# Patient Record
Sex: MALE | Race: WHITE | NOT HISPANIC OR LATINO | URBAN - METROPOLITAN AREA
[De-identification: names, ages, dates, MRNs, and addresses within clinical notes are randomized per-mention and may not be internally consistent; named-entity substitution may affect disease eponyms.]

---

## 2021-04-16 ENCOUNTER — INPATIENT (INPATIENT)
Facility: HOSPITAL | Age: 86
LOS: 6 days | Discharge: HOME | DRG: 312 | End: 2021-04-23
Attending: HOSPITALIST | Admitting: HOSPITALIST
Payer: MEDICARE

## 2021-04-16 VITALS
WEIGHT: 151.9 LBS | DIASTOLIC BLOOD PRESSURE: 80 MMHG | HEART RATE: 75 BPM | HEIGHT: 71 IN | SYSTOLIC BLOOD PRESSURE: 137 MMHG | TEMPERATURE: 97 F | RESPIRATION RATE: 19 BRPM | OXYGEN SATURATION: 99 %

## 2021-04-16 RX ORDER — SODIUM CHLORIDE 9 MG/ML
500 INJECTION INTRAMUSCULAR; INTRAVENOUS; SUBCUTANEOUS ONCE
Refills: 0 | Status: COMPLETED | OUTPATIENT
Start: 2021-04-16 | End: 2021-04-16

## 2021-04-16 RX ADMIN — SODIUM CHLORIDE 500 MILLILITER(S): 9 INJECTION INTRAMUSCULAR; INTRAVENOUS; SUBCUTANEOUS at 23:35

## 2021-04-16 NOTE — ED PROVIDER NOTE - PHYSICAL EXAMINATION
PHYSICAL EXAM:   General: well-appearing, appears stated age, not in extremis   HEENT: NC/AT, PERRLA, EOMI without nystagmus, conjunctiva pink, airway patent  Cardiovascular: regular rate and rhythm, + S1/S2, no murmurs, rubs, gallops appreciated  Respiratory: clear to auscultation bilaterally, good aeration bilaterally, nonlabored respirations  Abdominal: soft, nontender, nondistended, no rebound, guarding or rigidity  Back: no rashes noted  Neuro: Alert and oriented x3. Moving all extremities. No focal sensory deficits. FTN WNL. No facial droop  Psychiatric: appropriate mood and affect.   Skin: pale appearing  -Hope Ng PGY-3 PHYSICAL EXAM:   General: well-appearing, appears stated age, not in extremis   HEENT: NC/AT, PERRLA, EOMI without nystagmus, conjunctiva pink, airway patent  Cardiovascular: regular rate and rhythm, + S1/S2, no murmurs, rubs, gallops appreciated  Respiratory: clear to auscultation bilaterally, good aeration bilaterally, nonlabored respirations  Abdominal: soft, nontender, nondistended, no rebound, guarding or rigidity  Back: no rashes noted  Neuro: Alert and oriented x3. Moving all extremities. No focal sensory deficits. FTN WNL. No facial droop  Psychiatric: appropriate mood and affect.   Skin: pale appearing, L groin (site of angioplasty) soft, nontender, minimal erythema  -Hope Ng PGY-3

## 2021-04-16 NOTE — ED PROVIDER NOTE - ATTENDING CONTRIBUTION TO CARE
attending Grace: 89yM reports no significant PMH, s/p ?angioplasty yesterday p/w lightheadedness today. Reports dizziness upon standing, denies falls. Nonfocal neuro exam, pale skin. Will obtain FSG, ekg, place on tele, labs, cxr, attempt to obtain collateral information from family/EMR, reassess

## 2021-04-16 NOTE — ED PROVIDER NOTE - CLINICAL SUMMARY MEDICAL DECISION MAKING FREE TEXT BOX
89 yo M with no significant PMH presents with dizziness s/p angioplasty yesterday. Unclear what type of angioplasty. Neuro nonfocal. Low suspicion for intracranial pathology given nonfocal neuro exam, EKG sinus with 1st degree AV block but will maintain on tele/get trop to eval for arrythmia vs NSTEMI. Will get screening UA to eval for pyelo/UTI given possible kidney stent. Will get labs, orthostatics, try to obtain collateral. dispo pending

## 2021-04-16 NOTE — ED PROVIDER NOTE - CARE PLAN
Principal Discharge DX:	Orthostatic hypotension  Secondary Diagnosis:	Dizziness  Secondary Diagnosis:	Fall

## 2021-04-16 NOTE — ED PROVIDER NOTE - OBJECTIVE STATEMENT
89 yo M with no significant PMH presents with dizziness s/p angioplasty yesterday. Per EMS, patient had cardiac angioplasty yesterday. However, patient states he DOES NOT have any cardiac stents, and the procedure yesterday was for the "kidney". He endorses "kidney problems" and states that he was in a "vascular lab" yesterday. Details of the procedure unclear, no past history in the chart. Both numbers in the patients chart do not work. Denies chest pain, sob, fever, hematuria, dysuria, diarrhea, cough. States dizziness is worse with standing, feels like he will fall over. No actual falls 89 yo M with no significant PMH presents with dizziness s/p angioplasty yesterday. Per EMS, patient had cardiac angioplasty yesterday. However, patient states he DOES NOT have any cardiac stents, and the procedure yesterday was for the "kidney". He endorses "kidney problems" and states that he was in a "vascular lab" yesterday. Details of the procedure unclear, no past history in the chart. Both numbers in the patients chart do not work. Denies chest pain, sob, fever, hematuria, dysuria, diarrhea, cough. States dizziness is worse with standing, feels like he will fall over. No actual falls. Patient unable to provide information for wife. Unable to provide information for physician who performed the procedure 89 yo M with no significant PMH presents with dizziness s/p angioplasty yesterday. Per EMS, patient had cardiac angioplasty yesterday. However, patient states he DOES NOT have any cardiac stents, and the procedure yesterday was for the "kidney". He endorses "kidney problems" and states that he was in a "vascular lab" yesterday. Details of the procedure unclear, no past history in the chart. Both numbers in the patients chart do not work. Denies chest pain, sob, fever, hematuria, dysuria, diarrhea, cough. States dizziness is worse with standing, feels like he will fall over. No actual falls. Patient unable to provide wife's contact information. Unable to provide information for physician who performed the procedure

## 2021-04-16 NOTE — ED PROVIDER NOTE - PROGRESS NOTE DETAILS
Hope Ng MD PGY-3 spoke with wife Shanelle Callahan [phone], states that yesterday patient had LE angiogram (Dr. kilgore and Dr. Rommel Arceo) [phone] [address] [facility] [address]. Started on Plavix yesterday    This AM, patient was getting out of bed, couldn't reach banister, found on floor. Wife could not lift him. Called EMS.    Patient also has history of kidney disease (follows with Dr. sutherland). Walks with walker/cane at baseline. Has been ambulatory since fall.     patient has hx only of scoliosis and prostate CA, not currently on chemo or radiation. States that he has had increased weakness over the last few months, seen by PCP Hope Ng MD PGY-3 patient re-examined. endorses chronic c-spine arthritic pain but no new c-spine tenderness, no midline T/L/S spine tenderness. No chest wall tenderness. Full ROM of upper and lower extremities. Endorses R shoulder pain but with full ROM, no gross deformities. Xray shoulder added Hope Ng MD PGY-3  Patient refusing CT head. States "I did not hit my head", states he cannot lay flat 2/2 arthritis, despite tylenol. states he normally does not have to take anything for his arthritis because he does not put himself in positions that hurt. Patient is A&O x 3, no history of cognitive impairment, has capacity. Refusing CT and verbalizing risk of potential deterioration/death given concern for potential brain bleed on CT in setting of recently starting on plavix, having unwitnessed fall and dizziness. Sign out follow-up: +Orthostatic VS. Likely source of patient's feeling unbalanced with standing. Will give more hydration. Hgb 9.9, no comparison in system. Left groin non-tender, palpable femoral and pedal pulses. Pt reports angioplasty was done because he had a "cold blue left foot". Pt was resistant to get CT Head previously as he felt pain in his low back (arthritis and kyphosis) when technicians laid him on table. Pt A&Ox3 and adamant against CT because of pain when laying flat. When examiner inquired about advanced directives, pt answered he would like to be Full Code but when asked if patient would except brain surgery if he had ICH, he stated he would not. Made compromise with patient to try pain medication and trial of laying flat in stretcher, if successful then CT. CTH as fall on Plavix, however, given hypotension s/p left fem angioplasty will need CTA r/o bleeding. FABIO. Hope Ng MD PGY-3 spoke with pcp martin. states that if patient needs admission, he should be admitted to [facility] Sign out follow-up: +Orthostatic VS. Likely source of patient's feeling unbalanced with standing. Will give more hydration. Hgb 9.9, no comparison in system. Left groin non-tender, palpable femoral and pedal pulses. Pt reports angioplasty was done because he had a "cold blue left foot". Pt was resistant to get CT Head previously as he felt pain in his low back (arthritis and kyphosis) when technicians laid him on table. Pt A&Ox3 and adamant against CT because of pain when laying flat. When examiner inquired about advanced directives, pt answered he would like to be Full Code but when asked if patient would except brain surgery if he had ICH, he stated he would not. Made compromise with patient to try pain medication and trial of laying flat in stretcher, if successful then CT. CTH as fall on Plavix, however, given hypotension s/p left fem angioplasty will need CTA r/o bleeding. Pt asymptomatic when in bed but when stood up at bedside immediately dizzy and was not able to hold position. FABIO. Hope Ng MD PGY-3 CTA and CT head nonactionable. [facility] trop stable, no arrythmia, no tele monitoring indicated at this point

## 2021-04-17 DIAGNOSIS — I95.1 ORTHOSTATIC HYPOTENSION: ICD-10-CM

## 2021-04-17 DIAGNOSIS — R62.7 ADULT FAILURE TO THRIVE: ICD-10-CM

## 2021-04-17 DIAGNOSIS — I73.9 PERIPHERAL VASCULAR DISEASE, UNSPECIFIED: ICD-10-CM

## 2021-04-17 DIAGNOSIS — N18.30 CHRONIC KIDNEY DISEASE, STAGE 3 UNSPECIFIED: ICD-10-CM

## 2021-04-17 LAB
ALBUMIN SERPL ELPH-MCNC: 3.9 G/DL — SIGNIFICANT CHANGE UP (ref 3.3–5)
ALP SERPL-CCNC: 70 U/L — SIGNIFICANT CHANGE UP (ref 40–120)
ALT FLD-CCNC: 6 U/L — LOW (ref 10–45)
ANION GAP SERPL CALC-SCNC: 13 MMOL/L — SIGNIFICANT CHANGE UP (ref 5–17)
APPEARANCE UR: CLEAR — SIGNIFICANT CHANGE UP
APTT BLD: 24.9 SEC — LOW (ref 27.5–35.5)
AST SERPL-CCNC: 26 U/L — SIGNIFICANT CHANGE UP (ref 10–40)
BACTERIA # UR AUTO: NEGATIVE — SIGNIFICANT CHANGE UP
BASOPHILS # BLD AUTO: 0.05 K/UL — SIGNIFICANT CHANGE UP (ref 0–0.2)
BASOPHILS NFR BLD AUTO: 0.8 % — SIGNIFICANT CHANGE UP (ref 0–2)
BILIRUB SERPL-MCNC: 0.4 MG/DL — SIGNIFICANT CHANGE UP (ref 0.2–1.2)
BILIRUB UR-MCNC: NEGATIVE — SIGNIFICANT CHANGE UP
BUN SERPL-MCNC: 32 MG/DL — HIGH (ref 7–23)
CALCIUM SERPL-MCNC: 9.5 MG/DL — SIGNIFICANT CHANGE UP (ref 8.4–10.5)
CHLORIDE SERPL-SCNC: 103 MMOL/L — SIGNIFICANT CHANGE UP (ref 96–108)
CO2 SERPL-SCNC: 23 MMOL/L — SIGNIFICANT CHANGE UP (ref 22–31)
COLOR SPEC: YELLOW — SIGNIFICANT CHANGE UP
CREAT SERPL-MCNC: 1.83 MG/DL — HIGH (ref 0.5–1.3)
CULTURE RESULTS: NO GROWTH — SIGNIFICANT CHANGE UP
DIFF PNL FLD: ABNORMAL
EOSINOPHIL # BLD AUTO: 0.22 K/UL — SIGNIFICANT CHANGE UP (ref 0–0.5)
EOSINOPHIL NFR BLD AUTO: 3.5 % — SIGNIFICANT CHANGE UP (ref 0–6)
EPI CELLS # UR: 1 /HPF — SIGNIFICANT CHANGE UP
GLUCOSE SERPL-MCNC: 101 MG/DL — HIGH (ref 70–99)
GLUCOSE UR QL: ABNORMAL
HCT VFR BLD CALC: 29.9 % — LOW (ref 39–50)
HCT VFR BLD CALC: 31 % — LOW (ref 39–50)
HGB BLD-MCNC: 9.7 G/DL — LOW (ref 13–17)
HGB BLD-MCNC: 9.9 G/DL — LOW (ref 13–17)
HYALINE CASTS # UR AUTO: 1 /LPF — SIGNIFICANT CHANGE UP (ref 0–2)
INR BLD: 0.96 RATIO — SIGNIFICANT CHANGE UP (ref 0.88–1.16)
KETONES UR-MCNC: NEGATIVE — SIGNIFICANT CHANGE UP
LEUKOCYTE ESTERASE UR-ACNC: NEGATIVE — SIGNIFICANT CHANGE UP
LYMPHOCYTES # BLD AUTO: 0.39 K/UL — LOW (ref 1–3.3)
LYMPHOCYTES # BLD AUTO: 6.1 % — LOW (ref 13–44)
MCHC RBC-ENTMCNC: 30.7 PG — SIGNIFICANT CHANGE UP (ref 27–34)
MCHC RBC-ENTMCNC: 31.9 GM/DL — LOW (ref 32–36)
MCV RBC AUTO: 96.3 FL — SIGNIFICANT CHANGE UP (ref 80–100)
MONOCYTES # BLD AUTO: 0.89 K/UL — SIGNIFICANT CHANGE UP (ref 0–0.9)
MONOCYTES NFR BLD AUTO: 13.9 % — SIGNIFICANT CHANGE UP (ref 2–14)
NEUTROPHILS # BLD AUTO: 4.61 K/UL — SIGNIFICANT CHANGE UP (ref 1.8–7.4)
NEUTROPHILS NFR BLD AUTO: 72.2 % — SIGNIFICANT CHANGE UP (ref 43–77)
NITRITE UR-MCNC: NEGATIVE — SIGNIFICANT CHANGE UP
PH UR: 6.5 — SIGNIFICANT CHANGE UP (ref 5–8)
PLATELET # BLD AUTO: 170 K/UL — SIGNIFICANT CHANGE UP (ref 150–400)
POTASSIUM SERPL-MCNC: 4.2 MMOL/L — SIGNIFICANT CHANGE UP (ref 3.5–5.3)
POTASSIUM SERPL-SCNC: 4.2 MMOL/L — SIGNIFICANT CHANGE UP (ref 3.5–5.3)
PROT SERPL-MCNC: 6.7 G/DL — SIGNIFICANT CHANGE UP (ref 6–8.3)
PROT UR-MCNC: ABNORMAL
PROTHROM AB SERPL-ACNC: 11.5 SEC — SIGNIFICANT CHANGE UP (ref 10.6–13.6)
RBC # BLD: 3.22 M/UL — LOW (ref 4.2–5.8)
RBC # FLD: 12.3 % — SIGNIFICANT CHANGE UP (ref 10.3–14.5)
RBC CASTS # UR COMP ASSIST: 1 /HPF — SIGNIFICANT CHANGE UP (ref 0–4)
SARS-COV-2 RNA SPEC QL NAA+PROBE: SIGNIFICANT CHANGE UP
SODIUM SERPL-SCNC: 139 MMOL/L — SIGNIFICANT CHANGE UP (ref 135–145)
SP GR SPEC: 1.03 — HIGH (ref 1.01–1.02)
SPECIMEN SOURCE: SIGNIFICANT CHANGE UP
TROPONIN T, HIGH SENSITIVITY RESULT: 49 NG/L — SIGNIFICANT CHANGE UP (ref 0–51)
TROPONIN T, HIGH SENSITIVITY RESULT: 49 NG/L — SIGNIFICANT CHANGE UP (ref 0–51)
UROBILINOGEN FLD QL: NEGATIVE — SIGNIFICANT CHANGE UP
WBC # BLD: 6.39 K/UL — SIGNIFICANT CHANGE UP (ref 3.8–10.5)
WBC # FLD AUTO: 6.39 K/UL — SIGNIFICANT CHANGE UP (ref 3.8–10.5)
WBC UR QL: 1 /HPF — SIGNIFICANT CHANGE UP (ref 0–5)

## 2021-04-17 PROCEDURE — 70450 CT HEAD/BRAIN W/O DYE: CPT | Mod: 26,MA

## 2021-04-17 PROCEDURE — 73030 X-RAY EXAM OF SHOULDER: CPT | Mod: 26,RT

## 2021-04-17 PROCEDURE — 74174 CTA ABD&PLVS W/CONTRAST: CPT | Mod: 26,MA

## 2021-04-17 PROCEDURE — 71045 X-RAY EXAM CHEST 1 VIEW: CPT | Mod: 26

## 2021-04-17 PROCEDURE — 72170 X-RAY EXAM OF PELVIS: CPT | Mod: 26

## 2021-04-17 RX ORDER — CLOPIDOGREL BISULFATE 75 MG/1
75 TABLET, FILM COATED ORAL DAILY
Refills: 0 | Status: DISCONTINUED | OUTPATIENT
Start: 2021-04-17 | End: 2021-04-23

## 2021-04-17 RX ORDER — FENTANYL CITRATE 50 UG/ML
25 INJECTION INTRAVENOUS ONCE
Refills: 0 | Status: DISCONTINUED | OUTPATIENT
Start: 2021-04-17 | End: 2021-04-17

## 2021-04-17 RX ORDER — POLYETHYLENE GLYCOL 3350 17 G/17G
17 POWDER, FOR SOLUTION ORAL DAILY
Refills: 0 | Status: DISCONTINUED | OUTPATIENT
Start: 2021-04-17 | End: 2021-04-23

## 2021-04-17 RX ORDER — ACETAMINOPHEN 500 MG
650 TABLET ORAL EVERY 6 HOURS
Refills: 0 | Status: DISCONTINUED | OUTPATIENT
Start: 2021-04-17 | End: 2021-04-23

## 2021-04-17 RX ORDER — SENNA PLUS 8.6 MG/1
2 TABLET ORAL AT BEDTIME
Refills: 0 | Status: DISCONTINUED | OUTPATIENT
Start: 2021-04-17 | End: 2021-04-23

## 2021-04-17 RX ORDER — HEPARIN SODIUM 5000 [USP'U]/ML
5000 INJECTION INTRAVENOUS; SUBCUTANEOUS EVERY 8 HOURS
Refills: 0 | Status: DISCONTINUED | OUTPATIENT
Start: 2021-04-17 | End: 2021-04-23

## 2021-04-17 RX ORDER — SODIUM CHLORIDE 9 MG/ML
1000 INJECTION INTRAMUSCULAR; INTRAVENOUS; SUBCUTANEOUS
Refills: 0 | Status: DISCONTINUED | OUTPATIENT
Start: 2021-04-17 | End: 2021-04-19

## 2021-04-17 RX ORDER — ATORVASTATIN CALCIUM 80 MG/1
10 TABLET, FILM COATED ORAL AT BEDTIME
Refills: 0 | Status: DISCONTINUED | OUTPATIENT
Start: 2021-04-17 | End: 2021-04-23

## 2021-04-17 RX ORDER — SERTRALINE 25 MG/1
50 TABLET, FILM COATED ORAL DAILY
Refills: 0 | Status: DISCONTINUED | OUTPATIENT
Start: 2021-04-17 | End: 2021-04-23

## 2021-04-17 RX ORDER — ASPIRIN/CALCIUM CARB/MAGNESIUM 324 MG
81 TABLET ORAL DAILY
Refills: 0 | Status: DISCONTINUED | OUTPATIENT
Start: 2021-04-17 | End: 2021-04-23

## 2021-04-17 RX ORDER — ACETAMINOPHEN 500 MG
650 TABLET ORAL ONCE
Refills: 0 | Status: COMPLETED | OUTPATIENT
Start: 2021-04-17 | End: 2021-04-17

## 2021-04-17 RX ORDER — SODIUM CHLORIDE 9 MG/ML
1000 INJECTION INTRAMUSCULAR; INTRAVENOUS; SUBCUTANEOUS ONCE
Refills: 0 | Status: COMPLETED | OUTPATIENT
Start: 2021-04-17 | End: 2021-04-17

## 2021-04-17 RX ADMIN — ATORVASTATIN CALCIUM 10 MILLIGRAM(S): 80 TABLET, FILM COATED ORAL at 21:46

## 2021-04-17 RX ADMIN — SODIUM CHLORIDE 75 MILLILITER(S): 9 INJECTION INTRAMUSCULAR; INTRAVENOUS; SUBCUTANEOUS at 18:00

## 2021-04-17 RX ADMIN — Medication 81 MILLIGRAM(S): at 13:30

## 2021-04-17 RX ADMIN — CLOPIDOGREL BISULFATE 75 MILLIGRAM(S): 75 TABLET, FILM COATED ORAL at 13:27

## 2021-04-17 RX ADMIN — FENTANYL CITRATE 25 MICROGRAM(S): 50 INJECTION INTRAVENOUS at 04:55

## 2021-04-17 RX ADMIN — SERTRALINE 50 MILLIGRAM(S): 25 TABLET, FILM COATED ORAL at 18:00

## 2021-04-17 RX ADMIN — Medication 650 MILLIGRAM(S): at 02:03

## 2021-04-17 RX ADMIN — HEPARIN SODIUM 5000 UNIT(S): 5000 INJECTION INTRAVENOUS; SUBCUTANEOUS at 13:27

## 2021-04-17 RX ADMIN — HEPARIN SODIUM 5000 UNIT(S): 5000 INJECTION INTRAVENOUS; SUBCUTANEOUS at 21:47

## 2021-04-17 RX ADMIN — SENNA PLUS 2 TABLET(S): 8.6 TABLET ORAL at 21:46

## 2021-04-17 RX ADMIN — FENTANYL CITRATE 25 MICROGRAM(S): 50 INJECTION INTRAVENOUS at 03:38

## 2021-04-17 RX ADMIN — SODIUM CHLORIDE 1000 MILLILITER(S): 9 INJECTION INTRAMUSCULAR; INTRAVENOUS; SUBCUTANEOUS at 03:21

## 2021-04-17 RX ADMIN — SODIUM CHLORIDE 75 MILLILITER(S): 9 INJECTION INTRAMUSCULAR; INTRAVENOUS; SUBCUTANEOUS at 09:26

## 2021-04-17 RX ADMIN — SODIUM CHLORIDE 500 MILLILITER(S): 9 INJECTION INTRAMUSCULAR; INTRAVENOUS; SUBCUTANEOUS at 00:29

## 2021-04-17 RX ADMIN — Medication 650 MILLIGRAM(S): at 03:04

## 2021-04-17 RX ADMIN — SODIUM CHLORIDE 1000 MILLILITER(S): 9 INJECTION INTRAMUSCULAR; INTRAVENOUS; SUBCUTANEOUS at 04:55

## 2021-04-17 RX ADMIN — POLYETHYLENE GLYCOL 3350 17 GRAM(S): 17 POWDER, FOR SOLUTION ORAL at 13:27

## 2021-04-17 NOTE — H&P ADULT - NSHPPHYSICALEXAM_GEN_ALL_CORE
Vital Signs Last 24 Hrs  T(C): 36.7 (17 Apr 2021 07:39), Max: 37 (16 Apr 2021 23:20)  T(F): 98 (17 Apr 2021 07:39), Max: 98.6 (16 Apr 2021 23:20)  HR: 68 (17 Apr 2021 07:39) (68 - 80)  BP: 132/64 (17 Apr 2021 07:39) (126/69 - 146/76)  BP(mean): 84 (17 Apr 2021 05:25) (84 - 91)  RR: 17 (17 Apr 2021 07:39) (15 - 20)  SpO2: 97% (17 Apr 2021 07:39) (97% - 100%)    PHYSICAL EXAM:  GENERAL: NAD, well-developed, comfortable  HEAD:  Atraumatic, Normocephalic  EYES: EOMI, PERRLA, conjunctiva and sclera clear  NECK: Supple, No JVD  CHEST/LUNG: Clear to auscultation bilaterally; No wheeze  HEART: Regular rate and rhythm; No murmurs, rubs, or gallops  ABDOMEN: Soft, Nontender, Nondistended; Bowel sounds present  Neuro: AAOx3, no focal weakness, 5/5 b/l extremity strength  EXTREMITIES:  2+ Peripheral Pulses, No clubbing, cyanosis, or edema  SKIN: No rashes or lesions

## 2021-04-17 NOTE — H&P ADULT - NSHPREVIEWOFSYSTEMS_GEN_ALL_CORE
General: no weakness, no fever/chills, no weight loss/gain  Skin/Breast: no rash, no jaundice  Ophthalmologic: no vision changes, no dry eyes   Respiratory and Thorax: no cough, no wheezing, no hemoptysis, no dyspnea  Cardiovascular: no chest pain, no shortness of breath, no orthopnea  Gastrointestinal: no n/v/d, no abdominal pain, no dysphagia   Genitourinary: no dysuria, no frequency, no nocturia, no hematuria  Musculoskeletal: no trauma, no sprain/strain, no myalgias, no arthralgias, no fracture, +chronic left hip pain  Neurological: no HA, +dizziness, +orthostasis, no weakness, no numbness  Psychiatric: no depression, no SI/HI  Hematology/Lymphatics: no easy bruising  Endocrine: no heat or cold intolerance. no weight gain or loss  Allergic/Immunologic: no allergy or recent reaction

## 2021-04-17 NOTE — H&P ADULT - NSHPLABSRESULTS_GEN_ALL_CORE
LABS:                        9.7    x     )-----------( x        ( 2021 03:43 )             29.9     04-16    139  |  103  |  32<H>  ----------------------------<  101<H>  4.2   |  23  |  1.83<H>    Ca    9.5      2021 23:47    TPro  6.7  /  Alb  3.9  /  TBili  0.4  /  DBili  x   /  AST  26  /  ALT  6<L>  /  AlkPhos  70  04-16    PT/INR - ( 2021 23:47 )   PT: 11.5 sec;   INR: 0.96 ratio         PTT - ( 2021 23:47 )  PTT:24.9 sec  CAPILLARY BLOOD GLUCOSE      POCT Blood Glucose.: 92 mg/dL (2021 22:59)        Urinalysis Basic - ( 2021 00:24 )    Color: Yellow / Appearance: Clear / S.029 / pH: x  Gluc: x / Ketone: Negative  / Bili: Negative / Urobili: Negative   Blood: x / Protein: 30 mg/dL / Nitrite: Negative   Leuk Esterase: Negative / RBC: 1 /hpf / WBC 1 /HPF   Sq Epi: x / Non Sq Epi: 1 /hpf / Bacteria: Negative        RADIOLOGY & ADDITIONAL TESTS:    Imaging Personally Reviewed:  [x] YES  [ ] NO    Consultant(s) Notes Reviewed:  [x] YES  [ ] NO    Care Discussed with Consultants/Other Providers [x] YES  [ ] NO

## 2021-04-17 NOTE — H&P ADULT - ASSESSMENT
89 yo M with PMHx of Prostate Ca, hx of CKD stage 3 (Cr ~ 1.8-1.9, sees Dr. Chan renal), chronic left hip pain presents with dizziness s/p angioplasty yesterday with Vascular Dr. Wade Urias at [facility] for cold left foot/claudication, found to have stenosis in left femoral artery, popliteal and tibial artery, s/p stent placed. States dizziness is worse with standing, feels like he will fall over. Per the wife patient was getting out of bed, couldn't reach banister, found on floor. Wife could not lift him. Called EMS. In ED noted to be with orthostatic hypotension. IVF given. Admitted to medicine. Baseline hgb 10-11.      ********************* Incomplete note *********************  89 yo M with PMHx of Prostate Ca, hx of CKD stage 3 (Cr ~ 1.8-1.9, sees Dr. Chan renal), chronic left hip pain presents with dizziness s/p angioplasty yesterday with Vascular Dr. Wade Urias at [facility] for cold left foot/claudication, found to have stenosis in left femoral artery, popliteal and tibial artery, s/p stent placed. States dizziness is worse with standing, feels like he will fall over. Per the wife patient was getting out of bed, couldn't reach banister, found on floor. Wife could not lift him. Called EMS. In ED noted to be with orthostatic hypotension. IVF given. Admitted to medicine. Baseline hgb 10-11.      Orthostatic hypotension  CKD stage 3  CKD induced anemia  Prostate Ca  chronic left hip pain  peripheral artery disease s/p stent    Plan:  Admitted for dizziness, s/p recent stent placement by vascular for PAD  UA neg, no signs of infection. Cardiac enzymes stable x 2. EKG unimpressive.  c/w gentle IVF, repeat orthostatics daily  Physical therapy. Out of bed to chair with assistance.   Anemia at baseline, likely CKD induced. check iron studies, b12, folic  CKD stage 3, stable Cr at baseline 1.8-1.9. Can follow up with his renal Dr. Chan  Chronic left hip pain, arthritis. hx of lfet hip replacement, pain control  DVT ppx    Pt of [facility] PCP Dr. Castillo.     - Dr. ZEV Willet ([facility])  - [phone]   ********************* Incomplete note *********************  89 yo M with PMHx of Prostate Ca, hx of CKD stage 3 (Cr ~ 1.8-1.9, sees Dr. Chan renal), chronic left hip pain presents with dizziness s/p angioplasty yesterday with Vascular Dr. Wade Urias at [facility] for cold left foot/claudication, found to have stenosis in left femoral artery, popliteal and tibial artery, s/p stent placed. States dizziness is worse with standing, feels like he will fall over. Per the wife patient was getting out of bed, couldn't reach banister, found on floor. Wife could not lift him. Called EMS. In ED noted to be with orthostatic hypotension. IVF given. Admitted to medicine. Baseline hgb 10-11.      Orthostatic hypotension  CKD stage 3  CKD induced anemia  Prostate Ca  chronic left hip pain  peripheral artery disease s/p stent    Plan:  Admitted for dizziness, s/p recent stent placement by vascular for PAD  UA neg, no signs of infection. Cardiac enzymes stable x 2. EKG unimpressive.  CT abd: No gross left groin or active arterial bleeding, CT head also negative.  c/w gentle IVF, repeat orthostatics daily  Physical therapy. Out of bed to chair with assistance.   check TTE. vit b12, vit D, TSH  Anemia at baseline, likely CKD induced. check iron studies, b12, folic  CKD stage 3, stable Cr at baseline 1.8-1.9. Can follow up with his renal Dr. Chan  Chronic left hip pain, arthritis. hx of lfet hip replacement, pain control  DVT ppx    Pt of [facility] PCP Dr. Castillo.     - Dr. ZEV Heredia ([facility])  - [phone]   ********************* Incomplete note *********************  89 yo M with PMHx of Prostate Ca, hx of CKD stage 3 (Cr ~ 1.8-1.9, sees Dr. Chan renal), chronic left hip pain presents with dizziness s/p angioplasty yesterday with Vascular Dr. Wade Urias at [facility] for cold left foot/claudication, found to have stenosis in left femoral artery, popliteal and tibial artery, s/p stent placed. Plavix started. States dizziness is worse with standing, feels like he will fall over. Per the wife patient was getting out of bed, couldn't reach banister, found on floor. Wife could not lift him. Called EMS. In ED noted to be with orthostatic hypotension. IVF given. Admitted to medicine. Baseline hgb 10-11.      Orthostatic hypotension  CKD stage 3  CKD induced anemia  Prostate Ca  chronic left hip pain  peripheral artery disease s/p stent  depression on Sertraline   Constipation on senna and Miralax     Plan:  Admitted for dizziness, s/p recent stent placement by vascular for PAD, newly started Plavix 75 mg.   UA neg, no signs of infection. Cardiac enzymes stable x 2. EKG unimpressive.  CT abd: No gross left groin or active arterial bleeding, CT head also negative.  c/w gentle IVF, repeat orthostatics daily  Physical therapy. Out of bed to chair with assistance.   check TTE. vit b12, vit D, TSH  Anemia at baseline, likely CKD induced. check iron studies, b12, folic  CKD stage 3, stable Cr at baseline 1.8-1.9. Can follow up with his renal Dr. Chan  Chronic left hip pain, arthritis. hx of left hip replacement, pain control.  c/w Sertraline for depression   DVT ppx    (d/w the wife Shanelle. Pt has been feeling weak, difficulty walking x 6 weeks. Saw podiatry for toe nails, who referred him to vascular. Had MRA and vascular stent as above.   After post vascular procedure, walking is better, hence, started walking more, and fell. +dizziness.   baseline walk with walker. The wife Shanelle takes care of him. Pt was supposed to see physical therapy but hasn't been going due to covid pandemic.   Advance directives discussed.  Goals of care discussed. All questions answered.   Full code. Advance care planning time: approximately 30 mins spent discussing goals of care.)    Pt of [facility] PCP Dr. Castillo.     - [facility]  - [phone]

## 2021-04-17 NOTE — ED ADULT NURSE REASSESSMENT NOTE - NS ED NURSE REASSESS COMMENT FT1
Pt resting comfortably in bed, no c/o pain or discomfort. No s/s distress. On cardiac monitor, indicating NSR. PIV patent and flushing w/o difficulty, no s/s infection/infiltration. Pt assisted w/ turning and positioning. Awaiting results of CT. Fall precautions in place. All needs met. Safety and comfort measures provided. Bed locked and in lowest position, side rails up for safety. Call bell within reach.

## 2021-04-17 NOTE — CONSULT NOTE ADULT - SUBJECTIVE AND OBJECTIVE BOX
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 Dizziness was worse with standing, felt like he would fall over. Wife found him on the floor. No head trauma. No syncope. NO palpitations.      PAST MEDICAL & SURGICAL HISTORY:      MEDICATIONS  (STANDING):  clopidogrel Tablet 75 milliGRAM(s) Oral daily  heparin   Injectable 5000 Unit(s) SubCutaneous every 8 hours  polyethylene glycol 3350 17 Gram(s) Oral daily  senna 2 Tablet(s) Oral at bedtime  sertraline 50 milliGRAM(s) Oral daily  sodium chloride 0.9%. 1000 milliLiter(s) (75 mL/Hr) IV Continuous <Continuous>    MEDICATIONS  (PRN):  acetaminophen   Tablet .. 650 milliGRAM(s) Oral every 6 hours PRN Mild Pain (1 - 3), Moderate Pain (4 - 6)      Allergies    No Known Allergies    Intolerances        Social Histroy: Tobacco- , ETOH-, Illicit Drugs-    T(C): 36.6 (21 @ 09:38), Max: 37 (21 @ 23:20)  HR: 68 (21 @ 09:38) (68 - 80)  BP: 137/65 (21 @ 09:38) (126/69 - 146/76)  RR: 18 (21 @ 09:38) (15 - 20)  SpO2: 96% (21 @ 09:38) (96% - 100%)  I&O's Summary      Review of Systems:  Constitutional: [ ] Fever [ ] Chills [ ] Fatigue [ ] Weight change   HEENT: [ ] Blurred vision [ ] Eye Pain [ ] Headache [ ] Runny nose [ ] Sore Throat   Respiratory: [ ] Cough [ ] Wheezing [ ] Shortness of breath  Cardiovascular: [ ] Chest Pain [ ] Palpitations [ ] DICK [ ] PND [ ] Orthopnea [x] Dizziness  Gastrointestinal: [ ] Abdominal Pain [ ] Diarrhea [ ] Constipation [ ] Hemorrhoids [ ] Nausea [ ] Vomiting  Genitourinary: [ ] Nocturia [ ] Dysuria [ ] Incontinence  Extremities: [ ] Swelling [ ] Joint Pain  Neurologic: [ ] Focal deficit [ ] Paresthesias [ ] Syncope  Lymphatic: [ ] Swelling [ ] Lymphadenopathy   Skin: [ ] Rash [ ] Ecchymoses [ ] Wounds [ ] Lesions  Psychiatry: [ ] Depression [ ] Suicidal/Homicidal Ideation [ ] Anxiety [ ] Sleep Disturbances  [x ] 10 point review of systems is otherwise negative except as mentioned above            [ ]Unable to obtain    PHYSICAL EXAM:  GENERAL: Alert, NAD  NECK: Supple, No JVD, No carotid bruit.  CHEST/LUNG: Clear to auscultation bilaterally; No wheezes, rales, or rhonchi  HEART: S1 S2 normal, RRR,  No murmurs, rubs, or gallops  ABDOMEN: Soft, Nontender, Nondistended; Bowel sounds present  EXTREMITIES:  + peripheral cyanosis.      LABS:                        9.7    x     )-----------( x        ( 2021 03:43 )             29.9     04-16    139  |  103  |  32<H>  ----------------------------<  101<H>  4.2   |  23  |  1.83<H>    Ca    9.5      2021 23:47    TPro  6.7  /  Alb  3.9  /  TBili  0.4  /  DBili  x   /  AST  26  /  ALT  6<L>  /  AlkPhos  70  04-16    PT/INR - ( 2021 23:47 )   PT: 11.5 sec;   INR: 0.96 ratio         PTT - ( 2021 23:47 )  PTT:24.9 sec          Urinalysis Basic - ( 2021 00:24 )    Color: Yellow / Appearance: Clear / S.029 / pH: x  Gluc: x / Ketone: Negative  / Bili: Negative / Urobili: Negative   Blood: x / Protein: 30 mg/dL / Nitrite: Negative   Leuk Esterase: Negative / RBC: 1 /hpf / WBC 1 /HPF   Sq Epi: x / Non Sq Epi: 1 /hpf / Bacteria: Negative        MEDICATIONS  (STANDING):  clopidogrel Tablet 75 milliGRAM(s) Oral daily  heparin   Injectable 5000 Unit(s) SubCutaneous every 8 hours  polyethylene glycol 3350 17 Gram(s) Oral daily  senna 2 Tablet(s) Oral at bedtime  sertraline 50 milliGRAM(s) Oral daily  sodium chloride 0.9%. 1000 milliLiter(s) (75 mL/Hr) IV Continuous <Continuous>    MEDICATIONS  (PRN):  acetaminophen   Tablet .. 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 Dizziness was worse with standing, felt like he would fall over. Wife found him on the floor. No head trauma. No syncope. NO palpitations.      PAST MEDICAL & SURGICAL HISTORY:      MEDICATIONS  (STANDING):  clopidogrel Tablet 75 milliGRAM(s) Oral daily  heparin   Injectable 5000 Unit(s) SubCutaneous every 8 hours  polyethylene glycol 3350 17 Gram(s) Oral daily  senna 2 Tablet(s) Oral at bedtime  sertraline 50 milliGRAM(s) Oral daily  sodium chloride 0.9%. 1000 milliLiter(s) (75 mL/Hr) IV Continuous <Continuous>    MEDICATIONS  (PRN):  acetaminophen   Tablet .. 650 milliGRAM(s) Oral every 6 hours PRN Mild Pain (1 - 3), Moderate Pain (4 - 6)      Allergies    No Known Allergies    Intolerances        Social Histroy: Tobacco- , ETOH-, Illicit Drugs-    T(C): 36.6 (21 @ 09:38), Max: 37 (21 @ 23:20)  HR: 68 (21 @ 09:38) (68 - 80)  BP: 137/65 (21 @ 09:38) (126/69 - 146/76)  RR: 18 (21 @ 09:38) (15 - 20)  SpO2: 96% (21 @ 09:38) (96% - 100%)  I&O's Summary      Review of Systems:  Constitutional: [ ] Fever [ ] Chills [ ] Fatigue [ ] Weight change   HEENT: [ ] Blurred vision [ ] Eye Pain [ ] Headache [ ] Runny nose [ ] Sore Throat   Respiratory: [ ] Cough [ ] Wheezing [ ] Shortness of breath  Cardiovascular: [ ] Chest Pain [ ] Palpitations [ ] DICK [ ] PND [ ] Orthopnea [x] Dizziness  Gastrointestinal: [ ] Abdominal Pain [ ] Diarrhea [ ] Constipation [ ] Hemorrhoids [ ] Nausea [ ] Vomiting  Genitourinary: [ ] Nocturia [ ] Dysuria [ ] Incontinence  Extremities: [ ] Swelling [ ] Joint Pain  Neurologic: [ ] Focal deficit [ ] Paresthesias [ ] Syncope  Lymphatic: [ ] Swelling [ ] Lymphadenopathy   Skin: [ ] Rash [ ] Ecchymoses [ ] Wounds [ ] Lesions  Psychiatry: [ ] Depression [ ] Suicidal/Homicidal Ideation [ ] Anxiety [ ] Sleep Disturbances  [x ] 10 point review of systems is otherwise negative except as mentioned above            [ ]Unable to obtain    PHYSICAL EXAM:  GENERAL: Alert, NAD  NECK: Supple, No JVD, No carotid bruit.  CHEST/LUNG: Clear to auscultation bilaterally; No wheezes, rales, or rhonchi  HEART: S1 S2 normal, RRR,  No murmurs, rubs, or gallops  ABDOMEN: Soft, Nontender, Nondistended; Bowel sounds present  EXTREMITIES:  2+ DP / PT pulses b/l. Trace ankle edema b/l    LABS:                        9.7    x     )-----------( x        ( 2021 03:43 )             29.9     04-16    139  |  103  |  32<H>  ----------------------------<  101<H>  4.2   |  23  |  1.83<H>    Ca    9.5      2021 23:47    TPro  6.7  /  Alb  3.9  /  TBili  0.4  /  DBili  x   /  AST  26  /  ALT  6<L>  /  AlkPhos  70  04-16    PT/INR - ( 2021 23:47 )   PT: 11.5 sec;   INR: 0.96 ratio         PTT - ( 2021 23:47 )  PTT:24.9 sec          Urinalysis Basic - ( 2021 00:24 )    Color: Yellow / Appearance: Clear / S.029 / pH: x  Gluc: x / Ketone: Negative  / Bili: Negative / Urobili: Negative   Blood: x / Protein: 30 mg/dL / Nitrite: Negative   Leuk Esterase: Negative / RBC: 1 /hpf / WBC 1 /HPF   Sq Epi: x / Non Sq Epi: 1 /hpf / Bacteria: Negative        MEDICATIONS  (STANDING):  clopidogrel Tablet 75 milliGRAM(s) Oral daily  heparin   Injectable 5000 Unit(s) SubCutaneous every 8 hours  polyethylene glycol 3350 17 Gram(s) Oral daily  senna 2 Tablet(s) Oral at bedtime  sertraline 50 milliGRAM(s) Oral daily  sodium chloride 0.9%. 1000 milliLiter(s) (75 mL/Hr) IV Continuous <Continuous>    MEDICATIONS  (PRN):  acetaminophen   Tablet .. 650 milliGRAM(s) Oral every 6 hours PRN Mild Pain (1 - 3), Moderate Pain (4 - 6)      RADIOLOGY & ADDITIONAL TESTS:    Cardiology testing:  EKG:    Echo:    Stress Testing:    Cath:    Telemetry:

## 2021-04-17 NOTE — ED ADULT NURSE NOTE - OBJECTIVE STATEMENT
Pt is a 89y/o male A&Ox3 speaking coherently unknown ambulation status BIBEMS w/ c/o doziness s/p angioplasty yesterday. Pt denies any medical hx. States he has hx of a L hip replacement. EMS stated pt had cardiac angioplasty but pt states he does NOT have any cardiac stents. Pt has a dressing on LLQ which pt states if from his procedure yesterday done by vascular for his "kidneys." Pt is poor historian, states he has "kidney problems." Says dizziness is worse when standing and feels like he is going to fall over, denies any recent falls, hitting head, or LOC. Fall precautions in place. Pt placed on cardiac monitor, indicating NSR. Sating well on RA. PIV access obtained. Denies headache, vision changes, chest pain, shortness of breath, abdominal pain, nausea, vomiting, diarrhea, fevers, chills, dysuria, hematuria. Safety and comfort measures provided. Bed locked and in lowest position, side rails up for safety. Call bell within reach.

## 2021-04-17 NOTE — H&P ADULT - HISTORY OF PRESENT ILLNESS
89 yo M with PMHx of Prostate Ca, hx of CKD stage 3 (Cr ~ 1.8-1.9, sees Dr. Chan renal), chronic left hip pain presents with dizziness s/p angioplasty yesterday with Vascular Dr. 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 States dizziness is worse with standing, feels like he will fall over. Per the wife patient was getting out of bed, couldn't reach banister, found on floor. Wife could not lift him. Called EMS. In ED noted to be with orthostatic hypotension. IVF given. Admitted to medicine. Baseline hgb 10-11.

## 2021-04-17 NOTE — CONSULT NOTE ADULT - PROBLEM SELECTOR RECOMMENDATION 4
-  - chronic, stable    DVT ppx     will follow.    Patrick Castillo M.D., [facility]  [phone] -  - chronic, stable    DVT ppx. OOB. PT. DC planning.    will follow.    Patrick Castillo M.D., [facility]  [phone]

## 2021-04-17 NOTE — CONSULT NOTE ADULT - ASSESSMENT
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

## 2021-04-18 LAB
24R-OH-CALCIDIOL SERPL-MCNC: 22.5 NG/ML — LOW (ref 30–80)
ANION GAP SERPL CALC-SCNC: 9 MMOL/L — SIGNIFICANT CHANGE UP (ref 5–17)
BUN SERPL-MCNC: 24 MG/DL — HIGH (ref 7–23)
CALCIUM SERPL-MCNC: 8.7 MG/DL — SIGNIFICANT CHANGE UP (ref 8.4–10.5)
CHLORIDE SERPL-SCNC: 108 MMOL/L — SIGNIFICANT CHANGE UP (ref 96–108)
CO2 SERPL-SCNC: 21 MMOL/L — LOW (ref 22–31)
COVID-19 SPIKE DOMAIN AB INTERP: POSITIVE
COVID-19 SPIKE DOMAIN ANTIBODY RESULT: >250 U/ML — HIGH
CREAT SERPL-MCNC: 1.73 MG/DL — HIGH (ref 0.5–1.3)
GLUCOSE SERPL-MCNC: 99 MG/DL — SIGNIFICANT CHANGE UP (ref 70–99)
HCT VFR BLD CALC: 33.2 % — LOW (ref 39–50)
HGB BLD-MCNC: 10.5 G/DL — LOW (ref 13–17)
MAGNESIUM SERPL-MCNC: 2 MG/DL — SIGNIFICANT CHANGE UP (ref 1.6–2.6)
MCHC RBC-ENTMCNC: 30.6 PG — SIGNIFICANT CHANGE UP (ref 27–34)
MCHC RBC-ENTMCNC: 31.6 GM/DL — LOW (ref 32–36)
MCV RBC AUTO: 96.8 FL — SIGNIFICANT CHANGE UP (ref 80–100)
NRBC # BLD: 0 /100 WBCS — SIGNIFICANT CHANGE UP (ref 0–0)
PLATELET # BLD AUTO: 165 K/UL — SIGNIFICANT CHANGE UP (ref 150–400)
POTASSIUM SERPL-MCNC: 4 MMOL/L — SIGNIFICANT CHANGE UP (ref 3.5–5.3)
POTASSIUM SERPL-SCNC: 4 MMOL/L — SIGNIFICANT CHANGE UP (ref 3.5–5.3)
RBC # BLD: 3.43 M/UL — LOW (ref 4.2–5.8)
RBC # FLD: 12.4 % — SIGNIFICANT CHANGE UP (ref 10.3–14.5)
SARS-COV-2 IGG+IGM SERPL QL IA: >250 U/ML — HIGH
SARS-COV-2 IGG+IGM SERPL QL IA: POSITIVE
SODIUM SERPL-SCNC: 138 MMOL/L — SIGNIFICANT CHANGE UP (ref 135–145)
TSH SERPL-MCNC: 5.67 UIU/ML — HIGH (ref 0.27–4.2)
VIT B12 SERPL-MCNC: 938 PG/ML — SIGNIFICANT CHANGE UP (ref 232–1245)
WBC # BLD: 10.17 K/UL — SIGNIFICANT CHANGE UP (ref 3.8–10.5)
WBC # FLD AUTO: 10.17 K/UL — SIGNIFICANT CHANGE UP (ref 3.8–10.5)

## 2021-04-18 RX ORDER — SODIUM CHLORIDE 9 MG/ML
1000 INJECTION INTRAMUSCULAR; INTRAVENOUS; SUBCUTANEOUS
Refills: 0 | Status: DISCONTINUED | OUTPATIENT
Start: 2021-04-18 | End: 2021-04-19

## 2021-04-18 RX ORDER — FLUDROCORTISONE ACETATE 0.1 MG/1
0.1 TABLET ORAL DAILY
Refills: 0 | Status: DISCONTINUED | OUTPATIENT
Start: 2021-04-18 | End: 2021-04-19

## 2021-04-18 RX ORDER — CHOLECALCIFEROL (VITAMIN D3) 125 MCG
1000 CAPSULE ORAL DAILY
Refills: 0 | Status: DISCONTINUED | OUTPATIENT
Start: 2021-04-18 | End: 2021-04-23

## 2021-04-18 RX ADMIN — SODIUM CHLORIDE 75 MILLILITER(S): 9 INJECTION INTRAMUSCULAR; INTRAVENOUS; SUBCUTANEOUS at 10:00

## 2021-04-18 RX ADMIN — FLUDROCORTISONE ACETATE 0.1 MILLIGRAM(S): 0.1 TABLET ORAL at 12:54

## 2021-04-18 RX ADMIN — CLOPIDOGREL BISULFATE 75 MILLIGRAM(S): 75 TABLET, FILM COATED ORAL at 12:54

## 2021-04-18 RX ADMIN — HEPARIN SODIUM 5000 UNIT(S): 5000 INJECTION INTRAVENOUS; SUBCUTANEOUS at 12:54

## 2021-04-18 RX ADMIN — Medication 1000 UNIT(S): at 13:01

## 2021-04-18 RX ADMIN — ATORVASTATIN CALCIUM 10 MILLIGRAM(S): 80 TABLET, FILM COATED ORAL at 21:21

## 2021-04-18 RX ADMIN — HEPARIN SODIUM 5000 UNIT(S): 5000 INJECTION INTRAVENOUS; SUBCUTANEOUS at 21:21

## 2021-04-18 RX ADMIN — SENNA PLUS 2 TABLET(S): 8.6 TABLET ORAL at 21:22

## 2021-04-18 RX ADMIN — HEPARIN SODIUM 5000 UNIT(S): 5000 INJECTION INTRAVENOUS; SUBCUTANEOUS at 05:12

## 2021-04-18 RX ADMIN — SERTRALINE 50 MILLIGRAM(S): 25 TABLET, FILM COATED ORAL at 12:54

## 2021-04-18 RX ADMIN — Medication 81 MILLIGRAM(S): at 12:54

## 2021-04-18 NOTE — PHYSICAL THERAPY INITIAL EVALUATION ADULT - TRANSFER TRAINING, PT EVAL
GOAL: Pt will perform ALL transfers with Supervision, w/use of appropriate assistive device as needed, in 2 weeks.

## 2021-04-18 NOTE — PHYSICAL THERAPY INITIAL EVALUATION ADULT - PRECAUTIONS/LIMITATIONS, REHAB EVAL
Per the wife, pt was getting out of bed, couldn't reach banister, found on floor. Wife could not lift him. Called EMS. In ED noted to be with orthostatic hypotension. R Shoulder XR 4/17: No acute fracture or dislocation. 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 Pelvis XR 4/17: No acute displaced fracture in the pelvis. No pubic symphysis diastasis. (-) CXR 4/17. CT Head 4/17: No CT evidence of acute intracranial hemorrhage, subdural collection, mass effect or calvarial fracture. CT Angio Abdomen & Pelvis 4/17: No gross left groin or active arterial bleeding though detailed evaluation is limited secondary to streak artifact from left hip arthroplasty and contrast within the bladder.  Consider correlation with left groin vascular ultrasound if there remains concern for hematoma. Atherosclerotic disease. Age-indeterminate mild compression fractures of T12 and L4 vertebral bodies./fall precautions

## 2021-04-18 NOTE — PHYSICAL THERAPY INITIAL EVALUATION ADULT - ACTIVE RANGE OF MOTION EXAMINATION, REHAB EVAL
linette. upper extremity Active ROM was WNL (within normal limits)/bilateral lower extremity Active ROM was WNL (within normal limits)

## 2021-04-18 NOTE — PROGRESS NOTE ADULT - PROBLEM SELECTOR PLAN 1
-  - remains orthostatic  - cont gentle IV fluid.  - will start florinef 0.1 mg  - echo as inpatient or outpatient.

## 2021-04-18 NOTE — PHYSICAL THERAPY INITIAL EVALUATION ADULT - DISCHARGE DISPOSITION, PT EVAL
TBD pending functional mobility and resolution of orthostatics. Recommending Subacute Rehab, pt in agreement. IF pt goes home pt will require assist with ALL ADLs and functional mobility, Home PT and will need 3:1 commode, transport W/C and transportation inside home as pt is unable to negotiate stairs safely at this time. RAFAEL June aware./rehabilitation facility

## 2021-04-18 NOTE — PHYSICAL THERAPY INITIAL EVALUATION ADULT - ADDITIONAL COMMENTS
Per pt's wife Shanelle, pt lives with wife in an apartment, 12 steps from driveway to 1st floor and addt'l 12 steps to bedroom, +B HR's. Previously req some assist with ADLs and ambulated with RW. Pt recently has been stairbumping but was able to go up/down stairs with handrails before. Has walk-in shower, +grab bars. DME: RW. Per pt's wife Shanelle, pt lives with wife in an apartment, 12 steps from driveway to 1st floor and addt'l 12 steps to bedroom, +B HR's. Previously req some assist with ADLs and ambulated with RW. Pt recently has been stairbumping but was able to go up/down stairs with handrails before. Has walk-in shower, +grab bars. DME: RW. Pt was going to Outpatient PT prior to admission (unsure when last visit was).

## 2021-04-18 NOTE — PHYSICAL THERAPY INITIAL EVALUATION ADULT - PERTINENT HX OF CURRENT PROBLEM, REHAB EVAL
89M w/ MHx of Prostate Ca, hx of CKD stage 3 (Cr ~ 1.8-1.9, sees Dr. Chan renal), chronic left hip pain p/w dizziness s/p angioplasty yesterday with Vascular Dr. 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 States dizziness is worse with standing, feels like he will fall over. CONT'D BELOW:

## 2021-04-18 NOTE — PROVIDER CONTACT NOTE (OTHER) - SITUATION
Patient complaining of dizziness & lightheadedness while sitting up on the side of bed & standing up. Patient unable to stand up long enough for blood pressure & heart rate assessment to be completed.

## 2021-04-18 NOTE — PHYSICAL THERAPY INITIAL EVALUATION ADULT - GAIT DEVIATIONS NOTED, PT EVAL
decreased mauricio/decreased step length forward trunk lean, excessive kyphosis/decreased mauricio/decreased step length

## 2021-04-18 NOTE — PROVIDER CONTACT NOTE (OTHER) - ASSESSMENT
Patient is Alert and Oriented times Four. Patient denies chest pain, discomfort & shortness of breath. Patient's Lying and Sitting Blood Pressure and Heart Rate Assessment Results: Lying: /59, HR 82. Sitting: BP 88/53, . Patient assisted back into bed and placed in a reclining position and patient states that his dizziness and lightheadedness resolved. Fall Precautions maintained. Bed alarm activated and audible.

## 2021-04-19 LAB
ANION GAP SERPL CALC-SCNC: 9 MMOL/L — SIGNIFICANT CHANGE UP (ref 5–17)
BUN SERPL-MCNC: 24 MG/DL — HIGH (ref 7–23)
CALCIUM SERPL-MCNC: 8.4 MG/DL — SIGNIFICANT CHANGE UP (ref 8.4–10.5)
CHLORIDE SERPL-SCNC: 108 MMOL/L — SIGNIFICANT CHANGE UP (ref 96–108)
CO2 SERPL-SCNC: 22 MMOL/L — SIGNIFICANT CHANGE UP (ref 22–31)
CREAT SERPL-MCNC: 1.66 MG/DL — HIGH (ref 0.5–1.3)
FERRITIN SERPL-MCNC: 370 NG/ML — SIGNIFICANT CHANGE UP (ref 30–400)
GLUCOSE SERPL-MCNC: 91 MG/DL — SIGNIFICANT CHANGE UP (ref 70–99)
IRON SATN MFR SERPL: 24 % — SIGNIFICANT CHANGE UP (ref 16–55)
IRON SATN MFR SERPL: 43 UG/DL — LOW (ref 45–165)
POTASSIUM SERPL-MCNC: 3.7 MMOL/L — SIGNIFICANT CHANGE UP (ref 3.5–5.3)
POTASSIUM SERPL-SCNC: 3.7 MMOL/L — SIGNIFICANT CHANGE UP (ref 3.5–5.3)
SODIUM SERPL-SCNC: 139 MMOL/L — SIGNIFICANT CHANGE UP (ref 135–145)
T4 AB SER-ACNC: 4.5 UG/DL — LOW (ref 4.6–12)
TIBC SERPL-MCNC: 180 UG/DL — LOW (ref 220–430)
UIBC SERPL-MCNC: 137 UG/DL — SIGNIFICANT CHANGE UP (ref 110–370)

## 2021-04-19 RX ORDER — FLUDROCORTISONE ACETATE 0.1 MG/1
0.1 TABLET ORAL ONCE
Refills: 0 | Status: COMPLETED | OUTPATIENT
Start: 2021-04-19 | End: 2021-04-19

## 2021-04-19 RX ORDER — FLUDROCORTISONE ACETATE 0.1 MG/1
0.2 TABLET ORAL DAILY
Refills: 0 | Status: DISCONTINUED | OUTPATIENT
Start: 2021-04-20 | End: 2021-04-23

## 2021-04-19 RX ORDER — SODIUM CHLORIDE 9 MG/ML
1000 INJECTION INTRAMUSCULAR; INTRAVENOUS; SUBCUTANEOUS
Refills: 0 | Status: DISCONTINUED | OUTPATIENT
Start: 2021-04-19 | End: 2021-04-21

## 2021-04-19 RX ADMIN — POLYETHYLENE GLYCOL 3350 17 GRAM(S): 17 POWDER, FOR SOLUTION ORAL at 13:23

## 2021-04-19 RX ADMIN — CLOPIDOGREL BISULFATE 75 MILLIGRAM(S): 75 TABLET, FILM COATED ORAL at 13:21

## 2021-04-19 RX ADMIN — HEPARIN SODIUM 5000 UNIT(S): 5000 INJECTION INTRAVENOUS; SUBCUTANEOUS at 05:15

## 2021-04-19 RX ADMIN — FLUDROCORTISONE ACETATE 0.1 MILLIGRAM(S): 0.1 TABLET ORAL at 13:25

## 2021-04-19 RX ADMIN — Medication 81 MILLIGRAM(S): at 13:23

## 2021-04-19 RX ADMIN — HEPARIN SODIUM 5000 UNIT(S): 5000 INJECTION INTRAVENOUS; SUBCUTANEOUS at 22:01

## 2021-04-19 RX ADMIN — Medication 1000 UNIT(S): at 13:24

## 2021-04-19 RX ADMIN — ATORVASTATIN CALCIUM 10 MILLIGRAM(S): 80 TABLET, FILM COATED ORAL at 22:00

## 2021-04-19 RX ADMIN — SERTRALINE 50 MILLIGRAM(S): 25 TABLET, FILM COATED ORAL at 13:22

## 2021-04-19 RX ADMIN — FLUDROCORTISONE ACETATE 0.1 MILLIGRAM(S): 0.1 TABLET ORAL at 05:15

## 2021-04-19 RX ADMIN — SENNA PLUS 2 TABLET(S): 8.6 TABLET ORAL at 22:00

## 2021-04-19 RX ADMIN — SODIUM CHLORIDE 60 MILLILITER(S): 9 INJECTION INTRAMUSCULAR; INTRAVENOUS; SUBCUTANEOUS at 15:13

## 2021-04-19 RX ADMIN — HEPARIN SODIUM 5000 UNIT(S): 5000 INJECTION INTRAVENOUS; SUBCUTANEOUS at 15:13

## 2021-04-19 NOTE — PROGRESS NOTE ADULT - PROBLEM SELECTOR PLAN 1
-  - remains orthostatic  - cont gentle IV fluid.  - increase florinef to 0.2 mg daily.  - echo as inpatient or outpatient.

## 2021-04-19 NOTE — DIETITIAN INITIAL EVALUATION ADULT. - OTHER INFO
Intake : >75%  Denies nausea/vomit :  Denies difficulty chewing /swallow :  Denies diarrhea/constipation:  Last BM : 3 days ago, agrees to prunes  NKFA  IBW +/- 10%= 172pounds  Ht: 71"  Ht taken from dosing  Dosing ht: 180.3cm  Usual Weight PTA: 154pounds  Dosing wt: 68.9kg  BMI: 20.3  BMI calculated using wt from flow sheet  BMI calculated using ht from pt  wt used to calculate needs: current  Education Provided : N/A  pressure injury: none  edema: none

## 2021-04-19 NOTE — DIETITIAN INITIAL EVALUATION ADULT. - PERTINENT MEDS FT
MEDICATIONS  (STANDING):  aspirin enteric coated 81 milliGRAM(s) Oral daily  atorvastatin 10 milliGRAM(s) Oral at bedtime  cholecalciferol 1000 Unit(s) Oral daily  clopidogrel Tablet 75 milliGRAM(s) Oral daily  heparin   Injectable 5000 Unit(s) SubCutaneous every 8 hours  polyethylene glycol 3350 17 Gram(s) Oral daily  senna 2 Tablet(s) Oral at bedtime  sertraline 50 milliGRAM(s) Oral daily  sodium chloride 0.9%. 1000 milliLiter(s) (60 mL/Hr) IV Continuous <Continuous>    MEDICATIONS  (PRN):  acetaminophen   Tablet .. 650 milliGRAM(s) Oral every 6 hours PRN Mild Pain (1 - 3), Moderate Pain (4 - 6)

## 2021-04-19 NOTE — DIETITIAN INITIAL EVALUATION ADULT. - ORAL INTAKE PTA/DIET HISTORY
cereal for breakfast, yogurt for lunch, mac and cheese for dinner. no snacks or supplements. cereal for breakfast, yogurt for lunch, mac and cheese for dinner. no snacks or supplements.  he reports that he eats more here than he eats at home.

## 2021-04-20 LAB
ANION GAP SERPL CALC-SCNC: 8 MMOL/L — SIGNIFICANT CHANGE UP (ref 5–17)
BUN SERPL-MCNC: 26 MG/DL — HIGH (ref 7–23)
CALCIUM SERPL-MCNC: 8.3 MG/DL — LOW (ref 8.4–10.5)
CHLORIDE SERPL-SCNC: 108 MMOL/L — SIGNIFICANT CHANGE UP (ref 96–108)
CO2 SERPL-SCNC: 23 MMOL/L — SIGNIFICANT CHANGE UP (ref 22–31)
CREAT SERPL-MCNC: 1.72 MG/DL — HIGH (ref 0.5–1.3)
GLUCOSE SERPL-MCNC: 93 MG/DL — SIGNIFICANT CHANGE UP (ref 70–99)
HCT VFR BLD CALC: 29.8 % — LOW (ref 39–50)
HGB BLD-MCNC: 9.3 G/DL — LOW (ref 13–17)
MCHC RBC-ENTMCNC: 30.9 PG — SIGNIFICANT CHANGE UP (ref 27–34)
MCHC RBC-ENTMCNC: 31.2 GM/DL — LOW (ref 32–36)
MCV RBC AUTO: 99 FL — SIGNIFICANT CHANGE UP (ref 80–100)
NRBC # BLD: 0 /100 WBCS — SIGNIFICANT CHANGE UP (ref 0–0)
PLATELET # BLD AUTO: 153 K/UL — SIGNIFICANT CHANGE UP (ref 150–400)
POTASSIUM SERPL-MCNC: 3.8 MMOL/L — SIGNIFICANT CHANGE UP (ref 3.5–5.3)
POTASSIUM SERPL-SCNC: 3.8 MMOL/L — SIGNIFICANT CHANGE UP (ref 3.5–5.3)
RBC # BLD: 3.01 M/UL — LOW (ref 4.2–5.8)
RBC # FLD: 12.7 % — SIGNIFICANT CHANGE UP (ref 10.3–14.5)
SODIUM SERPL-SCNC: 139 MMOL/L — SIGNIFICANT CHANGE UP (ref 135–145)
WBC # BLD: 5.37 K/UL — SIGNIFICANT CHANGE UP (ref 3.8–10.5)
WBC # FLD AUTO: 5.37 K/UL — SIGNIFICANT CHANGE UP (ref 3.8–10.5)

## 2021-04-20 PROCEDURE — 93306 TTE W/DOPPLER COMPLETE: CPT | Mod: 26

## 2021-04-20 RX ADMIN — HEPARIN SODIUM 5000 UNIT(S): 5000 INJECTION INTRAVENOUS; SUBCUTANEOUS at 05:10

## 2021-04-20 RX ADMIN — POLYETHYLENE GLYCOL 3350 17 GRAM(S): 17 POWDER, FOR SOLUTION ORAL at 13:48

## 2021-04-20 RX ADMIN — FLUDROCORTISONE ACETATE 0.2 MILLIGRAM(S): 0.1 TABLET ORAL at 05:10

## 2021-04-20 RX ADMIN — HEPARIN SODIUM 5000 UNIT(S): 5000 INJECTION INTRAVENOUS; SUBCUTANEOUS at 13:48

## 2021-04-20 RX ADMIN — CLOPIDOGREL BISULFATE 75 MILLIGRAM(S): 75 TABLET, FILM COATED ORAL at 13:48

## 2021-04-20 RX ADMIN — SERTRALINE 50 MILLIGRAM(S): 25 TABLET, FILM COATED ORAL at 13:48

## 2021-04-20 RX ADMIN — Medication 1000 UNIT(S): at 13:48

## 2021-04-20 RX ADMIN — Medication 81 MILLIGRAM(S): at 13:48

## 2021-04-20 RX ADMIN — HEPARIN SODIUM 5000 UNIT(S): 5000 INJECTION INTRAVENOUS; SUBCUTANEOUS at 22:34

## 2021-04-20 RX ADMIN — SENNA PLUS 2 TABLET(S): 8.6 TABLET ORAL at 22:34

## 2021-04-20 RX ADMIN — ATORVASTATIN CALCIUM 10 MILLIGRAM(S): 80 TABLET, FILM COATED ORAL at 22:34

## 2021-04-20 NOTE — PROGRESS NOTE ADULT - PROBLEM SELECTOR PLAN 1
-  - vitals stable  - florinef as ordered.  - echo as inpatient or outpatient. -  - vitals stable  - florinef as ordered.

## 2021-04-20 NOTE — PROGRESS NOTE ADULT - PROBLEM SELECTOR PLAN 4
-  - anemia chronic, stable.    - ok with discharge planning. can get tte as outpatient.     Jai Jay D.O.  [phone] -  - anemia chronic, stable.    TTE unremarkable.   ok with discharge planning from cardiac standpoint. will follow as needed.      Jai Jay D.O.  [phone]

## 2021-04-21 ENCOUNTER — TRANSCRIPTION ENCOUNTER (OUTPATIENT)
Age: 86
End: 2021-04-21

## 2021-04-21 LAB
ANION GAP SERPL CALC-SCNC: 7 MMOL/L — SIGNIFICANT CHANGE UP (ref 5–17)
BUN SERPL-MCNC: 29 MG/DL — HIGH (ref 7–23)
CALCIUM SERPL-MCNC: 8.6 MG/DL — SIGNIFICANT CHANGE UP (ref 8.4–10.5)
CHLORIDE SERPL-SCNC: 108 MMOL/L — SIGNIFICANT CHANGE UP (ref 96–108)
CO2 SERPL-SCNC: 21 MMOL/L — LOW (ref 22–31)
CREAT SERPL-MCNC: 1.73 MG/DL — HIGH (ref 0.5–1.3)
GLUCOSE SERPL-MCNC: 96 MG/DL — SIGNIFICANT CHANGE UP (ref 70–99)
PHOSPHATE SERPL-MCNC: 2.8 MG/DL — SIGNIFICANT CHANGE UP (ref 2.5–4.5)
POTASSIUM SERPL-MCNC: 3.8 MMOL/L — SIGNIFICANT CHANGE UP (ref 3.5–5.3)
POTASSIUM SERPL-SCNC: 3.8 MMOL/L — SIGNIFICANT CHANGE UP (ref 3.5–5.3)
SARS-COV-2 RNA SPEC QL NAA+PROBE: SIGNIFICANT CHANGE UP
SODIUM SERPL-SCNC: 136 MMOL/L — SIGNIFICANT CHANGE UP (ref 135–145)

## 2021-04-21 RX ORDER — SODIUM CHLORIDE 9 MG/ML
1000 INJECTION INTRAMUSCULAR; INTRAVENOUS; SUBCUTANEOUS
Refills: 0 | Status: DISCONTINUED | OUTPATIENT
Start: 2021-04-21 | End: 2021-04-23

## 2021-04-21 RX ADMIN — HEPARIN SODIUM 5000 UNIT(S): 5000 INJECTION INTRAVENOUS; SUBCUTANEOUS at 05:43

## 2021-04-21 RX ADMIN — FLUDROCORTISONE ACETATE 0.2 MILLIGRAM(S): 0.1 TABLET ORAL at 05:43

## 2021-04-21 RX ADMIN — ATORVASTATIN CALCIUM 10 MILLIGRAM(S): 80 TABLET, FILM COATED ORAL at 21:19

## 2021-04-21 RX ADMIN — Medication 1000 UNIT(S): at 12:30

## 2021-04-21 RX ADMIN — CLOPIDOGREL BISULFATE 75 MILLIGRAM(S): 75 TABLET, FILM COATED ORAL at 12:29

## 2021-04-21 RX ADMIN — HEPARIN SODIUM 5000 UNIT(S): 5000 INJECTION INTRAVENOUS; SUBCUTANEOUS at 14:54

## 2021-04-21 RX ADMIN — SODIUM CHLORIDE 60 MILLILITER(S): 9 INJECTION INTRAMUSCULAR; INTRAVENOUS; SUBCUTANEOUS at 21:19

## 2021-04-21 RX ADMIN — Medication 81 MILLIGRAM(S): at 12:29

## 2021-04-21 RX ADMIN — HEPARIN SODIUM 5000 UNIT(S): 5000 INJECTION INTRAVENOUS; SUBCUTANEOUS at 21:20

## 2021-04-21 RX ADMIN — SODIUM CHLORIDE 60 MILLILITER(S): 9 INJECTION INTRAMUSCULAR; INTRAVENOUS; SUBCUTANEOUS at 14:54

## 2021-04-21 RX ADMIN — SERTRALINE 50 MILLIGRAM(S): 25 TABLET, FILM COATED ORAL at 12:29

## 2021-04-21 NOTE — DISCHARGE NOTE PROVIDER - NSDCMRMEDTOKEN_GEN_ALL_CORE_FT
Plavix 75 mg oral tablet: 1 tab(s) orally once a day  sertraline 50 mg oral tablet: 1 tab(s) orally once a day   acetaminophen 325 mg oral tablet: 2 tab(s) orally every 6 hours, As needed, Mild Pain (1 - 3), Moderate Pain (4 - 6)  aspirin 81 mg oral delayed release tablet: 1 tab(s) orally once a day  atorvastatin 10 mg oral tablet: 1 tab(s) orally once a day (at bedtime)  bisacodyl 10 mg rectal suppository: 1 suppository(ies) rectal once a day, As needed, Constipation  cholecalciferol oral tablet: 1000 unit(s) orally once a day  fludrocortisone 0.1 mg oral tablet: 4 tab(s) orally once a day  heparin: 5000 unit(s) subcutaneous every 8 hours  Plavix 75 mg oral tablet: 1 tab(s) orally once a day  polyethylene glycol 3350 oral powder for reconstitution: 17 gram(s) orally once a day  senna oral tablet: 2 tab(s) orally once a day (at bedtime)  sertraline 50 mg oral tablet: 1 tab(s) orally once a day

## 2021-04-21 NOTE — DISCHARGE NOTE PROVIDER - NSDCFUADDINST_GEN_ALL_CORE_FT
- Follow up with your Primary Care Doctor within 1 week after discharge from rehab.  - Follow up with Dr. Urias within........... - Follow up with your Primary Care Doctor within 1 week after discharge from rehab.  - Follow up with Dr. Urias within 1 week. - Follow up with your Primary Care Doctor within 1 week after discharge from rehab.  - Follow up with Dr. Urias within 1 week.  - Repeat BMP in 1-2 days.

## 2021-04-21 NOTE — DISCHARGE NOTE PROVIDER - CARE PROVIDER_API CALL
Patrick Castillo)  Cardiovascular Disease; Internal Medicine  [address] [place] [address], [address]  [place] [address]  Phone: [phone]  Fax: [phone]  Follow Up Time:     Wade Urias)  Vascular Surgery  [address], [address]  [place] [address]  Phone: [phone]  Fax: [phone]  Follow Up Time:    Patrick Castillo)  Cardiovascular Disease; Internal Medicine  [address] [facility] [address] [facility], [address]  [place] [address]  Phone: [phone]  Fax: [phone]  Follow Up Time:     Wade Urias)  Vascular Surgery  [address] [facility], [address]  [place] [address]  Phone: [phone]  Fax: [phone]  Follow Up Time:     Fei Chan  INTERNAL MEDICINE  [address] [facility] [facility], [address]  [place] [address]  Phone: [phone]  Fax: [phone]  Follow Up Time:

## 2021-04-21 NOTE — DISCHARGE NOTE PROVIDER - HOSPITAL COURSE
89 yo M with PMHx of Prostate Ca, hx of CKD stage 3 (Cr ~ 1.8-1.9, sees Dr. Chan renal), chronic left hip pain presents with dizziness s/p angioplasty yesterday with Vascular Dr. Wade Urias at [facility] for cold left foot/claudication, found to have stenosis in left femoral artery, popliteal and tibial artery, s/p stent placed. Plavix started. States dizziness is worse with standing, feels like he will fall over. Per the wife patient was getting out of bed, couldn't reach banister, found on floor. Wife could not lift him. Called EMS. In ED noted to be with orthostatic hypotension. IVF given. Admitted to medicine. Baseline hgb 10-11.      Orthostatic hypotension  CKD stage 3  CKD induced anemia  Prostate Ca  chronic left hip pain  peripheral artery disease s/p stent  depression on Sertraline   Constipation on senna and Miralax     Plan:  # Dizziness:  s/p recent stent placement by vascular for PAD, newly started Plavix 75MG   UA neg, no signs of infection  Cardiac enzymes stable x 2; EKG unimpressive  CT abd: No gross left groin or active arterial bleeding, CT head also negative  Cont gentle IVF, repeat orthostatics daily, still positive - started on Florinef 0.1MG   Physical therapy - Out of bed to chair with assistance  Vit B12 normal, Vit D low, supplement started, TSH borderline  T4 4.5  Anemia at baseline, likely CKD induced  Echo: EF 55% Conclusions 1. Normal left ventricular internal dimensions and wall thicknesses 2. Endocardium not well visualized; grossly normal left ventricular systolic function. 3. The right ventricle is not well visualized; grossly normal right ventricular systolic function.  4/19: Florinef increase to 0.2MG daily  4/21: patient became hypotensive while working with PT, asymptomatic  IVF ordered  Monitor BP, if remains soft may need to increase Florinef as per cards  Follow up cards recs     # CKD stage 3:  Stable Cr at baseline 1.8-1.9  1.66 -->1.72 --> 1.73  Can follow up with his renal Dr. Chan    # PAD:  Chronic, stable  Good + pulses on exam  Cont ASA/Plavix  Cont Atorvastatin 10MG QHS    # Chronic left hip pain, arthritis:  Hx of left hip replacement  Pain control - Tylenol 650MG Q6H PRN for mild and moderate pain    # Depression:  Chronic  Cont Sertraline 50MG daily     # DVT ppx:  Heparin subq Q8H        (d/w the wife Shanelle on 4/17/21. Pt has been feeling weak, difficulty walking x 6 weeks. Saw podiatry for toe nails, who referred him to vascular. Had MRA and vascular stent as above.   After post vascular procedure, walking is better, hence, started walking more, and fell. +dizziness. baseline walk with walker. The wife Shanelle takes care of him. Pt was supposed to see physical therapy but hasn't been going due to covid pandemic. Patient was evaluated by PT here and recommended for SHERIF.    Pt of [facility] PCP Dr. Castillo 89 yo M with PMHx of Prostate Ca, hx of CKD stage 3 (Cr ~ 1.8-1.9, sees Dr. Chan renal), chronic left hip pain presents with dizziness s/p angioplasty yesterday with Vascular Dr. Wade Urias at [facility] for cold left foot/claudication, found to have stenosis in left femoral artery, popliteal and tibial artery, s/p stent placed. Plavix started. States dizziness is worse with standing, feels like he will fall over. Per the wife patient was getting out of bed, couldn't reach banister, found on floor. Wife could not lift him. Called EMS. In ED noted to be with orthostatic hypotension.   CT head  and CT cervical spine negative   Xray pelvis and Xray shoulder with no fx   CT abdomen  and pelvis  negative for Hematoma   Pt responded to IV hydration   Cards eval advised for Florinef , symptoms better -----  ECHO with Normal LVF   Pt eval recommends SHERIF            89 yo M with PMHx of Prostate Ca, hx of CKD stage 3 (Cr ~ 1.8-1.9, sees Dr. Chan renal), chronic left hip pain presents with dizziness s/p angioplasty yesterday with Vascular Dr. Wade Urias at [facility] for cold left foot/claudication, found to have stenosis in left femoral artery, popliteal and tibial artery, s/p stent placed. Plavix started. States dizziness is worse with standing, feels like he will fall over. Per the wife patient was getting out of bed, couldn't reach banister, found on floor. Wife could not lift him. Called EMS. In ED noted to be with orthostatic hypotension.   CT head  and CT cervical spine negative   Xray pelvis and Xray shoulder with no fx   CT abdomen  and pelvis  negative for Hematoma   Pt responded to IV hydration   Cards eval advised for Florinef, dose increased and symptoms better.  ECHO with Normal LVF   Pt eval recommends SHERIF            89 yo M with PMHx of Prostate Ca, hx of CKD stage 3 (Cr ~ 1.8-1.9, sees Dr. Chan renal), chronic left hip pain presents with dizziness s/p angioplasty yesterday with Vascular Dr. Wade Urias at [facility] for cold left foot/claudication, found to have stenosis in left femoral artery, popliteal and tibial artery, s/p stent placed. Plavix started. States dizziness is worse with standing, feels like he will fall over. Per the wife patient was getting out of bed, couldn't reach banister, found on floor. Wife could not lift him. Called EMS. In ED noted to be with orthostatic hypotension.   CT head  and CT cervical spine negative   Xray pelvis and Xray shoulder with no fx   CT abdomen  and pelvis  negative for Hematoma   Pt responded to IV hydration and addition of Florinef.  Florinef uptirated, no longer dizzy.  ECHO with Normal LVF   Pt eval recommends SHERIF     Patient cleared by IM and Cardiology for discharge.

## 2021-04-21 NOTE — DISCHARGE NOTE PROVIDER - NSDCCPCAREPLAN_GEN_ALL_CORE_FT
PRINCIPAL DISCHARGE DIAGNOSIS  Diagnosis: Orthostatic hypotension  Assessment and Plan of Treatment: Orthostatic hypotension, also known as postural hypotension or orthostasis, occurs when your blood pressure falls suddenly when you stand up or stretch.  It is usually accompanied by a sensation of dizziness or lightheadedness.  Symptoms can be exacerbated if you do not drink enough, are on blood pressure medications, drink alcohol, or experience bleeding.   Participate in activities as tolerate.  Avoid activity or condition that causes your orthostasis.  Lay down with your feet up if you feel like you might faint; breath deeply until symptoms pass.  If you pass out, call 911 to be taken to the nearest emergency room.  Also call 911 to be taken to the nearest emergency room if you experience dizziness or lightheadedness associated with severe headache, slurred speech, vision changes, facial asymmetry, chest pain, abdominal pain, or back pain.      SECONDARY DISCHARGE DIAGNOSES  Diagnosis: PAD (peripheral artery disease)  Assessment and Plan of Treatment: Let your doctor know if you have new or worsening pain in your leg, cold feet, or changes to the color of your toes or feet.  You can help yourself with lifestyle change, eat fruits and vegetables, low fat dairy products, reduce meat and fatty food consumption, walk or perform some form of physical medications as prescribed.     PRINCIPAL DISCHARGE DIAGNOSIS  Diagnosis: Orthostatic hypotension  Assessment and Plan of Treatment: Orthostatic hypotension, also known as postural hypotension or orthostasis, occurs when your blood pressure falls suddenly when you stand up or stretch.  It is usually accompanied by a sensation of dizziness or lightheadedness.  Symptoms can be exacerbated if you do not drink enough, are on blood pressure medications, drink alcohol, or experience bleeding.   Participate in activities as tolerate.  Avoid activity or condition that causes your orthostasis.  Lay down with your feet up if you feel like you might faint; breath deeply until symptoms pass.  If you pass out, call 911 to be taken to the nearest emergency room.  Also call 911 to be taken to the nearest emergency room if you experience dizziness or lightheadedness associated with severe headache, slurred speech, vision changes, facial asymmetry, chest pain, abdominal pain, or back pain.      SECONDARY DISCHARGE DIAGNOSES  Diagnosis: PAD (peripheral artery disease)  Assessment and Plan of Treatment: Let your doctor know if you have new or worsening pain in your leg, cold feet, or changes to the color of your toes or feet.  You can help yourself with lifestyle change, eat fruits and vegetables, low fat dairy products, reduce meat and fatty food consumption, walk or perform some form of physical medications as prescribed.    Diagnosis: Renal insufficiency  Assessment and Plan of Treatment: Avoid taking NSAIDs (ex: Ibuprofen, Advil, Celebrex, Naprosyn) and other agents that can harm the kidneys such as intravenous contrast for diagnostic testing, combination cold medications, etc. until you are instructed to do so by your Primary Care Physician.  Have all of your medications adjusted for your renal function by your Health Care Provider.  Blood pressure control is important.  Take all medication as prescribed.  Do not overconsume foods that are high in potassium, such as bananas, until you are instructed to do so by your primary care physician.

## 2021-04-21 NOTE — CHART NOTE - NSCHARTNOTEFT_GEN_A_CORE
Called by PT for patient who was hypotensive during encounter.  Initial BP 69/36 - asymptomatics and able to do exercises - last BP 90/53.  Patient evaluated, noted to be in no acute distress.  Afebrile.  Conversive - denies CP, SOB, palpitations, dizziness, and nausea.  d/w Dr. Jay of Cardiology - recommended giving IV hydration, and monitoring BP.  If BP remains soft, may need to increase Florinef.    Maxine Frazier NP  [phone] Called by PT for patient who was hypotensive during encounter.  Initial BP 69/36 - asymptomatics and able to do exercises - last BP 90/53.  Patient evaluated, noted to be in no acute distress.  Afebrile.  Conversive - denies CP, SOB, palpitations, dizziness, and nausea.  d/w Dr. Jay of Cardiology - recommended giving IV hydration, and monitoring BP.  If BP remains soft, may need to increase Florinef.  No plan for discharge today - d/w RENEE Frazier NP  [phone] Called by PT for patient who was hypotensive during encounter.  Initial BP 69/36 (repeat 76/39) - asymptomatics and able to do exercises - last BP 90/53.  Patient evaluated, noted to be in no acute distress.  Afebrile.  Conversive - denies CP, SOB, palpitations, dizziness, and nausea.  d/w Dr. Jay of Cardiology - recommended giving IV hydration, and monitoring BP.  If BP remains soft, may need to increase Florinef.  No plan for discharge today - d/w RENEE Frazier NP  [phone]

## 2021-04-21 NOTE — DISCHARGE NOTE PROVIDER - PROVIDER TOKENS
PROVIDER:[TOKEN:[[id number]]],PROVIDER:[TOKEN:[[id number]]] PROVIDER:[TOKEN:[[id number]]],PROVIDER:[TOKEN:[[id number]]],PROVIDER:[TOKEN:[[id number]]]

## 2021-04-21 NOTE — DISCHARGE NOTE PROVIDER - DETAILS OF MALNUTRITION DIAGNOSIS/DIAGNOSES
This patient has been assessed with a concern for Malnutrition and was treated during this hospitalization for the following Nutrition diagnosis/diagnoses:     -  04/19/2021: Severe protein-calorie malnutrition

## 2021-04-22 LAB
ANION GAP SERPL CALC-SCNC: 11 MMOL/L — SIGNIFICANT CHANGE UP (ref 5–17)
BUN SERPL-MCNC: 22 MG/DL — SIGNIFICANT CHANGE UP (ref 7–23)
CALCIUM SERPL-MCNC: 8.6 MG/DL — SIGNIFICANT CHANGE UP (ref 8.4–10.5)
CHLORIDE SERPL-SCNC: 102 MMOL/L — SIGNIFICANT CHANGE UP (ref 96–108)
CO2 SERPL-SCNC: 31 MMOL/L — SIGNIFICANT CHANGE UP (ref 22–31)
CREAT SERPL-MCNC: 0.93 MG/DL — SIGNIFICANT CHANGE UP (ref 0.5–1.3)
GLUCOSE SERPL-MCNC: 83 MG/DL — SIGNIFICANT CHANGE UP (ref 70–99)
POTASSIUM SERPL-MCNC: 4.4 MMOL/L — SIGNIFICANT CHANGE UP (ref 3.5–5.3)
POTASSIUM SERPL-SCNC: 4.4 MMOL/L — SIGNIFICANT CHANGE UP (ref 3.5–5.3)
SODIUM SERPL-SCNC: 144 MMOL/L — SIGNIFICANT CHANGE UP (ref 135–145)

## 2021-04-22 RX ORDER — SODIUM CHLORIDE 9 MG/ML
1000 INJECTION INTRAMUSCULAR; INTRAVENOUS; SUBCUTANEOUS
Refills: 0 | Status: DISCONTINUED | OUTPATIENT
Start: 2021-04-22 | End: 2021-04-23

## 2021-04-22 RX ADMIN — HEPARIN SODIUM 5000 UNIT(S): 5000 INJECTION INTRAVENOUS; SUBCUTANEOUS at 05:38

## 2021-04-22 RX ADMIN — SODIUM CHLORIDE 50 MILLILITER(S): 9 INJECTION INTRAMUSCULAR; INTRAVENOUS; SUBCUTANEOUS at 16:17

## 2021-04-22 RX ADMIN — HEPARIN SODIUM 5000 UNIT(S): 5000 INJECTION INTRAVENOUS; SUBCUTANEOUS at 14:35

## 2021-04-22 RX ADMIN — FLUDROCORTISONE ACETATE 0.2 MILLIGRAM(S): 0.1 TABLET ORAL at 05:38

## 2021-04-22 RX ADMIN — HEPARIN SODIUM 5000 UNIT(S): 5000 INJECTION INTRAVENOUS; SUBCUTANEOUS at 21:37

## 2021-04-22 RX ADMIN — ATORVASTATIN CALCIUM 10 MILLIGRAM(S): 80 TABLET, FILM COATED ORAL at 21:37

## 2021-04-22 RX ADMIN — Medication 1000 UNIT(S): at 13:44

## 2021-04-22 RX ADMIN — SERTRALINE 50 MILLIGRAM(S): 25 TABLET, FILM COATED ORAL at 13:45

## 2021-04-22 RX ADMIN — CLOPIDOGREL BISULFATE 75 MILLIGRAM(S): 75 TABLET, FILM COATED ORAL at 13:45

## 2021-04-22 RX ADMIN — Medication 81 MILLIGRAM(S): at 13:43

## 2021-04-22 RX ADMIN — POLYETHYLENE GLYCOL 3350 17 GRAM(S): 17 POWDER, FOR SOLUTION ORAL at 13:45

## 2021-04-22 NOTE — PROGRESS NOTE ADULT - ATTENDING COMMENTS
[facility]  [phone]
Pt seen and examined with the NP. Agree with the assessment and plan.  Vitals, labs and radiology results personally reviewed.  Above note edited as appropriate.  Discussed with the pt and answered all questions.    still orthostatic. Met with the wife at bedside. All questions answered.  c/w gentle IVF. Florinef increased to 0.2 mg.  repeat orthostatics.   Physical therapy. Out of bed to chair with assistance.     Dr. Heredia ([facility])  [phone]
remains orthostatic and hypotensive to sbp 70s  ivf, florinef and monitor    [facility]

## 2021-04-22 NOTE — PROGRESS NOTE ADULT - PROBLEM SELECTOR PLAN 4
-  - anemia chronic, stable.    TTE unremarkable.   ok with discharge planning from cardiac standpoint as long as vitals remain stable and he is asymptomatic. will follow as needed.      Jai Jay D.O.  [phone]

## 2021-04-22 NOTE — PROGRESS NOTE ADULT - NUTRITIONAL ASSESSMENT
This patient has been assessed with a concern for Malnutrition and has been determined to have a diagnosis/diagnoses of Severe protein-calorie malnutrition.    This patient is being managed with:   Diet Regular-  Entered: Apr 17 2021  8:14AM

## 2021-04-23 ENCOUNTER — TRANSCRIPTION ENCOUNTER (OUTPATIENT)
Age: 86
End: 2021-04-23

## 2021-04-23 VITALS
DIASTOLIC BLOOD PRESSURE: 63 MMHG | WEIGHT: 154.76 LBS | OXYGEN SATURATION: 98 % | TEMPERATURE: 99 F | SYSTOLIC BLOOD PRESSURE: 127 MMHG | HEART RATE: 76 BPM | RESPIRATION RATE: 18 BRPM

## 2021-04-23 LAB
ANION GAP SERPL CALC-SCNC: 8 MMOL/L — SIGNIFICANT CHANGE UP (ref 5–17)
BUN SERPL-MCNC: 32 MG/DL — HIGH (ref 7–23)
CALCIUM SERPL-MCNC: 8.6 MG/DL — SIGNIFICANT CHANGE UP (ref 8.4–10.5)
CHLORIDE SERPL-SCNC: 110 MMOL/L — HIGH (ref 96–108)
CO2 SERPL-SCNC: 22 MMOL/L — SIGNIFICANT CHANGE UP (ref 22–31)
CREAT SERPL-MCNC: 2.01 MG/DL — HIGH (ref 0.5–1.3)
GLUCOSE SERPL-MCNC: 95 MG/DL — SIGNIFICANT CHANGE UP (ref 70–99)
POTASSIUM SERPL-MCNC: 4 MMOL/L — SIGNIFICANT CHANGE UP (ref 3.5–5.3)
POTASSIUM SERPL-SCNC: 4 MMOL/L — SIGNIFICANT CHANGE UP (ref 3.5–5.3)
SODIUM SERPL-SCNC: 140 MMOL/L — SIGNIFICANT CHANGE UP (ref 135–145)

## 2021-04-23 PROCEDURE — 99285 EMERGENCY DEPT VISIT HI MDM: CPT

## 2021-04-23 PROCEDURE — 72170 X-RAY EXAM OF PELVIS: CPT

## 2021-04-23 PROCEDURE — 97530 THERAPEUTIC ACTIVITIES: CPT

## 2021-04-23 PROCEDURE — 80048 BASIC METABOLIC PNL TOTAL CA: CPT

## 2021-04-23 PROCEDURE — 82607 VITAMIN B-12: CPT

## 2021-04-23 PROCEDURE — 70450 CT HEAD/BRAIN W/O DYE: CPT

## 2021-04-23 PROCEDURE — 85018 HEMOGLOBIN: CPT

## 2021-04-23 PROCEDURE — U0005: CPT

## 2021-04-23 PROCEDURE — 97161 PT EVAL LOW COMPLEX 20 MIN: CPT

## 2021-04-23 PROCEDURE — U0003: CPT

## 2021-04-23 PROCEDURE — 97116 GAIT TRAINING THERAPY: CPT

## 2021-04-23 PROCEDURE — 83540 ASSAY OF IRON: CPT

## 2021-04-23 PROCEDURE — 85730 THROMBOPLASTIN TIME PARTIAL: CPT

## 2021-04-23 PROCEDURE — 83550 IRON BINDING TEST: CPT

## 2021-04-23 PROCEDURE — 80053 COMPREHEN METABOLIC PANEL: CPT

## 2021-04-23 PROCEDURE — 71045 X-RAY EXAM CHEST 1 VIEW: CPT

## 2021-04-23 PROCEDURE — 85610 PROTHROMBIN TIME: CPT

## 2021-04-23 PROCEDURE — 83735 ASSAY OF MAGNESIUM: CPT

## 2021-04-23 PROCEDURE — 82728 ASSAY OF FERRITIN: CPT

## 2021-04-23 PROCEDURE — 87086 URINE CULTURE/COLONY COUNT: CPT

## 2021-04-23 PROCEDURE — 84443 ASSAY THYROID STIM HORMONE: CPT

## 2021-04-23 PROCEDURE — 84436 ASSAY OF TOTAL THYROXINE: CPT

## 2021-04-23 PROCEDURE — 82962 GLUCOSE BLOOD TEST: CPT

## 2021-04-23 PROCEDURE — 85025 COMPLETE CBC W/AUTO DIFF WBC: CPT

## 2021-04-23 PROCEDURE — 85014 HEMATOCRIT: CPT

## 2021-04-23 PROCEDURE — 73030 X-RAY EXAM OF SHOULDER: CPT

## 2021-04-23 PROCEDURE — 81001 URINALYSIS AUTO W/SCOPE: CPT

## 2021-04-23 PROCEDURE — 74174 CTA ABD&PLVS W/CONTRAST: CPT

## 2021-04-23 PROCEDURE — 86769 SARS-COV-2 COVID-19 ANTIBODY: CPT

## 2021-04-23 PROCEDURE — 85027 COMPLETE CBC AUTOMATED: CPT

## 2021-04-23 PROCEDURE — 97110 THERAPEUTIC EXERCISES: CPT

## 2021-04-23 PROCEDURE — 93306 TTE W/DOPPLER COMPLETE: CPT

## 2021-04-23 PROCEDURE — 82306 VITAMIN D 25 HYDROXY: CPT

## 2021-04-23 PROCEDURE — 84100 ASSAY OF PHOSPHORUS: CPT

## 2021-04-23 PROCEDURE — 84484 ASSAY OF TROPONIN QUANT: CPT

## 2021-04-23 RX ORDER — CHOLECALCIFEROL (VITAMIN D3) 125 MCG
1000 CAPSULE ORAL
Qty: 0 | Refills: 0 | DISCHARGE
Start: 2021-04-23

## 2021-04-23 RX ORDER — ACETAMINOPHEN 500 MG
2 TABLET ORAL
Qty: 0 | Refills: 0 | DISCHARGE
Start: 2021-04-23

## 2021-04-23 RX ORDER — FLUDROCORTISONE ACETATE 0.1 MG/1
1 TABLET ORAL
Qty: 0 | Refills: 0 | DISCHARGE
Start: 2021-04-23

## 2021-04-23 RX ORDER — FLUDROCORTISONE ACETATE 0.1 MG/1
4 TABLET ORAL
Qty: 0 | Refills: 0 | DISCHARGE
Start: 2021-04-23

## 2021-04-23 RX ORDER — ASPIRIN/CALCIUM CARB/MAGNESIUM 324 MG
1 TABLET ORAL
Qty: 0 | Refills: 0 | DISCHARGE
Start: 2021-04-23

## 2021-04-23 RX ORDER — ATORVASTATIN CALCIUM 80 MG/1
1 TABLET, FILM COATED ORAL
Qty: 0 | Refills: 0 | DISCHARGE
Start: 2021-04-23

## 2021-04-23 RX ORDER — FLUDROCORTISONE ACETATE 0.1 MG/1
0.4 TABLET ORAL DAILY
Refills: 0 | Status: DISCONTINUED | OUTPATIENT
Start: 2021-04-24 | End: 2021-04-23

## 2021-04-23 RX ORDER — HEPARIN SODIUM 5000 [USP'U]/ML
5000 INJECTION INTRAVENOUS; SUBCUTANEOUS
Qty: 0 | Refills: 0 | DISCHARGE
Start: 2021-04-23

## 2021-04-23 RX ORDER — SENNA PLUS 8.6 MG/1
2 TABLET ORAL
Qty: 0 | Refills: 0 | DISCHARGE
Start: 2021-04-23

## 2021-04-23 RX ORDER — CHOLECALCIFEROL (VITAMIN D3) 125 MCG
1 CAPSULE ORAL
Qty: 0 | Refills: 0 | DISCHARGE
Start: 2021-04-23

## 2021-04-23 RX ORDER — FLUDROCORTISONE ACETATE 0.1 MG/1
0.2 TABLET ORAL ONCE
Refills: 0 | Status: COMPLETED | OUTPATIENT
Start: 2021-04-23 | End: 2021-04-23

## 2021-04-23 RX ORDER — SENNA PLUS 8.6 MG/1
1 TABLET ORAL
Qty: 0 | Refills: 0 | DISCHARGE
Start: 2021-04-23

## 2021-04-23 RX ORDER — POLYETHYLENE GLYCOL 3350 17 G/17G
17 POWDER, FOR SOLUTION ORAL
Qty: 0 | Refills: 0 | DISCHARGE
Start: 2021-04-23

## 2021-04-23 RX ADMIN — HEPARIN SODIUM 5000 UNIT(S): 5000 INJECTION INTRAVENOUS; SUBCUTANEOUS at 13:14

## 2021-04-23 RX ADMIN — FLUDROCORTISONE ACETATE 0.2 MILLIGRAM(S): 0.1 TABLET ORAL at 13:13

## 2021-04-23 RX ADMIN — Medication 81 MILLIGRAM(S): at 13:13

## 2021-04-23 RX ADMIN — Medication 1000 UNIT(S): at 13:13

## 2021-04-23 RX ADMIN — CLOPIDOGREL BISULFATE 75 MILLIGRAM(S): 75 TABLET, FILM COATED ORAL at 13:13

## 2021-04-23 RX ADMIN — FLUDROCORTISONE ACETATE 0.2 MILLIGRAM(S): 0.1 TABLET ORAL at 05:13

## 2021-04-23 RX ADMIN — HEPARIN SODIUM 5000 UNIT(S): 5000 INJECTION INTRAVENOUS; SUBCUTANEOUS at 05:13

## 2021-04-23 RX ADMIN — SERTRALINE 50 MILLIGRAM(S): 25 TABLET, FILM COATED ORAL at 13:14

## 2021-04-23 NOTE — PROGRESS NOTE ADULT - ASSESSMENT
89 yo M with PMHx of Prostate Ca, hx of CKD stage 3 (Cr ~ 1.8-1.9, sees Dr. Chan renal), chronic left hip pain presents with dizziness s/p angioplasty yesterday with Vascular Dr. Wade Urias at [facility] for cold left foot/claudication, found to have stenosis in left femoral artery, popliteal and tibial artery, s/p stent placed. Plavix started. States dizziness is worse with standing, feels like he will fall over. Per the wife patient was getting out of bed, couldn't reach banister, found on floor. Wife could not lift him. Called EMS. In ED noted to be with orthostatic hypotension. IVF given. Admitted to medicine. Baseline hgb 10-11.      Orthostatic hypotension  CKD stage 3  CKD induced anemia  Prostate Ca  chronic left hip pain  peripheral artery disease s/p stent  depression on Sertraline   Constipation on senna and Miralax     Plan:  # Dizziness:  s/p recent stent placement by vascular for PAD, newly started Plavix 75MG   UA neg, no signs of infection  Cardiac enzymes stable x 2; EKG unimpressive  CT abd: No gross left groin or active arterial bleeding, CT head also negative  Cont gentle IVF, repeat orthostatics daily, still positive - started on Florinef 0.1MG   Physical therapy - Out of bed to chair with assistance  Vit B12 normal, Vit D low, supplement started, TSH borderline  T4 4.5  Anemia at baseline, likely CKD induced  Echo: EF 55% Conclusions 1. Normal left ventricular internal dimensions and wall thicknesses 2. Endocardium not well visualized; grossly normal left ventricular systolic function. 3. The right ventricle is not well visualized; grossly normal right ventricular systolic function.  4/19: Florinef increase to 0.2MG daily    # CKD stage 3:  Stable Cr at baseline 1.8-1.9  1.66 -->1.72  Can follow up with his renal Dr. Chan    # PAD:  Chronic, stable  Good + pulses on exam  Cont ASA/Plavix  Cont Atorvastatin 10MG QHS    # Chronic left hip pain, arthritis:  Hx of left hip replacement  Pain control - Tylenol 650MG Q6H PRN for mild and moderate pain    # Depression:  Chronic  Cont Sertraline 50MG daily     # DVT ppx:  Heparin subq Q8H        (d/w the wife Shanelle on 4/17/21. Pt has been feeling weak, difficulty walking x 6 weeks. Saw podiatry for toe nails, who referred him to vascular. Had MRA and vascular stent as above.   After post vascular procedure, walking is better, hence, started walking more, and fell. +dizziness. baseline walk with walker. The wife Shanelle takes care of him. Pt was supposed to see physical therapy but hasn't been going due to covid pandemic. Advance directives discussed.  Goals of care discussed. All questions answered. Full code.)    Pt of [facility] PCP Dr. Castillo     
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
Patient has annual schedule for 9.24.20, but has other concerns she would like to discuss sooner.     
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
89 yo M with PMHx of Prostate Ca, hx of CKD stage 3 (Cr ~ 1.8-1.9, sees Dr. Chan renal), chronic left hip pain presents with dizziness s/p angioplasty yesterday with Vascular Dr. Wade Urias at [facility] for cold left foot/claudication, found to have stenosis in left femoral artery, popliteal and tibial artery, s/p stent placed. Plavix started. States dizziness is worse with standing, feels like he will fall over. Per the wife patient was getting out of bed, couldn't reach banister, found on floor. Wife could not lift him. Called EMS. In ED noted to be with orthostatic hypotension. IVF given. Admitted to medicine. Baseline hgb 10-11.      Orthostatic hypotension  CKD stage 3  CKD induced anemia  Prostate Ca  chronic left hip pain  peripheral artery disease s/p stent  depression on Sertraline   Constipation on senna and Miralax     Plan:  Admitted for dizziness, s/p recent stent placement by vascular for PAD, newly started Plavix 75 mg.   UA neg, no signs of infection. Cardiac enzymes stable x 2. EKG unimpressive.  CT abd: No gross left groin or active arterial bleeding, CT head also negative.  c/w gentle IVF, repeat orthostatics daily, still positive. Florinef 0.1 mg started  Physical therapy. Out of bed to chair with assistance.   check TTE. vit b12 normal, vit D low, supplement started, TSH borderline. check T4 (ordered)    Anemia at baseline, likely CKD induced. check iron studies, b12, folic normal    CKD stage 3, stable Cr at baseline 1.8-1.9. Can follow up with his renal Dr. Chan    Chronic left hip pain, arthritis. hx of left hip replacement, pain control.    c/w Sertraline for depression     DVT ppx    (d/w the wife Shanelle on 4/17/21. Pt has been feeling weak, difficulty walking x 6 weeks. Saw podiatry for toe nails, who referred him to vascular. Had MRA and vascular stent as above.   After post vascular procedure, walking is better, hence, started walking more, and fell. +dizziness. baseline walk with walker. The wife Shanelle takes care of him. Pt was supposed to see physical therapy but hasn't been going due to covid pandemic. Advance directives discussed.  Goals of care discussed. All questions answered. Full code.)    Pt of [facility] PCP Dr. Castillo.     - Dr. ZEV Heredia ([facility])  - [phone]   
89 yo M with PMHx of Prostate Ca, hx of CKD stage 3 (Cr ~ 1.8-1.9, sees Dr. Chan renal), chronic left hip pain presents with dizziness s/p angioplasty yesterday with Vascular Dr. Wade Urias at [facility] for cold left foot/claudication, found to have stenosis in left femoral artery, popliteal and tibial artery, s/p stent placed. Plavix started. States dizziness is worse with standing, feels like he will fall over. Per the wife patient was getting out of bed, couldn't reach banister, found on floor. Wife could not lift him. Called EMS. In ED noted to be with orthostatic hypotension. IVF given. Admitted to medicine. Baseline hgb 10-11.      Orthostatic hypotension  CKD stage 3  CKD induced anemia  Prostate Ca  chronic left hip pain  peripheral artery disease s/p stent  depression on Sertraline   Constipation on senna and Miralax     Plan:  # Dizziness:  s/p recent stent placement by vascular for PAD, newly started Plavix 75MG   UA neg, no signs of infection  Cardiac enzymes stable x 2; EKG unimpressive  CT abd: No gross left groin or active arterial bleeding, CT head also negative  Cont gentle IVF, repeat orthostatics daily, still positive - started on Florinef 0.1MG   Physical therapy - Out of bed to chair with assistance  Vit B12 normal, Vit D low, supplement started, TSH borderline  T4 4.5  Anemia at baseline, likely CKD induced  Echo: EF 55% Conclusions 1. Normal left ventricular internal dimensions and wall thicknesses 2. Endocardium not well visualized; grossly normal left ventricular systolic function. 3. The right ventricle is not well visualized; grossly normal right ventricular systolic function.  4/19: Florinef increase to 0.2MG daily  4/21: patient became hypotensive while working with PT, asymptomatic  IVF ordered  Monitor BP, if remains soft may need to increase Florinef as per cards  4/22: IVF stopped, re-assess orthos as patient did not want to stand this AM  Follow up cards recs     # CKD stage 3:  Stable Cr at baseline 1.8-1.9  1.66 -->1.72 --> 1.73 --> 0.93  Can follow up with his renal Dr. Chan    # PAD:  Chronic, stable  Good + pulses on exam  Cont ASA/Plavix  Cont Atorvastatin 10MG QHS    # Chronic left hip pain, arthritis:  Hx of left hip replacement  Pain control - Tylenol 650MG Q6H PRN for mild and moderate pain    # Depression:  Chronic  Cont Sertraline 50MG daily     # DVT ppx:  Heparin subq Q8H        (d/w the wife Shanelle on 4/17/21. Pt has been feeling weak, difficulty walking x 6 weeks. Saw podiatry for toe nails, who referred him to vascular. Had MRA and vascular stent as above.   After post vascular procedure, walking is better, hence, started walking more, and fell. +dizziness. baseline walk with walker. The wife Shanelle takes care of him. Pt was supposed to see physical therapy but hasn't been going due to covid pandemic. Advance directives discussed.  Goals of care discussed. All questions answered. Full code.)    Pt of [facility] PCP Dr. Castillo     
89 yo M with PMHx of Prostate Ca, hx of CKD stage 3 (Cr ~ 1.8-1.9, sees Dr. Chan renal), chronic left hip pain presents with dizziness s/p angioplasty yesterday with Vascular Dr. Wade Urias at [facility] for cold left foot/claudication, found to have stenosis in left femoral artery, popliteal and tibial artery, s/p stent placed. Plavix started. States dizziness is worse with standing, feels like he will fall over. Per the wife patient was getting out of bed, couldn't reach banister, found on floor. Wife could not lift him. Called EMS. In ED noted to be with orthostatic hypotension. IVF given. Admitted to medicine. Baseline hgb 10-11.      Orthostatic hypotension  CKD stage 3  CKD induced anemia  Prostate Ca  chronic left hip pain  peripheral artery disease s/p stent  depression on Sertraline   Constipation on senna and Miralax     Plan:  # Dizziness:  s/p recent stent placement by vascular for PAD, newly started Plavix 75MG   UA neg, no signs of infection  Cardiac enzymes stable x 2; EKG unimpressive  CT abd: No gross left groin or active arterial bleeding, CT head also negative  Cont gentle IVF, repeat orthostatics daily, still positive - started on Florinef 0.1MG   Physical therapy - Out of bed to chair with assistance  Vit B12 normal, Vit D low, supplement started, TSH borderline  T4 4.5  Anemia at baseline, likely CKD induced  Echo: EF 55% Conclusions 1. Normal left ventricular internal dimensions and wall thicknesses 2. Endocardium not well visualized; grossly normal left ventricular systolic function. 3. The right ventricle is not well visualized; grossly normal right ventricular systolic function.  4/19: Florinef increase to 0.2MG daily  4/21: patient became hypotensive while working with PT, asymptomatic  IVF ordered  Monitor BP, if remains soft may need to increase Florinef as per cards  Follow up cards recs     # CKD stage 3:  Stable Cr at baseline 1.8-1.9  1.66 -->1.72 --> 1.73  Can follow up with his renal Dr. Chan    # PAD:  Chronic, stable  Good + pulses on exam  Cont ASA/Plavix  Cont Atorvastatin 10MG QHS    # Chronic left hip pain, arthritis:  Hx of left hip replacement  Pain control - Tylenol 650MG Q6H PRN for mild and moderate pain    # Depression:  Chronic  Cont Sertraline 50MG daily     # DVT ppx:  Heparin subq Q8H        (d/w the wife Shanelle on 4/17/21. Pt has been feeling weak, difficulty walking x 6 weeks. Saw podiatry for toe nails, who referred him to vascular. Had MRA and vascular stent as above.   After post vascular procedure, walking is better, hence, started walking more, and fell. +dizziness. baseline walk with walker. The wife Shanelle takes care of him. Pt was supposed to see physical therapy but hasn't been going due to covid pandemic. Advance directives discussed.  Goals of care discussed. All questions answered. Full code.)    Pt of [facility] PCP Dr. Castillo     
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
89 yo M with PMHx of Prostate Ca, hx of CKD stage 3 (Cr ~ 1.8-1.9, sees Dr. Chan renal), chronic left hip pain presents with dizziness s/p angioplasty yesterday with Vascular Dr. Wade Urias at [facility] for cold left foot/claudication, found to have stenosis in left femoral artery, popliteal and tibial artery, s/p stent placed. Plavix started. States dizziness is worse with standing, feels like he will fall over. Per the wife patient was getting out of bed, couldn't reach banister, found on floor. Wife could not lift him. Called EMS. In ED noted to be with orthostatic hypotension. IVF given. Admitted to medicine. Baseline hgb 10-11.      Orthostatic hypotension  CKD stage 3  CKD induced anemia  Prostate Ca  chronic left hip pain  peripheral artery disease s/p stent  depression on Sertraline   Constipation on senna and Miralax     Plan:  # Dizziness:  s/p recent stent placement by vascular for PAD, newly started Plavix 75MG   UA neg, no signs of infection  Cardiac enzymes stable x 2; EKG unimpressive  CT abd: No gross left groin or active arterial bleeding, CT head also negative  Cont gentle IVF, repeat orthostatics daily, still positive - started on Florinef 0.1MG   Physical therapy - Out of bed to chair with assistance  Vit B12 normal, Vit D low, supplement started, TSH borderline  T4 4.5  Anemia at baseline, likely CKD induced  Follow up Echo  4/19: Florinef increase to 0.2MG daily    # CKD stage 3:  Stable Cr at baseline 1.8-1.9  1.66 today  Can follow up with his renal Dr. Chan    # PAD:  Chronic, stable  Good + pulses on exam  Cont ASA/Plavix  Cont Atorvastatin 10MG QHS    # Chronic left hip pain, arthritis:  Hx of left hip replacement  Pain control - Tylenol 650MG Q6H PRN for mild and moderate pain    # Depression:  Chronic  Cont Sertraline 50MG daily     # DVT ppx:  Heparin subq Q8H        (d/w the wife Shanelle on 4/17/21. Pt has been feeling weak, difficulty walking x 6 weeks. Saw podiatry for toe nails, who referred him to vascular. Had MRA and vascular stent as above.   After post vascular procedure, walking is better, hence, started walking more, and fell. +dizziness. baseline walk with walker. The wife Shanelle takes care of him. Pt was supposed to see physical therapy but hasn't been going due to covid pandemic. Advance directives discussed.  Goals of care discussed. All questions answered. Full code.)    Pt of [facility] PCP Dr. Castillo     
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

## 2021-04-23 NOTE — PROGRESS NOTE ADULT - PROBLEM SELECTOR PLAN 2
-  - nutrition evaluation.  - OOB, PT.

## 2021-04-23 NOTE — PROGRESS NOTE ADULT - REASON FOR ADMISSION
dizziness, orthostasis

## 2021-04-23 NOTE — PROGRESS NOTE ADULT - PROBLEM SELECTOR PROBLEM 4
Anemia due to stage 3 chronic kidney disease, unspecified whether stage 3a or 3b CKD

## 2021-04-23 NOTE — PROGRESS NOTE ADULT - SUBJECTIVE AND OBJECTIVE BOX
SUBJECTIVE / OVERNIGHT EVENTS:    no events overnight  patient seen and examined  denies cp/sob  no n/v/d  + hypotension with PT this AM, asymptomatic     --------------------------------------------------------------------------------------------  LABS:                        9.3    5.37  )-----------( 153      ( 20 Apr 2021 07:18 )             29.8     04-21    136  |  108  |  29<H>  ----------------------------<  96  3.8   |  21<L>  |  1.73<H>    Ca    8.6      21 Apr 2021 06:34  Phos  2.8     04-21        CAPILLARY BLOOD GLUCOSE                RADIOLOGY & ADDITIONAL TESTS:    Imaging Personally Reviewed:  [x] YES  [ ] NO    Consultant(s) Notes Reviewed:  [x] YES  [ ] NO    MEDICATIONS  (STANDING):  aspirin enteric coated 81 milliGRAM(s) Oral daily  atorvastatin 10 milliGRAM(s) Oral at bedtime  cholecalciferol 1000 Unit(s) Oral daily  clopidogrel Tablet 75 milliGRAM(s) Oral daily  fludroCORTISONE 0.2 milliGRAM(s) Oral daily  heparin   Injectable 5000 Unit(s) SubCutaneous every 8 hours  polyethylene glycol 3350 17 Gram(s) Oral daily  senna 2 Tablet(s) Oral at bedtime  sertraline 50 milliGRAM(s) Oral daily  sodium chloride 0.9%. 1000 milliLiter(s) (60 mL/Hr) IV Continuous <Continuous>    MEDICATIONS  (PRN):  acetaminophen   Tablet .. 650 milliGRAM(s) Oral every 6 hours PRN Mild Pain (1 - 3), Moderate Pain (4 - 6)  bisacodyl Suppository 10 milliGRAM(s) Rectal daily PRN Constipation      Care Discussed with Consultants/Other Providers [x] YES  [ ] NO    Vital Signs Last 24 Hrs  T(C): 36.7 (21 Apr 2021 12:55), Max: 36.7 (21 Apr 2021 12:55)  T(F): 98 (21 Apr 2021 12:55), Max: 98 (21 Apr 2021 12:55)  HR: 71 (21 Apr 2021 12:55) (71 - 92)  BP: 125/57 (21 Apr 2021 12:55) (89/57 - 131/57)  BP(mean): --  RR: 19 (21 Apr 2021 12:55) (18 - 19)  SpO2: 98% (21 Apr 2021 12:55) (95% - 99%)  I&O's Summary    20 Apr 2021 07:01  -  21 Apr 2021 07:00  --------------------------------------------------------  IN: 840 mL / OUT: 0 mL / NET: 840 mL    PHYSICAL EXAM:  GENERAL: NAD, well-developed, comfortable  HEAD:  Atraumatic, Normocephalic  EYES: EOMI, PERRLA, conjunctiva and sclera clear  NECK: Supple, No JVD  CHEST/LUNG: Clear to auscultation bilaterally; No wheeze  HEART: Regular rate and rhythm; No murmurs, rubs, or gallops  ABDOMEN: Soft, Nontender, Nondistended; Bowel sounds present  NEURO: AAOx3, mildly forgetful, no focal weakness, 5/5 b/l extremity strength  EXTREMITIES:  2+ Peripheral Pulses, No clubbing, cyanosis, or edema  SKIN: No rashes or lesions         
SUBJECTIVE / OVERNIGHT EVENTS:  still orthostatic  mild dizziness on standing per pt   c/w IVF  Florinef if needed  no cp, no sob, no n/v/d. no abdominal pain.  no headache, no dizziness.   out of bed to chair needed daily         --------------------------------------------------------------------------------------------  LABS:                        10.5   10.17 )-----------( 165      ( 2021 07:12 )             33.2     04    138  |  108  |  24<H>  ----------------------------<  99  4.0   |  21<L>  |  1.73<H>    Ca    8.7      2021 07:12  Mg     2.0     -    TPro  6.7  /  Alb  3.9  /  TBili  0.4  /  DBili  x   /  AST  26  /  ALT  6<L>  /  AlkPhos  70  04-16    PT/INR - ( 2021 23:47 )   PT: 11.5 sec;   INR: 0.96 ratio         PTT - ( 2021 23:47 )  PTT:24.9 sec  CAPILLARY BLOOD GLUCOSE      POCT Blood Glucose.: 95 mg/dL (2021 08:52)        Urinalysis Basic - ( 2021 00:24 )    Color: Yellow / Appearance: Clear / S.029 / pH: x  Gluc: x / Ketone: Negative  / Bili: Negative / Urobili: Negative   Blood: x / Protein: 30 mg/dL / Nitrite: Negative   Leuk Esterase: Negative / RBC: 1 /hpf / WBC 1 /HPF   Sq Epi: x / Non Sq Epi: 1 /hpf / Bacteria: Negative        RADIOLOGY & ADDITIONAL TESTS:    Imaging Personally Reviewed:  [x] YES  [ ] NO    Consultant(s) Notes Reviewed:  [x] YES  [ ] NO    MEDICATIONS  (STANDING):  aspirin enteric coated 81 milliGRAM(s) Oral daily  atorvastatin 10 milliGRAM(s) Oral at bedtime  cholecalciferol 1000 Unit(s) Oral daily  clopidogrel Tablet 75 milliGRAM(s) Oral daily  fludroCORTISONE 0.1 milliGRAM(s) Oral daily  heparin   Injectable 5000 Unit(s) SubCutaneous every 8 hours  polyethylene glycol 3350 17 Gram(s) Oral daily  senna 2 Tablet(s) Oral at bedtime  sertraline 50 milliGRAM(s) Oral daily  sodium chloride 0.9%. 1000 milliLiter(s) (75 mL/Hr) IV Continuous <Continuous>  sodium chloride 0.9%. 1000 milliLiter(s) (75 mL/Hr) IV Continuous <Continuous>    MEDICATIONS  (PRN):  acetaminophen   Tablet .. 650 milliGRAM(s) Oral every 6 hours PRN Mild Pain (1 - 3), Moderate Pain (4 - 6)      Care Discussed with Consultants/Other Providers [x] YES  [ ] NO    Vital Signs Last 24 Hrs  T(C): 36.2 (2021 11:45), Max: 36.7 (2021 20:30)  T(F): 97.1 (2021 11:45), Max: 98.1 (2021 04:42)  HR: 70 (2021 11:45) (70 - 82)  BP: 124/61 (2021 11:45) (123/59 - 167/74)  BP(mean): --  RR: 18 (2021 11:45) (18 - 18)  SpO2: 98% (2021 11:45) (96% - 98%)  I&O's Summary    2021 07:  -  2021 07:00  --------------------------------------------------------  IN: 1250 mL / OUT: 350 mL / NET: 900 mL    2021 07:01  -  2021 15:15  --------------------------------------------------------  IN: 520 mL / OUT: 400 mL / NET: 120 mL      PHYSICAL EXAM:  GENERAL: NAD, well-developed, comfortable  HEAD:  Atraumatic, Normocephalic  EYES: EOMI, PERRLA, conjunctiva and sclera clear  NECK: Supple, No JVD  CHEST/LUNG: Clear to auscultation bilaterally; No wheeze  HEART: Regular rate and rhythm; No murmurs, rubs, or gallops  ABDOMEN: Soft, Nontender, Nondistended; Bowel sounds present  Neuro: AAOx3, mildly forgetful, no focal weakness, 5/5 b/l extremity strength  EXTREMITIES:  2+ Peripheral Pulses, No clubbing, cyanosis, or edema  SKIN: No rashes or lesions     
SUBJECTIVE / OVERNIGHT EVENTS:    no events overnight  patient seen and examined  OOB in chair  denies cp/sob  no n/v/d  still + orthostatics  + dizziness when standing as per patient     --------------------------------------------------------------------------------------------  LABS:                        10.5   10.17 )-----------( 165      ( 18 Apr 2021 07:12 )             33.2     04-19    139  |  108  |  24<H>  ----------------------------<  91  3.7   |  22  |  1.66<H>    Ca    8.4      19 Apr 2021 07:32  Mg     2.0     04-18        CAPILLARY BLOOD GLUCOSE                RADIOLOGY & ADDITIONAL TESTS:    Imaging Personally Reviewed:  [x] YES  [ ] NO    Consultant(s) Notes Reviewed:  [x] YES  [ ] NO    MEDICATIONS  (STANDING):  aspirin enteric coated 81 milliGRAM(s) Oral daily  atorvastatin 10 milliGRAM(s) Oral at bedtime  cholecalciferol 1000 Unit(s) Oral daily  clopidogrel Tablet 75 milliGRAM(s) Oral daily  fludroCORTISONE 0.1 milliGRAM(s) Oral once  heparin   Injectable 5000 Unit(s) SubCutaneous every 8 hours  polyethylene glycol 3350 17 Gram(s) Oral daily  senna 2 Tablet(s) Oral at bedtime  sertraline 50 milliGRAM(s) Oral daily  sodium chloride 0.9%. 1000 milliLiter(s) (60 mL/Hr) IV Continuous <Continuous>    MEDICATIONS  (PRN):  acetaminophen   Tablet .. 650 milliGRAM(s) Oral every 6 hours PRN Mild Pain (1 - 3), Moderate Pain (4 - 6)      Care Discussed with Consultants/Other Providers [x] YES  [ ] NO    Vital Signs Last 24 Hrs  T(C): 36.5 (19 Apr 2021 12:28), Max: 36.9 (18 Apr 2021 22:01)  T(F): 97.7 (19 Apr 2021 12:28), Max: 98.5 (18 Apr 2021 22:01)  HR: 93 (19 Apr 2021 12:28) (80 - 93)  BP: 115/60 (19 Apr 2021 11:50) (115/60 - 133/66)  BP(mean): --  RR: 18 (19 Apr 2021 12:28) (18 - 18)  SpO2: 97% (19 Apr 2021 12:28) (96% - 97%)  I&O's Summary    18 Apr 2021 07:01  -  19 Apr 2021 07:00  --------------------------------------------------------  IN: 1775 mL / OUT: 1050 mL / NET: 725 mL    PHYSICAL EXAM:  GENERAL: NAD, well-developed, comfortable  HEAD:  Atraumatic, Normocephalic  EYES: EOMI, PERRLA, conjunctiva and sclera clear  NECK: Supple, No JVD  CHEST/LUNG: Clear to auscultation bilaterally; No wheeze  HEART: Regular rate and rhythm; No murmurs, rubs, or gallops  ABDOMEN: Soft, Nontender, Nondistended; Bowel sounds present  NEURO: AAOx3, mildly forgetful, no focal weakness, 5/5 b/l extremity strength  EXTREMITIES:  2+ Peripheral Pulses, No clubbing, cyanosis, or edema  SKIN: No rashes or lesions         
[facility] Cardiology Progress Note  _______________________________    Pt. seen and examined. No new cardiac-related complaints.    Telemetry -sinus 60-70s    T(C): 36.7 (04-20-21 @ 04:40), Max: 37.1 (04-19-21 @ 21:28)  HR: 73 (04-19-21 @ 21:28) (73 - 93)  BP: 121/70 (04-19-21 @ 21:28) (115/60 - 121/70)  RR: 18 (04-20-21 @ 04:40) (18 - 18)  SpO2: 96% (04-20-21 @ 04:40) (96% - 97%)  I&O's Summary    19 Apr 2021 07:01  -  20 Apr 2021 07:00  --------------------------------------------------------  IN: 720 mL / OUT: 0 mL / NET: 720 mL        PHYSICAL EXAM:  GENERAL: Alert, NAD.  NECK: Supple  CHEST/LUNG: Clear to auscultation bilaterally; No wheezes, rales, or rhonchi.  HEART: S1 S2 normal, RRR; No murmurs, rubs, or gallops  ABDOMEN: Soft, Nondistended  EXTREMITIES:  No LE edema.      LABS:                        9.3    5.37  )-----------( 153      ( 20 Apr 2021 07:18 )             29.8     04-20    139  |  108  |  26<H>  ----------------------------<  93  3.8   |  23  |  1.72<H>    Ca    8.3<L>      20 Apr 2021 07:18                    MEDICATIONS  (STANDING):  aspirin enteric coated 81 milliGRAM(s) Oral daily  atorvastatin 10 milliGRAM(s) Oral at bedtime  cholecalciferol 1000 Unit(s) Oral daily  clopidogrel Tablet 75 milliGRAM(s) Oral daily  fludroCORTISONE 0.2 milliGRAM(s) Oral daily  heparin   Injectable 5000 Unit(s) SubCutaneous every 8 hours  polyethylene glycol 3350 17 Gram(s) Oral daily  senna 2 Tablet(s) Oral at bedtime  sertraline 50 milliGRAM(s) Oral daily  sodium chloride 0.9%. 1000 milliLiter(s) (60 mL/Hr) IV Continuous <Continuous>    MEDICATIONS  (PRN):  acetaminophen   Tablet .. 650 milliGRAM(s) Oral every 6 hours PRN Mild Pain (1 - 3), Moderate Pain (4 - 6)  bisacodyl Suppository 10 milliGRAM(s) Rectal daily PRN Constipation        RADIOLOGY & ADDITIONAL TESTS:    
SUBJECTIVE / OVERNIGHT EVENTS:    no events overnight  patient seen and examined  denies cp/sob  no n/v/d  does not know if he is dizzy today since he did not get OOB to chair today    --------------------------------------------------------------------------------------------  LABS:                        9.3    5.37  )-----------( 153      ( 20 Apr 2021 07:18 )             29.8     04-20    139  |  108  |  26<H>  ----------------------------<  93  3.8   |  23  |  1.72<H>    Ca    8.3<L>      20 Apr 2021 07:18        CAPILLARY BLOOD GLUCOSE                RADIOLOGY & ADDITIONAL TESTS:    Imaging Personally Reviewed:  [x] YES  [ ] NO    Consultant(s) Notes Reviewed:  [x] YES  [ ] NO    MEDICATIONS  (STANDING):  aspirin enteric coated 81 milliGRAM(s) Oral daily  atorvastatin 10 milliGRAM(s) Oral at bedtime  cholecalciferol 1000 Unit(s) Oral daily  clopidogrel Tablet 75 milliGRAM(s) Oral daily  fludroCORTISONE 0.2 milliGRAM(s) Oral daily  heparin   Injectable 5000 Unit(s) SubCutaneous every 8 hours  polyethylene glycol 3350 17 Gram(s) Oral daily  senna 2 Tablet(s) Oral at bedtime  sertraline 50 milliGRAM(s) Oral daily  sodium chloride 0.9%. 1000 milliLiter(s) (60 mL/Hr) IV Continuous <Continuous>    MEDICATIONS  (PRN):  acetaminophen   Tablet .. 650 milliGRAM(s) Oral every 6 hours PRN Mild Pain (1 - 3), Moderate Pain (4 - 6)  bisacodyl Suppository 10 milliGRAM(s) Rectal daily PRN Constipation      Care Discussed with Consultants/Other Providers [x] YES  [ ] NO    Vital Signs Last 24 Hrs  T(C): 36.9 (20 Apr 2021 13:47), Max: 37.1 (19 Apr 2021 21:28)  T(F): 98.5 (20 Apr 2021 13:47), Max: 98.7 (19 Apr 2021 21:28)  HR: 72 (20 Apr 2021 13:47) (72 - 76)  BP: 125/76 (20 Apr 2021 13:47) (118/72 - 125/76)  BP(mean): --  RR: 18 (20 Apr 2021 13:47) (18 - 18)  SpO2: 95% (20 Apr 2021 13:47) (95% - 97%)  I&O's Summary    19 Apr 2021 07:01  -  20 Apr 2021 07:00  --------------------------------------------------------  IN: 720 mL / OUT: 0 mL / NET: 720 mL    20 Apr 2021 07:01  -  20 Apr 2021 14:16  --------------------------------------------------------  IN: 360 mL / OUT: 0 mL / NET: 360 mL    PHYSICAL EXAM:  GENERAL: NAD, well-developed, comfortable  HEAD:  Atraumatic, Normocephalic  EYES: EOMI, PERRLA, conjunctiva and sclera clear  NECK: Supple, No JVD  CHEST/LUNG: Clear to auscultation bilaterally; No wheeze  HEART: Regular rate and rhythm; No murmurs, rubs, or gallops  ABDOMEN: Soft, Nontender, Nondistended; Bowel sounds present  NEURO: AAOx3, mildly forgetful, no focal weakness, 5/5 b/l extremity strength  EXTREMITIES:  2+ Peripheral Pulses, No clubbing, cyanosis, or edema  SKIN: No rashes or lesions         
[facility] Cardiology Progress Note  _______________________________    Pt. seen and examined. No new cardiac-related complaints.    Telemetry - sinus 80-90    T(C): 36.2 (21 @ 11:45), Max: 36.7 (21 @ 20:30)  HR: 70 (21 @ 11:45) (67 - 82)  BP: 124/61 (21 @ 11:45) (103/60 - 167/74)  RR: 18 (21 @ 11:45) (18 - 18)  SpO2: 98% (21 @ 11:45) (96% - 98%)  I&O's Summary    2021 07:  -  2021 07:00  --------------------------------------------------------  IN: 1250 mL / OUT: 350 mL / NET: 900 mL    2021 07:  -  2021 12:28  --------------------------------------------------------  IN: 280 mL / OUT: 0 mL / NET: 280 mL        PHYSICAL EXAM:  GENERAL: Alert, NAD  NECK: Supple, No JVD, No carotid bruit.  CHEST/LUNG: Clear to auscultation bilaterally; No wheezes, rales, or rhonchi  HEART: S1 S2 normal, RRR,  No murmurs, rubs, or gallops  ABDOMEN: Soft, Nontender, Nondistended; Bowel sounds present  EXTREMITIES:  2+ DP / PT pulses b/l. Trace ankle edema b/l      LABS:                        10.5   10.17 )-----------( 165      ( 2021 07:12 )             33.2         138  |  108  |  24<H>  ----------------------------<  99  4.0   |  21<L>  |  1.73<H>    Ca    8.7      2021 07:12  Mg     2.0     -18    TPro  6.7  /  Alb  3.9  /  TBili  0.4  /  DBili  x   /  AST  26  /  ALT  6<L>  /  AlkPhos  70  04-16    PT/INR - ( 2021 23:47 )   PT: 11.5 sec;   INR: 0.96 ratio         PTT - ( 2021 23:47 )  PTT:24.9 sec          Urinalysis Basic - ( 2021 00:24 )    Color: Yellow / Appearance: Clear / S.029 / pH: x  Gluc: x / Ketone: Negative  / Bili: Negative / Urobili: Negative   Blood: x / Protein: 30 mg/dL / Nitrite: Negative   Leuk Esterase: Negative / RBC: 1 /hpf / WBC 1 /HPF   Sq Epi: x / Non Sq Epi: 1 /hpf / Bacteria: Negative        MEDICATIONS  (STANDING):  aspirin enteric coated 81 milliGRAM(s) Oral daily  atorvastatin 10 milliGRAM(s) Oral at bedtime  cholecalciferol 1000 Unit(s) Oral daily  clopidogrel Tablet 75 milliGRAM(s) Oral daily  heparin   Injectable 5000 Unit(s) SubCutaneous every 8 hours  polyethylene glycol 3350 17 Gram(s) Oral daily  senna 2 Tablet(s) Oral at bedtime  sertraline 50 milliGRAM(s) Oral daily  sodium chloride 0.9%. 1000 milliLiter(s) (75 mL/Hr) IV Continuous <Continuous>  sodium chloride 0.9%. 1000 milliLiter(s) (75 mL/Hr) IV Continuous <Continuous>    MEDICATIONS  (PRN):  acetaminophen   Tablet .. 650 milliGRAM(s) Oral every 6 hours PRN Mild Pain (1 - 3), Moderate Pain (4 - 6)      RADIOLOGY & ADDITIONAL TESTS:    
SUBJECTIVE / OVERNIGHT EVENTS:    no events overnight  patient seen and examined  denies cp/sob  no n/v/d  did not want to stand during AM orthostatic check    --------------------------------------------------------------------------------------------  LABS:    04-22    144  |  102  |  22  ----------------------------<  83  4.4   |  31  |  0.93    Ca    8.6      22 Apr 2021 07:07  Phos  2.8     04-21        CAPILLARY BLOOD GLUCOSE                RADIOLOGY & ADDITIONAL TESTS:    Imaging Personally Reviewed:  [x] YES  [ ] NO    Consultant(s) Notes Reviewed:  [x] YES  [ ] NO    MEDICATIONS  (STANDING):  aspirin enteric coated 81 milliGRAM(s) Oral daily  atorvastatin 10 milliGRAM(s) Oral at bedtime  cholecalciferol 1000 Unit(s) Oral daily  clopidogrel Tablet 75 milliGRAM(s) Oral daily  fludroCORTISONE 0.2 milliGRAM(s) Oral daily  heparin   Injectable 5000 Unit(s) SubCutaneous every 8 hours  polyethylene glycol 3350 17 Gram(s) Oral daily  senna 2 Tablet(s) Oral at bedtime  sertraline 50 milliGRAM(s) Oral daily  sodium chloride 0.9%. 1000 milliLiter(s) (60 mL/Hr) IV Continuous <Continuous>    MEDICATIONS  (PRN):  acetaminophen   Tablet .. 650 milliGRAM(s) Oral every 6 hours PRN Mild Pain (1 - 3), Moderate Pain (4 - 6)  bisacodyl Suppository 10 milliGRAM(s) Rectal daily PRN Constipation      Care Discussed with Consultants/Other Providers [x] YES  [ ] NO    Vital Signs Last 24 Hrs  T(C): 36.7 (22 Apr 2021 04:10), Max: 36.7 (21 Apr 2021 12:55)  T(F): 98 (22 Apr 2021 04:10), Max: 98 (21 Apr 2021 12:55)  HR: 71 (22 Apr 2021 04:10) (71 - 81)  BP: 128/63 (22 Apr 2021 04:10) (122/63 - 131/66)  BP(mean): --  RR: 18 (22 Apr 2021 04:10) (18 - 19)  SpO2: 97% (22 Apr 2021 04:10) (96% - 98%)  I&O's Summary    21 Apr 2021 07:01  -  22 Apr 2021 07:00  --------------------------------------------------------  IN: 960 mL / OUT: 0 mL / NET: 960 mL    PHYSICAL EXAM:  GENERAL: NAD, well-developed, comfortable  HEAD:  Atraumatic, Normocephalic  EYES: EOMI, PERRLA, conjunctiva and sclera clear  NECK: Supple, No JVD  CHEST/LUNG: Clear to auscultation bilaterally; No wheeze  HEART: Regular rate and rhythm; No murmurs, rubs, or gallops  ABDOMEN: Soft, Nontender, Nondistended; Bowel sounds present  NEURO: AAOx3, mildly forgetful, no focal weakness, 5/5 b/l extremity strength  EXTREMITIES:  2+ Peripheral Pulses, No clubbing, cyanosis, or edema  SKIN: No rashes or lesions           
[facility] Cardiology Progress Note  _______________________________    Pt. seen and examined. No new cardiac-related complaints.    Telemetry -sinus 70-90s    T(C): 36.9 (04-19-21 @ 04:58), Max: 36.9 (04-18-21 @ 22:01)  HR: 80 (04-19-21 @ 04:58) (70 - 82)  BP: 133/66 (04-19-21 @ 04:58) (123/59 - 133/66)  RR: 18 (04-19-21 @ 04:58) (18 - 18)  SpO2: 96% (04-19-21 @ 04:58) (96% - 98%)  I&O's Summary    18 Apr 2021 07:01  -  19 Apr 2021 07:00  --------------------------------------------------------  IN: 1775 mL / OUT: 1050 mL / NET: 725 mL        PHYSICAL EXAM:  GENERAL: Alert, NAD.  NECK: Supple  CHEST/LUNG: Clear to auscultation bilaterally; No wheezes, rales, or rhonchi.  HEART: S1 S2 normal, RRR; No murmurs, rubs, or gallops  ABDOMEN: Soft, Nondistended  EXTREMITIES:  No LE edema.      LABS:                        10.5   10.17 )-----------( 165      ( 18 Apr 2021 07:12 )             33.2     04-19    139  |  108  |  24<H>  ----------------------------<  91  3.7   |  22  |  1.66<H>    Ca    8.4      19 Apr 2021 07:32  Mg     2.0     04-18                    MEDICATIONS  (STANDING):  aspirin enteric coated 81 milliGRAM(s) Oral daily  atorvastatin 10 milliGRAM(s) Oral at bedtime  cholecalciferol 1000 Unit(s) Oral daily  clopidogrel Tablet 75 milliGRAM(s) Oral daily  fludroCORTISONE 0.1 milliGRAM(s) Oral daily  heparin   Injectable 5000 Unit(s) SubCutaneous every 8 hours  polyethylene glycol 3350 17 Gram(s) Oral daily  senna 2 Tablet(s) Oral at bedtime  sertraline 50 milliGRAM(s) Oral daily  sodium chloride 0.9%. 1000 milliLiter(s) (75 mL/Hr) IV Continuous <Continuous>  sodium chloride 0.9%. 1000 milliLiter(s) (75 mL/Hr) IV Continuous <Continuous>    MEDICATIONS  (PRN):  acetaminophen   Tablet .. 650 milliGRAM(s) Oral every 6 hours PRN Mild Pain (1 - 3), Moderate Pain (4 - 6)        RADIOLOGY & ADDITIONAL TESTS:    
[facility] Cardiology Progress Note  _______________________________    Pt. seen and examined. No new cardiac-related complaints.    T(C): 36.7 (04-22-21 @ 04:10), Max: 36.7 (04-21-21 @ 12:55)  HR: 71 (04-22-21 @ 04:10) (71 - 92)  BP: 128/63 (04-22-21 @ 04:10) (89/57 - 131/66)  RR: 18 (04-22-21 @ 04:10) (18 - 19)  SpO2: 97% (04-22-21 @ 04:10) (96% - 99%)  I&O's Summary    21 Apr 2021 07:01  -  22 Apr 2021 07:00  --------------------------------------------------------  IN: 960 mL / OUT: 0 mL / NET: 960 mL        PHYSICAL EXAM:  GENERAL: Alert, NAD.  NECK: Supple  CHEST/LUNG: Clear to auscultation bilaterally; No wheezes, rales, or rhonchi.  HEART: S1 S2 normal, RRR; No murmurs, rubs, or gallops  ABDOMEN: Soft, Nondistended  EXTREMITIES:  No LE edema.      LABS:    04-22    144  |  102  |  22  ----------------------------<  83  4.4   |  31  |  0.93    Ca    8.6      22 Apr 2021 07:07  Phos  2.8     04-21                    MEDICATIONS  (STANDING):  aspirin enteric coated 81 milliGRAM(s) Oral daily  atorvastatin 10 milliGRAM(s) Oral at bedtime  cholecalciferol 1000 Unit(s) Oral daily  clopidogrel Tablet 75 milliGRAM(s) Oral daily  fludroCORTISONE 0.2 milliGRAM(s) Oral daily  heparin   Injectable 5000 Unit(s) SubCutaneous every 8 hours  polyethylene glycol 3350 17 Gram(s) Oral daily  senna 2 Tablet(s) Oral at bedtime  sertraline 50 milliGRAM(s) Oral daily  sodium chloride 0.9%. 1000 milliLiter(s) (60 mL/Hr) IV Continuous <Continuous>    MEDICATIONS  (PRN):  acetaminophen   Tablet .. 650 milliGRAM(s) Oral every 6 hours PRN Mild Pain (1 - 3), Moderate Pain (4 - 6)  bisacodyl Suppository 10 milliGRAM(s) Rectal daily PRN Constipation        RADIOLOGY & ADDITIONAL TESTS:    
[facility] Cardiology Progress Note  _______________________________    Pt. seen and examined. sleeping.    T(C): 37.2 (04-23-21 @ 04:17), Max: 37.2 (04-23-21 @ 04:17)  HR: 76 (04-23-21 @ 04:17) (76 - 91)  BP: 127/63 (04-23-21 @ 04:17) (112/56 - 133/74)  RR: 18 (04-23-21 @ 04:17) (18 - 19)  SpO2: 98% (04-23-21 @ 04:17) (98% - 100%)  I&O's Summary    22 Apr 2021 07:01  -  23 Apr 2021 07:00  --------------------------------------------------------  IN: 960 mL / OUT: 0 mL / NET: 960 mL        PHYSICAL EXAM:  GENERAL: sleeping. NAD  NECK: Supple  CHEST/LUNG: Clear to auscultation bilaterally; No wheezes, rales, or rhonchi.  HEART: S1 S2 normal, RRR; No murmurs, rubs, or gallops  ABDOMEN: Soft, Nondistended  EXTREMITIES:  No LE edema.      LABS:    04-22    144  |  102  |  22  ----------------------------<  83  4.4   |  31  |  0.93    Ca    8.6      22 Apr 2021 07:07                    MEDICATIONS  (STANDING):  aspirin enteric coated 81 milliGRAM(s) Oral daily  atorvastatin 10 milliGRAM(s) Oral at bedtime  cholecalciferol 1000 Unit(s) Oral daily  clopidogrel Tablet 75 milliGRAM(s) Oral daily  fludroCORTISONE 0.2 milliGRAM(s) Oral daily  heparin   Injectable 5000 Unit(s) SubCutaneous every 8 hours  polyethylene glycol 3350 17 Gram(s) Oral daily  senna 2 Tablet(s) Oral at bedtime  sertraline 50 milliGRAM(s) Oral daily  sodium chloride 0.9%. 1000 milliLiter(s) (60 mL/Hr) IV Continuous <Continuous>  sodium chloride 0.9%. 1000 milliLiter(s) (50 mL/Hr) IV Continuous <Continuous>    MEDICATIONS  (PRN):  acetaminophen   Tablet .. 650 milliGRAM(s) Oral every 6 hours PRN Mild Pain (1 - 3), Moderate Pain (4 - 6)  bisacodyl Suppository 10 milliGRAM(s) Rectal daily PRN Constipation        RADIOLOGY & ADDITIONAL TESTS:

## 2021-04-23 NOTE — PROGRESS NOTE ADULT - PROBLEM SELECTOR PLAN 1
-  - still orthostatic positive. goal is to minimize symptoms primarily. ok for discharge planning if orthostatic vitals positive but he remains asymptomatic.   - continue ivf for today.   - increase florinef to 0.4 mg daily.

## 2021-04-23 NOTE — PROGRESS NOTE ADULT - PROVIDER SPECIALTY LIST ADULT
Internal Medicine
Cardiology

## 2021-04-23 NOTE — CHART NOTE - NSCHARTNOTEFT_GEN_A_CORE
Nutrition Follow Up Note  Patient seen for: malnutrition follow up on [facility]    · Reason for Admission	dizziness, orthostasis      Chart reviewed, events noted.    : Patient remains acute today, still orthostatic per the team anticipate discharge to [facility]    Source: [x] Patient       [x] EMR        [] RN        [] Family at bedside       [] Other:    -If unable to interview patient: [] Trach/Vent/BiPAP  [] Disoriented/confused/inappropriate to interview    Diet Order:   Diet, Regular (21)    - Is current order appropriate/adequate? x] Yes  []  No:     - PO intake :   [x] >75%  Adequate    [] 50-75%  Fair       [] <50%  Poor    - Nutrition-related concerns: none at this time    GI:  Last BM , fecal incontinence___.   Bowel Regimen? [x] Yes - senna, dulcolax  [] No      Weights:   Daily Weight in k.2 (), Weight in k (), Weight in k.1 (), Weight in k.1 (-), Weight in k.9 (18)  6% gain since     Nutritionally Pertinent MEDICATIONS  (STANDING):  atorvastatin  cholecalciferol  fludroCORTISONE  polyethylene glycol 3350  senna  sodium chloride 0.9%.    Pertinent Labs:  @ 09:13: Na 140, BUN 32<H>, Cr 2.01<H>, BG 95, K+ 4.0          Skin per nursing documentation: no pressure injury  Edema: none    Estimated Needs:   [x] no change since previous assessment  [] recalculated:      Estimated Energy Needs:  · Weight (lbs)	145.7 lb  · Weight (kg)	66 kg  · Enter From (bakari/kg)	25  · Enter To (bakari/kg)	30  · Calculated From (bakari/kg)	1650  · Calculated To (bakari/kg)	1980     Estimated Protein Needs:  · Weight (lbs)	145.7 lb  · Weight (kg)	66 kg  · Enter From (g/kg)	0.8  · Enter To (g/kg)	1  · Calculated From (g/kg)	52.8  · Calculated To (g/kg)	66     Estimated Fluid Needs:  · Weight (lbs)	145.7 lb  · Weight (kg)	66 kg     Other Calculations:  · Other Calculations	needs based on current weight, 0.8gram protein due to CKD3      Previous Nutrition Diagnosis: severe malnutrition  Nutrition Diagnosis is: [x] ongoing  [] resolved [] not applicable     New Nutrition Diagnosis: [x] Not applicable    Nutrition Care Plan:  [] In Progress  [x] Achieved  [] Not applicable    Nutrition Interventions:     Education Provided:       [] Yes:  [x] No:        Recommendations:         [x] Continue current diet order            [] Add oral nutrition supplement:     [] Discontinue current diet order. Recommend:      [] Add micronutrient supplementation:      [x] Continue current micronutrient supplementation: Cholecalciferol      [x] Other: RD removed stewed prunes as per pt request    Monitoring and Evaluation:   Continue to monitor nutritional intake, tolerance to diet prescription, weights, labs, skin integrity      RD remains available upon request and will follow up per protocol  Millicent Rogers MA, RD, CDN #[phone]

## 2021-04-23 NOTE — PROGRESS NOTE ADULT - PROBLEM SELECTOR PLAN 4
-  - anemia chronic, stable.    TTE unremarkable.     ok with discharge planning from cardiac standpoint as long as he remains stable and asymptomatic. will follow as needed.      Jai Jay D.O.  [phone]

## 2021-04-23 NOTE — PROGRESS NOTE ADULT - PROBLEM SELECTOR PLAN 3
-  - good pulses on exam.  - cont asa and plavix.  - cont statin.

## 2021-04-23 NOTE — DISCHARGE NOTE NURSING/CASE MANAGEMENT/SOCIAL WORK - PATIENT PORTAL LINK FT
You can access the FollowMyHealth Patient Portal offered by [facility] by registering at the following website: [email or web address]. By joining [employer]’s FollowMyHealth portal, you will also be able to view your health information using other applications (apps) compatible with our system.

## 2021-05-05 ENCOUNTER — INPATIENT (INPATIENT)
Facility: HOSPITAL | Age: 86
LOS: 4 days | Discharge: HOME | DRG: 696 | End: 2021-05-10
Attending: UROLOGY | Admitting: UROLOGY
Payer: MEDICARE

## 2021-05-05 VITALS
HEIGHT: 71 IN | DIASTOLIC BLOOD PRESSURE: 74 MMHG | HEART RATE: 74 BPM | TEMPERATURE: 98 F | WEIGHT: 149.91 LBS | RESPIRATION RATE: 18 BRPM | SYSTOLIC BLOOD PRESSURE: 120 MMHG | OXYGEN SATURATION: 95 %

## 2021-05-05 DIAGNOSIS — Z98.890 OTHER SPECIFIED POSTPROCEDURAL STATES: Chronic | ICD-10-CM

## 2021-05-05 DIAGNOSIS — R31.9 HEMATURIA, UNSPECIFIED: ICD-10-CM

## 2021-05-05 LAB
ANION GAP SERPL CALC-SCNC: 11 MMOL/L — SIGNIFICANT CHANGE UP (ref 5–17)
APTT BLD: 26.4 SEC — LOW (ref 27.5–35.5)
BASOPHILS # BLD AUTO: 0 K/UL — SIGNIFICANT CHANGE UP (ref 0–0.2)
BASOPHILS NFR BLD AUTO: 0 % — SIGNIFICANT CHANGE UP (ref 0–2)
BLD GP AB SCN SERPL QL: NEGATIVE — SIGNIFICANT CHANGE UP
BUN SERPL-MCNC: 54 MG/DL — HIGH (ref 7–23)
CALCIUM SERPL-MCNC: 8.3 MG/DL — LOW (ref 8.4–10.5)
CHLORIDE SERPL-SCNC: 104 MMOL/L — SIGNIFICANT CHANGE UP (ref 96–108)
CO2 SERPL-SCNC: 24 MMOL/L — SIGNIFICANT CHANGE UP (ref 22–31)
CREAT SERPL-MCNC: 2.53 MG/DL — HIGH (ref 0.5–1.3)
EOSINOPHIL # BLD AUTO: 0.31 K/UL — SIGNIFICANT CHANGE UP (ref 0–0.5)
EOSINOPHIL NFR BLD AUTO: 3.5 % — SIGNIFICANT CHANGE UP (ref 0–6)
GLUCOSE SERPL-MCNC: 120 MG/DL — HIGH (ref 70–99)
HCT VFR BLD CALC: 25 % — LOW (ref 39–50)
HGB BLD-MCNC: 7.8 G/DL — LOW (ref 13–17)
INR BLD: 0.9 RATIO — SIGNIFICANT CHANGE UP (ref 0.88–1.16)
LYMPHOCYTES # BLD AUTO: 0.61 K/UL — LOW (ref 1–3.3)
LYMPHOCYTES # BLD AUTO: 6.9 % — LOW (ref 13–44)
MANUAL SMEAR VERIFICATION: SIGNIFICANT CHANGE UP
MCHC RBC-ENTMCNC: 31 PG — SIGNIFICANT CHANGE UP (ref 27–34)
MCHC RBC-ENTMCNC: 31.2 GM/DL — LOW (ref 32–36)
MCV RBC AUTO: 99.2 FL — SIGNIFICANT CHANGE UP (ref 80–100)
MONOCYTES # BLD AUTO: 0.84 K/UL — SIGNIFICANT CHANGE UP (ref 0–0.9)
MONOCYTES NFR BLD AUTO: 9.6 % — SIGNIFICANT CHANGE UP (ref 2–14)
NEUTROPHILS # BLD AUTO: 6.88 K/UL — SIGNIFICANT CHANGE UP (ref 1.8–7.4)
NEUTROPHILS NFR BLD AUTO: 78.3 % — HIGH (ref 43–77)
PLAT MORPH BLD: NORMAL — SIGNIFICANT CHANGE UP
PLATELET # BLD AUTO: 219 K/UL — SIGNIFICANT CHANGE UP (ref 150–400)
POTASSIUM SERPL-MCNC: 4 MMOL/L — SIGNIFICANT CHANGE UP (ref 3.5–5.3)
POTASSIUM SERPL-SCNC: 4 MMOL/L — SIGNIFICANT CHANGE UP (ref 3.5–5.3)
PROTHROM AB SERPL-ACNC: 10.8 SEC — SIGNIFICANT CHANGE UP (ref 10.6–13.6)
RBC # BLD: 2.52 M/UL — LOW (ref 4.2–5.8)
RBC # FLD: 13.3 % — SIGNIFICANT CHANGE UP (ref 10.3–14.5)
RBC BLD AUTO: SIGNIFICANT CHANGE UP
RH IG SCN BLD-IMP: NEGATIVE — SIGNIFICANT CHANGE UP
RH IG SCN BLD-IMP: NEGATIVE — SIGNIFICANT CHANGE UP
SARS-COV-2 RNA SPEC QL NAA+PROBE: SIGNIFICANT CHANGE UP
SODIUM SERPL-SCNC: 139 MMOL/L — SIGNIFICANT CHANGE UP (ref 135–145)
VARIANT LYMPHS # BLD: 1.7 % — SIGNIFICANT CHANGE UP (ref 0–6)
WBC # BLD: 8.79 K/UL — SIGNIFICANT CHANGE UP (ref 3.8–10.5)
WBC # FLD AUTO: 8.79 K/UL — SIGNIFICANT CHANGE UP (ref 3.8–10.5)

## 2021-05-05 PROCEDURE — 51700 IRRIGATION OF BLADDER: CPT

## 2021-05-05 PROCEDURE — 99285 EMERGENCY DEPT VISIT HI MDM: CPT | Mod: CS,GC

## 2021-05-05 PROCEDURE — 51703 INSERT BLADDER CATH COMPLEX: CPT

## 2021-05-05 PROCEDURE — 99232 SBSQ HOSP IP/OBS MODERATE 35: CPT | Mod: 25

## 2021-05-05 PROCEDURE — 99222 1ST HOSP IP/OBS MODERATE 55: CPT | Mod: 25

## 2021-05-05 RX ORDER — CEFAZOLIN SODIUM 1 G
1000 VIAL (EA) INJECTION EVERY 8 HOURS
Refills: 0 | Status: DISCONTINUED | OUTPATIENT
Start: 2021-05-05 | End: 2021-05-09

## 2021-05-05 RX ORDER — ONDANSETRON 8 MG/1
4 TABLET, FILM COATED ORAL EVERY 6 HOURS
Refills: 0 | Status: DISCONTINUED | OUTPATIENT
Start: 2021-05-05 | End: 2021-05-10

## 2021-05-05 RX ORDER — HEPARIN SODIUM 5000 [USP'U]/ML
5000 INJECTION INTRAVENOUS; SUBCUTANEOUS EVERY 8 HOURS
Refills: 0 | Status: DISCONTINUED | OUTPATIENT
Start: 2021-05-05 | End: 2021-05-10

## 2021-05-05 RX ORDER — ASPIRIN/CALCIUM CARB/MAGNESIUM 324 MG
81 TABLET ORAL DAILY
Refills: 0 | Status: DISCONTINUED | OUTPATIENT
Start: 2021-05-05 | End: 2021-05-10

## 2021-05-05 RX ORDER — ATORVASTATIN CALCIUM 80 MG/1
10 TABLET, FILM COATED ORAL AT BEDTIME
Refills: 0 | Status: DISCONTINUED | OUTPATIENT
Start: 2021-05-05 | End: 2021-05-10

## 2021-05-05 RX ORDER — POLYETHYLENE GLYCOL 3350 17 G/17G
17 POWDER, FOR SOLUTION ORAL DAILY
Refills: 0 | Status: DISCONTINUED | OUTPATIENT
Start: 2021-05-05 | End: 2021-05-07

## 2021-05-05 RX ORDER — SODIUM CHLORIDE 9 MG/ML
1000 INJECTION INTRAMUSCULAR; INTRAVENOUS; SUBCUTANEOUS
Refills: 0 | Status: DISCONTINUED | OUTPATIENT
Start: 2021-05-05 | End: 2021-05-08

## 2021-05-05 RX ORDER — ACETAMINOPHEN 500 MG
650 TABLET ORAL EVERY 6 HOURS
Refills: 0 | Status: DISCONTINUED | OUTPATIENT
Start: 2021-05-05 | End: 2021-05-10

## 2021-05-05 RX ORDER — FLUDROCORTISONE ACETATE 0.1 MG/1
0.4 TABLET ORAL DAILY
Refills: 0 | Status: DISCONTINUED | OUTPATIENT
Start: 2021-05-05 | End: 2021-05-10

## 2021-05-05 RX ORDER — SENNA PLUS 8.6 MG/1
2 TABLET ORAL AT BEDTIME
Refills: 0 | Status: DISCONTINUED | OUTPATIENT
Start: 2021-05-05 | End: 2021-05-08

## 2021-05-05 RX ORDER — SERTRALINE 25 MG/1
50 TABLET, FILM COATED ORAL DAILY
Refills: 0 | Status: DISCONTINUED | OUTPATIENT
Start: 2021-05-05 | End: 2021-05-10

## 2021-05-05 RX ADMIN — Medication 100 MILLIGRAM(S): at 17:44

## 2021-05-05 RX ADMIN — SODIUM CHLORIDE 50 MILLILITER(S): 9 INJECTION INTRAMUSCULAR; INTRAVENOUS; SUBCUTANEOUS at 17:43

## 2021-05-05 NOTE — H&P ADULT - HISTORY OF PRESENT ILLNESS
89yo male h/o metastatic prostate ca s/p radiation about 15 years ago, no recent urology follow up, CAD on asa, anemia, CKD, PAD with recent angioplasty with stent of left SFA for which he was recently started on plavix, BIBA from [facility] for gross hematuria. Per [facility] and patient, gross hematuria started yesterday  89yo male h/o metastatic prostate ca s/p radiation about 15 years ago, no recent urology follow up, CAD on asa, anemia, CKD, PAD with recent angioplasty with stent of left SFA for which he was recently started on plavix, BIBA from [facility] for gross hematuria. Per [facility] patient, gross hematuria started yesterday, never had hematuria prior to this. Patient is a poor historian, but states he may have had some increased frequency recently. Denies any fever/chills, dysuria, abdominal or flank pain. Has not seen a urologist in a few years and cannot recall urologists name.

## 2021-05-05 NOTE — ED ADULT NURSE REASSESSMENT NOTE - NS ED NURSE REASSESS COMMENT FT1
2 RNs at bedside to verify blood. Blood transfusing at 100mL/Hr. Patient informed about transfusion reactions. CBI infusing catheter patent, output approx 5500Mls, color light red.

## 2021-05-05 NOTE — H&P ADULT - NSHPPHYSICALEXAM_GEN_ALL_CORE
ICU Vital Signs Last 24 Hrs  T(C): 36.6 (05 May 2021 14:03), Max: 36.6 (05 May 2021 14:03)  T(F): 97.8 (05 May 2021 14:03), Max: 97.8 (05 May 2021 14:03)  HR: 74 (05 May 2021 14:03) (74 - 74)  BP: 120/74 (05 May 2021 14:03) (120/74 - 120/74)  BP(mean): --  ABP: --  ABP(mean): --  RR: 18 (05 May 2021 14:03) (18 - 18)  SpO2: 95% (05 May 2021 14:03) (95% - 95%)      GEN: NAD  ABD: soft, NT/ND  : +sanchez draining dark maroon urine.

## 2021-05-05 NOTE — ED ADULT NURSE NOTE - OBJECTIVE STATEMENT
89 y/o male with pmhx of cad, pvd with stents, ckd, hyperlipidemia and prostate ca on plavix and daily aspirin biba from [facility] for hematuria.  per ems, pt c/o urinary retention and a bladder scan revealed approx 700 cc's of urine.  f/c was inserted by [facility] RN which released dark red colored fluid.  pt is awake, alert and responsive to all stimuli.  no sob or respiratory distress noted.  skin is warm, dry and intact.  pt appears pale but no cyanosis noted at this time.  abdomen in soft and non distended.  no tenderness noted upon palpation in any quadrant.  recvd pt with f/c in placed attached to bsd with approx 700 cc's of dark red colored urine.  vss.  safety precautions in place.  will continue to monitor.

## 2021-05-05 NOTE — H&P ADULT - ASSESSMENT
89yo male h/o prostate ca s/p radiation bout 15 years ago, also recent LLE vascular stent, recently started on plavix, admitted with gross hematuria requiring CBI  - 22f 3 way placed , 75cc of clot removed and started on CBI  - monitor UO/color on CBI, wean as possible  - trend H/H transfuse as needed   - 1u pRBCs now  - ancef for ppx  - f/u urine culture from [facility]  - vascular consulted re: holding plavix  - continue asa  - regular diet

## 2021-05-05 NOTE — ED PROVIDER NOTE - CLINICAL SUMMARY MEDICAL DECISION MAKING FREE TEXT BOX
Dr. Farmer Note: hematuria in elderly with mild JOSELITO with concern for possible radiation cystitis vs infection, labs, UA, trend Hgb, Urologist consult

## 2021-05-05 NOTE — ED PROVIDER NOTE - NS ED ROS FT
Constitutional: No fever or chills  Eyes: No visual changes, eye pain or redness  HEENT: No throat pain, ear pain, nasal pain. No nose bleeding.  CV: No chest pain or lower extremity edema  Resp: No SOB no cough  GI: No abd pain. No nausea or vomiting. No diarrhea. No constipation.   : No dysuria, ++ hematuria.   MSK: No musculoskeletal pain  Skin: No rash  Neuro: No headache. No numbness or tingling. No weakness.

## 2021-05-05 NOTE — H&P ADULT - NSICDXPASTMEDICALHX_GEN_ALL_CORE_FT
PAST MEDICAL HISTORY:  CAD (coronary artery disease)     PAD (peripheral artery disease)     Prostate CA s/p radiation    Stage 3 chronic kidney disease      PAST MEDICAL HISTORY:  Anemia of chronic disease     CAD (coronary artery disease) asa    PAD (peripheral artery disease) s/p vascular stent    Prostate CA s/p radiation    Stage 3 chronic kidney disease

## 2021-05-05 NOTE — ED PROVIDER NOTE - PHYSICAL EXAMINATION
Gen: AAO x 3, NAD  Skin: No rashes or lesions  HEENT: NC/AT, PERRLA, EOMI, MMM  Resp: unlabored CTAB  Cardiac: rrr s1s2, no murmurs, rubs or gallops  GI: ND, +BS, Soft, NT  :   Ext: no pedal edema, FROM in all extremities  Neuro: no focal deficits

## 2021-05-05 NOTE — ED PROVIDER NOTE - OBJECTIVE STATEMENT
89yom with PMHx of Prostate Ca, hx of CKD stage 3 (Cr ~ 1.8-1.9, sees Dr. Chan renal), chronic left hip pain BIB EMS for hematuria 89yom with PMHx of Prostate Ca, hx of CKD stage 3 (Cr ~ 1.8-1.9, sees Dr. Chan renal), chronic left hip pain BIB EMS for hematuria from [facility]. 89yom with PMHx of Prostate Ca, hx of CKD stage 3 (Cr ~ 1.8-1.9, sees Dr. Chan renal), chronic left hip pain BIB EMS for hematuria from [facility].  Dr. Farmer Note: pt with constant hematuria, onset last night, improved but persistent in bag, no associated syncope or abdominal pain.

## 2021-05-05 NOTE — PROCEDURE NOTE - ADDITIONAL PROCEDURE DETAILS
16f sanchez removed and 22f 3 way placed using sterile technique. pt tolerated well. maroon colored urine drained.     75cc worth of clot removed, then draining a translucent cherry color. started on CBI. output clear on moderate to fast rate with intermittent periods of red urine.

## 2021-05-05 NOTE — ED PROVIDER NOTE - PROGRESS NOTE DETAILS
[facility] [place]- Cell [phone]; [place]- [phone]

## 2021-05-05 NOTE — ED ADULT NURSE REASSESSMENT NOTE - NS ED NURSE REASSESS COMMENT FT1
pt t&p to L side for comfort.  duoderm noted to midback; clean and dry.  no wounds on sacrum at this time.  will continue to monitor.

## 2021-05-05 NOTE — H&P ADULT - ATTENDING COMMENTS
Attending: patient was seen and examined, above reviewed. Will monitor CBC, may need blood transfusion. Will ask vascular regarding management of anti-coagulation. 22 Fr 3 way Wang was placed and blood clots irrigated. Started CBI, urine appears light pink/red. Will admit and monitor.

## 2021-05-05 NOTE — H&P ADULT - NSHPLABSRESULTS_GEN_ALL_CORE
Lab Results:  CBC  CBC Full  -  ( 05 May 2021 15:16 )  WBC Count : 8.79 K/uL  RBC Count : 2.52 M/uL  Hemoglobin : 7.8 g/dL  Hematocrit : 25.0 %  Platelet Count - Automated : 219 K/uL  Mean Cell Volume : 99.2 fl  Mean Cell Hemoglobin : 31.0 pg  Mean Cell Hemoglobin Concentration : 31.2 gm/dL  Auto Neutrophil # : 6.88 K/uL  Auto Lymphocyte # : 0.61 K/uL  Auto Monocyte # : 0.84 K/uL  Auto Eosinophil # : 0.31 K/uL  Auto Basophil # : 0.00 K/uL  Auto Neutrophil % : 78.3 %  Auto Lymphocyte % : 6.9 %  Auto Monocyte % : 9.6 %  Auto Eosinophil % : 3.5 %  Auto Basophil % : 0.0 %    .		Differential:	[] Automated		[] Manual  Chemistry  05-05    139  |  104  |  54<H>  ----------------------------<  120<H>  4.0   |  24  |  2.53<H>    Ca    8.3<L>      05 May 2021 15:16        PT/INR - ( 05 May 2021 15:16 )   PT: 10.8 sec;   INR: 0.90 ratio         PTT - ( 05 May 2021 15:16 )  PTT:26.4 sec

## 2021-05-05 NOTE — CHART NOTE - NSCHARTNOTEFT_GEN_A_CORE
RN called stating patient is screaming in pain and nurse is unable to trouble shoot patients CBI/ catheter.   Patient seen and examined immediately. Patient extremely uncomfortable, CBI was stopped and patient was clotted off and extremely distended. Patient states he has been in extreme pain for 1 hour. Endorses nausea. Denies fever, chills, vomiting.    General: patient in acute distress   Abd: soft, + TTP, +moderate distension   : sanchez catheter clotted off, balloon taken down and gently flushed catheter with return of ~200cc of clot, catheter hubbed and 20cc filled in balloon, CBI restarted and draining clear peach on a moderate-slow drip    Plan:  -strictly monitor CBI to ensure bags do not run dry and sanchez is draining properly   -RNs may flush catheter prn with piston syringe and NS/sterile water   -patient consented for blood transfusion, 1 unit prbc started   -if patient becomes distended or uncomfortable please immediately clamp sanchez and page urology at [phone]

## 2021-05-06 DIAGNOSIS — I95.1 ORTHOSTATIC HYPOTENSION: ICD-10-CM

## 2021-05-06 DIAGNOSIS — I73.9 PERIPHERAL VASCULAR DISEASE, UNSPECIFIED: ICD-10-CM

## 2021-05-06 DIAGNOSIS — R31.0 GROSS HEMATURIA: ICD-10-CM

## 2021-05-06 LAB
ANION GAP SERPL CALC-SCNC: 15 MMOL/L — SIGNIFICANT CHANGE UP (ref 5–17)
BUN SERPL-MCNC: 55 MG/DL — HIGH (ref 7–23)
CALCIUM SERPL-MCNC: 8 MG/DL — LOW (ref 8.4–10.5)
CHLORIDE SERPL-SCNC: 104 MMOL/L — SIGNIFICANT CHANGE UP (ref 96–108)
CO2 SERPL-SCNC: 19 MMOL/L — LOW (ref 22–31)
COVID-19 SPIKE DOMAIN AB INTERP: POSITIVE
COVID-19 SPIKE DOMAIN ANTIBODY RESULT: >250 U/ML — HIGH
CREAT SERPL-MCNC: 2.63 MG/DL — HIGH (ref 0.5–1.3)
GLUCOSE SERPL-MCNC: 123 MG/DL — HIGH (ref 70–99)
HCT VFR BLD CALC: 23.2 % — LOW (ref 39–50)
HCT VFR BLD CALC: 24.4 % — LOW (ref 39–50)
HGB BLD-MCNC: 7.4 G/DL — LOW (ref 13–17)
HGB BLD-MCNC: 8.1 G/DL — LOW (ref 13–17)
MCHC RBC-ENTMCNC: 30.7 PG — SIGNIFICANT CHANGE UP (ref 27–34)
MCHC RBC-ENTMCNC: 31.5 PG — SIGNIFICANT CHANGE UP (ref 27–34)
MCHC RBC-ENTMCNC: 31.9 GM/DL — LOW (ref 32–36)
MCHC RBC-ENTMCNC: 33.2 GM/DL — SIGNIFICANT CHANGE UP (ref 32–36)
MCV RBC AUTO: 94.9 FL — SIGNIFICANT CHANGE UP (ref 80–100)
MCV RBC AUTO: 96.3 FL — SIGNIFICANT CHANGE UP (ref 80–100)
NRBC # BLD: 0 /100 WBCS — SIGNIFICANT CHANGE UP (ref 0–0)
NRBC # BLD: 0 /100 WBCS — SIGNIFICANT CHANGE UP (ref 0–0)
PLATELET # BLD AUTO: 159 K/UL — SIGNIFICANT CHANGE UP (ref 150–400)
PLATELET # BLD AUTO: 204 K/UL — SIGNIFICANT CHANGE UP (ref 150–400)
POTASSIUM SERPL-MCNC: 4.5 MMOL/L — SIGNIFICANT CHANGE UP (ref 3.5–5.3)
POTASSIUM SERPL-SCNC: 4.5 MMOL/L — SIGNIFICANT CHANGE UP (ref 3.5–5.3)
RBC # BLD: 2.41 M/UL — LOW (ref 4.2–5.8)
RBC # BLD: 2.57 M/UL — LOW (ref 4.2–5.8)
RBC # FLD: 14.3 % — SIGNIFICANT CHANGE UP (ref 10.3–14.5)
RBC # FLD: 14.6 % — HIGH (ref 10.3–14.5)
SARS-COV-2 IGG+IGM SERPL QL IA: >250 U/ML — HIGH
SARS-COV-2 IGG+IGM SERPL QL IA: POSITIVE
SODIUM SERPL-SCNC: 138 MMOL/L — SIGNIFICANT CHANGE UP (ref 135–145)
WBC # BLD: 10.57 K/UL — HIGH (ref 3.8–10.5)
WBC # BLD: 12.3 K/UL — HIGH (ref 3.8–10.5)
WBC # FLD AUTO: 10.57 K/UL — HIGH (ref 3.8–10.5)
WBC # FLD AUTO: 12.3 K/UL — HIGH (ref 3.8–10.5)

## 2021-05-06 PROCEDURE — 99232 SBSQ HOSP IP/OBS MODERATE 35: CPT

## 2021-05-06 PROCEDURE — 99232 SBSQ HOSP IP/OBS MODERATE 35: CPT | Mod: 25

## 2021-05-06 PROCEDURE — 76770 US EXAM ABDO BACK WALL COMP: CPT | Mod: 26

## 2021-05-06 PROCEDURE — 51700 IRRIGATION OF BLADDER: CPT

## 2021-05-06 RX ORDER — ALUMINUM SULFATE
1 POWDER (GRAM) MISCELLANEOUS ONCE
Refills: 0 | Status: DISCONTINUED | OUTPATIENT
Start: 2021-05-06 | End: 2021-05-06

## 2021-05-06 RX ORDER — FUROSEMIDE 40 MG
20 TABLET ORAL ONCE
Refills: 0 | Status: COMPLETED | OUTPATIENT
Start: 2021-05-06 | End: 2021-05-07

## 2021-05-06 RX ORDER — ATROPA BELLADONNA AND OPIUM 16.2; 6 MG/1; MG/1
1 SUPPOSITORY RECTAL EVERY 6 HOURS
Refills: 0 | Status: DISCONTINUED | OUTPATIENT
Start: 2021-05-06 | End: 2021-05-10

## 2021-05-06 RX ORDER — ATROPA BELLADONNA AND OPIUM 16.2; 6 MG/1; MG/1
1 SUPPOSITORY RECTAL EVERY 6 HOURS
Refills: 0 | Status: DISCONTINUED | OUTPATIENT
Start: 2021-05-06 | End: 2021-05-06

## 2021-05-06 RX ORDER — CHOLECALCIFEROL (VITAMIN D3) 125 MCG
1000 CAPSULE ORAL DAILY
Refills: 0 | Status: DISCONTINUED | OUTPATIENT
Start: 2021-05-06 | End: 2021-05-10

## 2021-05-06 RX ADMIN — ATORVASTATIN CALCIUM 10 MILLIGRAM(S): 80 TABLET, FILM COATED ORAL at 00:27

## 2021-05-06 RX ADMIN — HEPARIN SODIUM 5000 UNIT(S): 5000 INJECTION INTRAVENOUS; SUBCUTANEOUS at 22:18

## 2021-05-06 RX ADMIN — Medication 1000 UNIT(S): at 17:21

## 2021-05-06 RX ADMIN — HEPARIN SODIUM 5000 UNIT(S): 5000 INJECTION INTRAVENOUS; SUBCUTANEOUS at 05:22

## 2021-05-06 RX ADMIN — Medication 81 MILLIGRAM(S): at 11:34

## 2021-05-06 RX ADMIN — Medication 100 MILLIGRAM(S): at 14:05

## 2021-05-06 RX ADMIN — SENNA PLUS 2 TABLET(S): 8.6 TABLET ORAL at 00:28

## 2021-05-06 RX ADMIN — Medication 100 MILLIGRAM(S): at 05:22

## 2021-05-06 RX ADMIN — FLUDROCORTISONE ACETATE 0.4 MILLIGRAM(S): 0.1 TABLET ORAL at 05:25

## 2021-05-06 RX ADMIN — HEPARIN SODIUM 5000 UNIT(S): 5000 INJECTION INTRAVENOUS; SUBCUTANEOUS at 14:05

## 2021-05-06 RX ADMIN — HEPARIN SODIUM 5000 UNIT(S): 5000 INJECTION INTRAVENOUS; SUBCUTANEOUS at 00:28

## 2021-05-06 RX ADMIN — Medication 100 MILLIGRAM(S): at 00:27

## 2021-05-06 RX ADMIN — SERTRALINE 50 MILLIGRAM(S): 25 TABLET, FILM COATED ORAL at 11:34

## 2021-05-06 RX ADMIN — ATORVASTATIN CALCIUM 10 MILLIGRAM(S): 80 TABLET, FILM COATED ORAL at 22:18

## 2021-05-06 RX ADMIN — ATROPA BELLADONNA AND OPIUM 1 SUPPOSITORY(S): 16.2; 6 SUPPOSITORY RECTAL at 05:25

## 2021-05-06 RX ADMIN — Medication 650 MILLIGRAM(S): at 04:42

## 2021-05-06 RX ADMIN — Medication 650 MILLIGRAM(S): at 04:12

## 2021-05-06 RX ADMIN — Medication 100 MILLIGRAM(S): at 22:18

## 2021-05-06 NOTE — PROGRESS NOTE ADULT - ASSESSMENT
89yo male h/o prostate ca s/p radiation bout 15 years ago, also recent LLE vascular stent, recently started on plavix, admitted with gross hematuria requiring CBI; gross hematuria causing acute blood loss anemia requiring blood transfusion  - 22f 3 way in place   - monitor UO/color on CBI, wean as possible  - trend H/H transfuse as needed   - ancef for ppx  - f/u urine culture from [facility]  - vascular consulted re: holding plavix  - continue asa  - regular diet

## 2021-05-06 NOTE — CONSULT NOTE ADULT - ASSESSMENT
89yo male h/o metastatic prostate ca s/p radiation about 15 years ago, no recent urology follow up, CAD on asa, anemia, CKD, PAD with recent angioplasty with stent of left SFA for which he was recently started on plavix, BIBA from [facility] for gross hematuria. 
89M hx metastatic prostate ca s/p radiation about 15 years ago, CAD on asa, anemia, CKD III, PAD s/p angioplasty with stent of left SFA on Plavix (4/16/21) presenting from rehab with hematuria    - Please start aspirin in lieu of Plavix and resume Plavix when able  - Follow up with Dr. Urias as an outpatient  - Rest of care per primary/urology    d/w Dr. Adonay Bledsoe, PGY-3  Vascular Surgery  [id number] with questions 
89yo male h/o prostate ca s/p radiation bout 15 years ago, also recent LLE vascular stent, recently started on plavix, admitted with gross hematuria requiring CBI    # Hematuria:  22f 3 way placed , 75cc of clot removed and started on CBI  Monitor UO/color on CBI, wean as possible  Trend H/H transfuse as needed   1u pRBCs now  Ancef for ppx  Follow up UCx from [facility]  Cont ASA, hold Plavix as per vascular  4/6: + pain + abd distension, RN clamped CBI and paged urology - awaiting team  Follow urology recs    # PAD:  Recent angioplasty  Cont ASA, hold Plavix  Follow up vascular recs    # Orthostatics Hypotension:  Recent admission for orthostatic hypotension  BP stable  TTE unremarkable 4/2021  Cont Evon Franco IVF  Follow up cards recs    # DVT ppx:  Heparin subq Q8H

## 2021-05-06 NOTE — CONSULT NOTE ADULT - PROBLEM SELECTOR RECOMMENDATION 3
-  -recent admission for orthostatic hypotension  -BP stable  -TTE unremarkable 4/2021  -continue with florinef   -gentle ivf     [facility]    Jai Jay D.O.  [phone]

## 2021-05-06 NOTE — CONSULT NOTE ADULT - SUBJECTIVE AND OBJECTIVE BOX
Patient is a 89y old  Male who presents with a chief complaint of gross hematuria (06 May 2021 08:58)      HPI:  89yo male h/o metastatic prostate ca s/p radiation about 15 years ago, no recent urology follow up, CAD on asa, anemia, CKD, PAD with recent angioplasty with stent of left SFA for which he was recently started on plavix, BIBA from [facility] rehab for gross hematuria. Per [facility] and patient, gross hematuria started yesterday, never had hematuria prior to this. Patient is a poor historian, but states he may have had some increased frequency recently. Denies any fever/chills, dysuria, abdominal or flank pain. Has not seen a urologist in a few years and cannot recall urologists name.  (05 May 2021 16:06)      PAST MEDICAL & SURGICAL HISTORY:  CAD (coronary artery disease)  asa    Prostate CA  s/p radiation    Stage 3 chronic kidney disease    PAD (peripheral artery disease)  s/p vascular stent    Anemia of chronic disease    H/O vascular surgery        FAMILY HISTORY:      SOCIAL HISTORY:    Allergies    No Known Allergies    Intolerances          REVIEW OF SYSTEMS:  General: no weakness, no fever/chills, no weight loss/gain  Skin/Breast: no rash, no jaundice  Ophthalmologic: no vision changes, no dry eyes   Respiratory and Thorax: no cough, no wheezing, no hemoptysis, no dyspnea  Cardiovascular: no chest pain, no shortness of breath, no orthopnea  Gastrointestinal: no n/v/d, + abdominal pain, no dysphagia   Genitourinary: no dysuria, no frequency, no nocturia, + hematuria  Musculoskeletal: no trauma, no sprain/strain, no myalgias, no arthralgias, no fracture  Neurological: no HA, no dizziness, no weakness, no numbness  Psychiatric: no depression, no SI/HI  Hematology/Lymphatics: no easy bruising  Endocrine: no heat or cold intolerance. no weight gain or loss  Allergic/Immunologic: no allergy or recent reaction     SUBJECTIVE / OVERNIGHT EVENTS:    hand irrigation x2 overnight  patient seen and examined  denies cp/sob  no n/v/d  +abd distension   CBI clamped - awaiting Urology      Vital Signs Last 24 Hrs  T(C): 36.6 (06 May 2021 08:30), Max: 36.9 (05 May 2021 22:40)  T(F): 97.9 (06 May 2021 08:30), Max: 98.5 (05 May 2021 22:40)  HR: 76 (06 May 2021 08:30) (74 - 88)  BP: 97/59 (06 May 2021 08:30) (96/49 - 150/65)  BP(mean): 71 (05 May 2021 20:15) (71 - 71)  RR: 17 (06 May 2021 08:30) (16 - 20)  SpO2: 99% (06 May 2021 08:30) (95% - 99%)  I&O's Summary    05 May 2021 07:01  -  06 May 2021 07:00  --------------------------------------------------------  IN: 970 mL / OUT: 0 mL / NET: 970 mL    06 May 2021 07:01  -  06 May 2021 10:03  --------------------------------------------------------  IN: 390 mL / OUT: 0 mL / NET: 390 mL        PHYSICAL EXAM:  GENERAL: NAD, Uncomfortable  HEAD:  Atraumatic, Normocephalic  EYES: EOMI, PERRLA, conjunctiva and sclera clear  NECK: Supple, No JVD  CHEST/LUNG: Clear to auscultation bilaterally; No wheeze  HEART: Regular rate and rhythm; No murmurs, rubs, or gallops  ABDOMEN: Soft, +TTP, + distension; Bowel sounds present  : +sanchez catheter - CBI clamped  NEURO: AAOx3, no focal deficit, 5/5 b/l extremities  EXTREMITIES:  2+ Peripheral Pulses, No clubbing, cyanosis, or edema  SKIN: No rashes or lesions    LABS:                        7.4    12.30 )-----------( 204      ( 06 May 2021 06:43 )             23.2     05-06    138  |  104  |  55<H>  ----------------------------<  123<H>  4.5   |  19<L>  |  2.63<H>    Ca    8.0<L>      06 May 2021 06:43      PT/INR - ( 05 May 2021 15:16 )   PT: 10.8 sec;   INR: 0.90 ratio         PTT - ( 05 May 2021 15:16 )  PTT:26.4 sec  CAPILLARY BLOOD GLUCOSE                RADIOLOGY & ADDITIONAL TESTS:    Imaging Personally Reviewed:  [x] YES  [ ] NO    Consultant(s) Notes Reviewed:  [x] YES  [ ] NO      MEDICATIONS  (STANDING):  aspirin enteric coated 81 milliGRAM(s) Oral daily  atorvastatin 10 milliGRAM(s) Oral at bedtime  ceFAZolin   IVPB 1000 milliGRAM(s) IV Intermittent every 8 hours  fludroCORTISONE 0.4 milliGRAM(s) Oral daily  heparin   Injectable 5000 Unit(s) SubCutaneous every 8 hours  polyethylene glycol 3350 17 Gram(s) Oral daily  senna 2 Tablet(s) Oral at bedtime  sertraline 50 milliGRAM(s) Oral daily  sodium chloride 0.9%. 1000 milliLiter(s) (50 mL/Hr) IV Continuous <Continuous>    MEDICATIONS  (PRN):  acetaminophen   Tablet .. 650 milliGRAM(s) Oral every 6 hours PRN Mild Pain (1 - 3)  belladonna 16.2 mG/opium 30 mg Suppository 1 Suppository(s) Rectal every 6 hours PRN bladder spasms  bisacodyl Suppository 10 milliGRAM(s) Rectal daily PRN Constipation  ondansetron Injectable 4 milliGRAM(s) IV Push every 6 hours PRN Nausea and/or Vomiting      Care Discussed with Consultants/Other Providers [x] YES  [ ] NO    
                                                                                            Vascular Surgery Consult  Consulting surgical team: Vascular Surgery  Consulting attending: Wade Urias    HPI:  89yo male h/o metastatic prostate ca s/p radiation about 15 years ago, no recent urology follow up, CAD on asa, anemia, CKD, PAD with recent angioplasty with stent of left SFA for which he was recently started on plavix, BIBA from [facility] rehab for gross hematuria. Per [facility] and patient, gross hematuria started yesterday, never had hematuria prior to this. Patient is a poor historian, but states he may have had some increased frequency recently. Denies any fever/chills, dysuria, abdominal or flank pain. Has not seen a urologist in a few years and cannot recall urologists name.  (05 May 2021 16:06)    Patient had a left leg angiogram, angioplasty with stent of SFA, angioplasty of popliteal/tibial arteries on 4/16/21. Vascular surgery consulted for antiplatelet recommendations in setting of hematuria.       PAST MEDICAL HISTORY:  CAD (coronary artery disease)    DM (diabetes mellitus)    Prostate CA    Stage 3 chronic kidney disease    PAD (peripheral artery disease)        PAST SURGICAL HISTORY:  H/O vascular surgery        MEDICATIONS:      ALLERGIES:  No Known Allergies      VITALS & I/Os:  Vital Signs Last 24 Hrs  T(C): 36.6 (05 May 2021 14:03), Max: 36.6 (05 May 2021 14:03)  T(F): 97.8 (05 May 2021 14:03), Max: 97.8 (05 May 2021 14:03)  HR: 74 (05 May 2021 14:03) (74 - 74)  BP: 120/74 (05 May 2021 14:03) (120/74 - 120/74)  BP(mean): --  RR: 18 (05 May 2021 14:03) (18 - 18)  SpO2: 95% (05 May 2021 14:03) (95% - 95%)    I&O's Summary      PHYSICAL EXAM:  General: No acute distress  Respiratory: Nonlabored  Cardiovascular: RRR  Abdominal: Soft, nondistended, nontender  : Wang with hematuria  Extremities: Warm, 2+ DP, 1+ popliteal, and 2+ femoral arteries b/l    LABS:                        7.8    8.79  )-----------( 219      ( 05 May 2021 15:16 )             25.0     05-05    139  |  104  |  54<H>  ----------------------------<  120<H>  4.0   |  24  |  2.53<H>    Ca    8.3<L>      05 May 2021 15:16      Lactate:    PT/INR - ( 05 May 2021 15:16 )   PT: 10.8 sec;   INR: 0.90 ratio         PTT - ( 05 May 2021 15:16 )  PTT:26.4 sec        
[facility] Cardiology Consult  _________________________    Patient is a 89y old  Male who presents with a chief complaint of gross hematuria (06 May 2021 06:56)      HPI:  89yo male h/o metastatic prostate ca s/p radiation about 15 years ago, no recent urology follow up, CAD on asa, anemia, CKD, PAD with recent angioplasty with stent of left SFA for which he was recently started on plavix, BIBA from [facility] rehab for gross hematuria. Per [facility] and patient, gross hematuria started yesterday, never had hematuria prior to this. Patient is a poor historian, but states he may have had some increased frequency recently. Denies any fever/chills, dysuria, abdominal or flank pain. Has not seen a urologist in a few years and cannot recall urologists name.      recently admitted for orthostatic hypotension, started on florinef.       PAST MEDICAL & SURGICAL HISTORY:  CAD (coronary artery disease)  asa    Prostate CA  s/p radiation    Stage 3 chronic kidney disease    PAD (peripheral artery disease)  s/p vascular stent    Anemia of chronic disease    H/O vascular surgery        MEDICATIONS  (STANDING):  aspirin enteric coated 81 milliGRAM(s) Oral daily  atorvastatin 10 milliGRAM(s) Oral at bedtime  ceFAZolin   IVPB 1000 milliGRAM(s) IV Intermittent every 8 hours  fludroCORTISONE 0.4 milliGRAM(s) Oral daily  heparin   Injectable 5000 Unit(s) SubCutaneous every 8 hours  polyethylene glycol 3350 17 Gram(s) Oral daily  senna 2 Tablet(s) Oral at bedtime  sertraline 50 milliGRAM(s) Oral daily  sodium chloride 0.9%. 1000 milliLiter(s) (50 mL/Hr) IV Continuous <Continuous>    MEDICATIONS  (PRN):  acetaminophen   Tablet .. 650 milliGRAM(s) Oral every 6 hours PRN Mild Pain (1 - 3)  belladonna 16.2 mG/opium 30 mg Suppository 1 Suppository(s) Rectal every 6 hours PRN bladder spasms  bisacodyl Suppository 10 milliGRAM(s) Rectal daily PRN Constipation  ondansetron Injectable 4 milliGRAM(s) IV Push every 6 hours PRN Nausea and/or Vomiting      Allergies    No Known Allergies    Intolerances        Social Histroy: Tobacco- , ETOH-, Illicit Drugs-    T(C): 36.6 (05-06-21 @ 08:30), Max: 36.9 (05-05-21 @ 22:40)  HR: 76 (05-06-21 @ 08:30) (74 - 88)  BP: 97/59 (05-06-21 @ 08:30) (96/49 - 150/65)  RR: 17 (05-06-21 @ 08:30) (16 - 20)  SpO2: 99% (05-06-21 @ 08:30) (95% - 99%)  I&O's Summary    05 May 2021 07:01  -  06 May 2021 07:00  --------------------------------------------------------  IN: 970 mL / OUT: 0 mL / NET: 970 mL        Review of Systems:  Constitutional: [ ] Fever [ ] Chills [ ] Fatigue [ ] Weight change   HEENT: [ ] Blurred vision [ ] Eye Pain [ ] Headache [ ] Runny nose [ ] Sore Throat   Respiratory: [ ] Cough [ ] Wheezing [ ] Shortness of breath  Cardiovascular: [ ] Chest Pain [ ] Palpitations [ ] DICK [ ] PND [ ] Orthopnea  Gastrointestinal: [ ] Abdominal Pain [ ] Diarrhea [ ] Constipation [ ] Hemorrhoids [ ] Nausea [ ] Vomiting  Genitourinary: [ ] Nocturia [ ] Dysuria [ ] Incontinence  Extremities: [ ] Swelling [ ] Joint Pain  Neurologic: [ ] Focal deficit [ ] Paresthesias [ ] Syncope  Lymphatic: [ ] Swelling [ ] Lymphadenopathy   Skin: [ ] Rash [ ] Ecchymoses [ ] Wounds [ ] Lesions  Psychiatry: [ ] Depression [ ] Suicidal/Homicidal Ideation [ ] Anxiety [ ] Sleep Disturbances  [x ] 10 point review of systems is otherwise negative except as mentioned above            [ ]Unable to obtain    PHYSICAL EXAM:  GENERAL: Alert, NAD  NECK: Supple  CHEST/LUNG: Clear to auscultation bilaterally; No wheezes, rales, or rhonchi  HEART: S1 S2 normal, RRR,  No murmurs, rubs, or gallops  ABDOMEN: Soft, Nondistended  EXTREMITIES:  No LE edema.      LABS:                        7.4    12.30 )-----------( 204      ( 06 May 2021 06:43 )             23.2     05-06    138  |  104  |  55<H>  ----------------------------<  123<H>  4.5   |  19<L>  |  2.63<H>    Ca    8.0<L>      06 May 2021 06:43      PT/INR - ( 05 May 2021 15:16 )   PT: 10.8 sec;   INR: 0.90 ratio         PTT - ( 05 May 2021 15:16 )  PTT:26.4 sec              MEDICATIONS  (STANDING):  aspirin enteric coated 81 milliGRAM(s) Oral daily  atorvastatin 10 milliGRAM(s) Oral at bedtime  ceFAZolin   IVPB 1000 milliGRAM(s) IV Intermittent every 8 hours  fludroCORTISONE 0.4 milliGRAM(s) Oral daily  heparin   Injectable 5000 Unit(s) SubCutaneous every 8 hours  polyethylene glycol 3350 17 Gram(s) Oral daily  senna 2 Tablet(s) Oral at bedtime  sertraline 50 milliGRAM(s) Oral daily  sodium chloride 0.9%. 1000 milliLiter(s) (50 mL/Hr) IV Continuous <Continuous>    MEDICATIONS  (PRN):  acetaminophen   Tablet .. 650 milliGRAM(s) Oral every 6 hours PRN Mild Pain (1 - 3)  belladonna 16.2 mG/opium 30 mg Suppository 1 Suppository(s) Rectal every 6 hours PRN bladder spasms  bisacodyl Suppository 10 milliGRAM(s) Rectal daily PRN Constipation  ondansetron Injectable 4 milliGRAM(s) IV Push every 6 hours PRN Nausea and/or Vomiting          RADIOLOGY & ADDITIONAL TESTS:    Cardiology testing:

## 2021-05-06 NOTE — PROGRESS NOTE ADULT - SUBJECTIVE AND OBJECTIVE BOX
Subjective  required hand irrigation overnight x 2   remains on slow cbi   Objective    Vital signs  T(F): , Max: 98.5 (05-05-21 @ 22:40)  HR: 75 (05-06-21 @ 05:25)  BP: 108/62 (05-06-21 @ 05:25)  SpO2: 99% (05-06-21 @ 05:25)  Wt(kg): --    Output     05-05 @ 07:01  -  05-06 @ 06:58  --------------------------------------------------------  IN: 920 mL / OUT: 0 mL / NET: 920 mL        Gen awake alert nad axox3  Abd falt soft ntnd   Back no cvat bl    nonpalp bladder   cbi slow and clear currently     Labs      05-05 @ 15:16    WBC 8.79  / Hct 25.0  / SCr 2.53       Urine Cx: pending

## 2021-05-06 NOTE — CHART NOTE - NSCHARTNOTEFT_GEN_A_CORE
Called to the bedside for CBI not draining and pt with abd pain.  Pt seen and examined  States he has severe abdominal pain and feels like he has to void. Believes this happened because he drank orange juice this morning.    Vital Signs Last 24 Hrs  T(C): 36.1 (06 May 2021 10:45), Max: 36.9 (05 May 2021 22:40)  T(F): 97 (06 May 2021 10:45), Max: 98.5 (05 May 2021 22:40)  HR: 73 (06 May 2021 10:45) (73 - 88)  BP: 107/63 (06 May 2021 10:45) (96/49 - 150/65)  BP(mean): 71 (05 May 2021 20:15) (71 - 71)  RR: 17 (06 May 2021 10:45) (16 - 20)  SpO2: 96% (06 May 2021 10:45) (95% - 99%)    General: NAD, Lying in bed comfortably  Neuro: no focal deficits  Resp: No respiratory distress or accessory muscle use. no supplemental O2  Abd: Soft, moderate distension, no rebound/guarding  : palpable bladder. suprapubic tenderness. Sanchez not draining.   Skin: Intact, no breakdown  Musculoskeletal: All 4 extremities moving spontaneously, no limitations.     Under aseptic technique, sanchez balloon taken down and bladder irrigated with sterile water. Evacuation of large amount of clot (~200-250cc). Color improved to pink. No further clots aspirated. 15cc sterile water placed in balloon and CBI restarted on moderate drip with clear pink tinged output. Pt reports feeling better and abd pain resolved. Tolerated well.    A/P: Pt with gross hematuria  - continue to monitor CBI. Wean as tolerated  - hand irrigate PRN  - if CBI on and sanchez obstructed/not draining, please clamp and [place] urology at [id number].     Will continue to monitor

## 2021-05-07 LAB
ANION GAP SERPL CALC-SCNC: 14 MMOL/L — SIGNIFICANT CHANGE UP (ref 5–17)
BUN SERPL-MCNC: 47 MG/DL — HIGH (ref 7–23)
CALCIUM SERPL-MCNC: 7.6 MG/DL — LOW (ref 8.4–10.5)
CHLORIDE SERPL-SCNC: 107 MMOL/L — SIGNIFICANT CHANGE UP (ref 96–108)
CO2 SERPL-SCNC: 19 MMOL/L — LOW (ref 22–31)
CREAT SERPL-MCNC: 2.4 MG/DL — HIGH (ref 0.5–1.3)
GLUCOSE SERPL-MCNC: 93 MG/DL — SIGNIFICANT CHANGE UP (ref 70–99)
HCT VFR BLD CALC: 28.6 % — LOW (ref 39–50)
HGB BLD-MCNC: 9.4 G/DL — LOW (ref 13–17)
MCHC RBC-ENTMCNC: 30.8 PG — SIGNIFICANT CHANGE UP (ref 27–34)
MCHC RBC-ENTMCNC: 32.9 GM/DL — SIGNIFICANT CHANGE UP (ref 32–36)
MCV RBC AUTO: 93.8 FL — SIGNIFICANT CHANGE UP (ref 80–100)
NRBC # BLD: 0 /100 WBCS — SIGNIFICANT CHANGE UP (ref 0–0)
PLATELET # BLD AUTO: 170 K/UL — SIGNIFICANT CHANGE UP (ref 150–400)
POTASSIUM SERPL-MCNC: 3.8 MMOL/L — SIGNIFICANT CHANGE UP (ref 3.5–5.3)
POTASSIUM SERPL-SCNC: 3.8 MMOL/L — SIGNIFICANT CHANGE UP (ref 3.5–5.3)
RBC # BLD: 3.05 M/UL — LOW (ref 4.2–5.8)
RBC # FLD: 14.7 % — HIGH (ref 10.3–14.5)
SODIUM SERPL-SCNC: 140 MMOL/L — SIGNIFICANT CHANGE UP (ref 135–145)
WBC # BLD: 8.67 K/UL — SIGNIFICANT CHANGE UP (ref 3.8–10.5)
WBC # FLD AUTO: 8.67 K/UL — SIGNIFICANT CHANGE UP (ref 3.8–10.5)

## 2021-05-07 PROCEDURE — 99232 SBSQ HOSP IP/OBS MODERATE 35: CPT

## 2021-05-07 RX ADMIN — Medication 1000 UNIT(S): at 12:38

## 2021-05-07 RX ADMIN — FLUDROCORTISONE ACETATE 0.4 MILLIGRAM(S): 0.1 TABLET ORAL at 05:27

## 2021-05-07 RX ADMIN — SENNA PLUS 2 TABLET(S): 8.6 TABLET ORAL at 23:00

## 2021-05-07 RX ADMIN — HEPARIN SODIUM 5000 UNIT(S): 5000 INJECTION INTRAVENOUS; SUBCUTANEOUS at 22:55

## 2021-05-07 RX ADMIN — Medication 100 MILLIGRAM(S): at 05:27

## 2021-05-07 RX ADMIN — Medication 100 MILLIGRAM(S): at 22:54

## 2021-05-07 RX ADMIN — HEPARIN SODIUM 5000 UNIT(S): 5000 INJECTION INTRAVENOUS; SUBCUTANEOUS at 14:30

## 2021-05-07 RX ADMIN — Medication 81 MILLIGRAM(S): at 12:38

## 2021-05-07 RX ADMIN — Medication 20 MILLIGRAM(S): at 03:09

## 2021-05-07 RX ADMIN — HEPARIN SODIUM 5000 UNIT(S): 5000 INJECTION INTRAVENOUS; SUBCUTANEOUS at 05:27

## 2021-05-07 RX ADMIN — Medication 100 MILLIGRAM(S): at 14:30

## 2021-05-07 RX ADMIN — ATORVASTATIN CALCIUM 10 MILLIGRAM(S): 80 TABLET, FILM COATED ORAL at 22:56

## 2021-05-07 RX ADMIN — SERTRALINE 50 MILLIGRAM(S): 25 TABLET, FILM COATED ORAL at 12:38

## 2021-05-07 NOTE — PROGRESS NOTE ADULT - ASSESSMENT
89yo male h/o prostate ca s/p radiation bout 15 years ago, also recent LLE vascular stent, recently started on plavix, admitted with gross hematuria requiring CBI; gross hematuria causing acute blood loss anemia requiring blood transfusion  - continue CBI, wean as possible  - trend H/H transfuse as needed   - ancef for ppx  - f/u urine culture from [facility]  - Plavix held   - continue asa  - regular diet   - medical clearance for possible OR if hematuria worsens   89yo male h/o prostate ca s/p radiation about 15 years ago, also recent LLE vascular stent, recently started on plavix, admitted with gross hematuria requiring CBI; gross hematuria causing acute blood loss anemia requiring blood transfusion  - continue CBI, wean as possible  - trend H/H transfuse as needed   - ancef for ppx  - f/u urine culture from [facility]  - Plavix held   - continue asa  - regular diet   - medical clearance for possible OR if hematuria worsens

## 2021-05-07 NOTE — PROGRESS NOTE ADULT - ASSESSMENT
89yo male h/o metastatic prostate ca s/p radiation about 15 years ago, no recent urology follow up, CAD on asa, anemia, CKD, PAD with recent angioplasty with stent of left SFA for which he was recently started on plavix, BIBA from [facility] for gross hematuria.

## 2021-05-07 NOTE — PHYSICAL THERAPY INITIAL EVALUATION ADULT - ASR WT BEARING STATUS EVAL
Chief Complaint   Patient presents with   • Follow-Up     MS       Problem List Items Addressed This Visit     Multiple sclerosis (HCC)     Pt got the Ocrevus on 11/23 and she states that she has had recent increase in heart rate at rest. Pt states that she has been doing ok since the Ocrevus. 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 Pt is working 8 hour shifts preferably and that helps. Pt is more sedentary recently. Pt states that she is not working out as much as she should. Pt is needing to see her eye doctor.         Relevant Medications    nortriptyline (PAMELOR) 25 MG Cap    Other Relevant Orders    CBC WITH DIFFERENTIAL    Comp Metabolic Panel    IMMUNOGLOBULINS A/G/M SERUM      Other Visit Diagnoses     Intermittent palpitations        Relevant Orders    REFERRAL TO CARDIOLOGY    Tachycardia        Relevant Orders    REFERRAL TO CARDIOLOGY          History of present illness:  Heaven Chavis 56 y.o. female presents today for multiple sclerosis, reactive depression and new complaints of palpitations and tachycardia.    Past medical history:   Past Medical History:   Diagnosis Date   • Hashimoto's disease 2004   • MS (multiple sclerosis) ([facility]) 2007       Past surgical history:   Past Surgical History:   Procedure Laterality Date   • ABDOMINAL HYSTERECTOMY TOTAL     • CHOLECYSTECTOMY     • FOOT SURGERY      left foot reattached       Family history:   Family History   Problem Relation Age of Onset   • Stroke Mother    • Clotting Disorder Father    • Thyroid Sister    • Thyroid Sister    • Diabetes Sister    • Thyroid Sister    • Thyroid Sister    • Thyroid Sister    • Thyroid Sister        Social history:   Social History     Socioeconomic History   • Marital status: Other     Spouse name: Not on file   • Number of children: Not on file   • Years of education: Not on file   • Highest education level: Not on file   Occupational History   • Not on file   Social Needs   • Financial resource strain: Not  on file   • Food insecurity     Worry: Not on file     Inability: Not on file   • Transportation needs     Medical: Not on file     Non-medical: Not on file   Tobacco Use   • Smoking status: Former Smoker     Types: Cigarettes     Quit date:      Years since quittin.9   • Smokeless tobacco: Never Used   Substance and Sexual Activity   • Alcohol use: Not Currently     Comment: Occasionally   • Drug use: No   • Sexual activity: Yes   Lifestyle   • Physical activity     Days per week: Not on file     Minutes per session: Not on file   • Stress: Not on file   Relationships   • Social connections     Talks on phone: Not on file     Gets together: Not on file     Attends [employer] service: Not on file     Active member of club or organization: Not on file     Attends meetings of clubs or organizations: Not on file     Relationship status: Not on file   • Intimate partner violence     Fear of current or ex partner: Not on file     Emotionally abused: Not on file     Physically abused: Not on file     Forced sexual activity: Not on file   Other Topics Concern   • Not on file   Social History Narrative   • Not on file       Current medications:   Current Outpatient Medications   Medication   • nortriptyline (PAMELOR) 25 MG Cap   • baclofen (LIORESAL) 10 MG Tab   • FLUoxetine (PROZAC) 10 MG Cap   • Multiple Vitamins-Minerals (MULTIVITAMIN ADULT PO)   • Iron Carbonyl-Vitamin C-FOS (CHEWABLE IRON PO)   • modafinil (PROVIGIL) 200 MG Tab   • INTRAROSA 6.5 MG INSERT   • temazepam (RESTORIL) 30 MG capsule   • B Complex Vitamins (VITAMIN B COMPLEX PO)   • Ascorbic Acid (VITAMIN C) 1000 MG Tab   • Omega 3-6-9 Fatty Acids (OMEGA 3-6-9 COMPLEX PO)   • vitamin D (CHOLECALCIFEROL) 1000 UNIT Tab   • NALTREXONE HCL PO   • SYNTHROID 150 MCG Tab   • liothyronine (CYTOMEL) 5 MCG Tab     No current facility-administered medications for this visit.        Medication Allergy:  No Known Allergies    Review of systems:   Constitutional:  "denies fever, night sweats, weight loss.   Eyes: denies acute vision change, eye pain or secretion.   Ears, Nose, Mouth, Throat: denies nasal secretion, nasal bleeding, difficulty swallowing, hearing loss, tinnitus, vertigo, ear pain, acute dental problems, oral ulcers or lesions.   Endocrine: denies recent weight changes, heat or cold intolerance, polyuria, polydypsia, polyphagia,abnormal hair growth.  Cardiovascular: denies new onset of chest pain, palpitations, syncope, or dyspnea of exertion.  Pulmonary: denies shortness of breath, new onset of cough, hemoptysis, wheezing, chest pain or flu-like symptoms.   GI: denies nausea, vomiting, diarrhea, GI bleeding, change in appetite, abdominal pain, and change in bowel habits.  : denies dysuria, urinary incontinence, hematuria.  Heme/oncology: denies history of easy bruising or bleeding. No history of cancer, DVTor PE.  Allergy/immunology: denies hives/urticaria, or itching.   Dermatologic: denies new rash, or new skin lesions.  Musculoskeletal:denies joint swelling or pain, muscle pain, neck and back pain. Neurologic: denies headaches, acute visual changes, facial droopiness, muscle weakness (focal or generalized), paresthesias, anesthesia, ataxia, change in speech or language, memory loss, abnormal movements, seizures, loss of consciousness, or episodes of confusion.   Psychiatric: denies symptoms of depression, anxiety, hallucinations, mood swings or changes, suicidal or homicidal thoughts.     Physical examination:   Vitals:    12/10/20 0924   BP: 128/78   BP Location: Left arm   Patient Position: Sitting   BP Cuff Size: Adult   Pulse: 94   Temp: 36.7 °C (98 °F)   TempSrc: Temporal   SpO2: 96%   Weight: 89 kg (196 lb 3.4 oz)   Height: 1.626 m (5' 4\")     General: Patient in no acute distress, pleasant and cooperative.  HEENT: Normocephalic, no signs of acute trauma.   Neck: supple, no meningeal signs or carotid bruits. There is normal range of motion. No " tenderness on exam.   Chest: clear to auscultation. No cough.   CV: RRR, no murmurs.   Skin: no signs of acute rashes or trauma.   Musculoskeletal: joints exhibit full range of motion, without any pain to palpation. There are no signs of joint or muscle swelling. There is no tenderness to deep palpation of muscles.   Psychiatric: No hallucinatory behavior. Denies symptoms of depression or suicidal ideation. Mood and affect appear normal on exam.     NEUROLOGICAL EXAM:   Mental status, orientation: Awake, alert and fully oriented.   Speech and language: speech is clear and fluent. The patient is able to name, repeat and comprehend.   Memory: There is intact recollection of recent and remote events.   Cranial nerve exam: Pupils are 3-4 mm bilaterally and equally reactive to light and accommodation. Visual fields are intact by confrontation. Fundoscopic exam was unremarkable. There is no nystagmus on primary or secondary gaze. Intact full EOM in all directions of gaze. Face appears symmetric. Sensation in the face is intact to light touch. Uvula is midline. Palate elevates symmetrically. Tongue is midline and without any signs of tongue biting or fasciculations. Sternocleidomastoid muscles exhibit is normal strength bilaterally. Shoulder shrug is intact bilaterally.   Motor exam: Strength is 5/5 in all extremities. Tone is normal. No abnormal movements were seen on exam.   Sensory exam reveals normal sense of light touch, proprioception, vibration and pinprick in all extremities.   Deep tendon reflexes:  2+ throughout. Plantar responses are flexor. There is no clonus.   Coordination: shows a normal finger-nose-finger. Normal rapidly alternating movements.   Gait: The patient was able to get up from seated position on first attempt without requiring assistance. Found to be steady when walking. Movements were fluid with normal arm swing. The patient was able to turn without difficulties or tendency to fall. Romberg  examination       ANCILLARY DATA REVIEWED:     Lab Data Review:  No results found for this or any previous visit (from the past 24 hour(s)).    Records reviewed: I reviewed previous H&H and all of her primary care visits and anemia was not assessed.      Imaging: I reviewed patient's last MRI scan of the brain which were done in the PACS system in September 2020.          ASSESSMENT AND PLAN:    1. Multiple sclerosis (HCC)  Plan to continue Ocrevus.  Patient states that she usually does well with her infusions.  She has no recent infections.  Patient does state that she has had worsening of her gait.  Patient states that she needs to continue to work on her healthy diet.  Patient will get CBC, CMP and immunoglobulins prior to her next Ocrevus infusion which I have ordered today.        2.  Reactive depression    Continue low-dose Prozac 10 mg which is helped in dealing with the grief of the loss of her .      3 tachycardia/palpitations    Refer to cardiology    FOLLOW-UP:   4 months     I spent 45 minutes with this patient face-to-face, over fifty percent was spent counseling patient on their condition, best management practices, reviewing test results and risks and benefits of treatment.          EDUCATION AND COUNSELING:  -Discussed regular exercise program and prevention of cardiovascular disease, including stroke.   -Discussed healthy lifestyle, including: healthy diet (rich in fruits, vegetables, nuts and healthy oils); proper hydration, and adequate sleep hygiene (allowing 7-8 hrs of overnight sleep).        Melissa Bloch, MD  Clinical  of Neurology [facility] [email or web address]] of Medicine.   Diplomate in Neurology.   Office: [phone]  Fax: [phone]         no weight-bearing restrictions

## 2021-05-07 NOTE — PROGRESS NOTE ADULT - SUBJECTIVE AND OBJECTIVE BOX
UROLOGY DAILY PROGRESS NOTE:     Subjective: Patient seen and examined at bedside. No overnight events.       Objective:  Vital signs  T(F): , Max: 98 (05-06-21 @ 08:10)  HR: 65 (05-07-21 @ 06:35)  BP: 109/66 (05-07-21 @ 06:35)  SpO2: 94% (05-07-21 @ 06:35)  Wt(kg): --    I&O's Summary    06 May 2021 07:01  -  07 May 2021 07:00  --------------------------------------------------------  IN: 3264 mL / OUT: 0 mL / NET: 3264 mL        Gen: NAD  Pulm: No respiratory distress, no subcostal retractions  CV: RRR, no JVD  Abd: Soft, NT, ND  : CBI - clear     Labs:  05-06  10.57 / 24.4  /x      05-06  12.30 / 23.2  /2.63                           8.1    10.57 )-----------( 159      ( 06 May 2021 17:18 )             24.4     05-06    138  |  104  |  55<H>  ----------------------------<  123<H>  4.5   |  19<L>  |  2.63<H>    Ca    8.0<L>      06 May 2021 06:43      PT/INR - ( 05 May 2021 15:16 )   PT: 10.8 sec;   INR: 0.90 ratio         PTT - ( 05 May 2021 15:16 )  PTT:26.4 sec    Urine Cx:   UROLOGY DAILY PROGRESS NOTE:     Subjective: no pain.       Objective: Patient seen and examined at bedside. No overnight events.   Vital signs  T(F): , Max: 98 (05-06-21 @ 08:10)  HR: 65 (05-07-21 @ 06:35)  BP: 109/66 (05-07-21 @ 06:35)  SpO2: 94% (05-07-21 @ 06:35)  Wt(kg): --    I&O's Summary    06 May 2021 07:01  -  07 May 2021 07:00  --------------------------------------------------------  IN: 3264 mL / OUT: 0 mL / NET: 3264 mL        Gen: NAD  Pulm: No respiratory distress, no subcostal retractions  CV: RRR, no JVD  Abd: Soft, NT, ND  : CBI - clear     Labs:  05-06  10.57 / 24.4  /x      05-06  12.30 / 23.2  /2.63                           8.1    10.57 )-----------( 159      ( 06 May 2021 17:18 )             24.4     05-06    138  |  104  |  55<H>  ----------------------------<  123<H>  4.5   |  19<L>  |  2.63<H>    Ca    8.0<L>      06 May 2021 06:43      PT/INR - ( 05 May 2021 15:16 )   PT: 10.8 sec;   INR: 0.90 ratio         PTT - ( 05 May 2021 15:16 )  PTT:26.4 sec    Urine Cx:

## 2021-05-07 NOTE — PROGRESS NOTE ADULT - ASSESSMENT
89yo male h/o prostate ca s/p radiation bout 15 years ago, also recent LLE vascular stent, recently started on plavix, admitted with gross hematuria requiring CBI    # Hematuria:  22f 3 way placed , 75cc of clot removed and started on CBI  Monitor UO/color on CBI, wean as possible  Trend H/H transfuse as needed   3u pRBCs now- stable hb   Ancef for ppx  Follow up UCx from [facility]  Cont ASA, hold Plavix as per vascular  Follow urology recs    # PAD:  Recent angioplasty  Cont ASA, hold Plavix  Follow up vascular recs    # Orthostatics Hypotension:  Recent admission for orthostatic hypotension  BP stable  TTE unremarkable 4/2021  Cont Florinef  Salvador IVF  Follow up cards recs    # DVT ppx:  Heparin subq Q8H    Preop clearence note- for tentative  procedure- if hematuria worsens  pt without any cardiac symptoms, ekg reviewed- no st t changes, nSR, LAD,   TTE reviewed- no valvular dz  pt intermediate risk for mod risk procedure,  medically optimized    [facility]  [phone]

## 2021-05-07 NOTE — PROGRESS NOTE ADULT - SUBJECTIVE AND OBJECTIVE BOX
Patient is a 89y old  Male who presents with a chief complaint of gross hematuria (07 May 2021 09:06)      INTERVAL HPI/OVERNIGHT EVENTS: noted  pt seen and examined this am   events noted  sanchez bag clearing- light pink urine draining  denies cp/sob/palpitations/dizziness  denies n/v/abd pain      Vital Signs Last 24 Hrs  T(C): 36.7 (07 May 2021 18:03), Max: 36.7 (06 May 2021 23:40)  T(F): 98 (07 May 2021 18:03), Max: 98 (06 May 2021 23:40)  HR: 79 (07 May 2021 18:03) (61 - 79)  BP: 114/64 (07 May 2021 18:03) (107/54 - 125/52)  BP(mean): --  RR: 18 (07 May 2021 18:03) (16 - 18)  SpO2: 96% (07 May 2021 18:03) (94% - 98%)    acetaminophen   Tablet .. 650 milliGRAM(s) Oral every 6 hours PRN  aspirin enteric coated 81 milliGRAM(s) Oral daily  atorvastatin 10 milliGRAM(s) Oral at bedtime  belladonna 16.2 mG/opium 30 mg Suppository 1 Suppository(s) Rectal every 6 hours PRN  bisacodyl Suppository 10 milliGRAM(s) Rectal daily PRN  ceFAZolin   IVPB 1000 milliGRAM(s) IV Intermittent every 8 hours  cholecalciferol 1000 Unit(s) Oral daily  fludroCORTISONE 0.4 milliGRAM(s) Oral daily  heparin   Injectable 5000 Unit(s) SubCutaneous every 8 hours  ondansetron Injectable 4 milliGRAM(s) IV Push every 6 hours PRN  senna 2 Tablet(s) Oral at bedtime  sertraline 50 milliGRAM(s) Oral daily  sodium chloride 0.9%. 1000 milliLiter(s) IV Continuous <Continuous>      PHYSICAL EXAM:  GENERAL: NAD,   EYES: conjunctiva and sclera clear  ENMT: Moist mucous membranes  NECK: Supple, No JVD, Normal thyroid  CHEST/LUNG: non labored, cta b/l  HEART: Regular rate and rhythm; No murmurs, rubs, or gallops  ABDOMEN: Soft, Nontender, Nondistended; Bowel sounds present  EXTREMITIES:  2+ Peripheral Pulses, No clubbing, cyanosis, or edema  LYMPH: No lymphadenopathy noted  SKIN: No rashes or lesions    Consultant(s) Notes Reviewed:  [x ] YES  [ ] NO  Care Discussed with Consultants/Other Providers [ x] YES  [ ] NO    LABS:                        9.4    8.67  )-----------( 170      ( 07 May 2021 07:43 )             28.6     05-07    140  |  107  |  47<H>  ----------------------------<  93  3.8   |  19<L>  |  2.40<H>    Ca    7.6<L>      07 May 2021 07:46          CAPILLARY BLOOD GLUCOSE                  RADIOLOGY & ADDITIONAL TESTS:    Imaging Personally Reviewed:  [x ] YES  [ ] NO

## 2021-05-07 NOTE — PHYSICAL THERAPY INITIAL EVALUATION ADULT - PERTINENT HX OF CURRENT PROBLEM, REHAB EVAL
as per chart review: h/o prostate ca s/p radiation bout 15 years ago, also recent LLE vascular stent, recently started on plavix, admitted with gross hematuria requiring CBI; gross hematuria causing acute blood loss anemia requiring blood transfusion

## 2021-05-07 NOTE — PROGRESS NOTE ADULT - SUBJECTIVE AND OBJECTIVE BOX
[facility] Cardiology Progress Note  _______________________________    Pt. seen and examined. Sleeping.    T(C): 36.6 (05-07-21 @ 06:35), Max: 36.7 (05-06-21 @ 23:40)  HR: 65 (05-07-21 @ 06:35) (62 - 93)  BP: 109/66 (05-07-21 @ 06:35) (94/58 - 116/64)  RR: 18 (05-07-21 @ 06:35) (16 - 18)  SpO2: 94% (05-07-21 @ 06:35) (94% - 98%)  I&O's Summary    06 May 2021 07:01  -  07 May 2021 07:00  --------------------------------------------------------  IN: 3264 mL / OUT: 0 mL / NET: 3264 mL        PHYSICAL EXAM:  GENERAL: NAD.  NECK: Supple  CHEST/LUNG: Clear to auscultation bilaterally; No wheezes, rales, or rhonchi.  HEART: S1 S2 normal, RRR; No murmurs, rubs, or gallops  ABDOMEN: Soft, Nondistended  EXTREMITIES:  No LE edema.      LABS:                        9.4    8.67  )-----------( 170      ( 07 May 2021 07:43 )             28.6     05-07    140  |  107  |  47<H>  ----------------------------<  93  3.8   |  19<L>  |  2.40<H>    Ca    7.6<L>      07 May 2021 07:46      PT/INR - ( 05 May 2021 15:16 )   PT: 10.8 sec;   INR: 0.90 ratio         PTT - ( 05 May 2021 15:16 )  PTT:26.4 sec              MEDICATIONS  (STANDING):  aspirin enteric coated 81 milliGRAM(s) Oral daily  atorvastatin 10 milliGRAM(s) Oral at bedtime  ceFAZolin   IVPB 1000 milliGRAM(s) IV Intermittent every 8 hours  cholecalciferol 1000 Unit(s) Oral daily  fludroCORTISONE 0.4 milliGRAM(s) Oral daily  heparin   Injectable 5000 Unit(s) SubCutaneous every 8 hours  polyethylene glycol 3350 17 Gram(s) Oral daily  senna 2 Tablet(s) Oral at bedtime  sertraline 50 milliGRAM(s) Oral daily  sodium chloride 0.9%. 1000 milliLiter(s) (50 mL/Hr) IV Continuous <Continuous>    MEDICATIONS  (PRN):  acetaminophen   Tablet .. 650 milliGRAM(s) Oral every 6 hours PRN Mild Pain (1 - 3)  belladonna 16.2 mG/opium 30 mg Suppository 1 Suppository(s) Rectal every 6 hours PRN bladder spasms  bisacodyl Suppository 10 milliGRAM(s) Rectal daily PRN Constipation  ondansetron Injectable 4 milliGRAM(s) IV Push every 6 hours PRN Nausea and/or Vomiting        RADIOLOGY & ADDITIONAL TESTS:

## 2021-05-08 LAB
ANION GAP SERPL CALC-SCNC: 12 MMOL/L — SIGNIFICANT CHANGE UP (ref 5–17)
BLD GP AB SCN SERPL QL: NEGATIVE — SIGNIFICANT CHANGE UP
BUN SERPL-MCNC: 41 MG/DL — HIGH (ref 7–23)
CALCIUM SERPL-MCNC: 7.6 MG/DL — LOW (ref 8.4–10.5)
CHLORIDE SERPL-SCNC: 108 MMOL/L — SIGNIFICANT CHANGE UP (ref 96–108)
CO2 SERPL-SCNC: 22 MMOL/L — SIGNIFICANT CHANGE UP (ref 22–31)
CREAT SERPL-MCNC: 2.02 MG/DL — HIGH (ref 0.5–1.3)
GLUCOSE SERPL-MCNC: 92 MG/DL — SIGNIFICANT CHANGE UP (ref 70–99)
HCT VFR BLD CALC: 27.4 % — LOW (ref 39–50)
HGB BLD-MCNC: 9.1 G/DL — LOW (ref 13–17)
MCHC RBC-ENTMCNC: 31.2 PG — SIGNIFICANT CHANGE UP (ref 27–34)
MCHC RBC-ENTMCNC: 33.2 GM/DL — SIGNIFICANT CHANGE UP (ref 32–36)
MCV RBC AUTO: 93.8 FL — SIGNIFICANT CHANGE UP (ref 80–100)
NRBC # BLD: 0 /100 WBCS — SIGNIFICANT CHANGE UP (ref 0–0)
PLATELET # BLD AUTO: 191 K/UL — SIGNIFICANT CHANGE UP (ref 150–400)
POTASSIUM SERPL-MCNC: 3.4 MMOL/L — LOW (ref 3.5–5.3)
POTASSIUM SERPL-SCNC: 3.4 MMOL/L — LOW (ref 3.5–5.3)
RBC # BLD: 2.92 M/UL — LOW (ref 4.2–5.8)
RBC # FLD: 14.9 % — HIGH (ref 10.3–14.5)
RH IG SCN BLD-IMP: NEGATIVE — SIGNIFICANT CHANGE UP
SARS-COV-2 RNA SPEC QL NAA+PROBE: SIGNIFICANT CHANGE UP
SODIUM SERPL-SCNC: 142 MMOL/L — SIGNIFICANT CHANGE UP (ref 135–145)
WBC # BLD: 6.92 K/UL — SIGNIFICANT CHANGE UP (ref 3.8–10.5)
WBC # FLD AUTO: 6.92 K/UL — SIGNIFICANT CHANGE UP (ref 3.8–10.5)

## 2021-05-08 PROCEDURE — 99231 SBSQ HOSP IP/OBS SF/LOW 25: CPT

## 2021-05-08 RX ORDER — POTASSIUM CHLORIDE 20 MEQ
20 PACKET (EA) ORAL
Refills: 0 | Status: COMPLETED | OUTPATIENT
Start: 2021-05-08 | End: 2021-05-08

## 2021-05-08 RX ADMIN — ATORVASTATIN CALCIUM 10 MILLIGRAM(S): 80 TABLET, FILM COATED ORAL at 21:39

## 2021-05-08 RX ADMIN — Medication 20 MILLIEQUIVALENT(S): at 12:18

## 2021-05-08 RX ADMIN — Medication 100 MILLIGRAM(S): at 05:57

## 2021-05-08 RX ADMIN — FLUDROCORTISONE ACETATE 0.4 MILLIGRAM(S): 0.1 TABLET ORAL at 05:59

## 2021-05-08 RX ADMIN — SERTRALINE 50 MILLIGRAM(S): 25 TABLET, FILM COATED ORAL at 12:18

## 2021-05-08 RX ADMIN — Medication 100 MILLIGRAM(S): at 21:44

## 2021-05-08 RX ADMIN — HEPARIN SODIUM 5000 UNIT(S): 5000 INJECTION INTRAVENOUS; SUBCUTANEOUS at 21:40

## 2021-05-08 RX ADMIN — HEPARIN SODIUM 5000 UNIT(S): 5000 INJECTION INTRAVENOUS; SUBCUTANEOUS at 05:58

## 2021-05-08 RX ADMIN — SODIUM CHLORIDE 50 MILLILITER(S): 9 INJECTION INTRAMUSCULAR; INTRAVENOUS; SUBCUTANEOUS at 06:00

## 2021-05-08 RX ADMIN — HEPARIN SODIUM 5000 UNIT(S): 5000 INJECTION INTRAVENOUS; SUBCUTANEOUS at 14:40

## 2021-05-08 RX ADMIN — Medication 1000 UNIT(S): at 12:19

## 2021-05-08 RX ADMIN — Medication 20 MILLIEQUIVALENT(S): at 14:41

## 2021-05-08 RX ADMIN — Medication 81 MILLIGRAM(S): at 12:18

## 2021-05-08 RX ADMIN — Medication 100 MILLIGRAM(S): at 14:39

## 2021-05-08 NOTE — PROGRESS NOTE ADULT - ASSESSMENT
89yo male h/o prostate ca s/p radiation about 15 years ago, also recent LLE vascular stent, recently started on plavix, admitted with gross hematuria requiring CBI; gross hematuria causing acute blood loss anemia requiring blood transfusion  - clamped CBI  - trend H/H transfuse as needed   - ancef for ppx  - f/u urine culture from [facility]  - Plavix held   - continue asa  - regular diet   - f/u medicine team, cardiology

## 2021-05-08 NOTE — PROGRESS NOTE ADULT - SUBJECTIVE AND OBJECTIVE BOX
Patient is a 89y old  Male who presents with a chief complaint of gross hematuria (08 May 2021 09:52)      INTERVAL HPI/OVERNIGHT EVENTS: noted  pt seen and examined this am   events noted  feels well  urinary bag -clear this am, tubing is bloody      Vital Signs Last 24 Hrs  T(C): 37.4 (08 May 2021 21:37), Max: 37.4 (08 May 2021 21:37)  T(F): 99.4 (08 May 2021 21:37), Max: 99.4 (08 May 2021 21:37)  HR: 95 (08 May 2021 21:37) (67 - 95)  BP: 105/51 (08 May 2021 21:37) (94/51 - 127/67)  BP(mean): --  RR: 18 (08 May 2021 21:37) (18 - 18)  SpO2: 96% (08 May 2021 21:37) (96% - 97%)    acetaminophen   Tablet .. 650 milliGRAM(s) Oral every 6 hours PRN  aspirin enteric coated 81 milliGRAM(s) Oral daily  atorvastatin 10 milliGRAM(s) Oral at bedtime  belladonna 16.2 mG/opium 30 mg Suppository 1 Suppository(s) Rectal every 6 hours PRN  bisacodyl Suppository 10 milliGRAM(s) Rectal daily PRN  ceFAZolin   IVPB 1000 milliGRAM(s) IV Intermittent every 8 hours  cholecalciferol 1000 Unit(s) Oral daily  fludroCORTISONE 0.4 milliGRAM(s) Oral daily  heparin   Injectable 5000 Unit(s) SubCutaneous every 8 hours  ondansetron Injectable 4 milliGRAM(s) IV Push every 6 hours PRN  sertraline 50 milliGRAM(s) Oral daily      PHYSICAL EXAM:  GENERAL: NAD,   EYES: conjunctiva and sclera clear  ENMT: Moist mucous membranes  NECK: Supple, No JVD, Normal thyroid  CHEST/LUNG: non labored, cta b/l  HEART: Regular rate and rhythm; No murmurs, rubs, or gallops  ABDOMEN: Soft, Nontender, Nondistended; Bowel sounds present  EXTREMITIES:  2+ Peripheral Pulses, No clubbing, cyanosis, or edema  LYMPH: No lymphadenopathy noted  SKIN: No rashes or lesions    Consultant(s) Notes Reviewed:  [x ] YES  [ ] NO  Care Discussed with Consultants/Other Providers [ x] YES  [ ] NO    LABS:                        9.1    6.92  )-----------( 191      ( 08 May 2021 07:47 )             27.4     05-08    142  |  108  |  41<H>  ----------------------------<  92  3.4<L>   |  22  |  2.02<H>    Ca    7.6<L>      08 May 2021 07:47          CAPILLARY BLOOD GLUCOSE                  RADIOLOGY & ADDITIONAL TESTS:    Imaging Personally Reviewed:  [x ] YES  [ ] NO

## 2021-05-08 NOTE — PROGRESS NOTE ADULT - SUBJECTIVE AND OBJECTIVE BOX
[facility] Cardiology Progress Note  _______________________________    Pt. seen and examined. No new cardiac-related complaints.      T(C): 36.6 (05-08-21 @ 05:25), Max: 36.7 (05-07-21 @ 18:03)  HR: 67 (05-08-21 @ 05:25) (61 - 79)  BP: 127/67 (05-08-21 @ 05:25) (114/64 - 127/67)  RR: 18 (05-08-21 @ 05:25) (18 - 18)  SpO2: 97% (05-08-21 @ 05:25) (94% - 97%)  I&O's Summary    07 May 2021 07:01  -  08 May 2021 07:00  --------------------------------------------------------  IN: 1278 mL / OUT: 0 mL / NET: 1278 mL        PHYSICAL EXAM:  hGENERAL: Elderly M, Alert, NAD  NECK: Supple, No JVD, No carotid bruit.  CHEST/LUNG: Clear to auscultation bilaterally; No wheezes, rales, or rhonchi  HEART: S1 S2 normal, RRR,  No murmurs, rubs, or gallops  ABDOMEN: Soft, Nontender, Nondistended; Bowel sounds present  EXTREMITIES:  2+ DP / PT pulses b/l. Trace ankle edema b/      LABS:                        9.1    6.92  )-----------( 191      ( 08 May 2021 07:47 )             27.4     05-08    142  |  108  |  41<H>  ----------------------------<  92  3.4<L>   |  22  |  2.02<H>    Ca    7.6<L>      08 May 2021 07:47                    MEDICATIONS  (STANDING):  aspirin enteric coated 81 milliGRAM(s) Oral daily  atorvastatin 10 milliGRAM(s) Oral at bedtime  ceFAZolin   IVPB 1000 milliGRAM(s) IV Intermittent every 8 hours  cholecalciferol 1000 Unit(s) Oral daily  fludroCORTISONE 0.4 milliGRAM(s) Oral daily  heparin   Injectable 5000 Unit(s) SubCutaneous every 8 hours  senna 2 Tablet(s) Oral at bedtime  sertraline 50 milliGRAM(s) Oral daily  sodium chloride 0.9%. 1000 milliLiter(s) (50 mL/Hr) IV Continuous <Continuous>    MEDICATIONS  (PRN):  acetaminophen   Tablet .. 650 milliGRAM(s) Oral every 6 hours PRN Mild Pain (1 - 3)  belladonna 16.2 mG/opium 30 mg Suppository 1 Suppository(s) Rectal every 6 hours PRN bladder spasms  bisacodyl Suppository 10 milliGRAM(s) Rectal daily PRN Constipation  ondansetron Injectable 4 milliGRAM(s) IV Push every 6 hours PRN Nausea and/or Vomiting      RADIOLOGY & ADDITIONAL TESTS:

## 2021-05-08 NOTE — PROGRESS NOTE ADULT - PROBLEM SELECTOR PLAN 1
-   -improving.   - Hb stable  -management as per urology  -holding plavix -   -CBI ongoing.  - Hb stable  -management as per urology  -holding plavix

## 2021-05-08 NOTE — PROGRESS NOTE ADULT - ASSESSMENT
89yo male h/o prostate ca s/p radiation bout 15 years ago, also recent LLE vascular stent, recently started on plavix, admitted with gross hematuria requiring CBI    # Hematuria:  22f 3 way placed , 75cc of clot removed and started on CBI  Monitor UO/color on CBI, wean as possible  Trend H/H transfuse as needed   3u pRBCs now- stable hb   Ancef for ppx  Follow up UCx from [facility]  Cont ASA, hold Plavix as per vascular  Follow urology recs    # PAD:  Recent angioplasty  Cont ASA, hold Plavix  Follow up vascular recs    # Orthostatics Hypotension:  Recent admission for orthostatic hypotension  BP stable  TTE unremarkable 4/2021  Cont Florinef  Salvador IVF  Follow up cards recs    # DVT ppx:  Heparin subq Q8H    Preop clearence note- for tentative  procedure- if hematuria worsens  pt without any cardiac symptoms, ekg reviewed- no st t changes, nSR, LAD,   TTE reviewed- no valvular dz  pt intermediate risk for mod risk procedure,  medically optimized    [facility]  [phone]

## 2021-05-08 NOTE — PROGRESS NOTE ADULT - SUBJECTIVE AND OBJECTIVE BOX
UROLOGY DAILY PROGRESS NOTE:     Subjective: Patient seen and examined at bedside. No overnight events.       Objective:  Vital signs  T(F): , Max: 98 (05-07-21 @ 18:03)  HR: 67 (05-08-21 @ 05:25)  BP: 127/67 (05-08-21 @ 05:25)  SpO2: 97% (05-08-21 @ 05:25)  Wt(kg): --    I&O's Summary    07 May 2021 07:01  -  08 May 2021 07:00  --------------------------------------------------------  IN: 1278 mL / OUT: 0 mL / NET: 1278 mL        Gen: NAD  Pulm: No respiratory distress, no subcostal retractions  CV: RRR, no JVD  Abd: Soft, NT, ND  : CBI- clear     Labs:  05-08  6.92  / 27.4  /2.02   05-07  x     / x     /2.40                           9.1    6.92  )-----------( 191      ( 08 May 2021 07:47 )             27.4     05-08    142  |  108  |  41<H>  ----------------------------<  92  3.4<L>   |  22  |  2.02<H>    Ca    7.6<L>      08 May 2021 07:47      Urine Cx:

## 2021-05-09 ENCOUNTER — TRANSCRIPTION ENCOUNTER (OUTPATIENT)
Age: 86
End: 2021-05-09

## 2021-05-09 LAB
ANION GAP SERPL CALC-SCNC: 12 MMOL/L — SIGNIFICANT CHANGE UP (ref 5–17)
BUN SERPL-MCNC: 32 MG/DL — HIGH (ref 7–23)
CALCIUM SERPL-MCNC: 7.6 MG/DL — LOW (ref 8.4–10.5)
CHLORIDE SERPL-SCNC: 105 MMOL/L — SIGNIFICANT CHANGE UP (ref 96–108)
CO2 SERPL-SCNC: 22 MMOL/L — SIGNIFICANT CHANGE UP (ref 22–31)
CREAT SERPL-MCNC: 1.76 MG/DL — HIGH (ref 0.5–1.3)
GLUCOSE SERPL-MCNC: 113 MG/DL — HIGH (ref 70–99)
HCT VFR BLD CALC: 27 % — LOW (ref 39–50)
HGB BLD-MCNC: 9 G/DL — LOW (ref 13–17)
MCHC RBC-ENTMCNC: 31.8 PG — SIGNIFICANT CHANGE UP (ref 27–34)
MCHC RBC-ENTMCNC: 33.3 GM/DL — SIGNIFICANT CHANGE UP (ref 32–36)
MCV RBC AUTO: 95.4 FL — SIGNIFICANT CHANGE UP (ref 80–100)
NRBC # BLD: 0 /100 WBCS — SIGNIFICANT CHANGE UP (ref 0–0)
PLATELET # BLD AUTO: 196 K/UL — SIGNIFICANT CHANGE UP (ref 150–400)
POTASSIUM SERPL-MCNC: 3.6 MMOL/L — SIGNIFICANT CHANGE UP (ref 3.5–5.3)
POTASSIUM SERPL-SCNC: 3.6 MMOL/L — SIGNIFICANT CHANGE UP (ref 3.5–5.3)
RBC # BLD: 2.83 M/UL — LOW (ref 4.2–5.8)
RBC # FLD: 14.6 % — HIGH (ref 10.3–14.5)
SODIUM SERPL-SCNC: 139 MMOL/L — SIGNIFICANT CHANGE UP (ref 135–145)
WBC # BLD: 9.37 K/UL — SIGNIFICANT CHANGE UP (ref 3.8–10.5)
WBC # FLD AUTO: 9.37 K/UL — SIGNIFICANT CHANGE UP (ref 3.8–10.5)

## 2021-05-09 PROCEDURE — 99231 SBSQ HOSP IP/OBS SF/LOW 25: CPT

## 2021-05-09 RX ADMIN — HEPARIN SODIUM 5000 UNIT(S): 5000 INJECTION INTRAVENOUS; SUBCUTANEOUS at 22:09

## 2021-05-09 RX ADMIN — HEPARIN SODIUM 5000 UNIT(S): 5000 INJECTION INTRAVENOUS; SUBCUTANEOUS at 13:11

## 2021-05-09 RX ADMIN — Medication 650 MILLIGRAM(S): at 12:00

## 2021-05-09 RX ADMIN — FLUDROCORTISONE ACETATE 0.4 MILLIGRAM(S): 0.1 TABLET ORAL at 05:46

## 2021-05-09 RX ADMIN — HEPARIN SODIUM 5000 UNIT(S): 5000 INJECTION INTRAVENOUS; SUBCUTANEOUS at 05:45

## 2021-05-09 RX ADMIN — Medication 650 MILLIGRAM(S): at 11:13

## 2021-05-09 RX ADMIN — ATORVASTATIN CALCIUM 10 MILLIGRAM(S): 80 TABLET, FILM COATED ORAL at 22:09

## 2021-05-09 RX ADMIN — SERTRALINE 50 MILLIGRAM(S): 25 TABLET, FILM COATED ORAL at 11:13

## 2021-05-09 RX ADMIN — Medication 81 MILLIGRAM(S): at 11:13

## 2021-05-09 RX ADMIN — Medication 1000 UNIT(S): at 11:13

## 2021-05-09 RX ADMIN — Medication 100 MILLIGRAM(S): at 05:45

## 2021-05-09 NOTE — CHART NOTE - NSCHARTNOTEFT_GEN_A_CORE
Spoke with Vascular Fellow, Dr. Min and please Spoke with Vascular Fellow, Dr. Min and please restart Plavix as soon as okay from urology standpoint. Please call with any questions.    [phone]

## 2021-05-09 NOTE — PROGRESS NOTE ADULT - ASSESSMENT
89yo male h/o prostate ca s/p radiation about 15 years ago, also recent LLE vascular stent, recently started on plavix, admitted with gross hematuria requiring CBI; gross hematuria causing acute blood loss anemia requiring blood transfusion  - TOV/PVR  - trend H/H transfuse as needed   - ancef for ppx  - Plavix held   - continue asa  - regular diet   - f/u medicine team, cardiology

## 2021-05-09 NOTE — PROGRESS NOTE ADULT - SUBJECTIVE AND OBJECTIVE BOX
Interval Events:    No acute events overnight  Urine clear on clamp, catheter removed    S: Patient doing well, denies fevers, chills, nausea, emesis, chest pain, SOB.  Pain is well controlled. Tolerating PO w/o N/V.  +/+ F/BM.    O: Vital Signs Last 24 Hrs  T(C): 37.1 (09 May 2021 05:04), Max: 37.4 (08 May 2021 21:37)  T(F): 98.8 (09 May 2021 05:04), Max: 99.4 (08 May 2021 21:37)  HR: 78 (09 May 2021 05:04) (76 - 95)  BP: 126/66 (09 May 2021 05:04) (94/51 - 126/66)  BP(mean): --  RR: 18 (09 May 2021 05:04) (18 - 18)  SpO2: 96% (09 May 2021 05:04) (94% - 97%)      08 May 2021 07:01  -  09 May 2021 07:00  --------------------------------------------------------  IN:    IV PiggyBack: 50 mL    Oral Fluid: 770 mL    sodium chloride 0.9%: 300 mL  Total IN: 1120 mL    OUT:    Indwelling Catheter - Urethral (mL): 1150 mL    Voided (mL): 300 mL  Total OUT: 1450 mL    Total NET: -330 mL          Physical Exam:    Gen: in no acute distres  Resp: No additional work of breathing   GI: Soft, non-tender, non-distended, with normoactive bowel sounds.  No masses.  MSK: Moves all extremities equally  Skin: No rashes    Labs:                        9.0    9.37  )-----------( 196      ( 09 May 2021 06:57 )             27.0     08 May 2021 07:47    142    |  108    |  41     ----------------------------<  92     3.4     |  22     |  2.02     Ca    7.6        08 May 2021 07:47        CAPILLARY BLOOD GLUCOSE                    MEDICATIONS  (STANDING):  aspirin enteric coated 81 milliGRAM(s) Oral daily  atorvastatin 10 milliGRAM(s) Oral at bedtime  ceFAZolin   IVPB 1000 milliGRAM(s) IV Intermittent every 8 hours  cholecalciferol 1000 Unit(s) Oral daily  fludroCORTISONE 0.4 milliGRAM(s) Oral daily  heparin   Injectable 5000 Unit(s) SubCutaneous every 8 hours  sertraline 50 milliGRAM(s) Oral daily    MEDICATIONS  (PRN):  acetaminophen   Tablet .. 650 milliGRAM(s) Oral every 6 hours PRN Mild Pain (1 - 3)  belladonna 16.2 mG/opium 30 mg Suppository 1 Suppository(s) Rectal every 6 hours PRN bladder spasms  bisacodyl Suppository 10 milliGRAM(s) Rectal daily PRN Constipation  ondansetron Injectable 4 milliGRAM(s) IV Push every 6 hours PRN Nausea and/or Vomiting

## 2021-05-09 NOTE — DISCHARGE NOTE PROVIDER - NSDCMRMEDTOKEN_GEN_ALL_CORE_FT
acetaminophen 325 mg oral tablet: 2 tab(s) orally every 6 hours, As needed, Mild Pain (1 - 3), Moderate Pain (4 - 6)  aspirin 81 mg oral delayed release tablet: 1 tab(s) orally once a day  atorvastatin 10 mg oral tablet: 1 tab(s) orally once a day (at bedtime)  bisacodyl 10 mg rectal suppository: 1 suppository(ies) rectal once a day, As needed, Constipation  cholecalciferol oral tablet: 1000 unit(s) orally once a day  fludrocortisone 0.1 mg oral tablet: 4 tab(s) orally once a day  heparin: 5000 unit(s) subcutaneous every 8 hours  Plavix 75 mg oral tablet: 1 tab(s) orally once a day  polyethylene glycol 3350 oral powder for reconstitution: 17 gram(s) orally once a day  senna oral tablet: 2 tab(s) orally once a day (at bedtime)  sertraline 50 mg oral tablet: 1 tab(s) orally once a day   acetaminophen 325 mg oral tablet: 2 tab(s) orally every 6 hours, As needed, Mild Pain (1 - 3), Moderate Pain (4 - 6)  aspirin 81 mg oral delayed release tablet: 1 tab(s) orally once a day  atorvastatin 10 mg oral tablet: 1 tab(s) orally once a day (at bedtime)  bisacodyl 10 mg rectal suppository: 1 suppository(ies) rectal once a day, As needed, Constipation  cholecalciferol oral tablet: 1000 unit(s) orally once a day  fludrocortisone 0.1 mg oral tablet: 4 tab(s) orally once a day  Plavix 75 mg oral tablet: 1 tab(s) orally once a day  polyethylene glycol 3350 oral powder for reconstitution: 17 gram(s) orally once a day  senna oral tablet: 2 tab(s) orally once a day (at bedtime)  sertraline 50 mg oral tablet: 1 tab(s) orally once a day

## 2021-05-09 NOTE — DISCHARGE NOTE PROVIDER - HOSPITAL COURSE
89yo male h/o metastatic prostate ca s/p radiation about 15 years ago, no recent urology follow up, CAD on asa, anemia, CKD, PAD with recent angioplasty with stent of left SFA (4/16) for which he was recently started on plavix, BIBA from [facility] on 5/5 for gross hematuria.  Patient was seen by urology in the ED and a 22f 3 way placed , 75cc of clot removed, CBI was started and patient was given 1 u pRBC. Vascular surgery was consulted and Plavix was held. Patient continued with CBI, requiring intermittent irrigation of clot. Patient's CBC remained stable and he required no further transfusions. On Hospital Day 3, CBI was clamped and urine output was clear over 24 hours. On Hospital Day 4, the sanchez catheter was removed and patient *** trial of void.     At the time of discharge, the patient was hemodynamically stable, was tolerating PO diet, was voiding urine and passing stool, was ambulating, and was comfortable with adequate pain control. The patient was instructed to follow up with Dr. Yadav within 1-2 weeks after discharge from the hospital. The patient felt comfortable with discharge. The patient was discharged to [facility]. The patient had no other issues.  Covid negative 5/8/21. 89yo male h/o metastatic prostate ca s/p radiation about 15 years ago, no recent urology follow up, CAD on asa, anemia, CKD, PAD with recent angioplasty with stent of left SFA (4/16) for which he was recently started on plavix, BIBA from [facility] on 5/5 for gross hematuria.  Patient was seen by urology in the ED and a 22f 3 way placed , 75cc of clot removed, CBI was started and patient was given 1 u pRBC. Vascular surgery was consulted and Plavix was held. Patient continued with CBI, requiring intermittent irrigation of clot. Patient's CBC remained stable and he required no further transfusions. On Hospital Day 3, CBI was clamped and urine output was clear over 24 hours. On Hospital Day 4, the sanchez catheter was removed and patient *** trial of void. Patient instructed to restart Plavix upon discharge to rehab.    At the time of discharge, the patient was hemodynamically stable, was tolerating PO diet, was voiding urine and passing stool, was ambulating, and was comfortable with adequate pain control. The patient was instructed to follow up with Dr. Yadav within 1-2 weeks after discharge from the hospital. The patient felt comfortable with discharge. The patient was discharged to [facility]. The patient had no other issues.  Covid negative 5/8/21. 89yo male h/o metastatic prostate ca s/p radiation about 15 years ago, no recent urology follow up, CAD on asa, anemia, CKD, PAD with recent angioplasty with stent of left SFA (4/16) for which he was recently started on plavix, BIBA from [facility] rehab on 5/5 for gross hematuria.  Patient was seen by urology in the ED and a 22f 3 way placed , 75cc of clot removed, CBI was started and patient was given 1 u pRBC. Vascular surgery was consulted and Plavix was held. Patient continued with CBI, requiring intermittent irrigation of clot. Patient's CBC remained stable and he required no further transfusions. On Hospital Day 3, CBI was clamped and urine output was clear over 24 hours. On Hospital Day 4, the sanchez catheter was removed and patient failed trial of void.  The catheter was replaced.  Patient was instructed to restart Plavix upon discharge to rehab.    At the time of discharge, the patient was hemodynamically stable, was tolerating PO diet, was voiding urine and passing stool, was ambulating, and was comfortable with adequate pain control. The patient was instructed to follow up with Dr. Yadav within 1-2 weeks after discharge from the hospital. The patient felt comfortable with discharge. The patient was discharged to [facility]. The patient had no other issues.  Covid negative 5/8/21.

## 2021-05-09 NOTE — DISCHARGE NOTE PROVIDER - NSDCFUADDINST_GEN_ALL_CORE_FT
Take medications as instructed by prescriptions.       Please follow up with Dr. Yadav within x2 week after discharge from the hospital. You may call [phone]  to schedule an appointment. Please call if you have return of blood in the urine or are unable to void.     Follow-up with primary care doctor as well.     Call [phone] and return to the ED for chest pain, shortness of breath, significant increase in pain, or significant change in color of surgical sites.   Take medications as instructed by prescriptions.   You may restart Plavix upon discharge from hospital.    Please follow up with Dr. Yadav within x2 week after discharge from the hospital. You may call [phone]  to schedule an appointment. Please call if you have return of blood in the urine or are unable to void.     Follow-up with primary care doctor as well.     Call [phone] and return to the ED for chest pain, shortness of breath, significant increase in pain, or significant change in color of surgical sites.

## 2021-05-09 NOTE — PROGRESS NOTE ADULT - ASSESSMENT
89yo male h/o prostate ca s/p radiation bout 15 years ago, also recent LLE vascular stent, recently started on plavix, admitted with gross hematuria requiring CBI    # Hematuria:   75cc of clot removed and started on CBI  Monitor UO/color on CBI, wean as possible  CBI weaned off, sanchez dced for TOV  Trend H/H transfuse as needed   3u pRBCs now- stable hb   Ancef for ppx  Follow up [facility] from [facility]  Cont ASA, hold Plavix as per vascular  Follow urology recs    # PAD:  Recent angioplasty  Cont ASA, hold Plavix  Follow up vascular recs    # Orthostatics Hypotension:  Recent admission for orthostatic hypotension  BP stable  TTE unremarkable 4/2021  Cont Florinef  Salvador IVF  Follow up cards recs    # DVT ppx:  Heparin subq Q8H    Preop clearence note- for tentative  procedure- if hematuria worsens  pt without any cardiac symptoms, ekg reviewed- no st t changes, nSR, LAD,   TTE reviewed- no valvular dz  pt intermediate risk for mod risk procedure,  medically optimized    [facility]  [phone]

## 2021-05-09 NOTE — PROGRESS NOTE ADULT - ATTENDING COMMENTS
Pt seen and examined  agree with above  urine currently clear- holding cbi
Pt seen and examined  doing well; sanchez out for TOV  D/c planning from  perspective, though likely to be for placement  agree with plan above, and PT/rehab
Attending: patient seen with wife at bedside and discussed plan. Will transfuse factors. Will monitor CBC closely. Due to JOSELITO, not a great candidate for Alum instillation. Given his advanced age, they would rather not undergo anesthesia/surgery. They were made aware that if hematuria and anemia persist despite transfusion and CBI, may require going to OR.
Attending: patient was seen, resting in bed. Urine clear on CBI. Agree with plan as above. Will monitor for now with CBC post-transfusion. Continue slow CBI for now.

## 2021-05-09 NOTE — PROGRESS NOTE ADULT - PROBLEM SELECTOR PLAN 2
-  -recent angioplasty  -vascular recs.
-  -recent angioplasty  -plavix held.  - cont ASA.
-  -recent angioplasty  - cont ASA.  - cont to hold plavix for now.

## 2021-05-09 NOTE — DISCHARGE NOTE PROVIDER - NSDCCPCAREPLAN_GEN_ALL_CORE_FT
PRINCIPAL DISCHARGE DIAGNOSIS  Diagnosis: Hematuria  Assessment and Plan of Treatment:        PRINCIPAL DISCHARGE DIAGNOSIS  Diagnosis: Hematuria  Assessment and Plan of Treatment: You had blood in the urine that resolved but you were unable to fully urinate and were discharged with a sanchez catheter.       PRINCIPAL DISCHARGE DIAGNOSIS  Diagnosis: Hematuria  Assessment and Plan of Treatment: You had blood in the urine that resolved but you were unable to fully urinate and were discharged with a sanchez catheter.  Follow up with Dr Yadav in the [facility] for your catheter to be removed.      SECONDARY DISCHARGE DIAGNOSES  Diagnosis: PAD (peripheral artery disease)  Assessment and Plan of Treatment: Continue plavix and follow up routinely with your primary care doctor or vascular surgeon.    Diagnosis: Chronic kidney disease, unspecified CKD stage  Assessment and Plan of Treatment: Follow up routinely with your nephrologist and avoid taking medications that will hurt the kidney    Diagnosis: CAD (coronary artery disease)  Assessment and Plan of Treatment: Continue plavix and follow up routinely with your primary care doctor or cardiologist

## 2021-05-09 NOTE — PROGRESS NOTE ADULT - SUBJECTIVE AND OBJECTIVE BOX
Patient is a 89y old  Male who presents with a chief complaint of gross hematuria (09 May 2021 11:16)      INTERVAL HPI/OVERNIGHT EVENTS: noted  pt seen and examined this am   events noted  CBI dced  hematuria improved  sanchez dced for TOV      Vital Signs Last 24 Hrs  T(C): 37 (09 May 2021 21:28), Max: 37.1 (09 May 2021 05:04)  T(F): 98.6 (09 May 2021 21:28), Max: 98.8 (09 May 2021 05:04)  HR: 85 (09 May 2021 21:28) (73 - 89)  BP: 100/60 (09 May 2021 21:28) (92/40 - 131/53)  BP(mean): --  RR: 20 (09 May 2021 21:28) (18 - 20)  SpO2: 94% (09 May 2021 21:28) (94% - 97%)    acetaminophen   Tablet .. 650 milliGRAM(s) Oral every 6 hours PRN  aspirin enteric coated 81 milliGRAM(s) Oral daily  atorvastatin 10 milliGRAM(s) Oral at bedtime  belladonna 16.2 mG/opium 30 mg Suppository 1 Suppository(s) Rectal every 6 hours PRN  bisacodyl Suppository 10 milliGRAM(s) Rectal daily PRN  cholecalciferol 1000 Unit(s) Oral daily  fludroCORTISONE 0.4 milliGRAM(s) Oral daily  heparin   Injectable 5000 Unit(s) SubCutaneous every 8 hours  ondansetron Injectable 4 milliGRAM(s) IV Push every 6 hours PRN  sertraline 50 milliGRAM(s) Oral daily      PHYSICAL EXAM:  GENERAL: NAD,   EYES: conjunctiva and sclera clear  ENMT: Moist mucous membranes  NECK: Supple, No JVD, Normal thyroid  CHEST/LUNG: non labored, cta b/l  HEART: Regular rate and rhythm; No murmurs, rubs, or gallops  ABDOMEN: Soft, Nontender, Nondistended; Bowel sounds present  EXTREMITIES:  2+ Peripheral Pulses, No clubbing, cyanosis, or edema  LYMPH: No lymphadenopathy noted  SKIN: No rashes or lesions    Consultant(s) Notes Reviewed:  [x ] YES  [ ] NO  Care Discussed with Consultants/Other Providers [ x] YES  [ ] NO    LABS:                        9.0    9.37  )-----------( 196      ( 09 May 2021 06:57 )             27.0     05-09    139  |  105  |  32<H>  ----------------------------<  113<H>  3.6   |  22  |  1.76<H>    Ca    7.6<L>      09 May 2021 06:57          CAPILLARY BLOOD GLUCOSE                  RADIOLOGY & ADDITIONAL TESTS:    Imaging Personally Reviewed:  [x ] YES  [ ] NO

## 2021-05-09 NOTE — PROGRESS NOTE ADULT - SUBJECTIVE AND OBJECTIVE BOX
[facility] Cardiology Progress Note  _______________________________    Pt. seen and examined. No new complaints. Kathleen removed this AM.        T(C): 36.7 (05-09-21 @ 09:27), Max: 37.4 (05-08-21 @ 21:37)  HR: 89 (05-09-21 @ 09:27) (76 - 95)  BP: 131/53 (05-09-21 @ 09:27) (94/51 - 131/53)  RR: 18 (05-09-21 @ 09:27) (18 - 18)  SpO2: 95% (05-09-21 @ 09:27) (94% - 97%)  I&O's Summary    08 May 2021 07:01  -  09 May 2021 07:00  --------------------------------------------------------  IN: 1120 mL / OUT: 1450 mL / NET: -330 mL    09 May 2021 07:01  -  09 May 2021 11:16  --------------------------------------------------------  IN: 240 mL / OUT: 0 mL / NET: 240 mL        PHYSICAL EXAM:  GENERAL: Elderly M, Alert, NAD.  NECK: Supple, No JVD, no carotid bruit.  CHEST/LUNG: Clear to auscultation bilaterally; No wheezes, rales, or rhonchi.  HEART: S1 S2 normal, RRR; No murmurs, rubs, or gallops  ABDOMEN: Soft, Nontender, Nondistended; Bowel sounds present  EXTREMITIES:  No LE edema.      LABS:                        9.0    9.37  )-----------( 196      ( 09 May 2021 06:57 )             27.0     05-09    139  |  105  |  32<H>  ----------------------------<  113<H>  3.6   |  22  |  1.76<H>    Ca    7.6<L>      09 May 2021 06:57                    MEDICATIONS  (STANDING):  aspirin enteric coated 81 milliGRAM(s) Oral daily  atorvastatin 10 milliGRAM(s) Oral at bedtime  cholecalciferol 1000 Unit(s) Oral daily  fludroCORTISONE 0.4 milliGRAM(s) Oral daily  heparin   Injectable 5000 Unit(s) SubCutaneous every 8 hours  sertraline 50 milliGRAM(s) Oral daily    MEDICATIONS  (PRN):  acetaminophen   Tablet .. 650 milliGRAM(s) Oral every 6 hours PRN Mild Pain (1 - 3)  belladonna 16.2 mG/opium 30 mg Suppository 1 Suppository(s) Rectal every 6 hours PRN bladder spasms  bisacodyl Suppository 10 milliGRAM(s) Rectal daily PRN Constipation  ondansetron Injectable 4 milliGRAM(s) IV Push every 6 hours PRN Nausea and/or Vomiting      RADIOLOGY & ADDITIONAL TESTS:

## 2021-05-09 NOTE — DISCHARGE NOTE PROVIDER - PROVIDER TOKENS
PROVIDER:[TOKEN:[[id number]],FOLLOWUP:[2 weeks]] PROVIDER:[TOKEN:[[id number]],FOLLOWUP:[2 weeks]],PROVIDER:[TOKEN:[[id number]],FOLLOWUP:[2 weeks]]

## 2021-05-09 NOTE — DISCHARGE NOTE PROVIDER - CARE PROVIDERS DIRECT ADDRESSES
[email or web address]

## 2021-05-09 NOTE — PROGRESS NOTE ADULT - PROBLEM SELECTOR PLAN 3
-  -recent admission for orthostatic hypotension  -BP stable  -TTE unremarkable 4/2021  -continue lupe Castillo M.D., [facility]  [phone]
-  -recent admission for orthostatic hypotension  -BP stable  -TTE unremarkable 4/2021  -continue with florinef   -gentle ivf     Patient of Dr. Patrick Castillo ([facility])    Jai Jay D.O.  [phone].
-  -recent admission for orthostatic hypotension  -BP stable  -TTE unremarkable 4/2021  -continue florinef   - DC planning for rehab.  Follow-up with me in the office 2 weeks after getting out of rehab.    Patrick Castillo M.D., [facility]  [phone]

## 2021-05-10 ENCOUNTER — TRANSCRIPTION ENCOUNTER (OUTPATIENT)
Age: 86
End: 2021-05-10

## 2021-05-10 VITALS
TEMPERATURE: 98 F | RESPIRATION RATE: 18 BRPM | DIASTOLIC BLOOD PRESSURE: 57 MMHG | SYSTOLIC BLOOD PRESSURE: 103 MMHG | OXYGEN SATURATION: 95 % | HEART RATE: 80 BPM

## 2021-05-10 PROBLEM — I25.10 ATHEROSCLEROTIC HEART DISEASE OF NATIVE CORONARY ARTERY WITHOUT ANGINA PECTORIS: Chronic | Status: ACTIVE | Noted: 2021-05-05

## 2021-05-10 PROBLEM — N18.30 CHRONIC KIDNEY DISEASE, STAGE 3 UNSPECIFIED: Chronic | Status: ACTIVE | Noted: 2021-05-05

## 2021-05-10 PROBLEM — D63.8 ANEMIA IN OTHER CHRONIC DISEASES CLASSIFIED ELSEWHERE: Chronic | Status: ACTIVE | Noted: 2021-05-05

## 2021-05-10 PROBLEM — I73.9 PERIPHERAL VASCULAR DISEASE, UNSPECIFIED: Chronic | Status: ACTIVE | Noted: 2021-05-05

## 2021-05-10 PROBLEM — C61 MALIGNANT NEOPLASM OF PROSTATE: Chronic | Status: ACTIVE | Noted: 2021-05-05

## 2021-05-10 PROCEDURE — 85610 PROTHROMBIN TIME: CPT

## 2021-05-10 PROCEDURE — 86850 RBC ANTIBODY SCREEN: CPT

## 2021-05-10 PROCEDURE — 85025 COMPLETE CBC W/AUTO DIFF WBC: CPT

## 2021-05-10 PROCEDURE — 86900 BLOOD TYPING SEROLOGIC ABO: CPT

## 2021-05-10 PROCEDURE — U0005: CPT

## 2021-05-10 PROCEDURE — U0003: CPT

## 2021-05-10 PROCEDURE — P9059: CPT

## 2021-05-10 PROCEDURE — P9016: CPT

## 2021-05-10 PROCEDURE — 86923 COMPATIBILITY TEST ELECTRIC: CPT

## 2021-05-10 PROCEDURE — 86901 BLOOD TYPING SEROLOGIC RH(D): CPT

## 2021-05-10 PROCEDURE — P9037: CPT

## 2021-05-10 PROCEDURE — 86769 SARS-COV-2 COVID-19 ANTIBODY: CPT

## 2021-05-10 PROCEDURE — 97161 PT EVAL LOW COMPLEX 20 MIN: CPT

## 2021-05-10 PROCEDURE — 76770 US EXAM ABDO BACK WALL COMP: CPT

## 2021-05-10 PROCEDURE — 85027 COMPLETE CBC AUTOMATED: CPT

## 2021-05-10 PROCEDURE — 80048 BASIC METABOLIC PNL TOTAL CA: CPT

## 2021-05-10 PROCEDURE — 99285 EMERGENCY DEPT VISIT HI MDM: CPT

## 2021-05-10 PROCEDURE — 99238 HOSP IP/OBS DSCHRG MGMT 30/<: CPT

## 2021-05-10 PROCEDURE — 36430 TRANSFUSION BLD/BLD COMPNT: CPT

## 2021-05-10 PROCEDURE — 85730 THROMBOPLASTIN TIME PARTIAL: CPT

## 2021-05-10 RX ADMIN — HEPARIN SODIUM 5000 UNIT(S): 5000 INJECTION INTRAVENOUS; SUBCUTANEOUS at 13:18

## 2021-05-10 RX ADMIN — Medication 81 MILLIGRAM(S): at 11:09

## 2021-05-10 RX ADMIN — FLUDROCORTISONE ACETATE 0.4 MILLIGRAM(S): 0.1 TABLET ORAL at 05:03

## 2021-05-10 RX ADMIN — HEPARIN SODIUM 5000 UNIT(S): 5000 INJECTION INTRAVENOUS; SUBCUTANEOUS at 05:03

## 2021-05-10 RX ADMIN — SERTRALINE 50 MILLIGRAM(S): 25 TABLET, FILM COATED ORAL at 11:09

## 2021-05-10 RX ADMIN — Medication 1000 UNIT(S): at 11:09

## 2021-05-10 NOTE — PROGRESS NOTE ADULT - SUBJECTIVE AND OBJECTIVE BOX
UROLOGY PA PROGRESS NOTE:     Subjective:  no acute events overnight, failed tov yesterday. clear urine from sanchez     Objective:  Vital signs  T(F): , Max: 99.3 (05-10-21 @ 01:21)  HR: 74 (05-10-21 @ 05:00)  BP: 109/69 (05-10-21 @ 05:00)  SpO2: 95% (05-10-21 @ 05:00)  Wt(kg): --    Output     05-09 @ 07:01  -  05-10 @ 07:00  --------------------------------------------------------  IN: 1040 mL / OUT: 1075 mL / NET: -35 mL        Physical Exam:  Gen: NAD  Abd: soft, NT/ND  : +sanchez draining clear     Labs:  05-09  9.37  / 27.0  /1.76   05-08  6.92  / 27.4  /2.02                           9.0    9.37  )-----------( 196      ( 09 May 2021 06:57 )             27.0     05-09    139  |  105  |  32<H>  ----------------------------<  113<H>  3.6   |  22  |  1.76<H>    Ca    7.6<L>      09 May 2021 06:57

## 2021-05-10 NOTE — PROGRESS NOTE ADULT - SUBJECTIVE AND OBJECTIVE BOX
Patient is a 89y old  Male who presents with a chief complaint of gross hematuria (10 May 2021 07:18)      INTERVAL HPI/OVERNIGHT EVENTS: noted  pt seen and examined this am   events noted  feels well      Vital Signs Last 24 Hrs  T(C): 36.7 (10 May 2021 14:29), Max: 37.4 (10 May 2021 01:21)  T(F): 98.1 (10 May 2021 14:29), Max: 99.3 (10 May 2021 01:21)  HR: 80 (10 May 2021 14:29) (74 - 84)  BP: 103/57 (10 May 2021 14:29) (98/54 - 111/60)  BP(mean): --  RR: 18 (10 May 2021 14:29) (17 - 18)  SpO2: 95% (10 May 2021 14:29) (94% - 95%)    acetaminophen   Tablet .. 650 milliGRAM(s) Oral every 6 hours PRN  aspirin enteric coated 81 milliGRAM(s) Oral daily  atorvastatin 10 milliGRAM(s) Oral at bedtime  belladonna 16.2 mG/opium 30 mg Suppository 1 Suppository(s) Rectal every 6 hours PRN  bisacodyl Suppository 10 milliGRAM(s) Rectal daily PRN  cholecalciferol 1000 Unit(s) Oral daily  fludroCORTISONE 0.4 milliGRAM(s) Oral daily  heparin   Injectable 5000 Unit(s) SubCutaneous every 8 hours  ondansetron Injectable 4 milliGRAM(s) IV Push every 6 hours PRN  sertraline 50 milliGRAM(s) Oral daily      PHYSICAL EXAM:  GENERAL: NAD,   EYES: conjunctiva and sclera clear  ENMT: Moist mucous membranes  NECK: Supple, No JVD, Normal thyroid  CHEST/LUNG: non labored, cta b/l  HEART: Regular rate and rhythm; No murmurs, rubs, or gallops  ABDOMEN: Soft, Nontender, Nondistended; Bowel sounds present  EXTREMITIES:  2+ Peripheral Pulses, No clubbing, cyanosis, or edema  LYMPH: No lymphadenopathy noted  SKIN: No rashes or lesions    Consultant(s) Notes Reviewed:  [x ] YES  [ ] NO  Care Discussed with Consultants/Other Providers [ x] YES  [ ] NO    LABS:                        9.0    9.37  )-----------( 196      ( 09 May 2021 06:57 )             27.0     05-09    139  |  105  |  32<H>  ----------------------------<  113<H>  3.6   |  22  |  1.76<H>    Ca    7.6<L>      09 May 2021 06:57          CAPILLARY BLOOD GLUCOSE                  RADIOLOGY & ADDITIONAL TESTS:    Imaging Personally Reviewed:  [x ] YES  [ ] NO

## 2021-05-10 NOTE — PROGRESS NOTE ADULT - NSICDXPILOT_GEN_ALL_CORE
[place]

## 2021-05-10 NOTE — DISCHARGE NOTE NURSING/CASE MANAGEMENT/SOCIAL WORK - PATIENT PORTAL LINK FT
You can access the FollowMyHealth Patient Portal offered by [facility] by registering at the following website: [email or web address]. By joining [facility]’s FollowMyHealth portal, you will also be able to view your health information using other applications (apps) compatible with our system.

## 2021-05-10 NOTE — PROGRESS NOTE ADULT - PROVIDER SPECIALTY LIST ADULT
Internal Medicine
Urology
Urology
Internal Medicine
Internal Medicine
Urology
Internal Medicine
Urology
Urology
Cardiology

## 2021-05-10 NOTE — PROGRESS NOTE ADULT - ASSESSMENT
89yo male h/o prostate ca s/p radiation about 15 years ago, also recent LLE vascular stent, recently started on plavix, admitted with gross hematuria requiring CBI; gross hematuria causing acute blood loss anemia requiring blood transfusion  - d/c planning to [facility]   - restart plavix today

## 2021-05-10 NOTE — PROGRESS NOTE ADULT - ASSESSMENT
89yo male h/o prostate ca s/p radiation bout 15 years ago, also recent LLE vascular stent, recently started on plavix, admitted with gross hematuria requiring CBI    # Hematuria:   75cc of clot removed and started on CBI  Monitor UO/color on CBI, wean as possible  CBI weaned off, sanchez dced for TOV  Trend H/H transfuse as needed   3u pRBCs now- stable hb   Ancef for ppx  Follow up UCx from [facility]  Cont ASA, hold Plavix as per vascular  Follow urology recs    # PAD:  Recent angioplasty  Cont ASA, hold Plavix  Follow up vascular recs    # Orthostatics Hypotension:  Recent admission for orthostatic hypotension  BP stable  TTE unremarkable 4/2021  Cont Florinef  Salvador IVF  Follow up cards recs    # DVT ppx:  Heparin subq Q8H    Preop clearence note- for tentative  procedure- if hematuria worsens  pt without any cardiac symptoms, ekg reviewed- no st t changes, nSR, LAD,   TTE reviewed- no valvular dz  pt intermediate risk for mod risk procedure,  medically optimized    [facility]  [phone]

## 2021-05-27 ENCOUNTER — RESULT REVIEW (OUTPATIENT)
Age: 86
End: 2021-05-27

## 2021-06-06 ENCOUNTER — INPATIENT (INPATIENT)
Facility: HOSPITAL | Age: 86
LOS: 15 days | Discharge: NURSING, REHAB OR LONG-TERM CARE | DRG: 280 | End: 2021-06-22
Attending: INTERNAL MEDICINE | Admitting: INTERNAL MEDICINE
Payer: MEDICARE

## 2021-06-06 VITALS
HEIGHT: 71 IN | OXYGEN SATURATION: 95 % | WEIGHT: 149.91 LBS | DIASTOLIC BLOOD PRESSURE: 68 MMHG | SYSTOLIC BLOOD PRESSURE: 118 MMHG | HEART RATE: 91 BPM | RESPIRATION RATE: 18 BRPM | TEMPERATURE: 98 F

## 2021-06-06 DIAGNOSIS — R07.9 CHEST PAIN, UNSPECIFIED: ICD-10-CM

## 2021-06-06 DIAGNOSIS — Z29.9 ENCOUNTER FOR PROPHYLACTIC MEASURES, UNSPECIFIED: ICD-10-CM

## 2021-06-06 DIAGNOSIS — Z98.890 OTHER SPECIFIED POSTPROCEDURAL STATES: Chronic | ICD-10-CM

## 2021-06-06 DIAGNOSIS — I73.9 PERIPHERAL VASCULAR DISEASE, UNSPECIFIED: ICD-10-CM

## 2021-06-06 DIAGNOSIS — I25.10 ATHEROSCLEROTIC HEART DISEASE OF NATIVE CORONARY ARTERY WITHOUT ANGINA PECTORIS: ICD-10-CM

## 2021-06-06 DIAGNOSIS — R31.9 HEMATURIA, UNSPECIFIED: ICD-10-CM

## 2021-06-06 DIAGNOSIS — N18.30 CHRONIC KIDNEY DISEASE, STAGE 3 UNSPECIFIED: ICD-10-CM

## 2021-06-06 DIAGNOSIS — C61 MALIGNANT NEOPLASM OF PROSTATE: ICD-10-CM

## 2021-06-06 DIAGNOSIS — D64.9 ANEMIA, UNSPECIFIED: ICD-10-CM

## 2021-06-06 LAB
ALBUMIN SERPL ELPH-MCNC: 3 G/DL — LOW (ref 3.3–5)
ALP SERPL-CCNC: 87 U/L — SIGNIFICANT CHANGE UP (ref 40–120)
ALT FLD-CCNC: 32 U/L — SIGNIFICANT CHANGE UP (ref 10–45)
ANION GAP SERPL CALC-SCNC: 13 MMOL/L — SIGNIFICANT CHANGE UP (ref 5–17)
APPEARANCE UR: ABNORMAL
AST SERPL-CCNC: 39 U/L — SIGNIFICANT CHANGE UP (ref 10–40)
BACTERIA # UR AUTO: ABNORMAL
BASOPHILS # BLD AUTO: 0.01 K/UL — SIGNIFICANT CHANGE UP (ref 0–0.2)
BASOPHILS NFR BLD AUTO: 0.1 % — SIGNIFICANT CHANGE UP (ref 0–2)
BILIRUB SERPL-MCNC: 0.2 MG/DL — SIGNIFICANT CHANGE UP (ref 0.2–1.2)
BILIRUB UR-MCNC: NEGATIVE — SIGNIFICANT CHANGE UP
BUN SERPL-MCNC: 51 MG/DL — HIGH (ref 7–23)
CALCIUM SERPL-MCNC: 8.6 MG/DL — SIGNIFICANT CHANGE UP (ref 8.4–10.5)
CHLORIDE SERPL-SCNC: 103 MMOL/L — SIGNIFICANT CHANGE UP (ref 96–108)
CO2 SERPL-SCNC: 21 MMOL/L — LOW (ref 22–31)
COLOR SPEC: SIGNIFICANT CHANGE UP
CREAT SERPL-MCNC: 1.68 MG/DL — HIGH (ref 0.5–1.3)
DIFF PNL FLD: ABNORMAL
EOSINOPHIL # BLD AUTO: 0.07 K/UL — SIGNIFICANT CHANGE UP (ref 0–0.5)
EOSINOPHIL NFR BLD AUTO: 0.8 % — SIGNIFICANT CHANGE UP (ref 0–6)
EPI CELLS # UR: 0 /HPF — SIGNIFICANT CHANGE UP
GLUCOSE SERPL-MCNC: 114 MG/DL — HIGH (ref 70–99)
GLUCOSE UR QL: NEGATIVE — SIGNIFICANT CHANGE UP
HCT VFR BLD CALC: 28.5 % — LOW (ref 39–50)
HGB BLD-MCNC: 8.8 G/DL — LOW (ref 13–17)
HYALINE CASTS # UR AUTO: 1 /LPF — SIGNIFICANT CHANGE UP (ref 0–2)
IMM GRANULOCYTES NFR BLD AUTO: 2.4 % — HIGH (ref 0–1.5)
KETONES UR-MCNC: NEGATIVE — SIGNIFICANT CHANGE UP
LEUKOCYTE ESTERASE UR-ACNC: ABNORMAL
LYMPHOCYTES # BLD AUTO: 1.04 K/UL — SIGNIFICANT CHANGE UP (ref 1–3.3)
LYMPHOCYTES # BLD AUTO: 11.4 % — LOW (ref 13–44)
MCHC RBC-ENTMCNC: 30.2 PG — SIGNIFICANT CHANGE UP (ref 27–34)
MCHC RBC-ENTMCNC: 30.9 GM/DL — LOW (ref 32–36)
MCV RBC AUTO: 97.9 FL — SIGNIFICANT CHANGE UP (ref 80–100)
MONOCYTES # BLD AUTO: 1.11 K/UL — HIGH (ref 0–0.9)
MONOCYTES NFR BLD AUTO: 12.2 % — SIGNIFICANT CHANGE UP (ref 2–14)
NEUTROPHILS # BLD AUTO: 6.66 K/UL — SIGNIFICANT CHANGE UP (ref 1.8–7.4)
NEUTROPHILS NFR BLD AUTO: 73.1 % — SIGNIFICANT CHANGE UP (ref 43–77)
NITRITE UR-MCNC: NEGATIVE — SIGNIFICANT CHANGE UP
NRBC # BLD: 0 /100 WBCS — SIGNIFICANT CHANGE UP (ref 0–0)
NT-PROBNP SERPL-SCNC: 2386 PG/ML — HIGH (ref 0–300)
PH UR: 6 — SIGNIFICANT CHANGE UP (ref 5–8)
PLATELET # BLD AUTO: 259 K/UL — SIGNIFICANT CHANGE UP (ref 150–400)
POTASSIUM SERPL-MCNC: 4.6 MMOL/L — SIGNIFICANT CHANGE UP (ref 3.5–5.3)
POTASSIUM SERPL-SCNC: 4.6 MMOL/L — SIGNIFICANT CHANGE UP (ref 3.5–5.3)
PROT SERPL-MCNC: 6.4 G/DL — SIGNIFICANT CHANGE UP (ref 6–8.3)
PROT UR-MCNC: ABNORMAL
RBC # BLD: 2.91 M/UL — LOW (ref 4.2–5.8)
RBC # FLD: 14 % — SIGNIFICANT CHANGE UP (ref 10.3–14.5)
RBC CASTS # UR COMP ASSIST: 1504 /HPF — HIGH (ref 0–4)
SARS-COV-2 RNA SPEC QL NAA+PROBE: SIGNIFICANT CHANGE UP
SODIUM SERPL-SCNC: 137 MMOL/L — SIGNIFICANT CHANGE UP (ref 135–145)
SP GR SPEC: 1.01 — SIGNIFICANT CHANGE UP (ref 1.01–1.02)
TROPONIN T, HIGH SENSITIVITY RESULT: 75 NG/L — HIGH (ref 0–51)
TROPONIN T, HIGH SENSITIVITY RESULT: 78 NG/L — HIGH (ref 0–51)
TROPONIN T, HIGH SENSITIVITY RESULT: 91 NG/L — HIGH (ref 0–51)
UROBILINOGEN FLD QL: NEGATIVE — SIGNIFICANT CHANGE UP
WBC # BLD: 9.11 K/UL — SIGNIFICANT CHANGE UP (ref 3.8–10.5)
WBC # FLD AUTO: 9.11 K/UL — SIGNIFICANT CHANGE UP (ref 3.8–10.5)
WBC UR QL: 328 /HPF — HIGH (ref 0–5)

## 2021-06-06 PROCEDURE — 99285 EMERGENCY DEPT VISIT HI MDM: CPT | Mod: CS,GC

## 2021-06-06 PROCEDURE — 99223 1ST HOSP IP/OBS HIGH 75: CPT

## 2021-06-06 PROCEDURE — 51703 INSERT BLADDER CATH COMPLEX: CPT

## 2021-06-06 PROCEDURE — 93010 ELECTROCARDIOGRAM REPORT: CPT

## 2021-06-06 PROCEDURE — 71045 X-RAY EXAM CHEST 1 VIEW: CPT | Mod: 26

## 2021-06-06 RX ORDER — ATORVASTATIN CALCIUM 80 MG/1
10 TABLET, FILM COATED ORAL AT BEDTIME
Refills: 0 | Status: DISCONTINUED | OUTPATIENT
Start: 2021-06-06 | End: 2021-06-22

## 2021-06-06 RX ORDER — FOLIC ACID 0.8 MG
1 TABLET ORAL DAILY
Refills: 0 | Status: DISCONTINUED | OUTPATIENT
Start: 2021-06-06 | End: 2021-06-22

## 2021-06-06 RX ORDER — FERROUS SULFATE 325(65) MG
325 TABLET ORAL DAILY
Refills: 0 | Status: DISCONTINUED | OUTPATIENT
Start: 2021-06-06 | End: 2021-06-22

## 2021-06-06 RX ORDER — SERTRALINE 25 MG/1
50 TABLET, FILM COATED ORAL DAILY
Refills: 0 | Status: DISCONTINUED | OUTPATIENT
Start: 2021-06-06 | End: 2021-06-22

## 2021-06-06 RX ORDER — FLUDROCORTISONE ACETATE 0.1 MG/1
0.4 TABLET ORAL DAILY
Refills: 0 | Status: DISCONTINUED | OUTPATIENT
Start: 2021-06-06 | End: 2021-06-22

## 2021-06-06 RX ORDER — CEFTRIAXONE 500 MG/1
1000 INJECTION, POWDER, FOR SOLUTION INTRAMUSCULAR; INTRAVENOUS EVERY 24 HOURS
Refills: 0 | Status: DISCONTINUED | OUTPATIENT
Start: 2021-06-06 | End: 2021-06-11

## 2021-06-06 RX ORDER — ACETAMINOPHEN 500 MG
650 TABLET ORAL EVERY 6 HOURS
Refills: 0 | Status: DISCONTINUED | OUTPATIENT
Start: 2021-06-06 | End: 2021-06-22

## 2021-06-06 RX ORDER — FUROSEMIDE 40 MG
20 TABLET ORAL ONCE
Refills: 0 | Status: COMPLETED | OUTPATIENT
Start: 2021-06-06 | End: 2021-06-06

## 2021-06-06 RX ORDER — ASPIRIN/CALCIUM CARB/MAGNESIUM 324 MG
162 TABLET ORAL DAILY
Refills: 0 | Status: DISCONTINUED | OUTPATIENT
Start: 2021-06-06 | End: 2021-06-06

## 2021-06-06 RX ORDER — AZITHROMYCIN 500 MG/1
500 TABLET, FILM COATED ORAL EVERY 24 HOURS
Refills: 0 | Status: DISCONTINUED | OUTPATIENT
Start: 2021-06-06 | End: 2021-06-08

## 2021-06-06 RX ORDER — POLYETHYLENE GLYCOL 3350 17 G/17G
17 POWDER, FOR SOLUTION ORAL DAILY
Refills: 0 | Status: DISCONTINUED | OUTPATIENT
Start: 2021-06-06 | End: 2021-06-22

## 2021-06-06 RX ORDER — SENNA PLUS 8.6 MG/1
2 TABLET ORAL AT BEDTIME
Refills: 0 | Status: DISCONTINUED | OUTPATIENT
Start: 2021-06-06 | End: 2021-06-22

## 2021-06-06 RX ORDER — BACITRACIN ZINC 500 UNIT/G
1 OINTMENT IN PACKET (EA) TOPICAL DAILY
Refills: 0 | Status: DISCONTINUED | OUTPATIENT
Start: 2021-06-06 | End: 2021-06-22

## 2021-06-06 RX ORDER — CLOPIDOGREL BISULFATE 75 MG/1
1 TABLET, FILM COATED ORAL
Qty: 0 | Refills: 0 | DISCHARGE

## 2021-06-06 RX ADMIN — CEFTRIAXONE 100 MILLIGRAM(S): 500 INJECTION, POWDER, FOR SOLUTION INTRAMUSCULAR; INTRAVENOUS at 21:28

## 2021-06-06 RX ADMIN — Medication 20 MILLIGRAM(S): at 19:11

## 2021-06-06 RX ADMIN — ATORVASTATIN CALCIUM 10 MILLIGRAM(S): 80 TABLET, FILM COATED ORAL at 21:27

## 2021-06-06 RX ADMIN — AZITHROMYCIN 250 MILLIGRAM(S): 500 TABLET, FILM COATED ORAL at 22:22

## 2021-06-06 NOTE — H&P ADULT - PROBLEM SELECTOR PLAN 7
CKD stage III, baseline Cr ~ 1.8-1.9  Follows Dr. Chan outpatient  Cr 1.68  Appears to be around baseline  Continue to monitor

## 2021-06-06 NOTE — H&P ADULT - PROBLEM SELECTOR PLAN 3
AOCD and acute blood loss 2/2 hematuria   Monitor HH  Keep active T&S, transfuse to keep Hgb >8   Treatment of hematuria as above

## 2021-06-06 NOTE — H&P ADULT - NSICDXPASTMEDICALHX_GEN_ALL_CORE_FT
PAST MEDICAL HISTORY:  Anemia of chronic disease     CAD (coronary artery disease) asa    PAD (peripheral artery disease) s/p vascular stent    Prostate CA s/p radiation    Stage 3 chronic kidney disease

## 2021-06-06 NOTE — ED ADULT NURSE NOTE - NSIMPLEMENTINTERV_GEN_ALL_ED
Implemented All Fall with Harm Risk Interventions:  [place] to call system. Call bell, personal items and telephone within reach. Instruct patient to call for assistance. Room bathroom lighting operational. Non-slip footwear when patient is off stretcher. Physically safe environment: no spills, clutter or unnecessary equipment. Stretcher in lowest position, wheels locked, appropriate side rails in place. Provide visual cue, wrist band, yellow gown, etc. Monitor gait and stability. Monitor for mental status changes and reorient to person, place, and time. Review medications for side effects contributing to fall risk. Reinforce activity limits and safety measures with patient and family. Provide visual clues: red socks.

## 2021-06-06 NOTE — ED CLERICAL - NS ED CLERK NOTE PRE-ARRIVAL INFORMATION; ADDITIONAL PRE-ARRIVAL INFORMATION
CC/Reason For referral: chest pain, r/o MI from [facility]  Preferred Consultant(if applicable):  Who admits for you (if needed):  Do you have documents you would like to fax over?  Would you still like to speak to an ED attending? yes and [facility] [email or web address] [phone]

## 2021-06-06 NOTE — H&P ADULT - ASSESSMENT
Patient is a 89 year old male with significant past medical history of prostate cancer s/p radiation, CAD, PVD s/p L SFA stent, CKD stage III, chronic anemia, recent admission for hematuria, is brought from [facility] for complaint of chest pain. Patient is admitted for further evaluation and management for hematuria and to rule out ACS/cardiomyopathy.  Patient is a 89 year old male with significant past medical history of prostate cancer s/p radiation, CAD, PVD s/p L SFA stent, CKD stage III, chronic anemia, recent admission for hematuria, is brought from [facility] for complaint of chest pain. Patient is admitted for further evaluation and management for hematuria, pneumonia and to rule out ACS/cardiomyopathy.

## 2021-06-06 NOTE — ED ADULT NURSE REASSESSMENT NOTE - NS ED NURSE REASSESS COMMENT FT1
0681 Pt bought over to [facility] with a monitor Pt  spoke to his wife that he was in the Hospital [facility]

## 2021-06-06 NOTE — ED ADULT NURSE NOTE - ED STAT RN HANDOFF DETAILS
Patients states increasing fatigue, worse w/exertion     Kenny Vance RN  07/22/18 2296 Report given to Savannah lee in holding and pt brought over to the blue area

## 2021-06-06 NOTE — H&P ADULT - PROBLEM SELECTOR PLAN 5
PAD s/p angioplasty with stent of left SFA was started on Plavix (4/16/21)  Follows Dr. Urias outpatient  Plavix and ASA on hold due to hematuria

## 2021-06-06 NOTE — ED PROVIDER NOTE - OBJECTIVE STATEMENT
89yo male h/o metastatic prostate ca s/p radiation about 15 years ago, no recent urology follow up, CAD, anemia, CKD, PAD with recent angioplasty with stent of left SFA (4/16), recent adm 5/5-5/10 for hematuria dc'd to [facility] w/ sanchez, presents for CP for the past 3-4 days. Left sided, started when he was turning in bed. was initially 4/10 pain, now 2/10. No radiation, feels as if someone is pressing on his chest. no fevers, sob, abd pain, dysuria.

## 2021-06-06 NOTE — ED PROVIDER NOTE - PHYSICAL EXAMINATION
Vital Signs Last 24 Hrs  T(C): 36.9 (06 Jun 2021 12:15), Max: 36.9 (06 Jun 2021 12:15)  T(F): 98.5 (06 Jun 2021 12:15), Max: 98.5 (06 Jun 2021 12:15)  HR: 83 (06 Jun 2021 12:52) (83 - 91)  BP: 120/62 (06 Jun 2021 12:52) (118/68 - 120/62)  BP(mean): --  RR: 17 (06 Jun 2021 12:52) (17 - 18)  SpO2: 96% (06 Jun 2021 12:52) (95% - 96%)  PHYSICAL EXAM:  GENERAL: appears chronically ill  EYES: EOMI, PERRLA  ENMT: No tonsillar erythema, exudates, or enlargement; Moist mucous membranes  NECK: Supple  HEART: Regular rate and rhythm; No murmurs, rubs, or gallops  RESPIRATORY: scattered wheezes  ABDOMEN: Soft, Nontender, Nondistended; Bowel sounds present  : Wang in place  NEUROLOGY: A&Ox3  EXTREMITIES: +2 edema bilaterally

## 2021-06-06 NOTE — CONSULT NOTE ADULT - SUBJECTIVE AND OBJECTIVE BOX
HPI:  Patient is a 89 year old male with past medical history of prostate cancer s/p radiation, CAD, PVD s/p L SFA stent, CKD stage III, chronic anemia, recent admission for hematuria, is brought from [facility] for complaint of chest pain. Consulted by medicine team for gross hematuria. He doesn't recall how long the hematuria has been ongoing. Per nurse at [facility] - ASA and plavix were stopped recently due to hematuria but she is unable to recall when. Patient doesn't recall being seen by a urologist since his last discharge.   Per chart review: last admission in May 2021 history of PAD s/p angioplasty with stent of left SFA started on Plavix (21), history of CAD on ASA, presented with hematuria May 5th - aspirin and plavix were intimally held, evaluated by urology - on CBI, ASA and plavix were resumed on discharge.  Patient poor historian, Estela, by denies any fever/chills, dysuria, abdominal or flank pain. Patient with sanchez since last discharge for urinary retention.  Labs show H/H stable from last discharge, 8.8/28.5 from 9.0/27 and Cr slightly improved from last discharge, 1.68 from 1.76. UA shows large blood, leuk esterase, WBCs and many bacteria.       PAST MEDICAL & SURGICAL HISTORY:  CAD (coronary artery disease)  asa    Prostate CA  s/p radiation    Stage 3 chronic kidney disease    PAD (peripheral artery disease)  s/p vascular stent    Anemia of chronic disease    H/O vascular surgery      FAMILY HISTORY:    SOCIAL HISTORY:   Tobacco hx:  MEDICATIONS  (STANDING):  atorvastatin 10 milliGRAM(s) Oral at bedtime  azithromycin  IVPB 500 milliGRAM(s) IV Intermittent every 24 hours  BACItracin   Ointment 1 Application(s) Topical daily  cefTRIAXone   IVPB 1000 milliGRAM(s) IV Intermittent every 24 hours  ferrous    sulfate 325 milliGRAM(s) Oral daily  fludroCORTISONE 0.4 milliGRAM(s) Oral daily  folic acid 1 milliGRAM(s) Oral daily  multivitamin 1 Tablet(s) Oral daily  senna 2 Tablet(s) Oral at bedtime  sertraline 50 milliGRAM(s) Oral daily  silver sulfADIAZINE 1% Cream 1 Application(s) Topical two times a day    MEDICATIONS  (PRN):  acetaminophen   Tablet .. 650 milliGRAM(s) Oral every 6 hours PRN Mild Pain/Moderate Pain  bisacodyl Suppository 10 milliGRAM(s) Rectal daily PRN Constipation  polyethylene glycol 3350 17 Gram(s) Oral daily PRN Constipation    Allergies    No Known Allergies    Intolerances        REVIEW OF SYSTEMS: Pertinent positives and negatives as stated in HPI, otherwise negative    Vital signs  T(C): 36.5 (21 @ 16:46), Max: 36.9 (21 @ 12:15)  HR: 71 (21 @ 19:06)  BP: 122/71 (21 @ 19:06)  SpO2: 100% (21 @ 16:46)  Wt(kg): --    Output      Physical Exam  Gen: NAD, AAOx2  Pulm: No respiratory distress, no subcostal retractions  Abd: Soft, NT, ND  : Circumcised, no lesions. Testes descended bilaterally.  Testes and epididymis nontender bilaterally. +sanchez draining turbid peach tea colored urine; exchanged for 22F six eye catheter, irrigated 5 cc of clot, 22F inserted, draining light peach colored urine  MSK: No edema present    LABS:         @ 13:11    WBC 9.11  / Hct 28.5  / SCr 1.68         137  |  103  |  51<H>  ----------------------------<  114<H>  4.6   |  21<L>  |  1.68<H>    Ca    8.6      2021 13:11    TPro  6.4  /  Alb  3.0<L>  /  TBili  0.2  /  DBili  x   /  AST  39  /  ALT  32  /  AlkPhos  87        Urinalysis Basic - ( 2021 21:03 )    Color: BROWN / Appearance: Slightly Turbid / S.015 / pH: x  Gluc: x / Ketone: Negative  / Bili: Negative / Urobili: Negative   Blood: x / Protein: 100 mg/dL / Nitrite: Negative   Leuk Esterase: Large / RBC: 1504 /hpf /  /HPF   Sq Epi: x / Non Sq Epi: 0 /hpf / Bacteria: Many        Urine Cx: pending

## 2021-06-06 NOTE — ED ADULT NURSE NOTE - PMH
Anemia of chronic disease    CAD (coronary artery disease)  asa  PAD (peripheral artery disease)  s/p vascular stent  Prostate CA  s/p radiation  Stage 3 chronic kidney disease

## 2021-06-06 NOTE — ED PROVIDER NOTE - NS_BEDUNITTYPES_ED_ALL_ED
Posterior Auricular Interpolation Flap Text: A decision was made to reconstruct the defect utilizing an interpolation axial flap and a staged reconstruction.  A telfa template was made of the defect.  This telfa template was then used to outline the posterior auricular interpolation flap.  The donor area for the pedicle flap was then injected with anesthesia.  The flap was excised through the skin and subcutaneous tissue down to the layer of the underlying musculature.  The pedicle flap was carefully excised within this deep plane to maintain its blood supply.  The edges of the donor site were undermined.   The donor site was closed in a primary fashion.  The pedicle was then rotated into position and sutured.  Once the tube was sutured into place, adequate blood supply was confirmed with blanching and refill.  The pedicle was then wrapped with xeroform gauze and dressed appropriately with a telfa and gauze bandage to ensure continued blood supply and protect the attached pedicle. TELEMETRY

## 2021-06-06 NOTE — H&P ADULT - PROBLEM SELECTOR PLAN 2
Follows Iain Stafford  Consult urology, will likely need CBI  Monitor HH  Check UA, Urine culture  Plavix and aspirin on hold

## 2021-06-06 NOTE — H&P ADULT - HISTORY OF PRESENT ILLNESS
[facility] Division of Hospital Medicine  Pauline Mensah MD  Pager (VIC, 8A-5P): [phone]  Other Times:  [phone]      Patient is a 89 year old male with significant past medical history of prostate cancer s/p radiation, CAD, PVD s/p L SFA stent, CKD stage III, chronic anemia, recent admission for hematuria, is brought from [facility] for complaint of chest pain. At the time of admission patient is AxOx2 (confused about time), presently he continues to have mid sternal chest pain which he describes as "funny feeling" and also feel "tinge" in the same spot with deep breaths. Pain is non radiating and he is unable to recall when it started and any alleviating or aggravating factors. He reports some dyspnea, orthopnea - feels more comfortable sleeping with elevated back of bed. Denies any other issue, but during ROS and exam - endorsed lower extremity which he believes started after sanchez cath was placed. He doesn't recall how long the hematuria has been ongoing. Per nurse at [facility] - ASA and plavix were stopped recently due to hematuria abut she is unable to recall when. Patient doesn't recall being seen by a urologist since his last discharge. Chronic issues include hearing impairment (not wearing hearing aides), vision impairment (needs reading glasses), back pain, R knee pain. His last BM was yesterday morning - which was soft non bloody. Denies headache, dizziness, fever, vision changes, sore throat, nasal congestion, sinus pain, cough, nausea, vomiting, blood in sputum, dysphagia, abdominal pain, numbness or new rash.      Per chart review: last admission in May 2021 history of PAD s/p angioplasty with stent of left SFA started on Plavix (4/16/21) , history of CAD on ASA, presented with hematuria May 5th - aspirin and plavix were intimally held, evaluated by urology - on CBI, ASA and plavix were resumed on discharge.                          [facility] Division of Hospital Medicine  Pauline Mensah MD  Pager (VIC, 3P-5P): [phone]  Other Times:  [phone]      Patient is a 89 year old male with significant past medical history of prostate cancer s/p radiation, CAD, PVD s/p L SFA stent, CKD stage III, chronic anemia, recent admission for hematuria, is brought from [facility] for complaint of chest pain. At the time of admission patient is AxOx2 (confused about time), presently he continues to have mid sternal chest pain which he describes as "funny feeling" and also feels a "tinge" in the same spot with deep breaths. Pain is non radiating and he is unable to recall when it started and any alleviating or aggravating factors. He reports some dyspnea, orthopnea - feels more comfortable sleeping with elevated back of bed. Denies any other issue, but during ROS and exam - endorsed lower extremity which he believes started after sanchez cath was placed. He doesn't recall how long the hematuria has been ongoing. Per nurse at [facility] - ASA and plavix were stopped recently due to hematuria abut she is unable to recall when. Patient doesn't recall being seen by a urologist since his last discharge. Chronic issues include hearing impairment (not wearing hearing aides), vision impairment (needs reading glasses), back pain, R knee pain. His last BM was yesterday morning - which was soft non bloody. Denies headache, dizziness, fever, vision changes, sore throat, nasal congestion, sinus pain, cough, nausea, vomiting, blood in sputum, dysphagia, abdominal pain, numbness or new rash.      Per chart review: last admission in May 2021 history of PAD s/p angioplasty with stent of left SFA started on Plavix (4/16/21) , history of CAD on ASA, presented with hematuria May 5th - aspirin and plavix were intimally held, evaluated by urology - on CBI, ASA and plavix were resumed on discharge.

## 2021-06-06 NOTE — ED ADULT NURSE NOTE - OBJECTIVE STATEMENT
89 year old male BIBA from [facility] pt states its discomfort for 3 days Pt [id number] heel think its the bed They are very uncomfortable Pt has an indwelling catheter placed 5/10 Pt has pitting edema in both lower extremities more the left foot than the right Pt has pressure ulcer prevention pads on the heels and top of the foot as well as the upper back are pt has stage 1 on the sacrum and one area on the back IV for Ems blood sent as ordered and pt placed on Oxygenfor pox 93 on room air pox now 96 %

## 2021-06-06 NOTE — H&P ADULT - PROBLEM SELECTOR PLAN 4
Plavix and ASA on hold due to hematuria  Continue Lipitor    #Orthostatic hypotension  Continue Florinef

## 2021-06-06 NOTE — CONSULT NOTE ADULT - ASSESSMENT
89 year old male with past medical history of prostate cancer s/p radiation, CAD, PVD s/p L SFA stent, CKD stage III, chronic anemia, recent admission for hematuria requiring CBI, is brought from [facility] for complaint of chest pain, urology consulted for gross hematuria.  - 22f 3 way placed, minimal clot evacuated  - CBI not warranted at this time  - monitor UO/color  - trend H/H  - CTX for ppx  - f/u urine culture  - ASA/plavix okay to continue from  standpoint as long as urine color and H/H remain stable  - discussed with Dr. Nguyen

## 2021-06-06 NOTE — ED ADULT NURSE NOTE - COVID-19 ORDERING FACILITY
Patient Education     The Growing Child: School-Age (6 to 12 Years)  Children progress at different rates. They have different interests, abilities, and personalities. But there are some common milestones many children reach from ages 6 to 12.    What can my child do at these ages?  As your child grows, you’ll notice him or her developing new and exciting abilities.  A child age 6 to 7:  · Enjoys many activities and stays busy  · Likes to paint and draw  · Practices skills in order to become better  · Jumps rope  · Rides a bike  A child age 8 to 9:  · Is more graceful with movements and abilities  · Jumps, skips, and chases  · Dresses and grooms self completely  · Can use tools, such as a hammer or screwdriver  A child age 10 to 12:  · Likes to sew and paint  What does my child understand?  As children enter into school age, their skills and understanding of concepts continue to grow.  A child age 6 to 7:  · Understands the concept of numbers  · Knows daytime and nighttime  · Knows right and left hands  · Can copy complex shapes, such as a azam  · Can tell time  · Understands commands that have 3 separate instructions  · Can explain objects and their use  · Can repeat 3 numbers backward  · Can read age-appropriate books  A child age 8 to 9:  · Can count backward  · Knows the date  · Reads more and enjoys reading  · Understands fractions  · Understands the concept of space  · Draws and paints  · Can name the months and days of the week, in order  · Enjoys collecting objects  A child age 10 to 12:  · Writes stories  · Likes to write letters  · Reads well  · Enjoys using the telephone  How will my child interact with others?  An important part of growing up is learning to interact and socialize with others. During the school-age years, you’ll see a change in your child. He or she will move from playing alone to having multiple friends and social groups. Friendships become more important. But your child is still  fond of you as parents, and likes being part of a family. Below are some of the common traits that your child may show at these ages.  A child age 6 to 7:  · Cooperates and shares  · Can be jealous of others and siblings  · Likes to copy adults  · Likes to play alone, but friends are becoming important  · Plays with friends of the same gender  · May sometimes have temper tantrums  · Is modest about his or her body  · Likes to play board games  A child age 8 to 9:  · Likes competition and games  · Starts to mix friends and play with children of the opposite gender  · Is modest about his or her body  · Enjoys clubs and groups, such as Boy Scouts or Girl Scouts  · Is becoming interested in boy-girl relationships, but doesn’t admit it  A child age 10 to 12:  · Finds friends are very important and may have a best friend  · Has increased interest in the opposite gender  · Likes and respects parents  · Enjoys talking to others  How can I encourage my child's social abilities?  You can help boost your school-aged child's social abilities by:  · Setting limits, guidelines, and expectations and enforcing them with appropriate penalties  · Modeling good behavior  · Complimenting your child for being cooperative and for personal achievements  · Helping your child choose activities that are suitable for his or her abilities  · Encouraging your child to talk with you and be open with his or her feelings  · Encouraging your child to read, and reading with your child  · Encouraging your child to get involved with hobbies and other activities  · Promoting physical activity  · Encouraging self-discipline and expecting your child to follow rules that are set  · Teaching your child to respect and listen to authority figures  · Encouraging your child to talk about peer pressure and setting guidelines to deal with peer pressure  · Spending uninterrupted time together and giving full attention to your child  · Limiting screen time (TV,  video, and computer)   [employer] last reviewed this educational content on 12/1/2018  © 8507-2805 [employer]. All rights reserved. This information is not intended as a substitute for professional medical care. Always follow your healthcare professional's instructions.           Patient Education     Well-Child Checkup: 6 to 10 Years     Struggles in school can indicate problems with a child’s health or development. If your child is having trouble in school, talk to the child’s healthcare provider.   Even if your child is healthy, keep bringing him or her in for yearly checkups. These visits make sure that your child’s health is protected with scheduled vaccines and health screenings. Your child's healthcare provider will also check his or her growth and development. This sheet describes some of what you can expect.  School and social issues  Here are some topics you, your child, and the healthcare provider may want to discuss during this visit:  · Reading. Does your child like to read? Is the child reading at the right level for his or her age group?   · Friendships. Does your child have friends at school? How do they get along? Do you like your child’s friends? Do you have any concerns about your child’s friendships or problems that may be happening with other children, such as bullying?  · Activities. What does your child like to do for fun? Is he or she involved in after-school activities such as sports, scouting, or music classes?   · Family interaction. How are things at home? Does your child have good relationships with others in the family? Does he or she talk to you about problems? How is the child’s behavior at home?   · Behavior and participation at school. How does your child act at school? Does the child follow the classroom routine and take part in group activities? What do teachers say about the child’s behavior? Is homework finished on time? Do you or other family members help with  homework?  · Household chores. Does your child help around the house with chores such as taking out the trash or setting the table?  Nutrition and exercise tips  Teaching your child healthy eating and lifestyle habits can lead to a lifetime of good health. To help, set a good example with your words and actions. Remember, good habits formed now will stay with your child forever. Here are some tips:  · Help your child get at least 30 to 60 minutes of active play per day. Moving around helps keep your child healthy. Go to the park, ride bikes, or play active games like tag or ball.  · Limit “screen time” to 1 hour each day. This includes time spent watching TV, playing video games, using the computer, and texting. If your child has a TV, computer, or video game console in the bedroom, replace it with a music player. For many kids, dancing and singing are fun ways to get moving.  · Limit sugary drinks. Soda, juice, and sports drinks lead to unhealthy weight gain and tooth decay. Water and low-fat or nonfat milk are best to drink. In moderation (6 ounces for a child 6 years old and 12 ounces for a child 7 to 10 years old daily), 100% fruit juice is OK. Save soda and other sugary drinks for special occasions.   · Serve nutritious foods. Keep a variety of healthy foods on hand for snacks, including fresh fruits and vegetables, lean meats, and whole grains. Foods like french fries, candy, and snack foods should only be served rarely.   · Serve child-sized portions. Children don’t need as much food as adults. Serve your child portions that make sense for his or her age and size. Let your child stop eating when he or she is full. If your child is still hungry after a meal, offer more vegetables or fruit.  · Ask the healthcare provider about your child’s weight. Your child should gain about 4 to 5 pounds (1.81 to 2.27 kg) each year. If your child is gaining more than that, talk to the healthcare provider about healthy eating  habits and exercise guidelines.  · Bring your child to the dentist at least twice a year for teeth cleaning and a checkup.  Sleeping tips  Now that your child is in school, a good night’s sleep is even more important. At this age, your child needs about 10 hours of sleep each night. Here are some tips:  · Set a bedtime and make sure your child follows it each night.  · TV, computer, and video games can agitate a child and make it hard to calm down for the night. Turn them off at least an hour before bed. Instead, read a chapter of a book together.  · Remind your child to brush and floss his or her teeth before bed. Directly supervise your child's dental self-care to make sure that both the back teeth and the front teeth are cleaned.  Safety tips  Recommendations to keep your child safe include the following:   · When riding a bike, your child should wear a helmet with the strap fastened. While roller-skating, roller-blading, or using a scooter or skateboard, it’s safest to wear wrist guards, elbow pads, knee pads, and a helmet.  · In the car, continue to use a booster seat until your child is taller than 4 feet 9 inches. At this height, kids are able to sit with the seat belt fitting correctly over the collarbone and hips. Ask the healthcare provider if you have questions about when your child will be ready to stop using a booster seat. All children younger than 13 should sit in the back seat.  · Teach your child not to talk to strangers or go anywhere with a stranger.  · Teach your child to swim. Many communities offer low-cost swimming lessons. Do not let your child play in or around a pool unattended, even if he or she knows how to swim.  Vaccines  Based on recommendations from the CDC, at this visit your child may receive the following vaccines:  · Diphtheria, tetanus, and pertussis (age 6 only)  · Human papillomavirus (HPV) (ages 9 and up)  · Influenza (flu), annually  · Measles, mumps, and rubella (age  6)  · Polio (age 6)  · Varicella (chickenpox) (age 6)  Bedwetting: It’s not your child’s fault  Bedwetting, or urinating when sleeping, can be frustrating for both you and your child. But it’s usually not a sign of a major problem. Your child’s body may simply need more time to mature. If a child suddenly starts wetting the bed, the cause is often a lifestyle change (such as starting school) or a stressful event (such as the birth of a sibling). But whatever the cause, it’s not in your child’s direct control. If your child wets the bed:  · Keep in mind that your child is not wetting on purpose. Never punish or tease a child for wetting the bed. Punishment or shaming may make the problem worse, not better.  · To help your child, be positive and supportive. Praise your child for not wetting and even for trying hard to stay dry.  · Two hours before bedtime don’t serve your child anything to drink.  · Remind your child to use the toilet before bed. You could also wake him or her to use the bathroom before you go to bed yourself.  · Have a routine for changing sheets and pajamas when the child wets. Try to make this routine as calm and orderly as possible. This will help keep both you and your child from getting too upset or frustrated to go back to sleep.  · Put up a calendar or chart and give your child a star or sticker for nights that he or she doesn’t wet the bed.  · Encourage your child to get out of bed and try to use the toilet if he or she wakes during the night. Put night-lights in the bedroom, hallway, and bathroom to help your child feel safer walking to the bathroom.  · If you have concerns about bedwetting, discuss them with the healthcare provider.  [employer] last reviewed this educational content on 4/1/2020 © 2000-2020 [employer]. All rights reserved. This information is not intended as a substitute for professional medical care. Always follow your healthcare professional's  instructions.            [facility]  - 2SE

## 2021-06-06 NOTE — H&P ADULT - PROBLEM SELECTOR PLAN 1
#Rule out ACS  #Volume overloaded, suspect cardiomyopathy     Continues to have mid sternal chest pain. Afebrile, hemodynamically stable, 1005 on NC 2L  Chest xray with BL pleural effusions. On exam 2+ pitting edema LE upto hips   Trop 75>78. proBNP 2k. EKG sinus rhythm with ?sinus arrythmia in V1  4/2021 TTE unremarkable, LVEF 55% per records    Will give lasix 20mg IV x1 - and re asses volume status   Hold ASA given hematuria  Recheck EKG  Check Echo  Monitor BMP, Is/O, trend troponin  Wean oxygen supp as tolerated   Continue to monitor on tele  Cardiology consult #Rule out ACS  #Volume overloaded, suspect cardiomyopathy   #Monitor for s/s of pneumonia low suspicion     Continues to have mid sternal chest pain. Afebrile, hemodynamically stable, 1005 on NC 2L  Chest xray with BL pleural effusions. On exam 2+ pitting edema LE upto hips   Trop 75>78. proBNP 2k. EKG sinus rhythm with ?sinus arrythmia in V1  4/2021 TTE unremarkable, LVEF 55% per records    Will give lasix 20mg IV x1 - and re asses volume status   Hold ASA given hematuria  Recheck EKG  Check Echo  Monitor BMP, Is/O, trend troponin  Wean oxygen supp as tolerated   Continue to monitor on tele  Cardiology consult #Rule out ACS  #Pneumonia  #Volume overloaded, suspect cardiomyopathy     Continues to have mid sternal chest pain. Afebrile, hemodynamically stable, 1005 on NC 2L  Chest xray with BL pleural effusions. On exam 2+ pitting edema LE upto hips   Trop 75>78. proBNP 2k. EKG sinus rhythm with ?sinus arrythmia in V1  4/2021 TTE unremarkable, LVEF 55% per records    Will give lasix 20mg IV x1 - and re asses volume status   Start Ceftriaxone, Azithromycin IV  Hold ASA given hematuria  Recheck EKG. Check Echo  Check procal, RVP, sputum cx, urine legionella strep pneum ags   Monitor BMP, Is/O, trend troponin  Wean oxygen supp as tolerated   Continue to monitor on tele  Cardiology consult #Rule out ACS  #Pneumonia  #Volume overloaded, suspect cardiomyopathy     Continues to have mid sternal chest pain. Afebrile, hemodynamically stable, 1005 on NC 2L  Chest xray with BL pleural effusions. On exam 2+ pitting edema LE upto hips   Trop 75>78. proBNP 2k. EKG sinus rhythm with ?sinus arrythmia in V1  4/2021 TTE unremarkable, LVEF 55% per records    Will give lasix 20mg IV x1 - and re asses volume status   Start Ceftriaxone, Azithromycin IV  Hold ASA given hematuria  Recheck EKG. Check Echo  Check procal, RVP, sputum cx, urine legionella strep pneum ags   Monitor BMP, Is/O, trend troponin  Wean oxygen supp as tolerated   Continue to monitor on tele  Incentive spirometry  Cardiology consult

## 2021-06-06 NOTE — H&P ADULT - PROBLEM SELECTOR PLAN 8
DVT PPX: SCDs  DASH diet  Continue pressure ulcer preventive measures per nursing   OOB to chair with assistance    Case and plan discussed with ACP: Delmy

## 2021-06-06 NOTE — ED PROVIDER NOTE - CLINICAL SUMMARY MEDICAL DECISION MAKING FREE TEXT BOX
89yo male h/o metastatic prostate ca s/p radiation about 15 years ago, no recent urology follow up, CAD, anemia, CKD, PAD with recent angioplasty with stent of left SFA (4/16) p/w cp x 4 days.    PLAN  r/o ACS  CBC, CMP, Trops, CXR, BNP

## 2021-06-06 NOTE — ED PROVIDER NOTE - ATTENDING CONTRIBUTION TO CARE
Attending MD Oliveros:  I personally have seen and examined this patient.  Resident note reviewed and agree on plan of care and except where noted.  See HPI, PE, and MDM for details.    89yo male h/o metastatic prostate ca s/p radiation about 15 years ago, no recent urology follow up, CAD, anemia, CKD, PAD with recent angioplasty with stent of left SFA (4/16), recent adm 5/5-5/10 for hematuria dc'd to [facility] w/ [facility], presents for CP for the past 3-4 days. ECG on arrival without diagnostic ischemic changes. Given cardiac history, will admit for rule out ACS, screening CXR, labs

## 2021-06-06 NOTE — ED PROVIDER NOTE - NS ED ROS FT
REVIEW OF SYSTEMS:  Constitutional: [-] fevers, [ -] chills, [- ] weight loss, [- ] weight gain  HEENT: [ -] vision problems, [- ] eye pain, [ -] nasal congestion, [- ] rhinorrhea, [- ] sore throat, [- ] dysphagia  CV: [+] chest pain, [- ] orthopnea, [- ] palpitations  Resp: [- ] cough, [- ] dyspnea, [- ] wheezing, [ -] hemoptysis  GI: [- ] nausea, [- ] vomiting, [- ] diarrhea, [ -] constipation, [- ] abdominal pain  : [- ] dysuria [- ] nocturia [- ] hematuria [ -] increased urinary frequency  Musculoskeletal: [- ] back pain [ -] myalgias [- ] arthralgias [- ] fracture  Skin: [- ] rash [ -] itch  Neurological: [ -] headache [- ] dizziness [- ] syncope [- ] weakness [- ] numbness  Psychiatric: [- ] anxiety [- ] depression  Endocrine: [- ] diabetes [ -] thyroid problem  Hematologic/Lymphatic: [- ] anemia [- ] bleeding problem  Allergic/Immunologic: [ -] itchy eyes [ -] nasal discharge [- ] hives [ -] angioedema

## 2021-06-06 NOTE — ED ADULT TRIAGE NOTE - PATIENT ON (OXYGEN DELIVERY METHOD)
nasal cannula
I will SWITCH the dose or number of times a day I take the medications listed below when I get home from the hospital:  None

## 2021-06-06 NOTE — H&P ADULT - NSHPOUTPATIENTPROVIDERS_GEN_ALL_CORE
Vascular surgery Dr. Urias  Cardiology/PCP Dr. Castillo, Patrick   Urology Dr. Yadav, Iain  Nephorlogy Dr. Chan

## 2021-06-06 NOTE — H&P ADULT - NSHPLABSRESULTS_GEN_ALL_CORE
Personally reviewed EKG in chart: sinus rhythm with ?sinus arrythmia in V1 rate 77, no significant st t changes

## 2021-06-07 DIAGNOSIS — I25.10 ATHEROSCLEROTIC HEART DISEASE OF NATIVE CORONARY ARTERY WITHOUT ANGINA PECTORIS: ICD-10-CM

## 2021-06-07 DIAGNOSIS — R77.8 OTHER SPECIFIED ABNORMALITIES OF PLASMA PROTEINS: ICD-10-CM

## 2021-06-07 DIAGNOSIS — R31.0 GROSS HEMATURIA: ICD-10-CM

## 2021-06-07 DIAGNOSIS — R07.9 CHEST PAIN, UNSPECIFIED: ICD-10-CM

## 2021-06-07 DIAGNOSIS — I48.92 UNSPECIFIED ATRIAL FLUTTER: ICD-10-CM

## 2021-06-07 LAB
ANION GAP SERPL CALC-SCNC: 11 MMOL/L — SIGNIFICANT CHANGE UP (ref 5–17)
APTT BLD: 26.4 SEC — LOW (ref 27.5–35.5)
BUN SERPL-MCNC: 49 MG/DL — HIGH (ref 7–23)
CALCIUM SERPL-MCNC: 8.9 MG/DL — SIGNIFICANT CHANGE UP (ref 8.4–10.5)
CHLORIDE SERPL-SCNC: 102 MMOL/L — SIGNIFICANT CHANGE UP (ref 96–108)
CO2 SERPL-SCNC: 24 MMOL/L — SIGNIFICANT CHANGE UP (ref 22–31)
COVID-19 SPIKE DOMAIN AB INTERP: POSITIVE
COVID-19 SPIKE DOMAIN ANTIBODY RESULT: >250 U/ML — HIGH
CREAT SERPL-MCNC: 1.75 MG/DL — HIGH (ref 0.5–1.3)
GLUCOSE SERPL-MCNC: 102 MG/DL — HIGH (ref 70–99)
HCT VFR BLD CALC: 29.8 % — LOW (ref 39–50)
HGB BLD-MCNC: 9.3 G/DL — LOW (ref 13–17)
INR BLD: 1.07 RATIO — SIGNIFICANT CHANGE UP (ref 0.88–1.16)
MAGNESIUM SERPL-MCNC: 2.3 MG/DL — SIGNIFICANT CHANGE UP (ref 1.6–2.6)
MCHC RBC-ENTMCNC: 31.1 PG — SIGNIFICANT CHANGE UP (ref 27–34)
MCHC RBC-ENTMCNC: 31.2 GM/DL — LOW (ref 32–36)
MCV RBC AUTO: 99.7 FL — SIGNIFICANT CHANGE UP (ref 80–100)
NRBC # BLD: 0 /100 WBCS — SIGNIFICANT CHANGE UP (ref 0–0)
PLATELET # BLD AUTO: 170 K/UL — SIGNIFICANT CHANGE UP (ref 150–400)
POTASSIUM SERPL-MCNC: 4 MMOL/L — SIGNIFICANT CHANGE UP (ref 3.5–5.3)
POTASSIUM SERPL-SCNC: 4 MMOL/L — SIGNIFICANT CHANGE UP (ref 3.5–5.3)
PROCALCITONIN SERPL-MCNC: 0.13 NG/ML — HIGH (ref 0.02–0.1)
PROTHROM AB SERPL-ACNC: 12.8 SEC — SIGNIFICANT CHANGE UP (ref 10.6–13.6)
RAPID RVP RESULT: SIGNIFICANT CHANGE UP
RBC # BLD: 2.99 M/UL — LOW (ref 4.2–5.8)
RBC # FLD: 14 % — SIGNIFICANT CHANGE UP (ref 10.3–14.5)
SARS-COV-2 IGG+IGM SERPL QL IA: >250 U/ML — HIGH
SARS-COV-2 IGG+IGM SERPL QL IA: POSITIVE
SARS-COV-2 RNA SPEC QL NAA+PROBE: SIGNIFICANT CHANGE UP
SODIUM SERPL-SCNC: 137 MMOL/L — SIGNIFICANT CHANGE UP (ref 135–145)
TROPONIN T, HIGH SENSITIVITY RESULT: 115 NG/L — HIGH (ref 0–51)
WBC # BLD: 8.12 K/UL — SIGNIFICANT CHANGE UP (ref 3.8–10.5)
WBC # FLD AUTO: 8.12 K/UL — SIGNIFICANT CHANGE UP (ref 3.8–10.5)

## 2021-06-07 PROCEDURE — 51700 IRRIGATION OF BLADDER: CPT

## 2021-06-07 PROCEDURE — 99231 SBSQ HOSP IP/OBS SF/LOW 25: CPT | Mod: 25

## 2021-06-07 RX ORDER — HEPARIN SODIUM 5000 [USP'U]/ML
4100 INJECTION INTRAVENOUS; SUBCUTANEOUS EVERY 6 HOURS
Refills: 0 | Status: DISCONTINUED | OUTPATIENT
Start: 2021-06-07 | End: 2021-06-09

## 2021-06-07 RX ORDER — HEPARIN SODIUM 5000 [USP'U]/ML
4100 INJECTION INTRAVENOUS; SUBCUTANEOUS EVERY 6 HOURS
Refills: 0 | Status: DISCONTINUED | OUTPATIENT
Start: 2021-06-07 | End: 2021-06-07

## 2021-06-07 RX ORDER — HEPARIN SODIUM 5000 [USP'U]/ML
INJECTION INTRAVENOUS; SUBCUTANEOUS
Qty: 25000 | Refills: 0 | Status: DISCONTINUED | OUTPATIENT
Start: 2021-06-07 | End: 2021-06-07

## 2021-06-07 RX ORDER — HEPARIN SODIUM 5000 [USP'U]/ML
INJECTION INTRAVENOUS; SUBCUTANEOUS
Qty: 25000 | Refills: 0 | Status: DISCONTINUED | OUTPATIENT
Start: 2021-06-07 | End: 2021-06-09

## 2021-06-07 RX ADMIN — Medication 1 MILLIGRAM(S): at 12:32

## 2021-06-07 RX ADMIN — AZITHROMYCIN 250 MILLIGRAM(S): 500 TABLET, FILM COATED ORAL at 21:48

## 2021-06-07 RX ADMIN — CEFTRIAXONE 100 MILLIGRAM(S): 500 INJECTION, POWDER, FOR SOLUTION INTRAMUSCULAR; INTRAVENOUS at 21:15

## 2021-06-07 RX ADMIN — Medication 1 APPLICATION(S): at 18:15

## 2021-06-07 RX ADMIN — HEPARIN SODIUM 800 UNIT(S)/HR: 5000 INJECTION INTRAVENOUS; SUBCUTANEOUS at 18:29

## 2021-06-07 RX ADMIN — SENNA PLUS 2 TABLET(S): 8.6 TABLET ORAL at 21:15

## 2021-06-07 RX ADMIN — Medication 1 APPLICATION(S): at 05:32

## 2021-06-07 RX ADMIN — Medication 1 APPLICATION(S): at 14:07

## 2021-06-07 RX ADMIN — SERTRALINE 50 MILLIGRAM(S): 25 TABLET, FILM COATED ORAL at 12:33

## 2021-06-07 RX ADMIN — Medication 325 MILLIGRAM(S): at 12:33

## 2021-06-07 RX ADMIN — Medication 1 TABLET(S): at 12:32

## 2021-06-07 RX ADMIN — FLUDROCORTISONE ACETATE 0.4 MILLIGRAM(S): 0.1 TABLET ORAL at 05:32

## 2021-06-07 RX ADMIN — ATORVASTATIN CALCIUM 10 MILLIGRAM(S): 80 TABLET, FILM COATED ORAL at 21:15

## 2021-06-07 NOTE — CONSULT NOTE ADULT - PROBLEM SELECTOR RECOMMENDATION 5
-  -noted on tele  -heparin drip for 48 hours for now  -if further episodes will need to consider anticoagulation    Patient of [facility]. [facility] Castillo ([facility])    Jai Jay D.O.  [phone]

## 2021-06-07 NOTE — PROGRESS NOTE ADULT - PROBLEM SELECTOR PLAN 5
PAD s/p angioplasty with stent of left SFA was started on Plavix (4/16/21)  Follows Dr. Urias outpatient  Plavix and ASA to cont

## 2021-06-07 NOTE — CONSULT NOTE ADULT - ASSESSMENT
89 year old male with significant past medical history of prostate cancer s/p radiation, CAD, PVD s/p L SFA stent, CKD stage III, chronic anemia, recent admission for hematuria, is brought from [facility] for complaint of chest pain.

## 2021-06-07 NOTE — PROGRESS NOTE ADULT - ASSESSMENT
89 year old male with past medical history of prostate cancer s/p radiation, CAD, PVD s/p L SFA stent, CKD stage III, chronic anemia, recent admission for hematuria requiring CBI, is brought from [facility] for complaint of chest pain, urology consulted for gross hematuria.     - 22F 3-way placed, no clot evacuated, color improved to clear peach.  - CBI not warranted at this time  - monitor UOP/color  - trend H/H, SCr, transfuse per primary team protocol  - hydration per primary team   - f/u urine culture  - ASA/Plavix okay to continue from  standpoint as long as urine color and H/H remain stable  - If color worsens or clots formed, please call  for reevaluation. [phone]  - will continue to monitor

## 2021-06-07 NOTE — CONSULT NOTE ADULT - PROBLEM SELECTOR RECOMMENDATION 9
-  -likely msk related pain however cannot rule out nstemi in the setting of rising troponin  -trend troponin until downtrending  -heparin drip for 48 hours  -aspirin and plavix  -tele monitoring  -repeat tte (limited study) to evaluate for wall motion abnormalities.   -given hematuria, ckd patient is not a candidate for invasive cardiac eval such as cardiac cath at this time.

## 2021-06-07 NOTE — CONSULT NOTE ADULT - SUBJECTIVE AND OBJECTIVE BOX
[facility] Cardiology Consult  _________________________    Patient is a 89y old  Male who presents with a chief complaint of chest pain (2021 23:22)      HPI:  Patient is a 89 year old male with significant past medical history of prostate cancer s/p radiation, CAD, PVD s/p L SFA stent, CKD stage III, chronic anemia, recent admission for hematuria, is brought from [facility] for complaint of chest pain. Patient is AxOx2 (confused about time), presently he continues to have mid sternal chest pain which he describes as "funny feeling" and also feels a "tinge" in the same spot with deep breaths. Pain is non radiating and he is unable to recall when it started and any alleviating or aggravating factors. He reports some dyspnea, orthopnea - feels more comfortable sleeping with elevated back of bed. Denies any other issue, but during ROS and exam - endorsed lower extremity which he believes started after sanchez cath was placed. He doesn't recall how long the hematuria has been ongoing. Per nurse at [facility] - ASA and plavix were stopped recently due to hematuria abut she is unable to recall when. Patient doesn't recall being seen by a urologist since his last discharge. Chronic issues include hearing impairment (not wearing hearing aides), vision impairment (needs reading glasses), back pain, R knee pain. His last BM was yesterday morning - which was soft non bloody. Denies headache, dizziness, fever, vision changes, sore throat, nasal congestion, sinus pain, cough, nausea, vomiting, blood in sputum, dysphagia, abdominal pain, numbness or new rash.      Noted to have gross hematuria overnight. urology following.                         (2021 18:23)      PAST MEDICAL & SURGICAL HISTORY:  CAD (coronary artery disease)  asa    Prostate CA  s/p radiation    Stage 3 chronic kidney disease    PAD (peripheral artery disease)  s/p vascular stent    Anemia of chronic disease    H/O vascular surgery        MEDICATIONS  (STANDING):  atorvastatin 10 milliGRAM(s) Oral at bedtime  azithromycin  IVPB 500 milliGRAM(s) IV Intermittent every 24 hours  BACItracin   Ointment 1 Application(s) Topical daily  cefTRIAXone   IVPB 1000 milliGRAM(s) IV Intermittent every 24 hours  ferrous    sulfate 325 milliGRAM(s) Oral daily  fludroCORTISONE 0.4 milliGRAM(s) Oral daily  folic acid 1 milliGRAM(s) Oral daily  multivitamin 1 Tablet(s) Oral daily  senna 2 Tablet(s) Oral at bedtime  sertraline 50 milliGRAM(s) Oral daily  silver sulfADIAZINE 1% Cream 1 Application(s) Topical two times a day    MEDICATIONS  (PRN):  acetaminophen   Tablet .. 650 milliGRAM(s) Oral every 6 hours PRN Mild Pain/Moderate Pain  bisacodyl Suppository 10 milliGRAM(s) Rectal daily PRN Constipation  polyethylene glycol 3350 17 Gram(s) Oral daily PRN Constipation      Allergies    No Known Allergies    Intolerances        Social Histroy: Tobacco- , ETOH-, Illicit Drugs-    T(C): 37.1 (21 @ 08:12), Max: 37.1 (21 @ 08:12)  HR: 77 (21 @ 08:12) (71 - 91)  BP: 135/79 (21 @ 08:12) (105/64 - 135/79)  RR: 19 (21 @ 08:12) (17 - 19)  SpO2: 96% (21 @ 08:12) (95% - 100%)  I&O's Summary    2021 07:01  -  2021 07:00  --------------------------------------------------------  IN: 0 mL / OUT: 1200 mL / NET: -1200 mL        Review of Systems:  Constitutional: [ ] Fever [ ] Chills [ ] Fatigue [ ] Weight change   HEENT: [ ] Blurred vision [ ] Eye Pain [ ] Headache [ ] Runny nose [ ] Sore Throat   Respiratory: [ ] Cough [ ] Wheezing [ ] Shortness of breath  Cardiovascular: [x ] Chest Pain [ ] Palpitations [ ] DICK [ ] PND [ ] Orthopnea  Gastrointestinal: [ ] Abdominal Pain [ ] Diarrhea [ ] Constipation [ ] Hemorrhoids [ ] Nausea [ ] Vomiting  Genitourinary: [ ] Nocturia [ ] Dysuria [ ] Incontinence  Extremities: [ ] Swelling [ ] Joint Pain  Neurologic: [ ] Focal deficit [ ] Paresthesias [ ] Syncope  Lymphatic: [ ] Swelling [ ] Lymphadenopathy   Skin: [ ] Rash [ ] Ecchymoses [ ] Wounds [ ] Lesions  Psychiatry: [ ] Depression [ ] Suicidal/Homicidal Ideation [ ] Anxiety [ ] Sleep Disturbances  [x ] 10 point review of systems is otherwise negative except as mentioned above            [ ]Unable to obtain    PHYSICAL EXAM:  GENERAL: Alert, NAD  NECK: Supple  CHEST/LUNG: Clear to auscultation bilaterally; No wheezes, rales, or rhonchi  HEART: S1 S2 normal, RRR,  No murmurs, rubs, or gallops  ABDOMEN: Soft, Nondistended  EXTREMITIES:  No LE edema.      LABS:                        9.3    8.12  )-----------( 170      ( 2021 05:54 )             29.8     06-    137  |  102  |  49<H>  ----------------------------<  102<H>  4.0   |  24  |  1.75<H>    Ca    8.9      2021 05:54  Mg     2.3     -    TPro  6.4  /  Alb  3.0<L>  /  TBili  0.2  /  DBili  x   /  AST  39  /  ALT  32  /  AlkPhos  87  -          Serum Pro-Brain Natriuretic Peptide: 2386 pg/mL (21 @ 13:11)      Urinalysis Basic - ( 2021 21:03 )    Color: BROWN / Appearance: Slightly Turbid / S.015 / pH: x  Gluc: x / Ketone: Negative  / Bili: Negative / Urobili: Negative   Blood: x / Protein: 100 mg/dL / Nitrite: Negative   Leuk Esterase: Large / RBC: 1504 /hpf /  /HPF   Sq Epi: x / Non Sq Epi: 0 /hpf / Bacteria: Many        MEDICATIONS  (STANDING):  atorvastatin 10 milliGRAM(s) Oral at bedtime  azithromycin  IVPB 500 milliGRAM(s) IV Intermittent every 24 hours  BACItracin   Ointment 1 Application(s) Topical daily  cefTRIAXone   IVPB 1000 milliGRAM(s) IV Intermittent every 24 hours  ferrous    sulfate 325 milliGRAM(s) Oral daily  fludroCORTISONE 0.4 milliGRAM(s) Oral daily  folic acid 1 milliGRAM(s) Oral daily  multivitamin 1 Tablet(s) Oral daily  senna 2 Tablet(s) Oral at bedtime  sertraline 50 milliGRAM(s) Oral daily  silver sulfADIAZINE 1% Cream 1 Application(s) Topical two times a day    MEDICATIONS  (PRN):  acetaminophen   Tablet .. 650 milliGRAM(s) Oral every 6 hours PRN Mild Pain/Moderate Pain  bisacodyl Suppository 10 milliGRAM(s) Rectal daily PRN Constipation  polyethylene glycol 3350 17 Gram(s) Oral daily PRN Constipation      Troponin T, High Sensitivity Result: 115 ng/L (21 @ 05:54)  Troponin T, High Sensitivity Result: 91 ng/L (21 @ 21:15)  Troponin T, High Sensitivity Result: 78 ng/L (21 @ 16:16)  Troponin T, High Sensitivity Result: 75 ng/L (21 @ 13:11)      RADIOLOGY & ADDITIONAL TESTS:    Cardiology testing:  EKG: sinus with first degree av block, no significant st-t abnormalities.    Telemetry: sinus with intermittent episodes of atrial flutter.

## 2021-06-07 NOTE — PROGRESS NOTE ADULT - SUBJECTIVE AND OBJECTIVE BOX
Subjective:  Called by RN for worsening hematuria.  Seen and examined at the bedside.  Pt reports feeling well. No acute complaints at this time.   Per RN, color was clear peach this AM, now fruit punch-merlot in color.     Objective    Vital signs  T(F): , Max: 98.7 (06-07-21 @ 08:12)  HR: 89 (06-07-21 @ 17:48)  BP: 115/72 (06-07-21 @ 17:48)  SpO2: 97% (06-07-21 @ 17:48)  Wt(kg): --    Output     OUT:    Indwelling Catheter - Urethral (mL): 1200 mL  Total OUT: 1200 mL    Total NET: -1200 mL      OUT:    Indwelling Catheter - Urethral (mL): 550 mL  Total OUT: 550 mL    Total NET: -550 mL      Gen: No acute distress  Resp: no respiratory distress. on supplemental O2   Abd: soft, nontender, nondistended. no rebound or guarding  : no suprapubic tenderness. Circ penis. testes descended bilaterally and nontender. 3way sanchez in place. Fruit punch to merlot color urine in tubing, no clots.    Using aseptic technique, sanchez irrigated with sterile water. Color immediately improved to clear peach. No clots aspirated.     Labs      06-07 @ 05:54    WBC 8.12  / Hct 29.8  / SCr 1.75     06-06 @ 13:11    WBC 9.11  / Hct 28.5  / SCr 1.68       Urine Cx: pending    Imaging  < from: US Kidney and Bladder (05.06.21 @ 12:11) >  EXAM:  US KIDNEYS AND BLADDER                          PROCEDURE DATE:  05/06/2021      INTERPRETATION:  CLINICAL INFORMATION: Gross hematuria.    COMPARISON: Abdominal CT dated 04/17/2021    TECHNIQUE: Sonography of the kidneys and bladder.    FINDINGS:    Right kidney: 9.7 cm. No renal mass, hydronephrosis or calculi.    Left kidney: 8.7 cm. No renal mass, hydronephrosis or calculi. Small parapelvic cyst.    Urinary bladder: Urinary bladder is collapsed around a Sanchez catheter. There is diffuse bladder wall thickening likely related to hypertrophy.    IMPRESSION:    No hydronephrosis.    Diffuse bladder wall thickening likely related to hypertrophy.    ALIYA IZQUIERDO M.D., ATTENDING RADIOLOGIST  This document has been electronically signed. May  6 2021  1:21PM    < end of copied text >

## 2021-06-07 NOTE — PROGRESS NOTE ADULT - PROBLEM SELECTOR PLAN 8
DVT PPX: SCDs  DASH diet  Continue pressure ulcer preventive measures per nursing   OOB to chair with assistance

## 2021-06-07 NOTE — PROGRESS NOTE ADULT - ASSESSMENT
Patient is a 89 year old male with significant past medical history of prostate cancer s/p radiation, CAD, PVD s/p L SFA stent, CKD stage III, chronic anemia, recent admission for hematuria, is brought from [facility] for complaint of chest pain. Patient is admitted for further evaluation and management for hematuria, pneumonia and to rule out ACS/cardiomyopathy.

## 2021-06-07 NOTE — PROGRESS NOTE ADULT - SUBJECTIVE AND OBJECTIVE BOX
Patient is a 89y old  Male who presents with a chief complaint of chest pain (2021 20:50)      INTERVAL HPI/OVERNIGHT EVENTS: noted  pt seen and examined this am   events noted  improved sob  deneis cp      Vital Signs Last 24 Hrs  T(C): 36.4 (2021 17:48), Max: 37.1 (2021 08:12)  T(F): 97.5 (2021 17:48), Max: 98.7 (2021 08:12)  HR: 89 (2021 17:48) (75 - 89)  BP: 115/72 (2021 17:48) (105/64 - 135/79)  BP(mean): --  RR: 18 (2021 17:48) (18 - 19)  SpO2: 97% (2021 17:48) (96% - 99%)    acetaminophen   Tablet .. 650 milliGRAM(s) Oral every 6 hours PRN  atorvastatin 10 milliGRAM(s) Oral at bedtime  azithromycin  IVPB 500 milliGRAM(s) IV Intermittent every 24 hours  BACItracin   Ointment 1 Application(s) Topical daily  bisacodyl Suppository 10 milliGRAM(s) Rectal daily PRN  cefTRIAXone   IVPB 1000 milliGRAM(s) IV Intermittent every 24 hours  ferrous    sulfate 325 milliGRAM(s) Oral daily  fludroCORTISONE 0.4 milliGRAM(s) Oral daily  folic acid 1 milliGRAM(s) Oral daily  heparin   Injectable 4100 Unit(s) IV Push every 6 hours PRN  heparin  Infusion.  Unit(s)/Hr IV Continuous <Continuous>  multivitamin 1 Tablet(s) Oral daily  polyethylene glycol 3350 17 Gram(s) Oral daily PRN  senna 2 Tablet(s) Oral at bedtime  sertraline 50 milliGRAM(s) Oral daily  silver sulfADIAZINE 1% Cream 1 Application(s) Topical two times a day      PHYSICAL EXAM:  GENERAL: NAD,   EYES: conjunctiva and sclera clear  ENMT: Moist mucous membranes  NECK: Supple, No JVD, Normal thyroid  CHEST/LUNG: non labored, cta b/l  HEART: Regular rate and rhythm; No murmurs, rubs, or gallops  ABDOMEN: Soft, Nontender, Nondistended; Bowel sounds present  EXTREMITIES:  2+ Peripheral Pulses, No clubbing, cyanosis, or edema  LYMPH: No lymphadenopathy noted  SKIN: No rashes or lesions    Consultant(s) Notes Reviewed:  [x ] YES  [ ] NO  Care Discussed with Consultants/Other Providers [ x] YES  [ ] NO    LABS:                        9.3    8.12  )-----------( 170      ( 2021 05:54 )             29.8     06-    137  |  102  |  49<H>  ----------------------------<  102<H>  4.0   |  24  |  1.75<H>    Ca    8.9      2021 05:54  Mg     2.3     06-07    TPro  6.4  /  Alb  3.0<L>  /  TBili  0.2  /  DBili  x   /  AST  39  /  ALT  32  /  AlkPhos  87  06-06    PT/INR - ( 2021 14:01 )   PT: 12.8 sec;   INR: 1.07 ratio         PTT - ( 2021 14:01 )  PTT:26.4 sec  Urinalysis Basic - ( 2021 21:03 )    Color: BROWN / Appearance: Slightly Turbid / S.015 / pH: x  Gluc: x / Ketone: Negative  / Bili: Negative / Urobili: Negative   Blood: x / Protein: 100 mg/dL / Nitrite: Negative   Leuk Esterase: Large / RBC: 1504 /hpf /  /HPF   Sq Epi: x / Non Sq Epi: 0 /hpf / Bacteria: Many      CAPILLARY BLOOD GLUCOSE            Urinalysis Basic - ( 2021 21:03 )    Color: BROWN / Appearance: Slightly Turbid / S.015 / pH: x  Gluc: x / Ketone: Negative  / Bili: Negative / Urobili: Negative   Blood: x / Protein: 100 mg/dL / Nitrite: Negative   Leuk Esterase: Large / RBC: 1504 /hpf /  /HPF   Sq Epi: x / Non Sq Epi: 0 /hpf / Bacteria: Many          RADIOLOGY & ADDITIONAL TESTS:    Imaging Personally Reviewed:  [x ] YES  [ ] NO

## 2021-06-07 NOTE — CONSULT NOTE ADULT - PROBLEM SELECTOR PROBLEM 4
Coronary artery disease involving native coronary artery of native heart, unspecified whether angina present

## 2021-06-08 LAB
ANION GAP SERPL CALC-SCNC: 10 MMOL/L — SIGNIFICANT CHANGE UP (ref 5–17)
APTT BLD: 43.2 SEC — HIGH (ref 27.5–35.5)
APTT BLD: 61.7 SEC — HIGH (ref 27.5–35.5)
APTT BLD: 67.6 SEC — HIGH (ref 27.5–35.5)
APTT BLD: 73.5 SEC — HIGH (ref 27.5–35.5)
BUN SERPL-MCNC: 41 MG/DL — HIGH (ref 7–23)
CALCIUM SERPL-MCNC: 8.7 MG/DL — SIGNIFICANT CHANGE UP (ref 8.4–10.5)
CHLORIDE SERPL-SCNC: 104 MMOL/L — SIGNIFICANT CHANGE UP (ref 96–108)
CO2 SERPL-SCNC: 25 MMOL/L — SIGNIFICANT CHANGE UP (ref 22–31)
CREAT SERPL-MCNC: 1.6 MG/DL — HIGH (ref 0.5–1.3)
GLUCOSE SERPL-MCNC: 109 MG/DL — HIGH (ref 70–99)
HCT VFR BLD CALC: 27.4 % — LOW (ref 39–50)
HGB BLD-MCNC: 8.6 G/DL — LOW (ref 13–17)
MCHC RBC-ENTMCNC: 30.7 PG — SIGNIFICANT CHANGE UP (ref 27–34)
MCHC RBC-ENTMCNC: 31.4 GM/DL — LOW (ref 32–36)
MCV RBC AUTO: 97.9 FL — SIGNIFICANT CHANGE UP (ref 80–100)
NRBC # BLD: 0 /100 WBCS — SIGNIFICANT CHANGE UP (ref 0–0)
PLATELET # BLD AUTO: 246 K/UL — SIGNIFICANT CHANGE UP (ref 150–400)
POTASSIUM SERPL-MCNC: 3.6 MMOL/L — SIGNIFICANT CHANGE UP (ref 3.5–5.3)
POTASSIUM SERPL-SCNC: 3.6 MMOL/L — SIGNIFICANT CHANGE UP (ref 3.5–5.3)
RBC # BLD: 2.8 M/UL — LOW (ref 4.2–5.8)
RBC # FLD: 13.7 % — SIGNIFICANT CHANGE UP (ref 10.3–14.5)
S PNEUM AG UR QL: NEGATIVE — SIGNIFICANT CHANGE UP
SODIUM SERPL-SCNC: 139 MMOL/L — SIGNIFICANT CHANGE UP (ref 135–145)
WBC # BLD: 10.55 K/UL — HIGH (ref 3.8–10.5)
WBC # FLD AUTO: 10.55 K/UL — HIGH (ref 3.8–10.5)

## 2021-06-08 PROCEDURE — 71250 CT THORAX DX C-: CPT | Mod: 26

## 2021-06-08 RX ADMIN — Medication 1 APPLICATION(S): at 11:21

## 2021-06-08 RX ADMIN — Medication 1 MILLIGRAM(S): at 11:10

## 2021-06-08 RX ADMIN — FLUDROCORTISONE ACETATE 0.4 MILLIGRAM(S): 0.1 TABLET ORAL at 06:23

## 2021-06-08 RX ADMIN — HEPARIN SODIUM 900 UNIT(S)/HR: 5000 INJECTION INTRAVENOUS; SUBCUTANEOUS at 21:19

## 2021-06-08 RX ADMIN — HEPARIN SODIUM 900 UNIT(S)/HR: 5000 INJECTION INTRAVENOUS; SUBCUTANEOUS at 15:08

## 2021-06-08 RX ADMIN — SERTRALINE 50 MILLIGRAM(S): 25 TABLET, FILM COATED ORAL at 11:10

## 2021-06-08 RX ADMIN — Medication 1 TABLET(S): at 11:10

## 2021-06-08 RX ADMIN — HEPARIN SODIUM 950 UNIT(S)/HR: 5000 INJECTION INTRAVENOUS; SUBCUTANEOUS at 00:45

## 2021-06-08 RX ADMIN — HEPARIN SODIUM 950 UNIT(S)/HR: 5000 INJECTION INTRAVENOUS; SUBCUTANEOUS at 07:42

## 2021-06-08 RX ADMIN — Medication 325 MILLIGRAM(S): at 11:10

## 2021-06-08 RX ADMIN — CEFTRIAXONE 100 MILLIGRAM(S): 500 INJECTION, POWDER, FOR SOLUTION INTRAMUSCULAR; INTRAVENOUS at 21:07

## 2021-06-08 RX ADMIN — Medication 1 APPLICATION(S): at 17:50

## 2021-06-08 RX ADMIN — SENNA PLUS 2 TABLET(S): 8.6 TABLET ORAL at 21:08

## 2021-06-08 RX ADMIN — Medication 1 APPLICATION(S): at 06:23

## 2021-06-08 RX ADMIN — ATORVASTATIN CALCIUM 10 MILLIGRAM(S): 80 TABLET, FILM COATED ORAL at 21:08

## 2021-06-08 NOTE — ADVANCED PRACTICE NURSE CONSULT - RECOMMEDATIONS
Will recommend the followin. Spine; continue with foam dressing: change every 3 days.  2. B/l buttocks: routine pericare with Deion  3. Complete Cair boots  4. turning and Positioning  5. Z-phong cushion for positioning  6. nutrition support as pt condition allows  Tx plan discussed with RN

## 2021-06-08 NOTE — CONSULT NOTE ADULT - SUBJECTIVE AND OBJECTIVE BOX
[facility], Division of Infectious Diseases  DORIAN Patino, JANIE Schofield  [phone]  ([phone] - weekdays after 5pm and weekends)    KIANNA GUILLEN  89y, Male  [id number]    HPI:  Patient is a 89 year old male with significant past medical history of prostate cancer s/p radiation, CAD, PVD s/p L SFA stent, CKD stage III, chronic anemia, recent admission for hematuria, is brought from [facility] for complaint of chest pain. At the time of admission patient is AxOx2 (confused about time), presently he continues to have mid sternal chest pain which he describes as "funny feeling" and also feels a "tinge" in the same spot with deep breaths. Pain is non radiating and he is unable to recall when it started and any alleviating or aggravating factors. He reports some dyspnea, orthopnea - feels more comfortable sleeping with elevated back of bed. Denies any other issue, but during ROS and exam - endorsed lower extremity which he believes started after sanchez cath was placed. He doesn't recall how long the hematuria has been ongoing. Per nurse at [facility] - ASA and plavix were stopped recently due to hematuria abut she is unable to recall when. Patient doesn't recall being seen by a urologist since his last discharge. Chronic issues include hearing impairment (not wearing hearing aides), vision impairment (needs reading glasses), back pain, R knee pain. His last BM was yesterday morning - which was soft non bloody. Denies headache, dizziness, fever, vision changes, sore throat, nasal congestion, sinus pain, cough, nausea, vomiting, blood in sputum, dysphagia, abdominal pain, numbness or new rash.    Per chart review: last admission in May 2021 history of PAD s/p angioplasty with stent of left SFA started on Plavix (21) , history of CAD on ASA, presented with hematuria May 5th - aspirin and plavix were intimally held, evaluated by urology - on CBI, ASA and plavix were resumed on discharge.  (2021 18:23)  ROS: 14 point review of systems completed, pertinent positives and negatives as per HPI.    Allergies: No Known Allergies    PMH -- CAD (coronary artery disease)  DM (diabetes mellitus)  Prostate CA  Stage 3 chronic kidney disease  PAD (peripheral artery disease)  Anemia of chronic disease    PSH -- H/O vascular surgery    FH -- noncontributory  Social History -- former smoker, denies alcohol or illicit drug use, resident at [facility]    Physical Exam--  Vital Signs Last 24 Hrs  T(F): 97.7 (2021 11:09), Max: 97.9 (2021 06:20)  HR: 74 (2021 11:09) (74 - 89)  BP: 110/72 (2021 11:09) (105/61 - 117/64)  RR: 18 (2021 11:09) (18 - 18)  SpO2: 99% (2021 11:09) (94% - 99%)  General: no acute distress  HEENT: NC/AT, EOMI, anicteric, conjunctiva pink and moist, neck supple  Lungs: Clear bilaterally without rales, wheezing or rhonchi  Heart: Regular rate and rhythm. No murmur, rub or gallop.  Abdomen: Soft. Nondistended. Nontender. BS present.   Neuro: awake, alert, answers/follows, no obvious focal deficits  Extremities: No cyanosis or clubbing. No edema.   Skin: Warm. Dry. Good turgor. No visible rash.   Psychiatric: Appropriate affect and mood for situation.   Lines: PIV    Laboratory & Imaging Data--  CBC:                       8.6    10.55 )-----------( 246      ( 2021 00:08 )             27.4     CMP:     139  |  104  |  41<H>  ----------------------------<  109<H>  3.6   |  25  |  1.60<H>    Ca    8.7      2021 06:37  Mg     2.3     06-07    Urinalysis Basic - ( 2021 21:03 )  Color: BROWN / Appearance: Slightly Turbid / S.015 / pH: x  Gluc: x / Ketone: Negative  / Bili: Negative / Urobili: Negative   Blood: x / Protein: 100 mg/dL / Nitrite: Negative   Leuk Esterase: Large / RBC: 1504 /hpf /  /HPF   Sq Epi: x / Non Sq Epi: 0 /hpf / Bacteria: Many    Microbiology:   Culture - Urine (collected 21 @ 00:48)  Source: .Urine Catheterized  Preliminary Report (21 @ 04:48):    >100,000 CFU/ml Escherichia coli     - Strep pneumoniae ur ag - negative    - RVP+COVID - negative    - COVID-19 PCR - negative    Radiology--  Xray Chest 1 View- PORTABLE-Urgent (Xray Chest 1 View- PORTABLE-Urgent .) (21 @ 13:17) >  FINDINGS: Left lower lung opacity silhouettes the left hemidiaphragm. Hazy right lower lung opacity.  There is no pneumothorax. Small bilateral pleural effusions. Heart size cannot be accurately assessed in this projection. No acute osseous abnormality.  IMPRESSION: Bilateral pleural effusion. Bibasilar pneumonia..    Active Medications--  acetaminophen   Tablet .. 650 milliGRAM(s) Oral every 6 hours PRN  atorvastatin 10 milliGRAM(s) Oral at bedtime  azithromycin  IVPB 500 milliGRAM(s) IV Intermittent every 24 hours  BACItracin   Ointment 1 Application(s) Topical daily  bisacodyl Suppository 10 milliGRAM(s) Rectal daily PRN  cefTRIAXone   IVPB 1000 milliGRAM(s) IV Intermittent every 24 hours  ferrous    sulfate 325 milliGRAM(s) Oral daily  fludroCORTISONE 0.4 milliGRAM(s) Oral daily  folic acid 1 milliGRAM(s) Oral daily  heparin   Injectable 4100 Unit(s) IV Push every 6 hours PRN  heparin  Infusion.  Unit(s)/Hr IV Continuous <Continuous>  multivitamin 1 Tablet(s) Oral daily  polyethylene glycol 3350 17 Gram(s) Oral daily PRN  senna 2 Tablet(s) Oral at bedtime  sertraline 50 milliGRAM(s) Oral daily  silver sulfADIAZINE 1% Cream 1 Application(s) Topical two times a day    Antimicrobials:   azithromycin  IVPB 500 milliGRAM(s) IV Intermittent every 24 hours - started   cefTRIAXone   IVPB 1000 milliGRAM(s) IV Intermittent every 24 hours - started     Immunologic:

## 2021-06-08 NOTE — PROGRESS NOTE ADULT - ATTENDING COMMENTS
#CP  r/o NSTEMI  TTE  cards fu    #hypoxia  cxr b/l pleural effusion  bibasal PNA  lasix prn  CT chest non-con fu  ID cs, Pulm cs    #UTI/PNA  cont ceftriaxone      Dr Heredia will be covering  starting 6/9/21  please call [facility] @ [phone] for questions or concerns

## 2021-06-08 NOTE — CONSULT NOTE ADULT - ASSESSMENT
Patient is a 89 year old male with significant past medical history of prostate cancer s/p radiation, CAD, PVD s/p L SFA stent, CKD stage III, chronic anemia, recent admission for hematuria, is brought from [facility] for complaint of chest pain. Patient admitted for further evaluation and management of hematuria, pneumonia and rule out ACS/cardiomyopathy.     Hematuria with possible UTI   - has sanchez in place since last admission for urinary retention - now noted with hematuria, was on aspirin/plavix but recently stopped due to hematuria   - UA taken from sanchez with , large LE, many bacteria, 1504 RBC, large blood - possible UTI vs colonization   - Urology following - sanchez exchanged 6/6 for 22F six eye catheter and irrigated, CBI not warranted    - overnight noted with hematuria   - Urine culture with >100k E.coli -- follow for sensitivities    - continue on ceftriaxone 1g IV Q24h     Volume overload, suspected cardiomyopathy, r/o pneumonia    - CXR with b/l pleural effusion and bibasilar pneumonia, imaging reviewed   - no respiratory complaints noted, afebrile - less likely pneumonia    - RVP, COVID and Strep pneumoniae ur ag negative, legionella ur ag pending   - discontinue azithromycin   - on ceftriaxone as above   - Cardiology following, s/p lasix, has elevated troponin, not a candidate for invasive cardiac eval    CKD stage 3   - no need for renal adjustment with current antibiotics       Reed Sue M.D.  [facility], Division of Infectious Diseases  [phone]  After 5pm on weekdays and all day on weekends - please call [phone] Patient is a 89 year old male with significant past medical history of prostate cancer s/p radiation, CAD, PVD s/p L SFA stent, CKD stage III, chronic anemia, recent admission for hematuria, is brought from [facility] for complaint of chest pain. Patient admitted for further evaluation and management of hematuria, pneumonia and rule out ACS/cardiomyopathy.     Hematuria with possible UTI   - has sanchez in place since last admission for urinary retention - now noted with hematuria, was on aspirin/plavix but recently stopped due to hematuria   - UA taken from sanchez with , large LE, many bacteria, 1504 RBC, large blood - possible UTI vs colonization   - Urology following - sanchez exchanged 6/6 for 22F six eye catheter and irrigated, CBI not warranted    - overnight noted with hematuria   - Urine culture with >100k E.coli -- follow for sensitivities    - continue on ceftriaxone 1g IV Q24h     Volume overload, suspected cardiomyopathy, r/o pneumonia    - CXR with b/l pleural effusion and bibasilar pneumonia, imaging reviewed   - no respiratory complaints noted, afebrile - less likely pneumonia    - RVP, COVID and Strep pneumoniae ur ag negative, legionella ur ag pending   - consider CT chest without contrast to further evaluate   - discontinue azithromycin   - on ceftriaxone as above   - Cardiology following, s/p lasix, has elevated troponin, not a candidate for invasive cardiac eval    CKD stage 3   - no need for renal adjustment with current antibiotics       Reed Sue M.D.  [facility], Division of Infectious Diseases  [phone]  After 5pm on weekdays and all day on weekends - please call [phone]

## 2021-06-08 NOTE — PROGRESS NOTE ADULT - ASSESSMENT
89 year old male with significant past medical history of prostate cancer s/p radiation, CAD, PVD s/p L SFA stent, CKD stage III, chronic anemia, recent admission for hematuria, is brought from [facility] for complaint of chest pain.

## 2021-06-08 NOTE — ADVANCED PRACTICE NURSE CONSULT - REASON FOR CONSULT
Requested by staff to assess skin status of pt a/w multiple pressure injuries. PMH is noted:    Patient is a 89 year old male with significant past medical history of prostate cancer s/p radiation, CAD, PVD s/p L SFA stent, CKD stage III, chronic anemia, recent admission for hematuria, is brought from [facility] for complaint of chest pain. At the time of admission patient is AxOx2 (confused about time), presently he continues to have mid sternal chest pain which he describes as "funny feeling" and also feels a "tinge" in the same spot with deep breaths. Pain is non radiating and he is unable to recall when it started and any alleviating or aggravating factors. He reports some dyspnea, orthopnea - feels more comfortable sleeping with elevated back of bed. Denies any other issue, but during ROS and exam - endorsed lower extremity which he believes started after sanchez cath was placed. He doesn't recall how long the hematuria has been ongoing. Per nurse at [facility] - ASA and plavix were stopped recently due to hematuria abut she is unable to recall when. Patient doesn't recall being seen by a urologist since his last discharge. Chronic issues include hearing impairment (not wearing hearing aides), vision impairment (needs reading glasses), back pain, R knee pain. His last BM was yesterday morning - which was soft non bloody. Denies headache, dizziness, fever, vision changes, sore throat, nasal congestion, sinus pain, cough, nausea, vomiting, blood in sputum, dysphagia, abdominal pain, numbness or new rash.      Per chart review: last admission in May 2021 history of PAD s/p angioplasty with stent of left SFA started on Plavix (4/16/21) , history of CAD on ASA, presented with hematuria May 5th - aspirin and plavix were intimally held, evaluated by urology - on CBI, ASA and plavix were resumed on discharge.

## 2021-06-08 NOTE — ADVANCED PRACTICE NURSE CONSULT - ASSESSMENT
The pt was encountered on [employer]- he is in a Versa[employer]re P 500 support surface and needs assistance with  turning and positioning. will recommend Complete Cair boots for off-loading.  As the pt was a/w pressure injuries, staff have requested a nutrition consult and it is pending.  Upon assessment, the pt presents with a deep tissue injury to the upper spine- the spine is slightly kyphotic and the bony prominences are protuberant. The deep tissue injury measures 6cmx 6cm [id number] presents with  a deep red color that is non-blanchable with surrounding erythema. The skin is intact at this time.  On the sacrum and b/l buttocks is a deep tissue injury measuring  8cm x 6cm x 0cm - here the skin is a dusky purple color. it is nonblanchable with a small open area  noted on each buttock. As pt reports incontinence will recommend a barrier cream.  staff had applied a foam to the spine- will recommend to continue with same to cushion.

## 2021-06-08 NOTE — CONSULT NOTE ADULT - ASSESSMENT
89M admitted from [facility] with chest pain and elevated troponin: r/o NSTEMI  CXR is indeterminate for pneumonia: poor inspiratory effort, AP portable: sugg effusions and/or atelectasis/consolidation. Patient with prior history of hemeturia and prostate CA: current UA bloody with culture positive for E Coli. Patient started on ceftriaxone and azithromycin. He has history of prior CAD and is anticoagulated per cardiology in setting of proable NSTEMI. Per patient and son, history not suggestive of OP dysphagia. If bilateral effusions confirmed on CT, sugg of CHF. Patient at risk for VTE, though doubt PE    REC    CT non contrast chest   LE dopplers  TTE as per cardiology  Continue antibiotics for poss UTI and pneumonia pending CT and cultures  Would DC azithromycin

## 2021-06-08 NOTE — PROGRESS NOTE ADULT - PROBLEM SELECTOR PLAN 5
-  -noted on tele on day of admission. no further episodes since.   -heparin drip for 48 hours total.  -if further episodes will need to consider anticoagulation    Patient of Dr. Patrick Castillo ([facility])    Jai Jay D.O.  [phone].

## 2021-06-08 NOTE — CONSULT NOTE ADULT - SUBJECTIVE AND OBJECTIVE BOX
PULMONARY CONSULT  Arpit Rendon MD  [phone]    Initial HPI on admission:  HPI:  [facility] Division of Hospital Medicine  Pauline Mensah MD  Pager (ERNST-ARTHUR, 4Y-5L): [phone]  Other Times:  [phone]      Patient is a 89 year old male with significant past medical history of prostate cancer s/p radiation, CAD, PVD s/p L SFA stent, CKD stage III, chronic anemia, recent admission for hematuria, is brought from [facility] for complaint of chest pain. At the time of admission patient is AxOx2 (confused about time), presently he continues to have mid sternal chest pain which he describes as "funny feeling" and also feels a "tinge" in the same spot with deep breaths. Pain is non radiating and he is unable to recall when it started and any alleviating or aggravating factors. He reports some dyspnea, orthopnea - feels more comfortable sleeping with elevated back of bed. Denies any other issue, but during ROS and exam - endorsed lower extremity which he believes started after sanchez cath was placed. He doesn't recall how long the hematuria has been ongoing. Per nurse at [facility] - ASA and plavix were stopped recently due to hematuria abut she is unable to recall when. Patient doesn't recall being seen by a urologist since his last discharge. Chronic issues include hearing impairment (not wearing hearing aides), vision impairment (needs reading glasses), back pain, R knee pain. His last BM was yesterday morning - which was soft non bloody. Denies headache, dizziness, fever, vision changes, sore throat, nasal congestion, sinus pain, cough, nausea, vomiting, blood in sputum, dysphagia, abdominal pain, numbness or new rash.      Per chart review: last admission in May 2021 history of PAD s/p angioplasty with stent of left SFA started on Plavix (21) , history of CAD on ASA, presented with hematuria May 5th - aspirin and plavix were intimally held, evaluated by urology - on CBI, ASA and plavix were resumed on discharge.           PAST MEDICAL & SURGICAL HISTORY:  CAD (coronary artery disease)  asa    Prostate CA  s/p radiation    Stage 3 chronic kidney disease    PAD (peripheral artery disease)  s/p vascular stent    Anemia of chronic disease    H/O vascular surgery      Allergies    No Known Allergies    Intolerances      FAMILY HISTORY:    Social History (marital status, living situation, occupation, tobacco use, alcohol and drug use, and sexual history): From [place]  Requires assistance, uses wheelchair     Tobacco Screening:  · Core Measure Site	No       Review of Systems:  · General	negative  · Skin/Breast	negative  · Ophthalmologic	negative  · ENMT	negative  · Respiratory and Thorax	negative  · Negative Cardiovascular Symptoms	no palpitations  · Cardiovascular Symptoms	chest pain  · Gastrointestinal	negative  · Genitourinary	negative  · Musculoskeletal	negative  · Neurological	negative  · Psychiatric	negative  · Hematology/Lymphatics	negative  · Endocrine	negative  · Allergic/Immunologic	negative  · Additional ROS	as in HPI      Allergies and Intolerances:        Allergies:  	No Known Allergies:       Medications:  MEDICATIONS  (STANDING):  atorvastatin 10 milliGRAM(s) Oral at bedtime  azithromycin  IVPB 500 milliGRAM(s) IV Intermittent every 24 hours  BACItracin   Ointment 1 Application(s) Topical daily  cefTRIAXone   IVPB 1000 milliGRAM(s) IV Intermittent every 24 hours  ferrous    sulfate 325 milliGRAM(s) Oral daily  fludroCORTISONE 0.4 milliGRAM(s) Oral daily  folic acid 1 milliGRAM(s) Oral daily  heparin  Infusion.  Unit(s)/Hr (8 mL/Hr) IV Continuous <Continuous>  multivitamin 1 Tablet(s) Oral daily  senna 2 Tablet(s) Oral at bedtime  sertraline 50 milliGRAM(s) Oral daily  silver sulfADIAZINE 1% Cream 1 Application(s) Topical two times a day    MEDICATIONS  (PRN):  acetaminophen   Tablet .. 650 milliGRAM(s) Oral every 6 hours PRN Mild Pain/Moderate Pain  bisacodyl Suppository 10 milliGRAM(s) Rectal daily PRN Constipation  heparin   Injectable 4100 Unit(s) IV Push every 6 hours PRN For aPTT less than 40  polyethylene glycol 3350 17 Gram(s) Oral daily PRN Constipation    Vital Signs Last 24 Hrs  T(C): 36.5 (2021 11:09), Max: 36.6 (2021 06:20)  T(F): 97.7 (2021 11:09), Max: 97.9 (2021 06:20)  HR: 74 (2021 11:09) (74 - 89)  BP: 110/72 (2021 11:09) (105/61 - 117/64)  BP(mean): --  RR: 18 (2021 11:09) (18 - 18)  SpO2: 99% (2021 11:09) (94% - 99%)           @ 07:01  -   @ 07:00  --------------------------------------------------------  IN: 0 mL / OUT: 1050 mL / NET: -1050 mL      LABS:                        8.6    10.55 )-----------( 246      ( 2021 00:08 )             27.4     06-08    139  |  104  |  41<H>  ----------------------------<  109<H>  3.6   |  25  |  1.60<H>    Ca    8.7      2021 06:37  Mg     2.3     06-07        PT/INR - ( 2021 14:01 )   PT: 12.8 sec;   INR: 1.07 ratio         PTT - ( 2021 06:37 )  PTT:61.7 sec  Urinalysis Basic - ( 2021 21:03 )    Color: BROWN / Appearance: Slightly Turbid / S.015 / pH: x  Gluc: x / Ketone: Negative  / Bili: Negative / Urobili: Negative   Blood: x / Protein: 100 mg/dL / Nitrite: Negative   Leuk Esterase: Large / RBC: 1504 /hpf /  /HPF   Sq Epi: x / Non Sq Epi: 0 /hpf / Bacteria: Many      Procalcitonin, Serum: 0.13 ng/mL (21 @ 05:54)    Serum Pro-Brain Natriuretic Peptide: 2386 pg/mL (21 @ 13:11)      CULTURES:  Culture Results:   >100,000 CFU/ml Escherichia coli ( @ 00:48)        Physical Examination:    General: No acute distress.      HEENT: Pupils equal, reactive to light.  Symmetric.    PULM: Clear to auscultation bilaterally, no significant sputum production    CVS: Regular rate and rhythm, no murmurs, rubs, or gallops    ABD: Soft, nondistended, nontender, normoactive bowel sounds, no masses    EXT: No edema, nontender    SKIN: Warm and well perfused, no rashes noted.    NEURO: Alert, oriented, interactive, nonfocal    RADIOLOGY REVIEWED PERSONALLY  CXR:    CT chest:    TTE:  < from: Transthoracic Echocardiogram (21 @ 10:48) >  Patient name: KIANNA GUILLEN  YOB: 1928   Age: 89 (M)   MR#: [id number]  Study Date: 2021  [facility] Zahraa Leslie NIESHA  Study quality: Technically difficult  Referring Physician: Sravanthi Heredia MD  Blood Pressure: 121/70 mmHg  Height: 180 cm  Weight: 69 kg  BSA: 1.9 m2  ------------------------------------------------------------------------  PROCEDURE: Transthoracic echocardiogram with 2-D, M-Mode  and complete spectral and color flow Doppler.  INDICATION: Abnormal electrocardiogram (ECG) (EKG) (R94.31)  ------------------------------------------------------------------------  Dimensions:    Normal Values:  LA:     2.7    2.0 - 4.0 cm  Ao:     3.3    2.0 - 3.8 cm  SEPTUM: 0.8    0.6 - 1.2 cm  PWT:    0.8    0.6 - 1.1 cm  LVIDd:  3.4    3.0 - 5.6 cm  LVIDs:  2.5    1.8 - 4.0 cm  Derived variables:  LVMI: 38 g/m2  RWT: 0.47  Fractional short: 26 %  EF (Visual Estimate): 55 %  ------------------------------------------------------------------------  Observations:  Mitral Valve: Normal mitral valve.  Aortic Valve/Aorta: Calcified trileaflet aortic valve with  normal opening. Mild aortic regurgitation.  Aortic Root: 3.3 cm.  LVOT diameter: 2 cm.  Left Atrium: Normal left atrium.  Left Ventricle: Endocardium not well visualized; grossly  normal left ventricular systolic function. Normal left  ventricular internal dimensions and wall thicknesses.  Right Heart: Normal right atrium. The right ventricle is  not well visualized; grossly normal right ventricular  systolic function. Normal tricuspid valve. Minimal  tricuspid regurgitation. Normal pulmonic valve. Minimal  pulmonic regurgitation.  Pericardium/Pleura: Normal pericardium with no pericardial  effusion.  Hemodynamic: Estimated right atrialpressure is 8 mm Hg.  Estimated right ventricular systolic pressure equals 29 mm  Hg, assuming right atrial pressure equals 8 mm Hg,  consistent with normal pulmonary pressures.  ------------------------------------------------------------------------  Conclusions:  1. Normal left ventricular internal dimensions and wall  thicknesses.  2. Endocardium not well visualized; grossly normal left  ventricular systolic function.  3. The right ventricle is not well visualized; grossly  normal right ventricular systolic function.     PULMONARY CONSULT  Arpit Rendon MD  [phone]    Initial HPI on admission:  HPI:  [facility] Division of Hospital Medicine  Pauline Mensah MD  Pager (ERNST-F, 1E-1Z): [phone]  Other Times:  [phone]      Patient is a 89 year old male with significant past medical history of prostate cancer s/p radiation, CAD, PVD s/p L SFA stent, CKD stage III, chronic anemia, recent admission for hematuria, is brought from [facility] for complaint of chest pain. At the time of admission patient is AxOx2 (confused about time), presently he continues to have mid sternal chest pain which he describes as "funny feeling" and also feels a "tinge" in the same spot with deep breaths. Pain is non radiating and he is unable to recall when it started and any alleviating or aggravating factors. He reports some dyspnea, orthopnea - feels more comfortable sleeping with elevated back of bed. Denies any other issue, but during ROS and exam - endorsed lower extremity which he believes started after sanchez cath was placed. He doesn't recall how long the hematuria has been ongoing. Per nurse at [facility] - ASA and plavix were stopped recently due to hematuria abut she is unable to recall when. Patient doesn't recall being seen by a urologist since his last discharge. Chronic issues include hearing impairment (not wearing hearing aides), vision impairment (needs reading glasses), back pain, R knee pain. His last BM was yesterday morning - which was soft non bloody. Denies headache, dizziness, fever, vision changes, sore throat, nasal congestion, sinus pain, cough, nausea, vomiting, blood in sputum, dysphagia, abdominal pain, numbness or new rash.  Per chart review: last admission in May 2021 history of PAD s/p angioplasty with stent of left SFA started on Plavix (21) , history of CAD on ASA, presented with hematuria May 5th - aspirin and plavix were intimally held, evaluated by urology - on CBI, ASA and plavix were resumed on discharge.     Patient seen with son in attendance  Alert, comfortable on nasal cannula  Denies history of COPD, smoking, dysphagia  Per son, patient following exercise regimen at gym, then home up through prior admission  He denied CP of dyspnea at time of visit      PAST MEDICAL & SURGICAL HISTORY:  CAD (coronary artery disease)  asa    Prostate CA  s/p radiation    Stage 3 chronic kidney disease    PAD (peripheral artery disease)  s/p vascular stent    Anemia of chronic disease    H/O vascular surgery    FAMILY HISTORY:    Social History (marital status, living situation, occupation, tobacco use, alcohol and drug use, and sexual history): From [place]  Requires assistance, uses wheelchair     Tobacco Screening:  · Core Measure Site	No       Review of Systems:  · General	negative  · Skin/Breast	negative  · Ophthalmologic	negative  · ENMT	negative  · Respiratory and Thorax	negative  · Negative Cardiovascular Symptoms	no palpitations  · Cardiovascular Symptoms	chest pain  · Gastrointestinal	negative  · Genitourinary	negative  · Musculoskeletal	negative  · Neurological	negative  · Psychiatric	negative  · Hematology/Lymphatics	negative  · Endocrine	negative  · Allergic/Immunologic	negative  · Additional ROS	as in HPI      Allergies and Intolerances:        Allergies:  	No Known Allergies:       Medications:  MEDICATIONS  (STANDING):  atorvastatin 10 milliGRAM(s) Oral at bedtime  azithromycin  IVPB 500 milliGRAM(s) IV Intermittent every 24 hours  BACItracin   Ointment 1 Application(s) Topical daily  cefTRIAXone   IVPB 1000 milliGRAM(s) IV Intermittent every 24 hours  ferrous    sulfate 325 milliGRAM(s) Oral daily  fludroCORTISONE 0.4 milliGRAM(s) Oral daily  folic acid 1 milliGRAM(s) Oral daily  heparin  Infusion.  Unit(s)/Hr (8 mL/Hr) IV Continuous <Continuous>  multivitamin 1 Tablet(s) Oral daily  senna 2 Tablet(s) Oral at bedtime  sertraline 50 milliGRAM(s) Oral daily  silver sulfADIAZINE 1% Cream 1 Application(s) Topical two times a day    MEDICATIONS  (PRN):  acetaminophen   Tablet .. 650 milliGRAM(s) Oral every 6 hours PRN Mild Pain/Moderate Pain  bisacodyl Suppository 10 milliGRAM(s) Rectal daily PRN Constipation  heparin   Injectable 4100 Unit(s) IV Push every 6 hours PRN For aPTT less than 40  polyethylene glycol 3350 17 Gram(s) Oral daily PRN Constipation    Vital Signs Last 24 Hrs  T(C): 36.5 (2021 11:09), Max: 36.6 (2021 06:20)  T(F): 97.7 (2021 11:09), Max: 97.9 (2021 06:20)  HR: 74 (2021 11:09) (74 - 89)  BP: 110/72 (2021 11:09) (105/61 - 117/64)  BP(mean): --  RR: 18 (2021 11:09) (18 - 18)  SpO2: 99% (2021 11:09) (94% - 99%)           @ 07:01  -   @ 07:00  --------------------------------------------------------  IN: 0 mL / OUT: 1050 mL / NET: -1050 mL      LABS:                        8.6    10.55 )-----------( 246      ( 2021 00:08 )             27.4         139  |  104  |  41<H>  ----------------------------<  109<H>  3.6   |  25  |  1.60<H>    Ca    8.7      2021 06:37  Mg     2.3     06-07        PT/INR - ( 2021 14:01 )   PT: 12.8 sec;   INR: 1.07 ratio         PTT - ( 2021 06:37 )  PTT:61.7 sec  Urinalysis Basic - ( 2021 21:03 )    Color: BROWN / Appearance: Slightly Turbid / S.015 / pH: x  Gluc: x / Ketone: Negative  / Bili: Negative / Urobili: Negative   Blood: x / Protein: 100 mg/dL / Nitrite: Negative   Leuk Esterase: Large / RBC: 1504 /hpf /  /HPF   Sq Epi: x / Non Sq Epi: 0 /hpf / Bacteria: Many      Procalcitonin, Serum: 0.13 ng/mL (21 @ 05:54)    Serum Pro-Brain Natriuretic Peptide: 2386 pg/mL (21 @ 13:11)      CULTURES:  Culture Results:   >100,000 CFU/ml Escherichia coli ( @ 00:48)        Physical Examination:    General: Non toxic, No acute distress.      HEENT: Pupils equal, reactive to light.  Symmetric.    PULM: Egophony with bronchial breathing L 1/4 basilar    CVS: Regular rate and rhythm, no murmurs, rubs, or gallops    ABD: Soft, nondistended, nontender, normoactive bowel sounds, no masses    EXT: No edema, nontender    SKIN: Warm and well perfused, no rashes noted.    NEURO: Alert, oriented, interactive, nonfocal    RADIOLOGY REVIEWED PERSONALLY  CXR:    PROCEDURE DATE:  2021        INTERPRETATION:  CLINICAL INFORMATION: Chest pain.    TECHNIQUE: Single portable view of the chest.    COMPARISON: Chest x-ray from 2021    FINDINGS:    Left lower lung opacity silhouettes the left hemidiaphragm. Hazy right lower lung opacity.  There is no pneumothorax. Small bilateral pleural effusions.  Heart size cannot be accurately assessed in this projection.  No acute osseous abnormality.    IMPRESSION:    Bilateral pleural effusion. Bibasilar pneumonia..    CT chest:    TTE:    Patient name: KIANNA GUILLEN  YOB: 1928   Age: 89 (M)   MR#: [id number]  Study Date: 2021  [facility]ahraa [facility] RDCS  Study quality: Technically difficult  Referring Physician: Sravanthi Heredia MD  Blood Pressure: 121/70 mmHg  Height: 180 cm  Weight: 69 kg  BSA: 1.9 m2  ------------------------------------------------------------------------  PROCEDURE: Transthoracic echocardiogram with 2-D, M-Mode  and complete spectral and color flow Doppler.  INDICATION: Abnormal electrocardiogram (ECG) (EKG) (R94.31)  ------------------------------------------------------------------------  Dimensions:    Normal Values:  LA:     2.7    2.0 - 4.0 cm  Ao:     3.3    2.0 - 3.8 cm  SEPTUM: 0.8    0.6 - 1.2 cm  PWT:    0.8    0.6 - 1.1 cm  LVIDd:  3.4    3.0 - 5.6 cm  LVIDs:  2.5    1.8 - 4.0 cm  Derived variables:  LVMI: 38 g/m2  RWT: 0.47  Fractional short: 26 %  EF (Visual Estimate): 55 %  ------------------------------------------------------------------------  Observations:  Mitral Valve: Normal mitral valve.  Aortic Valve/Aorta: Calcified trileaflet aortic valve with  normal opening. Mild aortic regurgitation.  Aortic Root: 3.3 cm.  LVOT diameter: 2 cm.  Left Atrium: Normal left atrium.  Left Ventricle: Endocardium not well visualized; grossly  normal left ventricular systolic function. Normal left  ventricular internal dimensions and wall thicknesses.  Right Heart: Normal right atrium. The right ventricle is  not well visualized; grossly normal right ventricular  systolic function. Normal tricuspid valve. Minimal  tricuspid regurgitation. Normal pulmonic valve. Minimal  pulmonic regurgitation.  Pericardium/Pleura: Normal pericardium with no pericardial  effusion.  Hemodynamic: Estimated right atrialpressure is 8 mm Hg.  Estimated right ventricular systolic pressure equals 29 mm  Hg, assuming right atrial pressure equals 8 mm Hg,  consistent with normal pulmonary pressures.  ------------------------------------------------------------------------  Conclusions:  1. Normal left ventricular internal dimensions and wall  thicknesses.  2. Endocardium not well visualized; grossly normal left  ventricular systolic function.  3. The right ventricle is not well visualized; grossly  normal right ventricular systolic function.

## 2021-06-08 NOTE — PROGRESS NOTE ADULT - ASSESSMENT
Patient is a 89 year old male with significant past medical history of prostate cancer s/p radiation, CAD, PVD s/p L SFA stent, CKD stage III, chronic anemia, recent admission for hematuria, is brought from [facility] for complaint of chest pain. Patient is admitted for further evaluation and management for hematuria, pneumonia and to rule out ACS/cardiomyopathy.     # Chest pain:  Likely msk related pain however cannot rule out NSTEMI in the setting of rising troponin  Trend troponin until downtrending  proBNP 2k  4/2021 TTE unremarkable, LVEF 55% per records  Aflutter noted on tele on day of admission - no further episodes since   Heparin drip for total of 48 hours  ASA & Plavix   Monitor on tele  Repeat TTE (limited study) to evaluate for wall motion abnormalities  Given hematuria, CKD patient is not a candidate for invasive cardiac eval such as cardiac cath at this time   Chest x-ray with BL pleural effusions   Follow up cards recs    # Hematuria:  Follows Iain Stafford  Consult urology noted  no clots, sanchez-clear urine  Monitor HH  + hematuria overnight  22F 3-way placed, no clot evacuated, color improved to clear peach  CBI not warranted at this time  Monitor urine outpt/color  Trend H/H, SCr, transfuse per primary team protocol  Follow up UCx  ASA/Plavix okay to continue from  standpoint as long as urine color and H/H remain stable   If color worsens or clots formed, please call  for reevaluation [phone]  Follow up urology recs    # Anemia:   AOCD and acute blood loss 2/2 hematuria   Monitor HH- stable  Keep active T&S, transfuse to keep Hgb >8   Tx  of hematuria as above     # CAD (coronary artery disease):  Plavix and ASA ok to continue  Continue Lipitor    # Orthostatic hypotension:  Continue Florinef    # PAD (peripheral artery disease):  PAD s/p angioplasty with stent of left SFA was started on Plavix (4/16/21)  Follows Dr. Urias outpatient  Plavix and ASA to cont    # Prostate CA:   Hx of prostate ca s/p radiation about ?15 years ago    # Stage 3 chronic kidney disease:  CKD stage III, baseline Cr ~ 1.8-1.9  Follows Dr. Chan outpatient  Cr 1.68  Appears to be around baseline  Continue to monitor    # Need for prophylactic measure:  DVT PPX: SCDs  DASH diet  Continue pressure ulcer preventive measures per nursing   OOB to chair with assistance

## 2021-06-08 NOTE — PROGRESS NOTE ADULT - SUBJECTIVE AND OBJECTIVE BOX
[facility] Cardiology Progress Note  _______________________________    Pt. seen and examined. No new cardiac-related complaints.    Telemetry -sinus 80-90s    T(C): 36.6 (21 @ 06:20), Max: 36.8 (21 @ 12:11)  HR: 81 (21 @ 06:20) (79 - 89)  BP: 117/64 (21 @ 06:20) (105/61 - 121/83)  RR: 18 (21 @ 06:20) (18 - 18)  SpO2: 96% (21 @ 06:20) (94% - 99%)  I&O's Summary    2021 07:01  -  2021 07:00  --------------------------------------------------------  IN: 0 mL / OUT: 1050 mL / NET: -1050 mL        PHYSICAL EXAM:  GENERAL: Alert, NAD.  NECK: Supple  CHEST/LUNG: Clear to auscultation bilaterally; No wheezes, rales, or rhonchi.  HEART: S1 S2 normal, RRR; No murmurs, rubs, or gallops  ABDOMEN: Soft, Nondistended  EXTREMITIES:  No LE edema.      LABS:                        8.6    10.55 )-----------( 246      ( 2021 00:08 )             27.4     08    139  |  104  |  41<H>  ----------------------------<  109<H>  3.6   |  25  |  1.60<H>    Ca    8.7      2021 06:37  Mg     2.3     06-07    TPro  6.4  /  Alb  3.0<L>  /  TBili  0.2  /  DBili  x   /  AST  39  /  ALT  32  /  AlkPhos  87  06-06    PT/INR - ( 2021 14:01 )   PT: 12.8 sec;   INR: 1.07 ratio         PTT - ( 2021 06:37 )  PTT:61.7 sec          Urinalysis Basic - ( 2021 21:03 )    Color: BROWN / Appearance: Slightly Turbid / S.015 / pH: x  Gluc: x / Ketone: Negative  / Bili: Negative / Urobili: Negative   Blood: x / Protein: 100 mg/dL / Nitrite: Negative   Leuk Esterase: Large / RBC: 1504 /hpf /  /HPF   Sq Epi: x / Non Sq Epi: 0 /hpf / Bacteria: Many        MEDICATIONS  (STANDING):  atorvastatin 10 milliGRAM(s) Oral at bedtime  azithromycin  IVPB 500 milliGRAM(s) IV Intermittent every 24 hours  BACItracin   Ointment 1 Application(s) Topical daily  cefTRIAXone   IVPB 1000 milliGRAM(s) IV Intermittent every 24 hours  ferrous    sulfate 325 milliGRAM(s) Oral daily  fludroCORTISONE 0.4 milliGRAM(s) Oral daily  folic acid 1 milliGRAM(s) Oral daily  heparin  Infusion.  Unit(s)/Hr (8 mL/Hr) IV Continuous <Continuous>  multivitamin 1 Tablet(s) Oral daily  senna 2 Tablet(s) Oral at bedtime  sertraline 50 milliGRAM(s) Oral daily  silver sulfADIAZINE 1% Cream 1 Application(s) Topical two times a day    MEDICATIONS  (PRN):  acetaminophen   Tablet .. 650 milliGRAM(s) Oral every 6 hours PRN Mild Pain/Moderate Pain  bisacodyl Suppository 10 milliGRAM(s) Rectal daily PRN Constipation  heparin   Injectable 4100 Unit(s) IV Push every 6 hours PRN For aPTT less than 40  polyethylene glycol 3350 17 Gram(s) Oral daily PRN Constipation        RADIOLOGY & ADDITIONAL TESTS:

## 2021-06-08 NOTE — PROGRESS NOTE ADULT - SUBJECTIVE AND OBJECTIVE BOX
SUBJECTIVE / OVERNIGHT EVENTS:    + hematuria overnight  patient seen and examined  denies cp  no n/v/d    --------------------------------------------------------------------------------------------  LABS:                        8.6    10.55 )-----------( 246      ( 2021 00:08 )             27.4     06-08    139  |  104  |  41<H>  ----------------------------<  109<H>  3.6   |  25  |  1.60<H>    Ca    8.7      2021 06:37  Mg     2.3     06-07    TPro  6.4  /  Alb  3.0<L>  /  TBili  0.2  /  DBili  x   /  AST  39  /  ALT  32  /  AlkPhos  87  06-06    PT/INR - ( 2021 14:01 )   PT: 12.8 sec;   INR: 1.07 ratio         PTT - ( 2021 06:37 )  PTT:61.7 sec  CAPILLARY BLOOD GLUCOSE            Urinalysis Basic - ( 2021 21:03 )    Color: BROWN / Appearance: Slightly Turbid / S.015 / pH: x  Gluc: x / Ketone: Negative  / Bili: Negative / Urobili: Negative   Blood: x / Protein: 100 mg/dL / Nitrite: Negative   Leuk Esterase: Large / RBC: 1504 /hpf /  /HPF   Sq Epi: x / Non Sq Epi: 0 /hpf / Bacteria: Many        RADIOLOGY & ADDITIONAL TESTS:    Imaging Personally Reviewed:  [x] YES  [ ] NO    Consultant(s) Notes Reviewed:  [x] YES  [ ] NO    MEDICATIONS  (STANDING):  atorvastatin 10 milliGRAM(s) Oral at bedtime  azithromycin  IVPB 500 milliGRAM(s) IV Intermittent every 24 hours  BACItracin   Ointment 1 Application(s) Topical daily  cefTRIAXone   IVPB 1000 milliGRAM(s) IV Intermittent every 24 hours  ferrous    sulfate 325 milliGRAM(s) Oral daily  fludroCORTISONE 0.4 milliGRAM(s) Oral daily  folic acid 1 milliGRAM(s) Oral daily  heparin  Infusion.  Unit(s)/Hr (8 mL/Hr) IV Continuous <Continuous>  multivitamin 1 Tablet(s) Oral daily  senna 2 Tablet(s) Oral at bedtime  sertraline 50 milliGRAM(s) Oral daily  silver sulfADIAZINE 1% Cream 1 Application(s) Topical two times a day    MEDICATIONS  (PRN):  acetaminophen   Tablet .. 650 milliGRAM(s) Oral every 6 hours PRN Mild Pain/Moderate Pain  bisacodyl Suppository 10 milliGRAM(s) Rectal daily PRN Constipation  heparin   Injectable 4100 Unit(s) IV Push every 6 hours PRN For aPTT less than 40  polyethylene glycol 3350 17 Gram(s) Oral daily PRN Constipation      Care Discussed with Consultants/Other Providers [x] YES  [ ] NO    Vital Signs Last 24 Hrs  T(C): 36.5 (2021 11:09), Max: 36.6 (2021 06:20)  T(F): 97.7 (2021 11:09), Max: 97.9 (2021 06:20)  HR: 74 (2021 11:09) (74 - 89)  BP: 110/72 (2021 11:09) (105/61 - 117/64)  BP(mean): --  RR: 18 (2021 11:09) (18 - 18)  SpO2: 99% (2021 11:09) (94% - 99%)  I&O's Summary    2021 07:01  -  2021 07:00  --------------------------------------------------------  IN: 0 mL / OUT: 1050 mL / NET: -1050 mL    PHYSICAL EXAM:  GENERAL: NAD,   EYES: conjunctiva and sclera clear  ENMT: Moist mucous membranes  NECK: Supple, No JVD, Normal thyroid  CHEST/LUNG: non labored, cta b/l  HEART: Regular rate and rhythm; No murmurs, rubs, or gallops  ABDOMEN: Soft, Nontender, Nondistended; Bowel sounds present  EXTREMITIES:  2+ Peripheral Pulses, No clubbing, cyanosis, or edema  LYMPH: No lymphadenopathy noted  SKIN: No rashes or lesions

## 2021-06-09 LAB
ANION GAP SERPL CALC-SCNC: 12 MMOL/L — SIGNIFICANT CHANGE UP (ref 5–17)
APTT BLD: 70.7 SEC — HIGH (ref 27.5–35.5)
BUN SERPL-MCNC: 36 MG/DL — HIGH (ref 7–23)
CALCIUM SERPL-MCNC: 8.8 MG/DL — SIGNIFICANT CHANGE UP (ref 8.4–10.5)
CHLORIDE SERPL-SCNC: 102 MMOL/L — SIGNIFICANT CHANGE UP (ref 96–108)
CO2 SERPL-SCNC: 23 MMOL/L — SIGNIFICANT CHANGE UP (ref 22–31)
CREAT SERPL-MCNC: 1.52 MG/DL — HIGH (ref 0.5–1.3)
GLUCOSE SERPL-MCNC: 107 MG/DL — HIGH (ref 70–99)
HCT VFR BLD CALC: 28.5 % — LOW (ref 39–50)
HGB BLD-MCNC: 9 G/DL — LOW (ref 13–17)
MCHC RBC-ENTMCNC: 30.6 PG — SIGNIFICANT CHANGE UP (ref 27–34)
MCHC RBC-ENTMCNC: 31.6 GM/DL — LOW (ref 32–36)
MCV RBC AUTO: 96.9 FL — SIGNIFICANT CHANGE UP (ref 80–100)
NRBC # BLD: 0 /100 WBCS — SIGNIFICANT CHANGE UP (ref 0–0)
PLATELET # BLD AUTO: 228 K/UL — SIGNIFICANT CHANGE UP (ref 150–400)
POTASSIUM SERPL-MCNC: 3.5 MMOL/L — SIGNIFICANT CHANGE UP (ref 3.5–5.3)
POTASSIUM SERPL-SCNC: 3.5 MMOL/L — SIGNIFICANT CHANGE UP (ref 3.5–5.3)
RBC # BLD: 2.94 M/UL — LOW (ref 4.2–5.8)
RBC # FLD: 13.7 % — SIGNIFICANT CHANGE UP (ref 10.3–14.5)
SODIUM SERPL-SCNC: 137 MMOL/L — SIGNIFICANT CHANGE UP (ref 135–145)
TROPONIN T, HIGH SENSITIVITY RESULT: 118 NG/L — HIGH (ref 0–51)
WBC # BLD: 8.73 K/UL — SIGNIFICANT CHANGE UP (ref 3.8–10.5)
WBC # FLD AUTO: 8.73 K/UL — SIGNIFICANT CHANGE UP (ref 3.8–10.5)

## 2021-06-09 RX ORDER — FUROSEMIDE 40 MG
40 TABLET ORAL DAILY
Refills: 0 | Status: DISCONTINUED | OUTPATIENT
Start: 2021-06-09 | End: 2021-06-11

## 2021-06-09 RX ORDER — POTASSIUM CHLORIDE 20 MEQ
40 PACKET (EA) ORAL ONCE
Refills: 0 | Status: COMPLETED | OUTPATIENT
Start: 2021-06-09 | End: 2021-06-09

## 2021-06-09 RX ADMIN — SENNA PLUS 2 TABLET(S): 8.6 TABLET ORAL at 21:08

## 2021-06-09 RX ADMIN — Medication 40 MILLIEQUIVALENT(S): at 12:13

## 2021-06-09 RX ADMIN — ATORVASTATIN CALCIUM 10 MILLIGRAM(S): 80 TABLET, FILM COATED ORAL at 21:08

## 2021-06-09 RX ADMIN — Medication 325 MILLIGRAM(S): at 12:12

## 2021-06-09 RX ADMIN — Medication 40 MILLIGRAM(S): at 14:28

## 2021-06-09 RX ADMIN — FLUDROCORTISONE ACETATE 0.4 MILLIGRAM(S): 0.1 TABLET ORAL at 05:12

## 2021-06-09 RX ADMIN — Medication 1 APPLICATION(S): at 17:54

## 2021-06-09 RX ADMIN — HEPARIN SODIUM 900 UNIT(S)/HR: 5000 INJECTION INTRAVENOUS; SUBCUTANEOUS at 03:42

## 2021-06-09 RX ADMIN — CEFTRIAXONE 100 MILLIGRAM(S): 500 INJECTION, POWDER, FOR SOLUTION INTRAMUSCULAR; INTRAVENOUS at 21:07

## 2021-06-09 RX ADMIN — Medication 1 APPLICATION(S): at 05:12

## 2021-06-09 RX ADMIN — SERTRALINE 50 MILLIGRAM(S): 25 TABLET, FILM COATED ORAL at 12:12

## 2021-06-09 RX ADMIN — Medication 1 TABLET(S): at 12:12

## 2021-06-09 RX ADMIN — Medication 1 MILLIGRAM(S): at 12:12

## 2021-06-09 RX ADMIN — Medication 1 APPLICATION(S): at 12:12

## 2021-06-09 NOTE — PROGRESS NOTE ADULT - PROBLEM SELECTOR PLAN 5
-  -noted on tele on day of admission. no further episodes since.   -heparin drip for 48 hours total.  -if further episodes will need to consider anticoagulation    Patient of Dr. Patrick Castillo ([facility])    Jai Jay D.O.  [phone].

## 2021-06-09 NOTE — PROGRESS NOTE ADULT - ASSESSMENT
89 year old male with significant past medical history of prostate cancer s/p radiation, CAD, PVD s/p L SFA stent, CKD stage III, chronic anemia, recent admission for hematuria, is brought from [facility] for complaint of chest pain.

## 2021-06-09 NOTE — PROGRESS NOTE ADULT - ASSESSMENT
Acute hypoxemic respiratory failure  Bilateral moderte pleural effusions, due to CHF - repeat TTE pending  Likely NSTEMI  EColi bacteruria  No clear evidence of pneumonia on CT  CKD  Prostate CA    REC    DIurese with IV lasix as tolerated  TTE pending: suspect reduced systolic and/or diastolic function  Monitor effusions: no plan for thorocentesis at this time  Titrate nasal cannula to target sat 90-92%  Full dose AC per cardiology  LE dopplers  Catheter associated UTI- continue ceftriaxone per ID

## 2021-06-09 NOTE — PHYSICAL THERAPY INITIAL EVALUATION ADULT - PRECAUTIONS/LIMITATIONS, REHAB EVAL
Pain is non radiating and he is unable to recall when it started and any alleviating or aggravating factors. He reports some dyspnea, orthopnea - feels more comfortable sleeping with elevated back of bed. Denies any other issue, but during ROS and exam - endorsed lower extremity which he believes started after sanchez cath was placed. He doesn't recall how long the hematuria has been ongoing. Per nurse at [facility] - ASA and plavix were stopped recently due to hematuria abut she is unable to recall when. Pt doesn't recall being seen by a urologist since his last discharge. Chronic issues include hearing impairment (not wearing hearing aides), vision impairment (needs reading glasses), back pain, R knee pain. His last BM was yesterday morning - which was soft non bloody. CTChest;   Small pericardial effusion.Moderate bilateral pleural effusions with associated compressive atelectasis.Age indeterminate moderate to severe vertebral compression deformities of T7, T8 and T12.CXR: Bilateral pleural effusion. Bibasilar pneumonia../fall precautions/hearing precautions/oxygen therapy device and L/min

## 2021-06-09 NOTE — PHYSICAL THERAPY INITIAL EVALUATION ADULT - PERTINENT HX OF CURRENT PROBLEM, REHAB EVAL
89y/o M  with significant past medical history of prostate cancer s/p radiation, CAD, PVD s/p L SFA stent, CKD stage III, chronic anemia, recent admission for hematuria, is brought from [facility] for complaint of chest pain. At the time of admission patient is AxOx2 (confused about time), presently he continues to have mid sternal chest pain which he describes as "funny feeling" and also feels a "tinge" in the same spot with deep breaths.

## 2021-06-09 NOTE — PROGRESS NOTE ADULT - SUBJECTIVE AND OBJECTIVE BOX
SUBJECTIVE / OVERNIGHT EVENTS:    no events overnight  patient seen and examined  denies cp/sob  no n/v/d  afebrile  urine color improved    --------------------------------------------------------------------------------------------  LABS:                        9.0    8.73  )-----------( 228      ( 09 Jun 2021 03:07 )             28.5     06-09    137  |  102  |  36<H>  ----------------------------<  107<H>  3.5   |  23  |  1.52<H>    Ca    8.8      09 Jun 2021 03:07      PTT - ( 09 Jun 2021 03:07 )  PTT:70.7 sec  CAPILLARY BLOOD GLUCOSE                RADIOLOGY & ADDITIONAL TESTS:    Imaging Personally Reviewed:  [x] YES  [ ] NO    Consultant(s) Notes Reviewed:  [x] YES  [ ] NO    MEDICATIONS  (STANDING):  atorvastatin 10 milliGRAM(s) Oral at bedtime  BACItracin   Ointment 1 Application(s) Topical daily  cefTRIAXone   IVPB 1000 milliGRAM(s) IV Intermittent every 24 hours  ferrous    sulfate 325 milliGRAM(s) Oral daily  fludroCORTISONE 0.4 milliGRAM(s) Oral daily  folic acid 1 milliGRAM(s) Oral daily  furosemide   Injectable 40 milliGRAM(s) IV Push daily  heparin  Infusion.  Unit(s)/Hr (8 mL/Hr) IV Continuous <Continuous>  multivitamin 1 Tablet(s) Oral daily  senna 2 Tablet(s) Oral at bedtime  sertraline 50 milliGRAM(s) Oral daily  silver sulfADIAZINE 1% Cream 1 Application(s) Topical two times a day    MEDICATIONS  (PRN):  acetaminophen   Tablet .. 650 milliGRAM(s) Oral every 6 hours PRN Mild Pain/Moderate Pain  bisacodyl Suppository 10 milliGRAM(s) Rectal daily PRN Constipation  heparin   Injectable 4100 Unit(s) IV Push every 6 hours PRN For aPTT less than 40  polyethylene glycol 3350 17 Gram(s) Oral daily PRN Constipation      Care Discussed with Consultants/Other Providers [x] YES  [ ] NO    Vital Signs Last 24 Hrs  T(C): 36.7 (09 Jun 2021 12:40), Max: 36.8 (08 Jun 2021 17:00)  T(F): 98.1 (09 Jun 2021 12:40), Max: 98.2 (08 Jun 2021 17:00)  HR: 87 (09 Jun 2021 12:40) (81 - 88)  BP: 126/74 (09 Jun 2021 12:40) (116/63 - 126/74)  BP(mean): --  RR: 18 (09 Jun 2021 12:40) (18 - 18)  SpO2: 98% (09 Jun 2021 12:40) (96% - 98%)  I&O's Summary    08 Jun 2021 07:01  -  09 Jun 2021 07:00  --------------------------------------------------------  IN: 0 mL / OUT: 1245 mL / NET: -1245 mL    PHYSICAL EXAM:  GENERAL: NAD,   EYES: conjunctiva and sclera clear  ENMT: Moist mucous membranes  NECK: Supple, No JVD, Normal thyroid  CHEST/LUNG: non labored, cta b/l  HEART: Regular rate and rhythm; No murmurs, rubs, or gallops  ABDOMEN: Soft, Nontender, Nondistended; Bowel sounds present  : sanchez catheter   EXTREMITIES:  2+ Peripheral Pulses, No clubbing, cyanosis, or edema  LYMPH: No lymphadenopathy noted  SKIN: No rashes or lesions         SUBJECTIVE / OVERNIGHT EVENTS:    no events overnight  patient seen and examined  denies cp/sob  no n/v/d  afebrile  urine color improved    --------------------------------------------------------------------------------------------  LABS:                        9.0    8.73  )-----------( 228      ( 09 Jun 2021 03:07 )             28.5     06-09    137  |  102  |  36<H>  ----------------------------<  107<H>  3.5   |  23  |  1.52<H>    Ca    8.8      09 Jun 2021 03:07      PTT - ( 09 Jun 2021 03:07 )  PTT:70.7 sec  CAPILLARY BLOOD GLUCOSE                RADIOLOGY & ADDITIONAL TESTS:    Imaging Personally Reviewed:  [x] YES  [ ] NO    Consultant(s) Notes Reviewed:  [x] YES  [ ] NO    MEDICATIONS  (STANDING):  atorvastatin 10 milliGRAM(s) Oral at bedtime  BACItracin   Ointment 1 Application(s) Topical daily  cefTRIAXone   IVPB 1000 milliGRAM(s) IV Intermittent every 24 hours  ferrous    sulfate 325 milliGRAM(s) Oral daily  fludroCORTISONE 0.4 milliGRAM(s) Oral daily  folic acid 1 milliGRAM(s) Oral daily  furosemide   Injectable 40 milliGRAM(s) IV Push daily  heparin  Infusion.  Unit(s)/Hr (8 mL/Hr) IV Continuous <Continuous>  multivitamin 1 Tablet(s) Oral daily  senna 2 Tablet(s) Oral at bedtime  sertraline 50 milliGRAM(s) Oral daily  silver sulfADIAZINE 1% Cream 1 Application(s) Topical two times a day    MEDICATIONS  (PRN):  acetaminophen   Tablet .. 650 milliGRAM(s) Oral every 6 hours PRN Mild Pain/Moderate Pain  bisacodyl Suppository 10 milliGRAM(s) Rectal daily PRN Constipation  heparin   Injectable 4100 Unit(s) IV Push every 6 hours PRN For aPTT less than 40  polyethylene glycol 3350 17 Gram(s) Oral daily PRN Constipation      Care Discussed with Consultants/Other Providers [x] YES  [ ] NO    Vital Signs Last 24 Hrs  T(C): 36.7 (09 Jun 2021 12:40), Max: 36.8 (08 Jun 2021 17:00)  T(F): 98.1 (09 Jun 2021 12:40), Max: 98.2 (08 Jun 2021 17:00)  HR: 87 (09 Jun 2021 12:40) (81 - 88)  BP: 126/74 (09 Jun 2021 12:40) (116/63 - 126/74)  BP(mean): --  RR: 18 (09 Jun 2021 12:40) (18 - 18)  SpO2: 98% (09 Jun 2021 12:40) (96% - 98%)  I&O's Summary    08 Jun 2021 07:01  -  09 Jun 2021 07:00  --------------------------------------------------------  IN: 0 mL / OUT: 1245 mL / NET: -1245 mL      PHYSICAL EXAM:  GENERAL: NAD, comfortable  EYES: conjunctiva and sclera clear  ENMT: Moist mucous membranes  NECK: Supple, No JVD, Normal thyroid  CHEST/LUNG: non labored, cta b/l  HEART: Regular rate and rhythm; No murmurs, rubs, or gallops  ABDOMEN: Soft, Nontender, Nondistended; Bowel sounds present  : sanchez catheter   EXTREMITIES:  2+ Peripheral Pulses, No clubbing, cyanosis, or edema  LYMPH: No lymphadenopathy noted  SKIN: No rashes or lesions

## 2021-06-09 NOTE — PROGRESS NOTE ADULT - SUBJECTIVE AND OBJECTIVE BOX
[facility], Division of Infectious Diseases  DORIAN Patino Y. Patel, S. Shah  [phone]  ([phone] - weekdays after 5pm and weekends)    Name: KIANNA GUILLEN  Age/Gender: 89y Male  MRN: [id number]    Interval History:  Patient seen at bedside this morning, reports feeling well.  Having breakfast, no new complaints.   Notes reviewed. No concerning overnight events  Afebrile.     Allergies: No Known Allergies      Objective:  Vitals:   T(F): 97.5 (06-09-21 @ 04:35), Max: 98.2 (06-08-21 @ 17:00)  HR: 85 (06-09-21 @ 09:25) (74 - 88)  BP: 117/72 (06-09-21 @ 09:25) (110/72 - 120/74)  RR: 18 (06-09-21 @ 04:35) (18 - 18)  SpO2: 96% (06-09-21 @ 04:35) (96% - 99%)  Physical Examination:  General: no acute distress  HEENT: NC/AT, EOMI, anicteric, neck supple  Cardio: S1, S2 heard, RRR, no murmurs  Resp: CTA bilaterally, no rales/wheezes/rhonchi  Abd: soft, NT, ND, + BS  Neuro: awake, alert, no obvious focal deficits  Ext: no edema or cyanosis, moving extremities  Skin: warm, dry, no visible rash  Psych: appropriate affect and mood for situation  Lines: PIV    Laboratory Studies:  CBC:                       9.0    8.73  )-----------( 228      ( 09 Jun 2021 03:07 )             28.5     WBC Trend:  8.73 06-09-21 @ 03:07  10.55 06-08-21 @ 00:08  8.12 06-07-21 @ 05:54  9.11 06-06-21 @ 13:11    CMP: 06-09    137  |  102  |  36<H>  ----------------------------<  107<H>  3.5   |  23  |  1.52<H>    Ca    8.8      09 Jun 2021 03:07    Microbiology: reviewed   Culture - Urine (collected 06-07-21 @ 00:48)  Source: .Urine Catheterized  Preliminary Report (06-08-21 @ 04:48):    >100,000 CFU/ml Escherichia coli    6/7 - Strep pneumoniae ur ag - negative   6/7 - RVP+COVID - negative   6/6 - COVID-19 PCR - negative    Radiology: reviewed   CT Chest No Cont (06.08.21 @ 16:52) >******PRELIMINARY REPORT******   INTERPRETATION:  Small pericardial effusion. Moderate bilateral pleural effusions with associated compressive atelectasis. Age indeterminate moderate to severe vertebral compression deformities of T7, T8 and T12.    Medications:  acetaminophen   Tablet .. 650 milliGRAM(s) Oral every 6 hours PRN  atorvastatin 10 milliGRAM(s) Oral at bedtime  BACItracin   Ointment 1 Application(s) Topical daily  bisacodyl Suppository 10 milliGRAM(s) Rectal daily PRN  cefTRIAXone   IVPB 1000 milliGRAM(s) IV Intermittent every 24 hours  ferrous    sulfate 325 milliGRAM(s) Oral daily  fludroCORTISONE 0.4 milliGRAM(s) Oral daily  folic acid 1 milliGRAM(s) Oral daily  heparin   Injectable 4100 Unit(s) IV Push every 6 hours PRN  heparin  Infusion.  Unit(s)/Hr IV Continuous <Continuous>  multivitamin 1 Tablet(s) Oral daily  polyethylene glycol 3350 17 Gram(s) Oral daily PRN  potassium chloride   Powder 40 milliEquivalent(s) Oral once  senna 2 Tablet(s) Oral at bedtime  sertraline 50 milliGRAM(s) Oral daily  silver sulfADIAZINE 1% Cream 1 Application(s) Topical two times a day    Antimicrobials:  cefTRIAXone   IVPB 1000 milliGRAM(s) IV Intermittent every 24 hours - started 6/6  s/p azithromycin 6/6-6/8

## 2021-06-09 NOTE — PROGRESS NOTE ADULT - SUBJECTIVE AND OBJECTIVE BOX
[facility] Cardiology Progress Note  _______________________________    Pt. seen and examined. No new cardiac-related complaints.    Telemetry -sinus 90-100s    T(C): 36.4 (06-09-21 @ 04:35), Max: 36.8 (06-08-21 @ 17:00)  HR: 88 (06-09-21 @ 04:35) (74 - 88)  BP: 120/68 (06-09-21 @ 04:35) (110/72 - 120/74)  RR: 18 (06-09-21 @ 04:35) (18 - 18)  SpO2: 96% (06-09-21 @ 04:35) (96% - 99%)  I&O's Summary    08 Jun 2021 07:01  -  09 Jun 2021 07:00  --------------------------------------------------------  IN: 0 mL / OUT: 1245 mL / NET: -1245 mL        PHYSICAL EXAM:  GENERAL: Alert, NAD.  NECK: Supple  CHEST/LUNG: Clear to auscultation bilaterally; No wheezes, rales, or rhonchi.  HEART: S1 S2 normal, RRR; No murmurs, rubs, or gallops  ABDOMEN: Soft, Nondistended  EXTREMITIES:  No LE edema.      LABS:                        9.0    8.73  )-----------( 228      ( 09 Jun 2021 03:07 )             28.5     06-09    137  |  102  |  36<H>  ----------------------------<  107<H>  3.5   |  23  |  1.52<H>    Ca    8.8      09 Jun 2021 03:07      PT/INR - ( 07 Jun 2021 14:01 )   PT: 12.8 sec;   INR: 1.07 ratio         PTT - ( 09 Jun 2021 03:07 )  PTT:70.7 sec              MEDICATIONS  (STANDING):  atorvastatin 10 milliGRAM(s) Oral at bedtime  BACItracin   Ointment 1 Application(s) Topical daily  cefTRIAXone   IVPB 1000 milliGRAM(s) IV Intermittent every 24 hours  ferrous    sulfate 325 milliGRAM(s) Oral daily  fludroCORTISONE 0.4 milliGRAM(s) Oral daily  folic acid 1 milliGRAM(s) Oral daily  heparin  Infusion.  Unit(s)/Hr (8 mL/Hr) IV Continuous <Continuous>  multivitamin 1 Tablet(s) Oral daily  potassium chloride   Powder 40 milliEquivalent(s) Oral once  senna 2 Tablet(s) Oral at bedtime  sertraline 50 milliGRAM(s) Oral daily  silver sulfADIAZINE 1% Cream 1 Application(s) Topical two times a day    MEDICATIONS  (PRN):  acetaminophen   Tablet .. 650 milliGRAM(s) Oral every 6 hours PRN Mild Pain/Moderate Pain  bisacodyl Suppository 10 milliGRAM(s) Rectal daily PRN Constipation  heparin   Injectable 4100 Unit(s) IV Push every 6 hours PRN For aPTT less than 40  polyethylene glycol 3350 17 Gram(s) Oral daily PRN Constipation        RADIOLOGY & ADDITIONAL TESTS:

## 2021-06-09 NOTE — PHYSICAL THERAPY INITIAL EVALUATION ADULT - ADDITIONAL COMMENTS
Pt lives with spouse in 2 bedroom apartment, states ~28 steps to negotiate denies elevator. Pt states independent at home with no AD however unable to provide functional status at [facility] rehab.

## 2021-06-09 NOTE — PROGRESS NOTE ADULT - ATTENDING COMMENTS
Pt seen and examined with the NP. Agree with the assessment and plan.  Vitals, labs and radiology results personally reviewed.  Above note edited as appropriate.  Discussed with the pt and answered all questions.    Pt seen and examined at bedside.   No overnight event. Feeling better.  no cp, no sob, no n/v/d.   on hep drip for possible NSTEMI. 48 hrs now. Trop still elevated but in the setting of CKD. will trend, repeat trop in am.  Likely will dc hep drip tomorrow morning.   Hematuria color better this evening. Urology on the case.   ID f/u appreciated.  the wife at bedside. all questions answered.     Dr. Heredia ([facility])  [phone] Pt seen and examined with the NP. Agree with the assessment and plan.  Vitals, labs and radiology results personally reviewed.  Above note edited as appropriate.  Discussed with the pt and answered all questions.    Pt seen and examined at bedside.   No overnight event. Feeling better.  no cp, no sob, no n/v/d.   on hep drip for possible NSTEMI. 48 hrs now. Trop still elevated but in the setting of CKD. will trend, repeat trop in am.  Likely will dc hep drip tomorrow morning but if a.flutter recurrent, then will need risk/benefits discussion with wife in regards to long term full AC given risk of hematuria with recent hospitalization and PRBC transfusion.   Hematuria color better this evening. Urology on the case.   ID f/u appreciated.  the wife at bedside. all questions answered.     Dr. Heredia ([facility])  [phone]

## 2021-06-09 NOTE — PROGRESS NOTE ADULT - ASSESSMENT
Patient is a 89 year old male with significant past medical history of prostate cancer s/p radiation, CAD, PVD s/p L SFA stent, CKD stage III, chronic anemia, recent admission for hematuria, is brought from [facility] for complaint of chest pain. Patient admitted for further evaluation and management of hematuria, pneumonia and rule out ACS/cardiomyopathy.     Hematuria with possible UTI   - has sanchez in place since last admission for urinary retention - now noted with hematuria, was on aspirin/plavix but recently stopped due to hematuria   - UA taken from sanchez with , large LE, many bacteria, 1504 RBC, large blood - possible UTI vs colonization   - Urology following - sanchez exchanged 6/6 for 22F six eye catheter and irrigated, CBI not warranted    - Urine culture with >100k E.coli -- follow for sensitivities    - continue on ceftriaxone 1g IV Q24h     Volume overload, suspected cardiomyopathy/CHF   - CXR with b/l pleural effusion and bibasilar pneumonia, imaging reviewed   - RVP, COVID and Strep pneumoniae ur ag negatlive, legionella ur ag pending   - CT chest imaging reviewed - prelim report small pericardial effusion, mod b/l pleural effusions with compressive atelectasis, no infiltrate or consolidation seen - no pneumonia    - Pulmonary following    - Cardiology following, s/p lasix, has elevated troponin, not a candidate for invasive cardiac eval    CKD stage 3   - no need for renal adjustment with current antibiotics       Reed Sue M.D.  [facility], Division of Infectious Diseases  [phone]  After 5pm on weekdays and all day on weekends - please call [phone]

## 2021-06-09 NOTE — PROGRESS NOTE ADULT - SUBJECTIVE AND OBJECTIVE BOX
Follow-up Pulm Progress Note  Arpit Rendon MD  [phone]    Feeling better today: stable resp status on nasal cannula  CT Chest reviewed: bilateral moderate pleural effusions with compressive atelectasis  TTE pending  Afebrile on ceftriaxone    Medications:  Vital Signs Last 24 Hrs  T(C): 36.7 (09 Jun 2021 12:40), Max: 36.8 (08 Jun 2021 17:00)  T(F): 98.1 (09 Jun 2021 12:40), Max: 98.2 (08 Jun 2021 17:00)  HR: 87 (09 Jun 2021 12:40) (81 - 88)  BP: 126/74 (09 Jun 2021 12:40) (116/63 - 126/74)  BP(mean): --  RR: 18 (09 Jun 2021 12:40) (18 - 18)  SpO2: 98% (09 Jun 2021 12:40) (96% - 98%)      06-08 @ 07:01  -  06-09 @ 07:00  --------------------------------------------------------  IN: 0 mL / OUT: 1245 mL / NET: -1245 mL        LABS:                        9.0    8.73  )-----------( 228      ( 09 Jun 2021 03:07 )             28.5     06-09    137  |  102  |  36<H>  ----------------------------<  107<H>  3.5   |  23  |  1.52<H>    Ca    8.8      09 Jun 2021 03:07        PTT - ( 09 Jun 2021 03:07 )  PTT:70.7 sec  Procalcitonin, Serum: 0.13 ng/mL (06-07-21 @ 05:54)        CULTURES:  Culture Results:   >100,000 CFU/ml Escherichia coli (06-07 @ 00:48)    Most recent blood culture -- 06-07 @ 00:48   -- -- .Urine Catheterized 06-07 @ 00:48      Physical Examination:  PULM:   CVS: Regular rate and rhythm, no murmurs, rubs, or gallops  ABD: Soft, non-tender  EXT:  No clubbing, cyanosis, or edema    RADIOLOGY REVIEWED  CXR:    CT chest:    TTE:

## 2021-06-09 NOTE — PROGRESS NOTE ADULT - ASSESSMENT
Patient is a 89 year old male with significant past medical history of prostate cancer s/p radiation, CAD, PVD s/p L SFA stent, CKD stage III, chronic anemia, recent admission for hematuria, is brought from [facility] for complaint of chest pain. Patient is admitted for further evaluation and management for hematuria, pneumonia and to rule out ACS/cardiomyopathy.     # Chest pain:  Likely msk related pain however cannot rule out NSTEMI in the setting of rising troponin  Trend troponin until downtrending  proBNP 2k  4/2021 TTE unremarkable, LVEF 55% per records  Aflutter noted on tele on day of admission - no further episodes since   Heparin drip for total of 48 hours  ASA & Plavix   Monitor on tele  Repeat TTE (limited study) to evaluate for wall motion abnormalities  Given hematuria, CKD patient is not a candidate for invasive cardiac eval such as cardiac cath at this time   Chest x-ray with BL pleural effusions, mod b/l pleural effusions with compressive atelectasis, no infiltrate or consolidation seen - no pna  IV Lasix started  Follow up cards recs    # Hematuria:  Follows Iain Stafford  Consult urology noted  no clots, sanchez-clear urine  Monitor HH  + hematuria overnight  22F 3-way placed, no clot evacuated, color improved to clear peach  CBI not warranted at this time  Monitor urine outpt/color  Trend H/H, SCr, transfuse per primary team protocol  UCx >100k E.coli -- follow for sensitivities  Cont Ceftriaxone 1G Q24H  ASA/Plavix okay to continue from  standpoint as long as urine color and H/H remain stable   If color worsens or clots formed, please call  for reevaluation [phone]  Follow up urology recs    # Anemia:   AOCD and acute blood loss 2/2 hematuria   Monitor HH- stable  Keep active T&S, transfuse to keep Hgb >8   Tx  of hematuria as above     # CAD (coronary artery disease):  Plavix and ASA ok to continue  Continue Lipitor    # Orthostatic hypotension:  Continue Florinef    # PAD (peripheral artery disease):  PAD s/p angioplasty with stent of left SFA was started on Plavix (4/16/21)  Follows Dr. Urias outpatient  Plavix and ASA to cont    # Prostate CA:   Hx of prostate ca s/p radiation about ?15 years ago    # Stage 3 chronic kidney disease:  CKD stage III, baseline Cr ~ 1.8-1.9  Follows Dr. Chan outpatient  Cr 1.68  Appears to be around baseline  Continue to monitor    # Need for prophylactic measure:  DVT PPX: SCDs  DASH diet  Continue pressure ulcer preventive measures per nursing   OOB to chair with assistance

## 2021-06-09 NOTE — PHYSICAL THERAPY INITIAL EVALUATION ADULT - BED MOBILITY LIMITATIONS, REHAB EVAL
pt did not complete supine to sit transfer secondary to increased agitation stating he was upset and did not wish to continue the IE/decreased ability to use arms for pushing/pulling/decreased ability to use legs for bridging/pushing/impaired ability to control trunk for mobility

## 2021-06-10 DIAGNOSIS — J90 PLEURAL EFFUSION, NOT ELSEWHERE CLASSIFIED: ICD-10-CM

## 2021-06-10 LAB
-  AMIKACIN: SIGNIFICANT CHANGE UP
-  AMOXICILLIN/CLAVULANIC ACID: SIGNIFICANT CHANGE UP
-  AMPICILLIN/SULBACTAM: SIGNIFICANT CHANGE UP
-  AMPICILLIN: SIGNIFICANT CHANGE UP
-  AZTREONAM: SIGNIFICANT CHANGE UP
-  CEFAZOLIN: SIGNIFICANT CHANGE UP
-  CEFEPIME: SIGNIFICANT CHANGE UP
-  CEFOXITIN: SIGNIFICANT CHANGE UP
-  CEFTRIAXONE: SIGNIFICANT CHANGE UP
-  CIPROFLOXACIN: SIGNIFICANT CHANGE UP
-  ERTAPENEM: SIGNIFICANT CHANGE UP
-  GENTAMICIN: SIGNIFICANT CHANGE UP
-  IMIPENEM: SIGNIFICANT CHANGE UP
-  LEVOFLOXACIN: SIGNIFICANT CHANGE UP
-  MEROPENEM: SIGNIFICANT CHANGE UP
-  NITROFURANTOIN: SIGNIFICANT CHANGE UP
-  PIPERACILLIN/TAZOBACTAM: SIGNIFICANT CHANGE UP
-  TIGECYCLINE: SIGNIFICANT CHANGE UP
-  TOBRAMYCIN: SIGNIFICANT CHANGE UP
-  TRIMETHOPRIM/SULFAMETHOXAZOLE: SIGNIFICANT CHANGE UP
APTT BLD: 24.9 SEC — LOW (ref 27.5–35.5)
CULTURE RESULTS: SIGNIFICANT CHANGE UP
HCT VFR BLD CALC: 27.4 % — LOW (ref 39–50)
HGB BLD-MCNC: 8.6 G/DL — LOW (ref 13–17)
LEGIONELLA AB SER-ACNC: <0.91 — SIGNIFICANT CHANGE UP (ref 0–0.9)
MCHC RBC-ENTMCNC: 30.3 PG — SIGNIFICANT CHANGE UP (ref 27–34)
MCHC RBC-ENTMCNC: 31.4 GM/DL — LOW (ref 32–36)
MCV RBC AUTO: 96.5 FL — SIGNIFICANT CHANGE UP (ref 80–100)
METHOD TYPE: SIGNIFICANT CHANGE UP
NRBC # BLD: 0 /100 WBCS — SIGNIFICANT CHANGE UP (ref 0–0)
ORGANISM # SPEC MICROSCOPIC CNT: SIGNIFICANT CHANGE UP
ORGANISM # SPEC MICROSCOPIC CNT: SIGNIFICANT CHANGE UP
PLATELET # BLD AUTO: 238 K/UL — SIGNIFICANT CHANGE UP (ref 150–400)
RBC # BLD: 2.84 M/UL — LOW (ref 4.2–5.8)
RBC # FLD: 13.9 % — SIGNIFICANT CHANGE UP (ref 10.3–14.5)
SPECIMEN SOURCE: SIGNIFICANT CHANGE UP
TROPONIN T, HIGH SENSITIVITY RESULT: 102 NG/L — HIGH (ref 0–51)
WBC # BLD: 7.92 K/UL — SIGNIFICANT CHANGE UP (ref 3.8–10.5)
WBC # FLD AUTO: 7.92 K/UL — SIGNIFICANT CHANGE UP (ref 3.8–10.5)

## 2021-06-10 PROCEDURE — 93970 EXTREMITY STUDY: CPT | Mod: 26

## 2021-06-10 RX ADMIN — Medication 1 TABLET(S): at 12:19

## 2021-06-10 RX ADMIN — ATORVASTATIN CALCIUM 10 MILLIGRAM(S): 80 TABLET, FILM COATED ORAL at 21:46

## 2021-06-10 RX ADMIN — CEFTRIAXONE 100 MILLIGRAM(S): 500 INJECTION, POWDER, FOR SOLUTION INTRAMUSCULAR; INTRAVENOUS at 21:47

## 2021-06-10 RX ADMIN — Medication 1 APPLICATION(S): at 05:54

## 2021-06-10 RX ADMIN — Medication 650 MILLIGRAM(S): at 19:25

## 2021-06-10 RX ADMIN — Medication 1 APPLICATION(S): at 12:19

## 2021-06-10 RX ADMIN — Medication 40 MILLIGRAM(S): at 05:41

## 2021-06-10 RX ADMIN — Medication 650 MILLIGRAM(S): at 18:39

## 2021-06-10 RX ADMIN — SENNA PLUS 2 TABLET(S): 8.6 TABLET ORAL at 21:46

## 2021-06-10 RX ADMIN — Medication 1 MILLIGRAM(S): at 12:19

## 2021-06-10 RX ADMIN — Medication 1 APPLICATION(S): at 18:38

## 2021-06-10 RX ADMIN — SERTRALINE 50 MILLIGRAM(S): 25 TABLET, FILM COATED ORAL at 12:19

## 2021-06-10 RX ADMIN — FLUDROCORTISONE ACETATE 0.4 MILLIGRAM(S): 0.1 TABLET ORAL at 05:41

## 2021-06-10 RX ADMIN — Medication 325 MILLIGRAM(S): at 12:19

## 2021-06-10 NOTE — DIETITIAN NUTRITION RISK NOTIFICATION - TREATMENT: THE FOLLOWING DIET HAS BEEN RECOMMENDED
Diet, Regular:   DASH/TLC {Sodium & Cholesterol Restricted} (DASH)  Supplement Feeding Modality:  Oral  Ensure Enlive Cans or Servings Per Day:  1       Frequency:  Two Times a day (06-10-21 @ 12:52) [Active]

## 2021-06-10 NOTE — PROGRESS NOTE ADULT - SUBJECTIVE AND OBJECTIVE BOX
SUBJECTIVE / OVERNIGHT EVENTS:  No overnight events.  Pt feels well.   Denied cp, sob, n/v/d.  no abdominal pain. No HA/dizziness.   completed 48 hrs of hep gtt, discontinued  sanchez with jose color urine. no hematuria.       --------------------------------------------------------------------------------------------  LABS:                        8.6    7.92  )-----------( 238      ( 10 Aydin 2021 07:02 )             27.4     06-09    137  |  102  |  36<H>  ----------------------------<  107<H>  3.5   |  23  |  1.52<H>    Ca    8.8      09 Jun 2021 03:07      PTT - ( 10 Aydin 2021 07:02 )  PTT:24.9 sec  CAPILLARY BLOOD GLUCOSE        RADIOLOGY & ADDITIONAL TESTS:    Imaging Personally Reviewed:  [x] YES  [ ] NO    Consultant(s) Notes Reviewed:  [x] YES  [ ] NO    MEDICATIONS  (STANDING):  atorvastatin 10 milliGRAM(s) Oral at bedtime  BACItracin   Ointment 1 Application(s) Topical daily  cefTRIAXone   IVPB 1000 milliGRAM(s) IV Intermittent every 24 hours  ferrous    sulfate 325 milliGRAM(s) Oral daily  fludroCORTISONE 0.4 milliGRAM(s) Oral daily  folic acid 1 milliGRAM(s) Oral daily  furosemide   Injectable 40 milliGRAM(s) IV Push daily  multivitamin 1 Tablet(s) Oral daily  senna 2 Tablet(s) Oral at bedtime  sertraline 50 milliGRAM(s) Oral daily  silver sulfADIAZINE 1% Cream 1 Application(s) Topical two times a day    MEDICATIONS  (PRN):  acetaminophen   Tablet .. 650 milliGRAM(s) Oral every 6 hours PRN Mild Pain/Moderate Pain  bisacodyl Suppository 10 milliGRAM(s) Rectal daily PRN Constipation  polyethylene glycol 3350 17 Gram(s) Oral daily PRN Constipation      Care Discussed with Consultants/Other Providers [x] YES  [ ] NO    Vital Signs Last 24 Hrs  T(C): 36.4 (10 Aydin 2021 11:01), Max: 36.5 (09 Jun 2021 18:15)  T(F): 97.5 (10 Aydin 2021 11:01), Max: 97.7 (09 Jun 2021 18:15)  HR: 84 (10 Aydin 2021 11:01) (84 - 103)  BP: 114/71 (10 Aydin 2021 11:01) (105/63 - 117/67)  BP(mean): --  RR: 17 (10 Aydin 2021 11:01) (17 - 18)  SpO2: 96% (10 Aydin 2021 11:01) (96% - 98%)  I&O's Summary    09 Jun 2021 07:01  -  10 Aydin 2021 07:00  --------------------------------------------------------  IN: 0 mL / OUT: 2500 mL / NET: -2500 mL    10 Aydin 2021 07:01  -  10 Aydin 2021 16:10  --------------------------------------------------------  IN: 300 mL / OUT: 800 mL / NET: -500 mL      PHYSICAL EXAM:  GENERAL: NAD, thin elderly, comfortable, lying in bed  HEAD:  Atraumatic, Normocephalic  EYES: EOMI, PERRLA, conjunctiva and sclera clear  NECK: Supple, No JVD  CHEST/LUNG: mild decrease breath sounds bilaterally; No wheeze   HEART: Regular rate and rhythm; No murmurs, rubs, or gallops  ABDOMEN: Soft, Nontender, Nondistended; Bowel sounds present  Neuro: AAOx2, forgetful, no focal weakness   : Sanchez in place, jose color urine, neg CVAT   EXTREMITIES:  2+ Peripheral Pulses, No clubbing, cyanosis, or edema  SKIN: No rashes or lesions

## 2021-06-10 NOTE — DIETITIAN INITIAL EVALUATION ADULT. - COLLABORATION WITH OTHER PROVIDERS
Recommended to MANJINDER Mann at [phone] to continue multivitamin and add vitamin C to promote wound healing.

## 2021-06-10 NOTE — PROGRESS NOTE ADULT - PROBLEM SELECTOR PLAN 5
-  -noted on ct chest, likely HFpEF exacerbation.   -lasix 40 mg iv daily.   -wean o2 as tolerated.   -supplement potassium and mag to keep lytes above 4 and 2 respectively.   -check tte -small to moderate bilateral pleural effusions.  -noted on ct chest, likely HFpEF exacerbation.   -lasix 40 mg iv daily.   -wean o2 as tolerated.   -supplement potassium and mag to keep lytes above 4 and 2 respectively.   -check tte

## 2021-06-10 NOTE — PROGRESS NOTE ADULT - ASSESSMENT
89 year old male with significant past medical history of prostate cancer s/p radiation, CAD, PVD s/p L SFA stent, CKD stage III, chronic anemia, recent admission for hematuria, is brought from [facility] for complaint of chest pain. Patient is admitted for further evaluation and management for hematuria, pneumonia and to rule out ACS/cardiomyopathy.     # Chest pain:  Likely msk related pain vs. NSTEMI in the setting of elevated troponin  troponin stable, downtrending, proBNP 2k  4/2021 TTE normal LV, LVEF 55% per records. Repeat TTE pending.   Aflutter noted on tele on day of admission - no further episodes since   Heparin drip completed for a total of 48 hours  ASA & Plavix restarted. (on hold in rehab due to hematuria)  c/w tele monitoring. remain in sinus.   Given hematuria, CKD patient is not a candidate for invasive cardiac eval such as cardiac cath at this time   Chest x-ray with BL pleural effusions, mod b/l pleural effusions with compressive atelectasis, no infiltrate or consolidation seen - no pna  IV Lasix started. pulm/card f/u appreciated.     # Hematuria:  Follows Dr. Yadav, Iain  urology on the case  no clots, sanchez - clear urine  Monitor HH  + hematuria resolved. s/p 22F 3-way placed, no clot evacuated, color improved to clear peach  CBI not warranted at this time, Monitor urine outpt/color  monitor hgb.   UCx >100k E.coli -- follow for sensitivities  c/w abx, plan for 7 days of Ceftriaxone 1G Q24H  ASA/Plavix okay to continue from  standpoint as long as urine color and H/H remain stable     # Anemia:   AOCD and acute blood loss 2/2 hematuria   Monitor HH- stable  Keep active T&S, transfuse to keep Hgb >8   Tx  of hematuria as above     # CAD (coronary artery disease):  Plavix and ASA ok to continue  Continue Lipitor    # Orthostatic hypotension:  Continue Florinef    # PAD (peripheral artery disease):  PAD s/p angioplasty with stent of left SFA was started on Plavix (4/16/21)  Follows Dr. Urias outpatient  Plavix and ASA to cont    # Prostate CA:   Hx of prostate ca s/p radiation about ?15 years ago    # Stage 3 chronic kidney disease:  CKD stage III, baseline Cr ~ 1.8-1.9  Follows Dr. Chan outpatient  Cr 1.68  Appears to be around baseline  Continue to monitor    # Need for prophylactic measure:  DVT PPX: SCDs  DASH diet  Continue pressure ulcer preventive measures per nursing   OOB to chair with assistance

## 2021-06-10 NOTE — DIETITIAN INITIAL EVALUATION ADULT. - REASON FOR ADMISSION
Per internal medicine: Patient is a 89 year old male with significant past medical history of prostate cancer s/p radiation, CAD, PVD s/p L SFA stent, CKD stage III, chronic anemia, recent admission for hematuria, is brought from [facility] for complaint of chest pain. Patient is admitted for further evaluation and management for hematuria, pneumonia and to rule out ACS/cardiomyopathy.

## 2021-06-10 NOTE — DIETITIAN INITIAL EVALUATION ADULT. - OTHER INFO
Per previous RD note, UBW is 154 pounds. Current weight 145 pounds, suggesting 6% weight loss in unknown time frame. Nutrition Focused Physical Exam refused, noted with overt signs of muscle depletion to temples, clavicles, shoulders. Of note, pt was found to be malnourished 2 months ago as per previous RD assessment.    No overt GI distress per team. Tolerating diet order but taking only 10-50% of meals provided. Last BM 6/9; ordered for bowel regimen. Also ordered for micronutrient supplementation. Per flowsheets, noted with pressure injuries: coccyx midline II, spine I, DTI upper mid back. 3+ bilateral edema noted in the legs.

## 2021-06-10 NOTE — DIETITIAN INITIAL EVALUATION ADULT. - PERTINENT LABORATORY DATA
Labs: 06-10 @ 07:02: Sodium --, Potassium --, Calcium --, Magnesium --, Phosphorus --, BUN --, Creatinine --, Glucose --, Alk Phos --, ALT/SGPT --, AST/SGOT --, Albumin --, Prealbumin --, Total Bilirubin --, Hemoglobin 8.6<L>, Hematocrit 27.4<L>, Ferritin --, C-Reactive Protein --, Creatine Kinase <<27>

## 2021-06-10 NOTE — PROGRESS NOTE ADULT - ASSESSMENT
Acute hypoxemic respiratory failure  Bilateral moderte pleural effusions, due to CHF - repeat TTE pending  Likely NSTEMI  EColi bacteruria  No clear evidence of pneumonia on CT  CKD  Prostate CA    REC    Conitnue daily IV lasix  F/U CXR on SAT to assess effusions  TTE unremarkable, though still assume effusions likely secondary to diastolic CHF  If no sign decrease in fluid over weekend, would consider thorocentesis. Acute hypoxemic respiratory failure  Bilateral moderte pleural effusions, due to CHF - repeat TTE pending  Likely NSTEMI  EColi bacteruria  No clear evidence of pneumonia on CT  CKD  Prostate CA  ADDENDUM: Moderate pericardial effusion with sugg tamponade phsyiology on TTE    REC    Cardiology f/u  F/U CXR on SAT to assess effusions  TTE unremarkable, though still assume effusions likely secondary to diastolic CHF  If no sign decrease in fluid over weekend, would consider thorocentesis.  ADDEND: Pericardiocentesis planned per cardiology

## 2021-06-10 NOTE — DIETITIAN INITIAL EVALUATION ADULT. - ADD RECOMMEND
Obtain more subjective information as pt amenable. Provide nutrition education as appropriate. Monitor tolerance to diet prescription, nutritional intake, GI function, weight trends, labs and skin integrity.

## 2021-06-10 NOTE — PROGRESS NOTE ADULT - ASSESSMENT
89 year old male with significant past medical history of prostate cancer s/p radiation, CAD, PVD s/p L SFA stent, CKD stage III, chronic anemia, recent admission for hematuria, is brought from [facility] for complaint of chest pain.

## 2021-06-10 NOTE — PROGRESS NOTE ADULT - SUBJECTIVE AND OBJECTIVE BOX
[facility] Cardiology Progress Note  _______________________________    Pt. seen and examined. No new cardiac-related complaints.    Telemetry -sinus 90s    T(C): 36.5 (06-10-21 @ 05:38), Max: 36.7 (06-09-21 @ 12:40)  HR: 85 (06-10-21 @ 05:38) (85 - 103)  BP: 117/67 (06-10-21 @ 05:38) (105/63 - 126/74)  RR: 18 (06-10-21 @ 05:38) (18 - 18)  SpO2: 98% (06-10-21 @ 05:38) (98% - 98%)  I&O's Summary    09 Jun 2021 07:01  -  10 Aydin 2021 07:00  --------------------------------------------------------  IN: 0 mL / OUT: 2500 mL / NET: -2500 mL        PHYSICAL EXAM:  GENERAL: Alert, NAD.  NECK: Supple  CHEST/LUNG: Clear to anterior auscultation bilaterally; No significant wheezes, rales, or rhonchi.  HEART: S1 S2 normal, RRR; No murmurs, rubs, or gallops  ABDOMEN: Soft, Nondistended  EXTREMITIES:  No LE edema.      LABS:                        8.6    7.92  )-----------( 238      ( 10 Aydin 2021 07:02 )             27.4     06-09    137  |  102  |  36<H>  ----------------------------<  107<H>  3.5   |  23  |  1.52<H>    Ca    8.8      09 Jun 2021 03:07      PTT - ( 10 Aydin 2021 07:02 )  PTT:24.9 sec              MEDICATIONS  (STANDING):  atorvastatin 10 milliGRAM(s) Oral at bedtime  BACItracin   Ointment 1 Application(s) Topical daily  cefTRIAXone   IVPB 1000 milliGRAM(s) IV Intermittent every 24 hours  ferrous    sulfate 325 milliGRAM(s) Oral daily  fludroCORTISONE 0.4 milliGRAM(s) Oral daily  folic acid 1 milliGRAM(s) Oral daily  furosemide   Injectable 40 milliGRAM(s) IV Push daily  multivitamin 1 Tablet(s) Oral daily  senna 2 Tablet(s) Oral at bedtime  sertraline 50 milliGRAM(s) Oral daily  silver sulfADIAZINE 1% Cream 1 Application(s) Topical two times a day    MEDICATIONS  (PRN):  acetaminophen   Tablet .. 650 milliGRAM(s) Oral every 6 hours PRN Mild Pain/Moderate Pain  bisacodyl Suppository 10 milliGRAM(s) Rectal daily PRN Constipation  polyethylene glycol 3350 17 Gram(s) Oral daily PRN Constipation        RADIOLOGY & ADDITIONAL TESTS:

## 2021-06-10 NOTE — PROGRESS NOTE ADULT - ASSESSMENT
Patient is a 89 year old male with significant past medical history of prostate cancer s/p radiation, CAD, PVD s/p L SFA stent, CKD stage III, chronic anemia, recent admission for hematuria, is brought from [facility] for complaint of chest pain. Patient admitted for further evaluation and management of hematuria, pneumonia and rule out ACS/cardiomyopathy.     Hematuria with possible UTI   - has sanchez in place since last admission for urinary retention - now noted with hematuria, was on aspirin/plavix but recently stopped due to hematuria   - UA taken from sanchez with , large LE, many bacteria, 1504 RBC, large blood - possible UTI vs colonization   - Urology following - sanchez exchanged 6/6 for 22F six eye catheter and irrigated, CBI not warranted    - Urine culture with >100k pansensitive E.coli    - continue ceftriaxone 1g IV Q24h - complete total 5 day course - last day today 6/10    Volume overload, suspected cardiomyopathy/CHF   - CXR with b/l pleural effusion and bibasilar pneumonia, imaging reviewed   - RVP, COVID and Strep pneumoniae ur ag negatlive, legionella ur ag pending   - CT chest imaging reviewed - small-mod b/l pleural effusions and small pericardial effusion; no infiltrate or consolidation seen - no pneumonia    - Pulmonary following    - Cardiology following, s/p lasix, has elevated troponin, not a candidate for invasive cardiac eval    CKD stage 3   - no need for renal adjustment with current antibiotic      ID will sign off at this time but remains available for any further questions/concerns.    Reed Sue M.D.  [facility], Division of Infectious Diseases  [phone]  After 5pm on weekdays and all day on weekends - please call [phone]

## 2021-06-10 NOTE — DIETITIAN INITIAL EVALUATION ADULT. - ORAL INTAKE PTA/DIET HISTORY
Patient seen, per team has periods of confusion, not interested in dietitian interview. RD inquired about PO intake at home and patient continuously repeated "now is not a good time, there is nothing you can do for me". Did admit that he drank Ensure at home and is willing to receive some here. No other information able to be obtained at this time.     Per chart, NKFA. Per H&P, PTA was taking vitamin D, multivitamin, iron, folic acid. Patient seen, per team has periods of confusion, not interested in dietitian interview. RD inquired about PO intake and patient continuously repeated "now is not a good time, there is nothing you can do for me". Did admit that he drank Ensure before admission and is willing to receive some here. No other information able to be obtained at this time.     Per chart, NKFA. Per H&P, PTA was taking vitamin D, multivitamin, iron, folic acid.

## 2021-06-10 NOTE — DIETITIAN INITIAL EVALUATION ADULT. - PROBLEM SELECTOR PLAN 1
#Rule out ACS  #Pneumonia  #Volume overloaded, suspect cardiomyopathy     Continues to have mid sternal chest pain. Afebrile, hemodynamically stable, 1005 on NC 2L  Chest xray with BL pleural effusions. On exam 2+ pitting edema LE upto hips   Trop 75>78. proBNP 2k. EKG sinus rhythm with ?sinus arrythmia in V1  4/2021 TTE unremarkable, LVEF 55% per records    Will give lasix 20mg IV x1 - and re asses volume status   Start Ceftriaxone, Azithromycin IV  Hold ASA given hematuria  Recheck EKG. Check Echo  Check procal, RVP, sputum cx, urine legionella strep pneum ags   Monitor BMP, Is/O, trend troponin  Wean oxygen supp as tolerated   Continue to monitor on tele  Incentive spirometry  Cardiology consult

## 2021-06-10 NOTE — PROGRESS NOTE ADULT - SUBJECTIVE AND OBJECTIVE BOX
Follow-up Pulm Progress Note  Arpit Rendon MD  [phone]    Stable cardio resp status  TTE: grossly normal LV/RV function, no sign valvular disease  Appears comfortable at rest on nasal cannula      Vital Signs Last 24 Hrs  T(C): 36.4 (10 Aydin 2021 11:01), Max: 36.5 (09 Jun 2021 18:15)  T(F): 97.5 (10 Aydin 2021 11:01), Max: 97.7 (09 Jun 2021 18:15)  HR: 84 (10 Aydin 2021 11:01) (84 - 103)  BP: 114/71 (10 Aydin 2021 11:01) (105/63 - 117/67)  BP(mean): --  RR: 17 (10 Aydin 2021 11:01) (17 - 18)  SpO2: 96% (10 Aydin 2021 11:01) (96% - 98%)                          8.6    7.92  )-----------( 238      ( 10 Aydin 2021 07:02 )             27.4       06-09    137  |  102  |  36<H>  ----------------------------<  107<H>  3.5   |  23  |  1.52<H>    Ca    8.8      09 Jun 2021 03:07      CULTURES:  Culture Results:   >100,000 CFU/ml Escherichia coli (06-07 @ 00:48)    Most recent blood culture -- 06-07 @ 00:48   -- -- .Urine Catheterized 06-07 @ 00:48      Physical Examination:  PULM: Diminished BS bases  CVS: Regular rate and rhythm, no murmurs, rubs, or gallops  ABD: Soft, non-tender  EXT:  No clubbing, cyanosis, or edema    RADIOLOGY REVIEWED  CXR:    CT chest:    TTE:   Follow-up Pulm Progress Note  Arpit Rendon MD  [phone]    Stable resp status  TTE: grossly normal LV/RV function, no sign valvular disease ADDENDUM: moderate pericardial effusion with diastolic collapse   Appears comfortable at rest on nasal cannula      Vital Signs Last 24 Hrs  T(C): 36.4 (10 Aydin 2021 11:01), Max: 36.5 (09 Jun 2021 18:15)  T(F): 97.5 (10 Aydin 2021 11:01), Max: 97.7 (09 Jun 2021 18:15)  HR: 84 (10 Aydin 2021 11:01) (84 - 103)  BP: 114/71 (10 Aydin 2021 11:01) (105/63 - 117/67)  BP(mean): --  RR: 17 (10 Aydin 2021 11:01) (17 - 18)  SpO2: 96% (10 Aydin 2021 11:01) (96% - 98%)                          8.6    7.92  )-----------( 238      ( 10 Aydin 2021 07:02 )             27.4       06-09    137  |  102  |  36<H>  ----------------------------<  107<H>  3.5   |  23  |  1.52<H>    Ca    8.8      09 Jun 2021 03:07      CULTURES:  Culture Results:   >100,000 CFU/ml Escherichia coli (06-07 @ 00:48)    Most recent blood culture -- 06-07 @ 00:48   -- -- .Urine Catheterized 06-07 @ 00:48      Physical Examination:  PULM: Diminished BS bases  CVS: Regular rate and rhythm, no murmurs, rubs, or gallops  ABD: Soft, non-tender  EXT:  No clubbing, cyanosis, or edema    RADIOLOGY REVIEWED  CXR:    CT chest:    TTE:

## 2021-06-10 NOTE — PROGRESS NOTE ADULT - SUBJECTIVE AND OBJECTIVE BOX
[facility], Division of Infectious Diseases  DORIAN Patino Y. Patel, S. Shah  [phone]  ([phone] - weekdays after 5pm and weekends)    Name: KIANNA GUILLEN  Age/Gender: 89y Male  MRN: [id number]    Interval History:  Patient seen at bedside this morning, reports feeling well.  Has no new complaints, denies fever, chills or any pain.  Notes reviewed. No concerning overnight events. Afebrile.     Allergies: No Known Allergies    Objective:  Vitals:   T(F): 97.7 (06-10-21 @ 05:38), Max: 98.1 (06-09-21 @ 12:40)  HR: 85 (06-10-21 @ 05:38) (85 - 103)  BP: 117/67 (06-10-21 @ 05:38) (105/63 - 126/74)  RR: 18 (06-10-21 @ 05:38) (18 - 18)  SpO2: 98% (06-10-21 @ 05:38) (98% - 98%)  Physical Examination:  General: no acute distress, nontoxic appearing  HEENT: NC/AT, anicteric, neck supple  Respiratory: no acc muscle use, breathing comfortably  Cardiovascular: S1 S2 present, normal rate  Gastrointestinal: normal appearing, nondistended  Neuro: AAOxself and place, no obvious focal deficits  Extremities: no edema, no cyanosis  Skin: warm, dry, no visible rash  Psych: appropriate affect and mood for situation  Lines: PIV  Wang: clear light yellow urine     Laboratory Studies:  CBC:                       8.6    7.92  )-----------( 238      ( 10 Aydin 2021 07:02 )             27.4     WBC trend:  WBC Count: 7.92 K/uL (06-10-21 @ 07:02)  WBC Count: 8.73 K/uL (06-09-21 @ 03:07)  WBC Count: 10.55 K/uL (06-08-21 @ 00:08)  WBC Count: 8.12 K/uL (06-07-21 @ 05:54)  WBC Count: 9.11 K/uL (06-06-21 @ 13:11)    CMP: 06-09    137  |  102  |  36<H>  ----------------------------<  107<H>  3.5   |  23  |  1.52<H>    Ca    8.8      09 Jun 2021 03:07    Microbiology: reviewed   Culture - Urine (collected 06-07-21 @ 00:48)  Source: .Urine Catheterized  Final Report (06-10-21 @ 06:41):    >100,000 CFU/ml Escherichia coli  Organism: Escherichia coli (06-10-21 @ 06:41)  Organism: Escherichia coli (06-10-21 @ 06:41)      -  Amikacin: S <=16      -  Amoxicillin/Clavulanic Acid: S <=8/4      -  Ampicillin: S <=8 These ampicillin results predict results for amoxicillin      -  Ampicillin/Sulbactam: S <=4/2 Enterobacter, Citrobacter, and Serratia may develop resistance during prolonged therapy (3-4 days)      -  Aztreonam: S <=4      -  Cefazolin: S <=2 (MIC_CL_COM_ENTERIC_CEFAZU) For uncomplicated UTI with K. pneumoniae, E. coli, or P. mirablis: CRUZ <=16 is sensitive and CRUZ >=32 is resistant. This also predicts results for oral agents cefaclor, cefdinir, cefpodoxime, cefprozil, cefuroxime axetil, cephalexin and locarbef for uncomplicated UTI. Note that some isolates may be susceptible to these agents while testing resistant to cefazolin.      -  Cefepime: S <=2      -  Cefoxitin: S <=8      -  Ceftriaxone: S <=1 Enterobacter, Citrobacter, and Serratia may develop resistance during prolonged therapy      -  Ciprofloxacin: S <=0.25      -  Ertapenem: S <=0.5      -  Gentamicin: S <=2      -  Imipenem: S <=1      -  Levofloxacin: S <=0.5      -  Meropenem: S <=1      -  Nitrofurantoin: S <=32 Should not be used to treat pyelonephritis      -  Piperacillin/Tazobactam: S <=8      -  Tigecycline: S <=2      -  Tobramycin: S <=2      -  Trimethoprim/Sulfamethoxazole: S <=0.5/9.5      Method Type: CRUZ    6/7 - Strep pneumoniae ur ag - negative   6/7 - RVP+COVID - negative   6/6 - COVID-19 PCR - negative    Radiology: reviewed   CT Chest No Cont (06.08.21 @ 16:52) >IMPRESSION: Small to moderate bilateral pleural effusions Small pericardial effusion.    Medications:  MEDICATIONS  (STANDING):  atorvastatin 10 milliGRAM(s) Oral at bedtime  BACItracin   Ointment 1 Application(s) Topical daily  cefTRIAXone   IVPB 1000 milliGRAM(s) IV Intermittent every 24 hours  ferrous    sulfate 325 milliGRAM(s) Oral daily  fludroCORTISONE 0.4 milliGRAM(s) Oral daily  folic acid 1 milliGRAM(s) Oral daily  furosemide   Injectable 40 milliGRAM(s) IV Push daily  multivitamin 1 Tablet(s) Oral daily  senna 2 Tablet(s) Oral at bedtime  sertraline 50 milliGRAM(s) Oral daily  silver sulfADIAZINE 1% Cream 1 Application(s) Topical two times a day    MEDICATIONS  (PRN):  acetaminophen   Tablet .. 650 milliGRAM(s) Oral every 6 hours PRN Mild Pain/Moderate Pain  bisacodyl Suppository 10 milliGRAM(s) Rectal daily PRN Constipation  polyethylene glycol 3350 17 Gram(s) Oral daily PRN Constipation    Antimicrobials:  cefTRIAXone   IVPB 1000 milliGRAM(s) IV Intermittent every 24 hours - started 6/6  s/p azithromycin 6/6-6/8

## 2021-06-10 NOTE — DIETITIAN INITIAL EVALUATION ADULT. - PERTINENT MEDS FT
MEDICATIONS  (STANDING):  atorvastatin 10 milliGRAM(s) Oral at bedtime  BACItracin   Ointment 1 Application(s) Topical daily  cefTRIAXone   IVPB 1000 milliGRAM(s) IV Intermittent every 24 hours  ferrous    sulfate 325 milliGRAM(s) Oral daily  fludroCORTISONE 0.4 milliGRAM(s) Oral daily  folic acid 1 milliGRAM(s) Oral daily  furosemide   Injectable 40 milliGRAM(s) IV Push daily  multivitamin 1 Tablet(s) Oral daily  senna 2 Tablet(s) Oral at bedtime  sertraline 50 milliGRAM(s) Oral daily  silver sulfADIAZINE 1% Cream 1 Application(s) Topical two times a day    MEDICATIONS  (PRN):  acetaminophen   Tablet .. 650 milliGRAM(s) Oral every 6 hours PRN Mild Pain/Moderate Pain  bisacodyl Suppository 10 milliGRAM(s) Rectal daily PRN Constipation  polyethylene glycol 3350 17 Gram(s) Oral daily PRN Constipation

## 2021-06-11 LAB
HCT VFR BLD CALC: 28.6 % — LOW (ref 39–50)
HGB BLD-MCNC: 9 G/DL — LOW (ref 13–17)
MCHC RBC-ENTMCNC: 30.2 PG — SIGNIFICANT CHANGE UP (ref 27–34)
MCHC RBC-ENTMCNC: 31.5 GM/DL — LOW (ref 32–36)
MCV RBC AUTO: 96 FL — SIGNIFICANT CHANGE UP (ref 80–100)
NRBC # BLD: 0 /100 WBCS — SIGNIFICANT CHANGE UP (ref 0–0)
PLATELET # BLD AUTO: 202 K/UL — SIGNIFICANT CHANGE UP (ref 150–400)
RBC # BLD: 2.98 M/UL — LOW (ref 4.2–5.8)
RBC # FLD: 13.8 % — SIGNIFICANT CHANGE UP (ref 10.3–14.5)
WBC # BLD: 10.69 K/UL — HIGH (ref 3.8–10.5)
WBC # FLD AUTO: 10.69 K/UL — HIGH (ref 3.8–10.5)

## 2021-06-11 PROCEDURE — 93306 TTE W/DOPPLER COMPLETE: CPT | Mod: 26

## 2021-06-11 PROCEDURE — 71250 CT THORAX DX C-: CPT | Mod: 26

## 2021-06-11 RX ORDER — ASCORBIC ACID 60 MG
500 TABLET,CHEWABLE ORAL DAILY
Refills: 0 | Status: DISCONTINUED | OUTPATIENT
Start: 2021-06-11 | End: 2021-06-22

## 2021-06-11 RX ADMIN — Medication 1 APPLICATION(S): at 05:30

## 2021-06-11 RX ADMIN — Medication 1 APPLICATION(S): at 12:16

## 2021-06-11 RX ADMIN — ATORVASTATIN CALCIUM 10 MILLIGRAM(S): 80 TABLET, FILM COATED ORAL at 21:50

## 2021-06-11 RX ADMIN — Medication 1 TABLET(S): at 12:15

## 2021-06-11 RX ADMIN — FLUDROCORTISONE ACETATE 0.4 MILLIGRAM(S): 0.1 TABLET ORAL at 05:30

## 2021-06-11 RX ADMIN — Medication 325 MILLIGRAM(S): at 12:15

## 2021-06-11 RX ADMIN — Medication 1 MILLIGRAM(S): at 12:15

## 2021-06-11 RX ADMIN — Medication 1 APPLICATION(S): at 17:53

## 2021-06-11 RX ADMIN — POLYETHYLENE GLYCOL 3350 17 GRAM(S): 17 POWDER, FOR SOLUTION ORAL at 12:16

## 2021-06-11 RX ADMIN — SERTRALINE 50 MILLIGRAM(S): 25 TABLET, FILM COATED ORAL at 12:15

## 2021-06-11 RX ADMIN — Medication 40 MILLIGRAM(S): at 05:30

## 2021-06-11 RX ADMIN — SENNA PLUS 2 TABLET(S): 8.6 TABLET ORAL at 21:50

## 2021-06-11 RX ADMIN — Medication 500 MILLIGRAM(S): at 12:15

## 2021-06-11 NOTE — PROGRESS NOTE ADULT - SUBJECTIVE AND OBJECTIVE BOX
SUBJECTIVE / OVERNIGHT EVENTS:    pericardial effusion on TTE  no events overnight  patient seen and examined  denies cp/sob  no n/v/d  + sanchez with no hematuria     --------------------------------------------------------------------------------------------  LABS:                        9.0    10.69 )-----------( 202      ( 11 Jun 2021 06:59 )             28.6           PTT - ( 10 Aydin 2021 07:02 )  PTT:24.9 sec  CAPILLARY BLOOD GLUCOSE                RADIOLOGY & ADDITIONAL TESTS:    Imaging Personally Reviewed:  [x] YES  [ ] NO    Consultant(s) Notes Reviewed:  [x] YES  [ ] NO    MEDICATIONS  (STANDING):  ascorbic acid 500 milliGRAM(s) Oral daily  atorvastatin 10 milliGRAM(s) Oral at bedtime  BACItracin   Ointment 1 Application(s) Topical daily  ferrous    sulfate 325 milliGRAM(s) Oral daily  fludroCORTISONE 0.4 milliGRAM(s) Oral daily  folic acid 1 milliGRAM(s) Oral daily  furosemide   Injectable 40 milliGRAM(s) IV Push daily  multivitamin 1 Tablet(s) Oral daily  senna 2 Tablet(s) Oral at bedtime  sertraline 50 milliGRAM(s) Oral daily  silver sulfADIAZINE 1% Cream 1 Application(s) Topical two times a day    MEDICATIONS  (PRN):  acetaminophen   Tablet .. 650 milliGRAM(s) Oral every 6 hours PRN Mild Pain/Moderate Pain  bisacodyl Suppository 10 milliGRAM(s) Rectal daily PRN Constipation  polyethylene glycol 3350 17 Gram(s) Oral daily PRN Constipation      Care Discussed with Consultants/Other Providers [x] YES  [ ] NO    Vital Signs Last 24 Hrs  T(C): 36.3 (11 Jun 2021 11:12), Max: 37.1 (11 Jun 2021 05:08)  T(F): 97.3 (11 Jun 2021 11:12), Max: 98.7 (11 Jun 2021 05:08)  HR: 82 (11 Jun 2021 11:12) (82 - 103)  BP: 100/55 (11 Jun 2021 11:12) (93/54 - 107/66)  BP(mean): --  RR: 18 (11 Jun 2021 11:12) (18 - 18)  SpO2: 99% (11 Jun 2021 11:12) (93% - 99%)  I&O's Summary    10 Aydin 2021 07:01  -  11 Jun 2021 07:00  --------------------------------------------------------  IN: 615 mL / OUT: 1200 mL / NET: -585 mL      PHYSICAL EXAM:  GENERAL: NAD, thin elderly, comfortable, lying in bed  HEAD:  Atraumatic, Normocephalic  EYES: EOMI, PERRLA, conjunctiva and sclera clear  NECK: Supple, No JVD  CHEST/LUNG: mild decrease breath sounds bilaterally; No wheeze   HEART: Regular rate and rhythm; No murmurs, rubs, or gallops  ABDOMEN: Soft, Nontender, Nondistended; Bowel sounds present  Neuro: AAOx2, forgetful, no focal weakness   : Sanchez in place, jose color urine, neg CVAT   EXTREMITIES:  2+ Peripheral Pulses, No clubbing, cyanosis, or edema  SKIN: No rashes or lesions

## 2021-06-11 NOTE — PROGRESS NOTE ADULT - PROBLEM SELECTOR PLAN 5
-small to moderate bilateral pleural effusions.  -noted on ct chest, likely HFpEF exacerbation.   -lasix 40 mg iv daily.   -wean o2 as tolerated.   -supplement potassium and mag to keep lytes above 4 and 2 respectively.

## 2021-06-11 NOTE — CHART NOTE - NSCHARTNOTEFT_GEN_A_CORE
Notified that pt. with a moderate pericardial effusion with signs of tamponade physiology   Vital signs stable   Pt. seen and examined at bedside   Pt. currently asymptomatic   RN instructed to monitor closely   Dr. Heredia made aware   Dr. Jay updated will update covering cardiologist  Will await further reccs per walter Robb NP-BC

## 2021-06-11 NOTE — PROGRESS NOTE ADULT - ASSESSMENT
Acute hypoxemic respiratory failure  Bilateral moderte pleural effusions, due to CHF - repeat TTE pending  Likely NSTEMI  EColi bacteruria  No clear evidence of pneumonia on CT  CKD  Prostate CA  ADDENDUM: Moderate pericardial effusion with sugg tamponade phsyiology on TTE  Possible idiopthic pleuro-pericarditis    REC    Plan for possible IR pericardial drainge after CT imaging chest  DC lasix  Serologic screening for auto immune disorders, though unlikly  Continue nasal oxygen  Monitor effusions

## 2021-06-11 NOTE — CHART NOTE - NSCHARTNOTEFT_GEN_A_CORE
Nutrition Follow Up Note  Patient seen for: malnutrition follow-up    Chart reviewed, events noted.    Source: [x] Patient       [x] EMR        [x] RN        [] Family at bedside       [] Other:    Diet Order:   Diet, Regular:   DASH/TLC {Sodium & Cholesterol Restricted} (DASH)  Supplement Feeding Modality:  Oral  Ensure Enlive Cans or Servings Per Day:  1       Frequency:  Two Times a day (06-10-21)    - Is current order appropriate/adequate? [x] Yes  []  No:     - PO intake :   [] >75%  Adequate    [x] 50-75%  Fair       [] <50%  Poor    - Nutrition-related concerns: Pt with poor PO intake since admission, had 10-20% of all three meals yesterday. Spoke to  Kelli about her discussion with the pt's wife. Visited the pt at breakfast today and he was very pleasant, eating oatmeal, drinking coffee. Prepared bagel and cream cheese for him. (RN stated he ate 70% in total) He stated he was eating more because his mental attitude changed. Ensure open, pt requested to drink it later- he prefers chocolate and will honor that preference.     GI:  Last BM unknown. Bowel Regimen? [x] Yes   [] No    No new weights    Nutritionally Pertinent MEDICATIONS  (STANDING):  atorvastatin  ferrous    sulfate  fludroCORTISONE  folic acid  furosemide   Injectable  multivitamin  senna    Skin per nursing documentation: pressure injuries: stage II coccyx, stage I to spine, DTI upper midback  Edema: 3+ b/l legs    Estimated Needs:   [x] no change since previous assessment  [] recalculated:     Previous Nutrition Diagnoses: Severe protein-calorie malnutrition and increased nutrient needs  Nutrition Diagnosis is: [x] ongoing  [] resolved [] not applicable     New Nutrition Diagnosis: [x] Not applicable    Nutrition Care Plan:  [x] In Progress: Ensure Enlive BID, Micronutrient supplementation  [] Achieved  [] Not applicable    Nutrition Interventions:     Education Provided:       [] Yes:  [] No:        Recommendations:         [x] Discontinue current diet order. Recommend: Low sodium + Ensure BID     [x] Add micronutrient supplementation: Vitamin C     [x] Continue current micronutrient supplementation: Multivitamin + folic acid + iron     [x] Other: Discussed pt's need for some meal assistance with RN    Monitoring and Evaluation:   Continue to monitor nutritional intake, tolerance to diet prescription, weights, labs, skin integrity    Janae Verde RD, CDN. Pager: [phone]   RD remains available upon request and will follow up per protocol Nutrition Follow Up Note  Patient seen for: malnutrition follow-up    Chart reviewed, events noted.    Source: [x] Patient       [x] EMR        [x] RN        [x] Family at bedside- Shanelle      [] Other:    Diet Order:   Diet, Regular:   DASH/TLC {Sodium & Cholesterol Restricted} (DASH)  Supplement Feeding Modality:  Oral  Ensure Enlive Cans or Servings Per Day:  1       Frequency:  Two Times a day (06-10-21)    - Is current order appropriate/adequate? [x] Yes  []  No:     - PO intake :   [] >75%  Adequate    [x] 50-75%  Fair       [] <50%  Poor    - Nutrition-related concerns: Pt with poor PO intake since admission, had 10-20% of all three meals yesterday. Spoke to  Kelli about her discussion with the pt's wife. Visited the pt at breakfast today and he was very pleasant, eating oatmeal, drinking coffee. Prepared bagel and cream cheese for him. (RN stated he ate 70% in total) He stated he was eating more because his mental attitude changed. Ensure open, pt requested to drink it later- he prefers chocolate and will honor that preference.   - Visited pt after lunch- no lunch eaten though he took 100% of Ensure     Pt's wife Shanelle confirms pt had progressive wt loss, was eating poorly at home and she is concerned. Pt does take Ensure at home, though does not enjoy many protein options at home. NFKA, no difficulty chewing/swallowing.    GI:  Last BM unknown. Bowel Regimen? [x] Yes   [] No    No new weights. Current weight 145 pounds per RD assessment 6/10    Nutritionally Pertinent MEDICATIONS  (STANDING):  atorvastatin  ferrous    sulfate  fludroCORTISONE  folic acid  furosemide   Injectable  multivitamin  senna    Skin per nursing documentation: pressure injuries: stage II coccyx, stage I to spine, DTI upper midback  Edema: 3+ b/l legs    Estimated Needs:   [x] no change since previous assessment  [] recalculated:     Previous Nutrition Diagnoses: Severe protein-calorie malnutrition and increased nutrient needs  Nutrition Diagnosis is: [x] ongoing  [] resolved [] not applicable     New Nutrition Diagnosis: [x] Not applicable    Nutrition Care Plan:  [x] In Progress: Ensure Enlive BID, Micronutrient supplementation  [] Achieved  [] Not applicable    Nutrition Interventions:     Education Provided:       [] Yes:  [] No:        Recommendations:         [x] Discontinue current diet order. Recommend: Low sodium + Ensure BID. Obtained food Preferences, will honor.      [x] Add micronutrient supplementation: Vitamin C     [x] Continue current micronutrient supplementation: Multivitamin + folic acid + iron     [x] Other: Discussed pt's need for some meal assistance with RN    Monitoring and Evaluation:   Continue to monitor nutritional intake, tolerance to diet prescription, weights, labs, skin integrity    Janae Verde RD, CDN. Pager: [phone]   RD remains available upon request and will follow up per protocol Nutrition Follow Up Note  Patient seen for: malnutrition follow-up    Chart reviewed, events noted.    Source: [x] Patient       [x] EMR        [x] RN        [x] Family at bedside- Shanelle      [] Other:    Diet Order:   Diet, Regular:   DASH/TLC {Sodium & Cholesterol Restricted} (DASH)  Supplement Feeding Modality:  Oral  Ensure Enlive Cans or Servings Per Day:  1       Frequency:  Two Times a day (06-10-21)    - Is current order appropriate/adequate? [x] Yes  []  No:     - PO intake :   [] >75%  Adequate    [x] 50-75%  Fair       [] <50%  Poor    - Nutrition-related concerns: Pt with poor PO intake since admission, had 10-20% of all three meals yesterday. Spoke to  Kelli about her discussion with the pt's wife. Visited the pt at breakfast today and he was very pleasant, eating oatmeal, drinking coffee. Prepared bagel and cream cheese for him. (RN stated he ate 70% in total) He stated he was eating more because his mental attitude changed. Ensure open, pt requested to drink it later- he prefers chocolate and will honor that preference.   - Visited pt after lunch- no lunch eaten though he took 100% of Ensure     Pt's wife Shanelle confirms pt had progressive wt loss, was eating poorly at home and she is concerned. Pt does take Ensure at home, though does not enjoy many protein options at home. Pt and his wife usually eat frozen meals at home or Ensure for meals, due to easier preparation, since his wife states she also has health problems that prevent her from cooking. Shanlele is amenable to discuss meals on wheels with social work. Informed Shanelle of high sodium content in frozen meals. NFKA, no difficulty chewing/swallowing.    GI:  Last BM unknown. Bowel Regimen? [x] Yes   [] No    No new weights. Current weight 145 pounds per RD assessment 6/10    Nutritionally Pertinent MEDICATIONS  (STANDING):  atorvastatin  ferrous    sulfate  fludroCORTISONE  folic acid  furosemide   Injectable  multivitamin  senna    Skin per nursing documentation: pressure injuries: stage II coccyx, stage I to spine, DTI upper midback  Edema: 3+ b/l legs    Estimated Needs:   [x] no change since previous assessment  [] recalculated:     Previous Nutrition Diagnoses: Severe protein-calorie malnutrition and increased nutrient needs  Nutrition Diagnosis is: [x] ongoing  [] resolved [] not applicable     New Nutrition Diagnosis: [x] Not applicable    Nutrition Care Plan:  [x] In Progress: Ensure Enlive BID, Micronutrient supplementation  [] Achieved  [] Not applicable    Nutrition Interventions:     Education Provided:       [] Yes:  [] No:        Recommendations:         [x] Discontinue current diet order. Recommend: Low sodium + Ensure BID. Obtained food Preferences, will honor.      [x] Add micronutrient supplementation: Vitamin C     [x] Continue current micronutrient supplementation: Multivitamin + folic acid + iron     [x] Other: Discussed pt's need for some meal assistance with RN        - Discussed with ALEJANDRINA Landa regarding enrollment in meals on wheels program.    Monitoring and Evaluation:   Continue to monitor nutritional intake, tolerance to diet prescription, weights, labs, skin integrity    Janae Verde RD, CDN. Pager: [phone]   RD remains available upon request and will follow up per protocol

## 2021-06-11 NOTE — PROGRESS NOTE ADULT - ASSESSMENT
89 year old male with significant past medical history of prostate cancer s/p radiation, CAD, PVD s/p L SFA stent, CKD stage III, chronic anemia, recent admission for hematuria, is brought from [facility] for complaint of chest pain.

## 2021-06-11 NOTE — PROGRESS NOTE ADULT - ATTENDING COMMENTS
Pt seen and examined with the NP. Agree with the assessment and plan.  Vitals, labs and radiology results personally reviewed.  Above note edited as appropriate.  Discussed with the pt and answered all questions.    TTE shows mod pericardia effusion. Pt asymptomatic.   IR consulted, recommends CT chest. Ordered.  d/c Lasix in the meantime.  tele no events.  d/w Card and pulm.  d/w the wife at bedside in details.  c/w PRN laxatives for constipation.   DVT ppx.     Dr. Heredia ([facility])  [phone]

## 2021-06-11 NOTE — PROGRESS NOTE ADULT - SUBJECTIVE AND OBJECTIVE BOX
Pt. seen and examined. Sleeping but easily arroused.  Only c/o shoulder pain.  Echo results noted    Telemetry -  Vital Signs Last 24 Hrs  T(C): 36.3 (11 Jun 2021 11:12), Max: 37.1 (11 Jun 2021 05:08)  T(F): 97.3 (11 Jun 2021 11:12), Max: 98.7 (11 Jun 2021 05:08)  HR: 82 (11 Jun 2021 11:12) (82 - 103)  BP: 100/55 (11 Jun 2021 11:12) (93/54 - 107/66)  BP(mean): --  RR: 18 (11 Jun 2021 11:12) (18 - 18)  SpO2: 99% (11 Jun 2021 11:12) (93% - 99%)    I&O's Summary    10 Aydin 2021 07:01  -  11 Jun 2021 07:00  --------------------------------------------------------  IN: 615 mL / OUT: 1200 mL / NET: -585 mL        PHYSICAL EXAM:  GENERAL: Alert, NAD.  NECK: Supple, No JVD, no carotid bruit.  CHEST/LUNG: Clear to auscultation bilaterally; No wheezes, rales, or rhonchi.  HEART: S1 S2 normal, RRR; No rubs, or gallops  ABDOMEN: Soft, Nontender, Nondistended; Bowel sounds present  EXTREMITIES:  No LE edema.      LABS:                        9.0    10.69 )-----------( 202      ( 11 Jun 2021 06:59 )             28.6           PTT - ( 10 Aydin 2021 07:02 )  PTT:24.9 sec              MEDICATIONS  (STANDING):  ascorbic acid 500 milliGRAM(s) Oral daily  atorvastatin 10 milliGRAM(s) Oral at bedtime  BACItracin   Ointment 1 Application(s) Topical daily  ferrous    sulfate 325 milliGRAM(s) Oral daily  fludroCORTISONE 0.4 milliGRAM(s) Oral daily  folic acid 1 milliGRAM(s) Oral daily  furosemide   Injectable 40 milliGRAM(s) IV Push daily  multivitamin 1 Tablet(s) Oral daily  senna 2 Tablet(s) Oral at bedtime  sertraline 50 milliGRAM(s) Oral daily  silver sulfADIAZINE 1% Cream 1 Application(s) Topical two times a day    MEDICATIONS  (PRN):  acetaminophen   Tablet .. 650 milliGRAM(s) Oral every 6 hours PRN Mild Pain/Moderate Pain  bisacodyl Suppository 10 milliGRAM(s) Rectal daily PRN Constipation  polyethylene glycol 3350 17 Gram(s) Oral daily PRN Constipation      RADIOLOGY & ADDITIONAL TESTS:

## 2021-06-11 NOTE — CONSULT NOTE ADULT - ASSESSMENT
Assessment/Plan:   89y Male with small pericardial effusion on prior CT. Patient with interval echo showing tamponade physiology. Recommend repeat CT chest to evaluate for percutaneous window for possible drainage.   - Noncontrast CT chest pending   - will need to follow up imaging   - Patient remains hemodynamically stable   - case discussed with Dr. Levine.  - recommendations discussed with primary team.

## 2021-06-11 NOTE — PROGRESS NOTE ADULT - SUBJECTIVE AND OBJECTIVE BOX
Follow-up Pulm Progress Note  Arpit Rendon MD  [phone]    Stable on nasal cannula, though dypsneic with activity  TTE noted: Moderate pericardial effusion with thickening, sugg tamponade phsyiolo, otherwise normal LV/RV function    Vital Signs Last 24 Hrs  T(C): 36.3 (11 Jun 2021 11:12), Max: 37.1 (11 Jun 2021 05:08)  T(F): 97.3 (11 Jun 2021 11:12), Max: 98.7 (11 Jun 2021 05:08)  HR: 82 (11 Jun 2021 11:12) (82 - 103)  BP: 100/55 (11 Jun 2021 11:12) (93/54 - 107/66)  BP(mean): --  RR: 18 (11 Jun 2021 11:12) (18 - 18)  SpO2: 99% (11 Jun 2021 11:12) (93% - 99%)                        9.0    10.69 )-----------( 202      ( 11 Jun 2021 06:59 )             28.6       CULTURES:  Culture Results:   >100,000 CFU/ml Escherichia coli (06-07 @ 00:48)    Most recent blood culture -- 06-07 @ 00:48   -- -- .Urine Catheterized 06-07 @ 00:48      Physical Examination:  PULM: Diminished BS bases  CVS: Regular rate and rhythm, no murmurs, rubs, or gallops  ABD: Soft, non-tender  EXT:  No clubbing, cyanosis, or edema    RADIOLOGY REVIEWED  CXR:    CT chest:    TTE:

## 2021-06-11 NOTE — PROGRESS NOTE ADULT - ASSESSMENT
89 year old male with significant past medical history of prostate cancer s/p radiation, CAD, PVD s/p L SFA stent, CKD stage III, chronic anemia, recent admission for hematuria, is brought from [facility] for complaint of chest pain. Patient is admitted for further evaluation and management for hematuria, pneumonia and to rule out ACS/cardiomyopathy.     # Chest pain:  Likely msk related pain vs. NSTEMI in the setting of elevated troponin  troponin stable, downtrending, proBNP 2k  4/2021 TTE normal LV, LVEF 55% per records  Aflutter noted on tele on day of admission - no further episodes since   Heparin drip completed for a total of 48 hours  ASA & Plavix restarted. (on hold in rehab due to hematuria)  c/w tele monitoring. remain in sinus.   Given hematuria, CKD patient is not a candidate for invasive cardiac eval such as cardiac cath at this time   Chest x-ray with BL pleural effusions, mod b/l pleural effusions with compressive atelectasis, no infiltrate or consolidation seen - no pna, likely HF exacerbation   IV Lasix started  Repeat TTE --> moderate pericardial effusion  Tamponade suggested - follow up IR eval for pericardiocentesis  Follow up cards/IR recs     # Hematuria:  Follows Dr. Yadav, [facility]  urology on the case  no clots, sanchez - clear urine  Monitor HH  + hematuria resolved. s/p 22F 3-way placed, no clot evacuated, color improved to clear peach  CBI not warranted at this time, Monitor urine outpt/color  monitor hgb.   UCx >100k E.coli -- follow for sensitivities  c/w abx, plan for 7 days of Ceftriaxone 1G Q24H  ASA/Plavix okay to continue from  standpoint as long as urine color and H/H remain stable     # Anemia:   AOCD and acute blood loss 2/2 hematuria   Monitor HH- stable  Keep active T&S, transfuse to keep Hgb >8   Tx  of hematuria as above     # CAD (coronary artery disease):  Plavix and ASA ok to continue  Continue Lipitor    # Orthostatic hypotension:  Continue Florinef    # PAD (peripheral artery disease):  PAD s/p angioplasty with stent of left SFA was started on Plavix (4/16/21)  Follows Dr. Urias outpatient  Plavix and ASA to cont    # Prostate CA:   Hx of prostate ca s/p radiation about ?15 years ago    # Stage 3 chronic kidney disease:  CKD stage III, baseline Cr ~ 1.8-1.9  Follows Dr. Chan outpatient  Cr 1.68  Appears to be around baseline  Continue to monitor    # Need for prophylactic measure:  DVT PPX: SCDs  DASH diet  Continue pressure ulcer preventive measures per nursing   OOB to chair with assistance

## 2021-06-11 NOTE — CONSULT NOTE ADULT - SUBJECTIVE AND OBJECTIVE BOX
Vascular & Interventional Radiology Brief Consult Note    Evaluate for Procedure: Pericardial drainage catheter placement.     HPI: 89y Male with small pericardial effusion seen on prior CT of 6/8. Repeat echo shows tamponade physiology.     Allergies:   Medications (Abx/Cardiac/Anticoagulation/Blood Products)    cefTRIAXone   IVPB: 100 mL/Hr IV Intermittent (06-10 @ 21:47)  furosemide   Injectable: 40 milliGRAM(s) IV Push (06-11 @ 05:30)    Data:    T(C): 36.8  HR: 81  BP: 107/63  RR: 18  SpO2: 94%    -WBC 10.69 / HgB 9.0 / Hct 28.6 / Plt 202  -Na 137 / Cl 102 / BUN 36 / Glucose 107  -K 3.5 / CO2 23 / Cr 1.52  -ALT -- / Alk Phos -- / T.Bili --  -INR1.07    Imaging: CT chest of 6/8 with small pericardial effusion.

## 2021-06-11 NOTE — CHART NOTE - NSCHARTNOTEFT_GEN_A_CORE
IR Event Note:    IR consulted for pericardial drainage catheter after repeat echo showed cardiac tamponade. Last CT on 6/8 showed small effusion.   Would recommend repeat CT to re-evaluate effusion. Full consult to follow.

## 2021-06-12 LAB
ANION GAP SERPL CALC-SCNC: 10 MMOL/L — SIGNIFICANT CHANGE UP (ref 5–17)
ANION GAP SERPL CALC-SCNC: 13 MMOL/L — SIGNIFICANT CHANGE UP (ref 5–17)
BUN SERPL-MCNC: 31 MG/DL — HIGH (ref 7–23)
BUN SERPL-MCNC: 36 MG/DL — HIGH (ref 7–23)
CALCIUM SERPL-MCNC: 8.8 MG/DL — SIGNIFICANT CHANGE UP (ref 8.4–10.5)
CALCIUM SERPL-MCNC: 8.9 MG/DL — SIGNIFICANT CHANGE UP (ref 8.4–10.5)
CHLORIDE SERPL-SCNC: 96 MMOL/L — SIGNIFICANT CHANGE UP (ref 96–108)
CHLORIDE SERPL-SCNC: 97 MMOL/L — SIGNIFICANT CHANGE UP (ref 96–108)
CO2 SERPL-SCNC: 27 MMOL/L — SIGNIFICANT CHANGE UP (ref 22–31)
CO2 SERPL-SCNC: 28 MMOL/L — SIGNIFICANT CHANGE UP (ref 22–31)
CREAT SERPL-MCNC: 1.64 MG/DL — HIGH (ref 0.5–1.3)
CREAT SERPL-MCNC: 1.68 MG/DL — HIGH (ref 0.5–1.3)
GLUCOSE SERPL-MCNC: 117 MG/DL — HIGH (ref 70–99)
GLUCOSE SERPL-MCNC: 128 MG/DL — HIGH (ref 70–99)
HCT VFR BLD CALC: 25.6 % — LOW (ref 39–50)
HGB BLD-MCNC: 8.2 G/DL — LOW (ref 13–17)
MAGNESIUM SERPL-MCNC: 2.1 MG/DL — SIGNIFICANT CHANGE UP (ref 1.6–2.6)
MCHC RBC-ENTMCNC: 30.4 PG — SIGNIFICANT CHANGE UP (ref 27–34)
MCHC RBC-ENTMCNC: 32 GM/DL — SIGNIFICANT CHANGE UP (ref 32–36)
MCV RBC AUTO: 94.8 FL — SIGNIFICANT CHANGE UP (ref 80–100)
NRBC # BLD: 0 /100 WBCS — SIGNIFICANT CHANGE UP (ref 0–0)
PHOSPHATE SERPL-MCNC: 3 MG/DL — SIGNIFICANT CHANGE UP (ref 2.5–4.5)
PLATELET # BLD AUTO: 188 K/UL — SIGNIFICANT CHANGE UP (ref 150–400)
POTASSIUM SERPL-MCNC: 2.9 MMOL/L — CRITICAL LOW (ref 3.5–5.3)
POTASSIUM SERPL-MCNC: 3.2 MMOL/L — LOW (ref 3.5–5.3)
POTASSIUM SERPL-SCNC: 2.9 MMOL/L — CRITICAL LOW (ref 3.5–5.3)
POTASSIUM SERPL-SCNC: 3.2 MMOL/L — LOW (ref 3.5–5.3)
RBC # BLD: 2.7 M/UL — LOW (ref 4.2–5.8)
RBC # FLD: 13.8 % — SIGNIFICANT CHANGE UP (ref 10.3–14.5)
SODIUM SERPL-SCNC: 133 MMOL/L — LOW (ref 135–145)
SODIUM SERPL-SCNC: 138 MMOL/L — SIGNIFICANT CHANGE UP (ref 135–145)
WBC # BLD: 11.34 K/UL — HIGH (ref 3.8–10.5)
WBC # FLD AUTO: 11.34 K/UL — HIGH (ref 3.8–10.5)

## 2021-06-12 PROCEDURE — 93010 ELECTROCARDIOGRAM REPORT: CPT

## 2021-06-12 PROCEDURE — 73030 X-RAY EXAM OF SHOULDER: CPT | Mod: 26,50

## 2021-06-12 RX ORDER — POTASSIUM CHLORIDE 20 MEQ
20 PACKET (EA) ORAL ONCE
Refills: 0 | Status: COMPLETED | OUTPATIENT
Start: 2021-06-12 | End: 2021-06-12

## 2021-06-12 RX ORDER — POTASSIUM CHLORIDE 20 MEQ
20 PACKET (EA) ORAL
Refills: 0 | Status: COMPLETED | OUTPATIENT
Start: 2021-06-12 | End: 2021-06-12

## 2021-06-12 RX ORDER — LIDOCAINE 4 G/100G
1 CREAM TOPICAL EVERY 24 HOURS
Refills: 0 | Status: DISCONTINUED | OUTPATIENT
Start: 2021-06-12 | End: 2021-06-22

## 2021-06-12 RX ADMIN — ATORVASTATIN CALCIUM 10 MILLIGRAM(S): 80 TABLET, FILM COATED ORAL at 21:49

## 2021-06-12 RX ADMIN — Medication 20 MILLIEQUIVALENT(S): at 23:43

## 2021-06-12 RX ADMIN — SERTRALINE 50 MILLIGRAM(S): 25 TABLET, FILM COATED ORAL at 11:58

## 2021-06-12 RX ADMIN — Medication 1 MILLIGRAM(S): at 11:58

## 2021-06-12 RX ADMIN — Medication 650 MILLIGRAM(S): at 12:27

## 2021-06-12 RX ADMIN — Medication 650 MILLIGRAM(S): at 02:29

## 2021-06-12 RX ADMIN — Medication 1 TABLET(S): at 11:58

## 2021-06-12 RX ADMIN — Medication 1 APPLICATION(S): at 13:15

## 2021-06-12 RX ADMIN — Medication 1 APPLICATION(S): at 05:18

## 2021-06-12 RX ADMIN — LIDOCAINE 1 PATCH: 4 CREAM TOPICAL at 17:05

## 2021-06-12 RX ADMIN — Medication 20 MILLIEQUIVALENT(S): at 15:46

## 2021-06-12 RX ADMIN — LIDOCAINE 1 PATCH: 4 CREAM TOPICAL at 19:11

## 2021-06-12 RX ADMIN — Medication 325 MILLIGRAM(S): at 11:58

## 2021-06-12 RX ADMIN — Medication 20 MILLIEQUIVALENT(S): at 19:34

## 2021-06-12 RX ADMIN — Medication 500 MILLIGRAM(S): at 11:58

## 2021-06-12 RX ADMIN — Medication 650 MILLIGRAM(S): at 11:57

## 2021-06-12 RX ADMIN — Medication 20 MILLIEQUIVALENT(S): at 17:08

## 2021-06-12 RX ADMIN — Medication 1 APPLICATION(S): at 17:12

## 2021-06-12 RX ADMIN — FLUDROCORTISONE ACETATE 0.4 MILLIGRAM(S): 0.1 TABLET ORAL at 05:18

## 2021-06-12 RX ADMIN — Medication 650 MILLIGRAM(S): at 01:56

## 2021-06-12 NOTE — CHART NOTE - NSCHARTNOTEFT_GEN_A_CORE
Notified by spouse that patient c/o right shoulder pain. Pt seen and examined at bedside. Reports that he started having pain after being transferred from CT scan table to stretcher. Reports that he is unable to lift his arm above his head. Denies any chest pain, palpitations, SOB, abdominal pain, headache or urinary symptoms.     Vital Signs Last 24 Hrs  T(C): 36.6 (12 Jun 2021 09:06), Max: 37 (12 Jun 2021 04:22)  T(F): 97.9 (12 Jun 2021 09:06), Max: 98.6 (12 Jun 2021 04:22)  HR: 87 (12 Jun 2021 09:06) (81 - 93)  BP: 112/66 (12 Jun 2021 09:06) (98/60 - 117/71)  BP(mean): --  RR: 18 (12 Jun 2021 09:06) (18 - 18)  SpO2: 95% (12 Jun 2021 09:06) (94% - 95%)  PHYSICAL EXAM:     General: NAD, non-toxic appearance  Neuro: NC, AT, PERRLA, no focal deficits  CV: S1 S2 RRR  Resp: B/L Lungs CTA, nonlabored  Abd: soft, NT, ND, + BS X4 quadrants  Ext: + right shoulder bone protrusion > left , guarding shoulder with ROM      HPI Patient is a 89 year old male  past medical history of prostate cancer s/p radiation, CAD, PVD s/p L SFA stent, CKD stage III, chronic anemia, recent admission for hematuria, now with right  shoulder pain r/o shoulder dislocation     - Continue current meds  - Stat B/L shoulder xray  Msg left for Dr Veloz  D/w spouse at the bedside  Abdon Barragan, SHABANA Notified by spouse that patient c/o right shoulder pain. Pt seen and examined at bedside. Nurse reports that pt cardiac rhythm had 5 beats of 2:1 AV Block and Atrial Tachycardia that lasted 2 sec. Reports that he started having pain after being transferred from CT scan table to stretcher. Reports that he is unable to lift his arm above his head. Denies any chest pain, palpitations, SOB, abdominal pain, headache or urinary symptoms.     Vital Signs Last 24 Hrs  T(C): 36.6 (12 Jun 2021 09:06), Max: 37 (12 Jun 2021 04:22)  T(F): 97.9 (12 Jun 2021 09:06), Max: 98.6 (12 Jun 2021 04:22)  HR: 87 (12 Jun 2021 09:06) (81 - 93)  BP: 112/66 (12 Jun 2021 09:06) (98/60 - 117/71)  BP(mean): --  RR: 18 (12 Jun 2021 09:06) (18 - 18)  SpO2: 95% (12 Jun 2021 09:06) (94% - 95%)  PHYSICAL EXAM:     General: NAD, non-toxic appearance  Neuro: NC, AT, PERRLA, no focal deficits  CV: S1 S2 RRR  Resp: B/L Lungs CTA, nonlabored  Abd: soft, NT, ND, + BS X4 quadrants  Ext: + right shoulder bone protrusion > left , guarding shoulder with ROM      HPI Patient is a 89 year old male  past medical history of prostate cancer s/p radiation, CAD, PVD s/p L SFA stent, CKD stage III, chronic anemia, recent admission for hematuria, now with right  shoulder pain r/o shoulder dislocation     - Continue current meds  - Stat B/L shoulder xray      Cardia arrythmia  - BMP stat - k   D/w spouse at and supplemented.  F/u 10pm bmp  EKG - no changes  D/W Dr Saunders  Spouse at the bedside  made aware  Msg left for Dr Magdiel Nettles-Thania Barragan, ACP Notified by spouse that patient c/o right shoulder pain. Pt seen and examined at bedside. Nurse reports that pt cardiac rhythm had 5 beats of 2:1 AV Block and Atrial Tachycardia that lasted 2 sec. Reports that he started having pain after being transferred from CT scan table to stretcher. Reports that he is unable to lift his arm above his head. Denies any chest pain, palpitations, SOB, abdominal pain, headache or urinary symptoms.     Vital Signs Last 24 Hrs  T(C): 36.6 (12 Jun 2021 09:06), Max: 37 (12 Jun 2021 04:22)  T(F): 97.9 (12 Jun 2021 09:06), Max: 98.6 (12 Jun 2021 04:22)  HR: 87 (12 Jun 2021 09:06) (81 - 93)  BP: 112/66 (12 Jun 2021 09:06) (98/60 - 117/71)  BP(mean): --  RR: 18 (12 Jun 2021 09:06) (18 - 18)  SpO2: 95% (12 Jun 2021 09:06) (94% - 95%)  PHYSICAL EXAM:     General: NAD, non-toxic appearance  Neuro: NC, AT, PERRLA, no focal deficits  CV: S1 S2 RRR  Resp: B/L Lungs CTA, nonlabored  Abd: soft, NT, ND, + BS X4 quadrants  Ext: + right shoulder bone protrusion > left , guarding shoulder with ROM      HPI Patient is a 89 year old male  past medical history of prostate cancer s/p radiation, CAD, PVD s/p L SFA stent, CKD stage III, chronic anemia, recent admission for hematuria, now with right  shoulder pain r/o shoulder dislocation     - Continue current meds  - Stat B/L shoulder xray      Cardia arrythmia  - BMP stat - potassium 2.9; Repleted  - F/u 10pm bmp  - Stat EKG - no changes  - Continue telemetry monitoring  D/W Dr Saunders  Spouse at the bedside  made aware  Msg left for Dr Magdiel Nettles-Thania Barragan, ACP

## 2021-06-12 NOTE — CHART NOTE - NSCHARTNOTEFT_GEN_A_CORE
IR follow up note    CT chest obtained. Pericardial effusion remains small and has limited percutaneous window. As patient remains  stable, would defer percutaneous pericardial drainage at this time.     Would defer to primary team for ongoing management.    Case reviewed with Dr. Levine   Discussed with primary team.

## 2021-06-12 NOTE — PROGRESS NOTE ADULT - ASSESSMENT
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

## 2021-06-12 NOTE — PROVIDER CONTACT NOTE (CRITICAL VALUE NOTIFICATION) - ASSESSMENT
Patient is A&Ox2-3, confused at times. Patient denies any chest pain, HA or SOB. Vital signs stable see VS flowsheet. HR Atrial flutter and normal sinus rhythm on the telemetry monitor.
K+2.9, dropped from 3.5. no s/s cp sob palpitations.

## 2021-06-12 NOTE — PROVIDER CONTACT NOTE (CRITICAL VALUE NOTIFICATION) - BACKGROUND
Patient is admitted for chest pain, UTI, CHF. Patient has a history of PAD, CAD, anemia, and prostate CA.
patient diagnosed cp

## 2021-06-12 NOTE — PROGRESS NOTE ADULT - ASSESSMENT
89 year old male with significant past medical history of prostate cancer s/p radiation, CAD, PVD s/p L SFA stent, CKD stage III, chronic anemia, recent admission for hematuria, is brought from [facility] for complaint of chest pain. Patient is admitted for further evaluation and management for hematuria, pneumonia and to rule out ACS/cardiomyopathy.     # Chest pain:  Likely msk related pain vs. NSTEMI in the setting of elevated troponin  troponin stable, downtrending, proBNP 2k  4/2021 TTE normal LV, LVEF 55% per records  Aflutter noted on tele on day of admission - no further episodes since   Heparin drip completed for a total of 48 hours  ASA & Plavix restarted. (on hold in rehab due to hematuria)  c/w tele monitoring. remain in sinus.   Given hematuria, CKD patient is not a candidate for invasive cardiac eval such as cardiac cath at this time   Chest x-ray with BL pleural effusions, mod b/l pleural effusions with compressive atelectasis, no infiltrate or consolidation seen - no pna, likely HF exacerbation   IV Lasix started, but held after TTE results.  Repeat TTE --> moderate pericardial effusion  no signs of Tamponade. CT chest shows small pericardial effusion, no plan for IR eval for pericardiocentesis  f/u pulm in regards to thoracentesis.     # Hematuria:  Follows Iain Stafford  urology on the case  no clots, sanchez - clear urine  Monitor HH  + hematuria resolved. s/p 22F 3-way placed, no clot evacuated, color improved to clear peach  CBI not warranted at this time, Monitor urine outpt/color  monitor hgb.   UCx >100k E.coli -- follow for sensitivities  c/w abx, plan for 7 days of Ceftriaxone 1G Q24H  ASA/Plavix okay to continue from  standpoint as long as urine color and H/H remain stable     # Anemia:   AOCD and acute blood loss 2/2 hematuria   Monitor HH- stable  Keep active T&S, transfuse to keep Hgb >8   Tx  of hematuria as above     # CAD (coronary artery disease):  Plavix and ASA ok to continue  Continue Lipitor    # Orthostatic hypotension:  Continue Florinef    # PAD (peripheral artery disease):  PAD s/p angioplasty with stent of left SFA was started on Plavix (4/16/21)  Follows Dr. Urias outpatient  Plavix and ASA to cont    # Prostate CA:   Hx of prostate ca s/p radiation about ?15 years ago    # Stage 3 chronic kidney disease:  CKD stage III, baseline Cr ~ 1.8-1.9  Follows Dr. Chan outpatient  Cr 1.68  Appears to be around baseline  Continue to monitor    # Need for prophylactic measure:  DVT PPX: SCDs  DASH diet  Continue pressure ulcer preventive measures per nursing   OOB to chair with assistance

## 2021-06-12 NOTE — PROGRESS NOTE ADULT - SUBJECTIVE AND OBJECTIVE BOX
PH Coverage    CC: no events    TELEMETRY: af    PHYSICAL EXAM:    T(C): 37 (06-12-21 @ 04:22), Max: 37 (06-12-21 @ 04:22)  HR: 85 (06-12-21 @ 04:22) (81 - 93)  BP: 98/60 (06-12-21 @ 04:22) (98/60 - 117/71)  RR: 18 (06-12-21 @ 04:22) (18 - 18)  SpO2: 95% (06-12-21 @ 04:22) (94% - 99%)  Wt(kg): --  I&O's Summary    11 Jun 2021 07:01  -  12 Jun 2021 07:00  --------------------------------------------------------  IN: 240 mL / OUT: 2150 mL / NET: -1910 mL        Appearance: Normal	  Cardiovascular: Normal S1 S2,RRR, No JVD, No murmurs  Respiratory: Lungs clear to auscultation	  Gastrointestinal:  Soft, Non-tender, + BS	  Extremities: Normal range of motion, No clubbing, cyanosis or edema  Vascular: Peripheral pulses palpable 2+ bilaterally     LABS:	 	                          8.2    11.34 )-----------( 188      ( 12 Jun 2021 06:48 )             25.6                 CARDIAC MARKERS:        MEDICATIONS  (STANDING):  ascorbic acid 500 milliGRAM(s) Oral daily  atorvastatin 10 milliGRAM(s) Oral at bedtime  BACItracin   Ointment 1 Application(s) Topical daily  ferrous    sulfate 325 milliGRAM(s) Oral daily  fludroCORTISONE 0.4 milliGRAM(s) Oral daily  folic acid 1 milliGRAM(s) Oral daily  multivitamin 1 Tablet(s) Oral daily  senna 2 Tablet(s) Oral at bedtime  sertraline 50 milliGRAM(s) Oral daily  silver sulfADIAZINE 1% Cream 1 Application(s) Topical two times a day

## 2021-06-12 NOTE — PROGRESS NOTE ADULT - PROBLEM SELECTOR PLAN 5
-small to moderate bilateral pleural effusions.  -noted on ct chest, likely HFpEF exacerbation.   -diuretic on hold  -wean o2 as tolerated.   -supplement potassium and mag to keep lytes above 4 and 2 respectively.

## 2021-06-12 NOTE — PROGRESS NOTE ADULT - SUBJECTIVE AND OBJECTIVE BOX
SUBJECTIVE / OVERNIGHT EVENTS:  Pt seen and examined at bedside.   No overnight event.  Feeling better.  no cp, no sob, no n/v/d.   the wife at bedside  CT chest results reviewed.         --------------------------------------------------------------------------------------------  LABS:                        8.2    11.34 )-----------( 188      ( 12 Jun 2021 06:48 )             25.6     06-12    138  |  97  |  31<H>  ----------------------------<  128<H>  2.9<LL>   |  28  |  1.68<H>    Ca    8.9      12 Jun 2021 13:19  Phos  3.0     06-12  Mg     2.1     06-12        CAPILLARY BLOOD GLUCOSE                RADIOLOGY & ADDITIONAL TESTS:    Imaging Personally Reviewed:  [x] YES  [ ] NO    Consultant(s) Notes Reviewed:  [x] YES  [ ] NO    MEDICATIONS  (STANDING):  ascorbic acid 500 milliGRAM(s) Oral daily  atorvastatin 10 milliGRAM(s) Oral at bedtime  BACItracin   Ointment 1 Application(s) Topical daily  ferrous    sulfate 325 milliGRAM(s) Oral daily  fludroCORTISONE 0.4 milliGRAM(s) Oral daily  folic acid 1 milliGRAM(s) Oral daily  lidocaine   Patch 1 Patch Transdermal every 24 hours  multivitamin 1 Tablet(s) Oral daily  senna 2 Tablet(s) Oral at bedtime  sertraline 50 milliGRAM(s) Oral daily  silver sulfADIAZINE 1% Cream 1 Application(s) Topical two times a day    MEDICATIONS  (PRN):  acetaminophen   Tablet .. 650 milliGRAM(s) Oral every 6 hours PRN Mild Pain/Moderate Pain  bisacodyl Suppository 10 milliGRAM(s) Rectal daily PRN Constipation  polyethylene glycol 3350 17 Gram(s) Oral daily PRN Constipation      Care Discussed with Consultants/Other Providers [x] YES  [ ] NO    Vital Signs Last 24 Hrs  T(C): 37.1 (12 Jun 2021 21:46), Max: 37.1 (12 Jun 2021 21:46)  T(F): 98.8 (12 Jun 2021 21:46), Max: 98.8 (12 Jun 2021 21:46)  HR: 95 (12 Jun 2021 21:46) (85 - 95)  BP: 114/71 (12 Jun 2021 21:46) (98/60 - 114/71)  BP(mean): --  RR: 18 (12 Jun 2021 21:46) (18 - 18)  SpO2: 93% (12 Jun 2021 21:46) (93% - 97%)  I&O's Summary    11 Jun 2021 07:01  -  12 Jun 2021 07:00  --------------------------------------------------------  IN: 240 mL / OUT: 2150 mL / NET: -1910 mL    12 Jun 2021 07:01  -  12 Jun 2021 22:21  --------------------------------------------------------  IN: 358 mL / OUT: 250 mL / NET: 108 mL      PHYSICAL EXAM:  GENERAL: NAD, thin elderly, comfortable, lying in bed  HEAD:  Atraumatic, Normocephalic  EYES: EOMI, PERRLA, conjunctiva and sclera clear  NECK: Supple, No JVD  CHEST/LUNG: mild decrease breath sounds bilaterally; No wheeze   HEART: Regular rate and rhythm; No murmurs, rubs, or gallops  ABDOMEN: Soft, Nontender, Nondistended; Bowel sounds present  Neuro: AAOx2, forgetful, no focal weakness   : Wang in place, jose color urine, neg CVAT   EXTREMITIES:  2+ Peripheral Pulses, No clubbing, cyanosis, or edema  SKIN: No rashes or lesions

## 2021-06-12 NOTE — PROGRESS NOTE ADULT - ASSESSMENT
89 year old male with significant past medical history of prostate cancer s/p radiation, CAD, PVD s/p L SFA stent, CKD stage III, chronic anemia, recent admission for hematuria, is brought from [facility] for complaint of chest pain.

## 2021-06-13 LAB
ANION GAP SERPL CALC-SCNC: 11 MMOL/L — SIGNIFICANT CHANGE UP (ref 5–17)
BUN SERPL-MCNC: 36 MG/DL — HIGH (ref 7–23)
CALCIUM SERPL-MCNC: 8.6 MG/DL — SIGNIFICANT CHANGE UP (ref 8.4–10.5)
CHLORIDE SERPL-SCNC: 98 MMOL/L — SIGNIFICANT CHANGE UP (ref 96–108)
CO2 SERPL-SCNC: 27 MMOL/L — SIGNIFICANT CHANGE UP (ref 22–31)
CREAT SERPL-MCNC: 1.67 MG/DL — HIGH (ref 0.5–1.3)
GLUCOSE SERPL-MCNC: 115 MG/DL — HIGH (ref 70–99)
HCT VFR BLD CALC: 27.6 % — LOW (ref 39–50)
HGB BLD-MCNC: 8.8 G/DL — LOW (ref 13–17)
MAGNESIUM SERPL-MCNC: 2.2 MG/DL — SIGNIFICANT CHANGE UP (ref 1.6–2.6)
MCHC RBC-ENTMCNC: 30.4 PG — SIGNIFICANT CHANGE UP (ref 27–34)
MCHC RBC-ENTMCNC: 31.9 GM/DL — LOW (ref 32–36)
MCV RBC AUTO: 95.5 FL — SIGNIFICANT CHANGE UP (ref 80–100)
NRBC # BLD: 0 /100 WBCS — SIGNIFICANT CHANGE UP (ref 0–0)
PHOSPHATE SERPL-MCNC: 2.3 MG/DL — LOW (ref 2.5–4.5)
PLATELET # BLD AUTO: 203 K/UL — SIGNIFICANT CHANGE UP (ref 150–400)
POTASSIUM SERPL-MCNC: 3.3 MMOL/L — LOW (ref 3.5–5.3)
POTASSIUM SERPL-SCNC: 3.3 MMOL/L — LOW (ref 3.5–5.3)
RBC # BLD: 2.89 M/UL — LOW (ref 4.2–5.8)
RBC # FLD: 13.7 % — SIGNIFICANT CHANGE UP (ref 10.3–14.5)
SODIUM SERPL-SCNC: 136 MMOL/L — SIGNIFICANT CHANGE UP (ref 135–145)
WBC # BLD: 9.38 K/UL — SIGNIFICANT CHANGE UP (ref 3.8–10.5)
WBC # FLD AUTO: 9.38 K/UL — SIGNIFICANT CHANGE UP (ref 3.8–10.5)

## 2021-06-13 RX ORDER — POTASSIUM CHLORIDE 20 MEQ
40 PACKET (EA) ORAL ONCE
Refills: 0 | Status: COMPLETED | OUTPATIENT
Start: 2021-06-13 | End: 2021-06-13

## 2021-06-13 RX ADMIN — Medication 650 MILLIGRAM(S): at 14:13

## 2021-06-13 RX ADMIN — Medication 325 MILLIGRAM(S): at 11:48

## 2021-06-13 RX ADMIN — LIDOCAINE 1 PATCH: 4 CREAM TOPICAL at 16:35

## 2021-06-13 RX ADMIN — Medication 500 MILLIGRAM(S): at 11:48

## 2021-06-13 RX ADMIN — LIDOCAINE 1 PATCH: 4 CREAM TOPICAL at 05:05

## 2021-06-13 RX ADMIN — FLUDROCORTISONE ACETATE 0.4 MILLIGRAM(S): 0.1 TABLET ORAL at 05:44

## 2021-06-13 RX ADMIN — Medication 650 MILLIGRAM(S): at 15:45

## 2021-06-13 RX ADMIN — LIDOCAINE 1 PATCH: 4 CREAM TOPICAL at 19:55

## 2021-06-13 RX ADMIN — SERTRALINE 50 MILLIGRAM(S): 25 TABLET, FILM COATED ORAL at 11:48

## 2021-06-13 RX ADMIN — Medication 40 MILLIEQUIVALENT(S): at 11:49

## 2021-06-13 RX ADMIN — Medication 1 MILLIGRAM(S): at 11:48

## 2021-06-13 RX ADMIN — ATORVASTATIN CALCIUM 10 MILLIGRAM(S): 80 TABLET, FILM COATED ORAL at 22:23

## 2021-06-13 RX ADMIN — Medication 1 APPLICATION(S): at 17:31

## 2021-06-13 RX ADMIN — Medication 1 APPLICATION(S): at 11:48

## 2021-06-13 RX ADMIN — Medication 1 APPLICATION(S): at 05:44

## 2021-06-13 RX ADMIN — Medication 1 TABLET(S): at 11:48

## 2021-06-13 NOTE — PROGRESS NOTE ADULT - SUBJECTIVE AND OBJECTIVE BOX
Follow-up Pulm Progress Note  Arpit Rendon MD  [phone]    No change in cardio-resp status: hemodynamically stable  On nasal cannula        Vital Signs Last 24 Hrs  T(C): 36.7 (13 Jun 2021 04:37), Max: 37.1 (12 Jun 2021 21:46)  T(F): 98.1 (13 Jun 2021 04:37), Max: 98.8 (12 Jun 2021 21:46)  HR: 86 (13 Jun 2021 04:37) (85 - 95)  BP: 104/66 (13 Jun 2021 04:37) (104/66 - 114/71)  BP(mean): --  RR: 17 (13 Jun 2021 04:37) (17 - 18)  SpO2: 90% (13 Jun 2021 04:37) (90% - 97%)                        8.8    9.38  )-----------( 203      ( 13 Jun 2021 07:24 )             27.6       06-13    136  |  98  |  36<H>  ----------------------------<  115<H>  3.3<L>   |  27  |  1.67<H>    Ca    8.6      13 Jun 2021 07:22  Phos  2.3     06-13  Mg     2.2     06-13      Physical Examination:  PULM: Diminished BS bases  CVS: Regular rate and rhythm, no murmurs, rubs, or gallops  ABD: Soft, non-tender  EXT:  No clubbing, cyanosis, or edema    RADIOLOGY REVIEWED  CXR:    CT chest:    TTE:

## 2021-06-13 NOTE — PROGRESS NOTE ADULT - SUBJECTIVE AND OBJECTIVE BOX
CARDIOLOGY FOLLOW UP - Dr. Saunders PH coverage   DATE OF SERVICE: 06-13-21 @ 08:50    CC events noted  pt. now resting comfortably  discomfort resolved.     REVIEW OF SYSTEMS:   CONSTITUTIONAL: No fever, weight loss, or fatigue   RESPIRATORY:  No cough, wheezing, chills or hemoptysis; No SOB  CARDIOVASCULAR: No chest pain, palpitations, passing out, dizziness, or leg swelling   GASTROINTESTINAL: No abdominal or epigastric pain. No nausea, vomiting, or hematemesis, no diarreha, or constipation, No melena or hematochezia   VASCULAR: no edema.       PHYSICAL EXAM:  T(C): 36.7 (06-13-21 @ 04:37), Max: 37.1 (06-12-21 @ 21:46)  HR: 86 (06-13-21 @ 04:37) (85 - 95)  BP: 104/66 (06-13-21 @ 04:37) (104/66 - 114/71)  RR: 17 (06-13-21 @ 04:37) (17 - 18)  SpO2: 90% (06-13-21 @ 04:37) (90% - 97%)  Wt(kg): --  I&O's Summary    12 Jun 2021 07:01  -  13 Jun 2021 07:00  --------------------------------------------------------  IN: 358 mL / OUT: 975 mL / NET: -617 mL        Appearance: Normal	  Cardiovascular: Normal S1 S2,RRR, No JVD, No murmurs  Respiratory: Lungs clear to auscultation	  Gastrointestinal:  Soft, Non-tender, + BS	  Extremities: Normal range of motion, No clubbing, cyanosis or edema      HOME MEDICATIONS:  A &amp; D topical ointment: Apply topically to affected area 2 times a day (left &amp; right heel) (06 Jun 2021 16:28)  acetaminophen 325 mg oral tablet: 2 tab(s) orally every 6 hours, As needed, Mild Pain (1 - 3), Moderate Pain (4 - 6) (06 Jun 2021 16:28)  atorvastatin 10 mg oral tablet: 1 tab(s) orally once a day (at bedtime) (06 Jun 2021 16:28)  bacitracin 500 units/g topical ointment: Apply topically to affected area once a day(left lower back, left lateral back, mid back) (06 Jun 2021 16:28)  Deion-Protect topical cream: Apply topically to affected area 2 times a day(sacrum &amp; left buttock) (06 Jun 2021 16:28)  bisacodyl 10 mg rectal suppository: 1 suppository(ies) rectal once a day, As needed, Constipation (06 Jun 2021 16:28)  cholecalciferol 2000 intl units (50 mcg) oral capsule: 1 cap(s) orally once a day (06 Jun 2021 16:28)  ferrous sulfate 325 mg (65 mg elemental iron) oral tablet: 1 tab(s) orally once a day (06 Jun 2021 16:28)  fludrocortisone 0.1 mg oral tablet: 4 tab(s) orally once a day (06 Jun 2021 16:28)  folic acid 1 mg oral tablet: 1 tab(s) orally once a day (06 Jun 2021 16:28)  Metamucil 525 mg oral capsule: 1 cap(s) orally once a day (06 Jun 2021 16:28)  Multiple Vitamins oral tablet: 1 tab(s) orally once a day (06 Jun 2021 16:28)  polyethylene glycol 3350 oral powder for reconstitution: 17 gram(s) orally once a day, As Needed (06 Jun 2021 16:28)  Senna 8.6 mg oral tablet: 1 tab(s) orally once a day (at bedtime), As Needed - for constipation (06 Jun 2021 16:28)  sertraline 50 mg oral tablet: 1 tab(s) orally once a day (06 Jun 2021 16:28)  silver sulfADIAZINE 1% topical cream: Apply topically to affected area 2 times a day(right buttock) (06 Jun 2021 16:28)      MEDICATIONS  (STANDING):  ascorbic acid 500 milliGRAM(s) Oral daily  atorvastatin 10 milliGRAM(s) Oral at bedtime  BACItracin   Ointment 1 Application(s) Topical daily  ferrous    sulfate 325 milliGRAM(s) Oral daily  fludroCORTISONE 0.4 milliGRAM(s) Oral daily  folic acid 1 milliGRAM(s) Oral daily  lidocaine   Patch 1 Patch Transdermal every 24 hours  multivitamin 1 Tablet(s) Oral daily  senna 2 Tablet(s) Oral at bedtime  sertraline 50 milliGRAM(s) Oral daily  silver sulfADIAZINE 1% Cream 1 Application(s) Topical two times a day      TELEMETRY: nsr  atrial tachycardia x 2mins. 	    ECG:  	  RADIOLOGY:   DIAGNOSTIC TESTING:  [ ] Echocardiogram:  [ ]  Catheterization:  [ ] Stress Test:    OTHER: 	    LABS:	 	                                8.8    9.38  )-----------( 203      ( 13 Jun 2021 07:24 )             27.6     06-13    136  |  98  |  36<H>  ----------------------------<  115<H>  3.3<L>   |  27  |  1.67<H>    Ca    8.6      13 Jun 2021 07:22  Phos  2.3     06-13  Mg     2.2     06-13

## 2021-06-13 NOTE — PROGRESS NOTE ADULT - ASSESSMENT
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

## 2021-06-13 NOTE — PROGRESS NOTE ADULT - PROBLEM SELECTOR PLAN 5
-small to moderate bilateral pleural effusions.  -noted on ct chest, likely HFpEF exacerbation.   -diuretic on hold  -wean o2 as tolerated.   -supplement potassium and mag to keep lytes above 4 and 2 respectively.  -f/u pulm in regards to thoracentesis.

## 2021-06-13 NOTE — PROGRESS NOTE ADULT - NSPROGADDITIONALINFOA_GEN_ALL_CORE
- Dr. ZEV Heredia ([facility])  - [phone]
- Dr. ZEV Heredia ([facility])  - [phone]
d/w pt and the wife.     - [facility]  - [phone]

## 2021-06-13 NOTE — PROGRESS NOTE ADULT - SUBJECTIVE AND OBJECTIVE BOX
SUBJECTIVE / OVERNIGHT EVENTS:  right shoulder pain bothering  the wife at bedside  pt comfortable but dyspneic time to time   no prior hx of gout. no recent trauma.  uric acid ordered.  no cp, no n/v/d. no abdominal pain.  no headache, no dizziness.       --------------------------------------------------------------------------------------------  LABS:                        8.8    9.38  )-----------( 203      ( 13 Jun 2021 07:24 )             27.6     06-13    136  |  98  |  36<H>  ----------------------------<  115<H>  3.3<L>   |  27  |  1.67<H>    Ca    8.6      13 Jun 2021 07:22  Phos  2.3     06-13  Mg     2.2     06-13        CAPILLARY BLOOD GLUCOSE                RADIOLOGY & ADDITIONAL TESTS:    Imaging Personally Reviewed:  [x] YES  [ ] NO    Consultant(s) Notes Reviewed:  [x] YES  [ ] NO    MEDICATIONS  (STANDING):  ascorbic acid 500 milliGRAM(s) Oral daily  atorvastatin 10 milliGRAM(s) Oral at bedtime  BACItracin   Ointment 1 Application(s) Topical daily  ferrous    sulfate 325 milliGRAM(s) Oral daily  fludroCORTISONE 0.4 milliGRAM(s) Oral daily  folic acid 1 milliGRAM(s) Oral daily  lidocaine   Patch 1 Patch Transdermal every 24 hours  multivitamin 1 Tablet(s) Oral daily  senna 2 Tablet(s) Oral at bedtime  sertraline 50 milliGRAM(s) Oral daily  silver sulfADIAZINE 1% Cream 1 Application(s) Topical two times a day    MEDICATIONS  (PRN):  acetaminophen   Tablet .. 650 milliGRAM(s) Oral every 6 hours PRN Mild Pain/Moderate Pain  bisacodyl Suppository 10 milliGRAM(s) Rectal daily PRN Constipation  polyethylene glycol 3350 17 Gram(s) Oral daily PRN Constipation      Care Discussed with Consultants/Other Providers [x] YES  [ ] NO    Vital Signs Last 24 Hrs  T(C): 36.7 (13 Jun 2021 04:37), Max: 37.1 (12 Jun 2021 21:46)  T(F): 98.1 (13 Jun 2021 04:37), Max: 98.8 (12 Jun 2021 21:46)  HR: 86 (13 Jun 2021 04:37) (85 - 95)  BP: 104/66 (13 Jun 2021 04:37) (104/66 - 114/71)  BP(mean): --  RR: 17 (13 Jun 2021 04:37) (17 - 18)  SpO2: 90% (13 Jun 2021 04:37) (90% - 97%)  I&O's Summary    12 Jun 2021 07:01  -  13 Jun 2021 07:00  --------------------------------------------------------  IN: 358 mL / OUT: 975 mL / NET: -617 mL      PHYSICAL EXAM:  GENERAL: NAD, thin elderly, comfortable, lying in bed, on nasal canula  HEAD:  Atraumatic, Normocephalic  EYES: EOMI, PERRLA, conjunctiva and sclera clear  NECK: Supple, No JVD  CHEST/LUNG: mild decrease breath sounds bilaterally; No wheeze   HEART: Regular rate and rhythm; No murmurs, rubs, or gallops  ABDOMEN: Soft, Nontender, Nondistended; Bowel sounds present  Neuro: AAOx2, forgetful, no focal weakness   : Wang in place, jose color urine, neg CVAT   EXTREMITIES:  2+ Peripheral Pulses, No clubbing, cyanosis, or edema, right should ROM limited due to pain. tenderness at acromioclavicular joint. minimal erythema. no edema. sensitive to touch   SKIN: No rashes or lesions

## 2021-06-13 NOTE — PROGRESS NOTE ADULT - ASSESSMENT
89 year old male with significant past medical history of prostate cancer s/p radiation, CAD, PVD s/p L SFA stent, CKD stage III, chronic anemia, recent admission for hematuria, is brought from [facility] for complaint of chest pain. Patient is admitted for further evaluation and management for hematuria, pneumonia and to rule out ACS/cardiomyopathy.     # Chest pain:  Likely msk related pain vs. NSTEMI in the setting of elevated troponin  troponin stable, downtrending, proBNP 2k  4/2021 TTE normal LV, LVEF 55% per records  A. flutter noted on tele on day of admission - no further episodes since   Heparin drip completed for a total of 48 hours  ASA & Plavix restarted. (on hold in rehab due to hematuria)  c/w tele monitoring. remain in sinus.   Given hematuria, CKD patient is not a candidate for invasive cardiac eval such as cardiac cath at this time   Chest x-ray with BL pleural effusions, mod b/l pleural effusions with compressive atelectasis, no infiltrate or consolidation seen - no pna, likely HF exacerbation   IV Lasix started, but held after TTE results.  Repeat TTE --> moderate pericardial effusion  no signs of Tamponade. CT chest shows small pericardial effusion, no plan for IR eval for pericardiocentesis  f/u pulm in regards to thoracentesis.  ?tentatively monday.    # Right shoulder pain - unclear etiology   no prior hx of gout. no recent trauma.  uric acid ordered.  shoulder xray neg for fracture.  lidocaine patch  PRN tylenol  ROM exercise  will consider ortho if pain persists.     # Hematuria:  Follows Dr. Yadav, [facility]  urology on the case  no clots, sanchez - clear urine  Monitor HH  + hematuria resolved. s/p 22F 3-way placed, no clot evacuated, color improved to clear peach  CBI not warranted at this time, Monitor urine outpt/color  monitor hgb.   UCx >100k E.coli -- follow for sensitivities  c/w abx, plan for 7 days of Ceftriaxone 1G Q24H  ASA/Plavix okay to continue from  standpoint as long as urine color and H/H remain stable     # Anemia:   AOCD and acute blood loss 2/2 hematuria   Monitor HH - stable  Keep active T&S, transfuse to keep Hgb >8   Tx  of hematuria as above     # CAD (coronary artery disease):  Plavix and ASA ok to continue  Continue Lipitor    # Orthostatic hypotension:  Continue Florinef    # PAD (peripheral artery disease):  PAD s/p angioplasty with stent of left SFA was started on Plavix (4/16/21)  Follows Dr. Urias outpatient  Plavix and ASA to cont    # Prostate CA:   Hx of prostate ca s/p radiation about ?15 years ago    # Stage 3 chronic kidney disease:  CKD stage III, baseline Cr ~ 1.8-1.9  Follows Dr. Chan outpatient  Cr 1.68  Appears to be around baseline  Continue to monitor    # Need for prophylactic measure:  DVT PPX: SCDs  DASH diet  Continue pressure ulcer preventive measures per nursing   OOB to chair with assistance

## 2021-06-13 NOTE — PROGRESS NOTE ADULT - ASSESSMENT
89 year old male with significant past medical history of prostate cancer s/p radiation, CAD, PVD s/p L SFA stent, CKD stage III, chronic anemia, recent admission for hematuria, is brought from [facility] for complaint of chest pain.

## 2021-06-14 ENCOUNTER — RESULT REVIEW (OUTPATIENT)
Age: 86
End: 2021-06-14

## 2021-06-14 LAB
ANION GAP SERPL CALC-SCNC: 11 MMOL/L — SIGNIFICANT CHANGE UP (ref 5–17)
B PERT IGG+IGM PNL SER: ABNORMAL
BUN SERPL-MCNC: 37 MG/DL — HIGH (ref 7–23)
CALCIUM SERPL-MCNC: 8.6 MG/DL — SIGNIFICANT CHANGE UP (ref 8.4–10.5)
CHLORIDE SERPL-SCNC: 100 MMOL/L — SIGNIFICANT CHANGE UP (ref 96–108)
CO2 SERPL-SCNC: 27 MMOL/L — SIGNIFICANT CHANGE UP (ref 22–31)
COLOR FLD: SIGNIFICANT CHANGE UP
CREAT SERPL-MCNC: 1.54 MG/DL — HIGH (ref 0.5–1.3)
EOSINOPHIL # FLD: 1 % — SIGNIFICANT CHANGE UP
FLUID INTAKE SUBSTANCE CLASS: SIGNIFICANT CHANGE UP
FLUID SEGMENTED GRANULOCYTES: 2 % — SIGNIFICANT CHANGE UP
GLUCOSE SERPL-MCNC: 112 MG/DL — HIGH (ref 70–99)
GRAM STN FLD: SIGNIFICANT CHANGE UP
HCT VFR BLD CALC: 28.1 % — LOW (ref 39–50)
HGB BLD-MCNC: 8.7 G/DL — LOW (ref 13–17)
LDH SERPL L TO P-CCNC: 472 U/L — SIGNIFICANT CHANGE UP
LYMPHOCYTES # FLD: 19 % — SIGNIFICANT CHANGE UP
MCHC RBC-ENTMCNC: 30.1 PG — SIGNIFICANT CHANGE UP (ref 27–34)
MCHC RBC-ENTMCNC: 31 GM/DL — LOW (ref 32–36)
MCV RBC AUTO: 97.2 FL — SIGNIFICANT CHANGE UP (ref 80–100)
MESOTHL CELL # FLD: 1 % — SIGNIFICANT CHANGE UP
MONOS+MACROS # FLD: 77 % — SIGNIFICANT CHANGE UP
NRBC # BLD: 0 /100 WBCS — SIGNIFICANT CHANGE UP (ref 0–0)
PLATELET # BLD AUTO: 238 K/UL — SIGNIFICANT CHANGE UP (ref 150–400)
POTASSIUM SERPL-MCNC: 3.6 MMOL/L — SIGNIFICANT CHANGE UP (ref 3.5–5.3)
POTASSIUM SERPL-SCNC: 3.6 MMOL/L — SIGNIFICANT CHANGE UP (ref 3.5–5.3)
PROT FLD-MCNC: 3.5 G/DL — SIGNIFICANT CHANGE UP
RBC # BLD: 2.89 M/UL — LOW (ref 4.2–5.8)
RBC # FLD: 13.8 % — SIGNIFICANT CHANGE UP (ref 10.3–14.5)
RCV VOL RI: HIGH /UL (ref 0–0)
RHEUMATOID FACT SERPL-ACNC: 15 IU/ML — HIGH (ref 0–13)
SODIUM SERPL-SCNC: 138 MMOL/L — SIGNIFICANT CHANGE UP (ref 135–145)
SPECIMEN SOURCE: SIGNIFICANT CHANGE UP
TOTAL NUCLEATED CELL COUNT, BODY FLUID: 310 /UL — SIGNIFICANT CHANGE UP
TUBE TYPE: SIGNIFICANT CHANGE UP
URATE SERPL-MCNC: 3.8 MG/DL — SIGNIFICANT CHANGE UP (ref 3.4–8.8)
WBC # BLD: 9.61 K/UL — SIGNIFICANT CHANGE UP (ref 3.8–10.5)
WBC # FLD AUTO: 9.61 K/UL — SIGNIFICANT CHANGE UP (ref 3.8–10.5)

## 2021-06-14 PROCEDURE — 71045 X-RAY EXAM CHEST 1 VIEW: CPT | Mod: 26

## 2021-06-14 PROCEDURE — 88305 TISSUE EXAM BY PATHOLOGIST: CPT | Mod: 26

## 2021-06-14 PROCEDURE — 32555 ASPIRATE PLEURA W/ IMAGING: CPT | Mod: RT

## 2021-06-14 PROCEDURE — 88112 CYTOPATH CELL ENHANCE TECH: CPT | Mod: 26

## 2021-06-14 RX ADMIN — FLUDROCORTISONE ACETATE 0.4 MILLIGRAM(S): 0.1 TABLET ORAL at 05:08

## 2021-06-14 RX ADMIN — SERTRALINE 50 MILLIGRAM(S): 25 TABLET, FILM COATED ORAL at 13:16

## 2021-06-14 RX ADMIN — ATORVASTATIN CALCIUM 10 MILLIGRAM(S): 80 TABLET, FILM COATED ORAL at 23:04

## 2021-06-14 RX ADMIN — Medication 500 MILLIGRAM(S): at 13:16

## 2021-06-14 RX ADMIN — Medication 650 MILLIGRAM(S): at 05:11

## 2021-06-14 RX ADMIN — Medication 1 APPLICATION(S): at 17:20

## 2021-06-14 RX ADMIN — Medication 1 MILLIGRAM(S): at 13:15

## 2021-06-14 RX ADMIN — LIDOCAINE 1 PATCH: 4 CREAM TOPICAL at 19:19

## 2021-06-14 RX ADMIN — Medication 325 MILLIGRAM(S): at 13:16

## 2021-06-14 RX ADMIN — LIDOCAINE 1 PATCH: 4 CREAM TOPICAL at 04:00

## 2021-06-14 RX ADMIN — Medication 1 APPLICATION(S): at 05:11

## 2021-06-14 RX ADMIN — LIDOCAINE 1 PATCH: 4 CREAM TOPICAL at 18:05

## 2021-06-14 RX ADMIN — Medication 650 MILLIGRAM(S): at 06:00

## 2021-06-14 RX ADMIN — Medication 1 TABLET(S): at 13:16

## 2021-06-14 RX ADMIN — Medication 1 APPLICATION(S): at 18:06

## 2021-06-14 NOTE — PROGRESS NOTE ADULT - ASSESSMENT
89 year old male with significant past medical history of prostate cancer s/p radiation, CAD, PVD s/p L SFA stent, CKD stage III, chronic anemia, recent admission for hematuria, is brought from [facility] for complaint of chest pain.

## 2021-06-14 NOTE — PROGRESS NOTE ADULT - ATTENDING COMMENTS
Pt seen and examined with the NP. Agree with the assessment and plan.  Vitals, labs and radiology results personally reviewed.  Above note edited as appropriate.  Discussed with the pt and answered all questions.    stable hemodynamics.   Plan for thoracentesis today.  The wife at bedside. All questions answered.  d/w pulm Dr. Rendon.    Dr. Heredia ([facility])  [phone]

## 2021-06-14 NOTE — PROGRESS NOTE ADULT - ASSESSMENT
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

## 2021-06-14 NOTE — PROGRESS NOTE ADULT - ASSESSMENT
89 year old male with significant past medical history of prostate cancer s/p radiation, CAD, PVD s/p L SFA stent, CKD stage III, chronic anemia, recent admission for hematuria, is brought from [facility] for complaint of chest pain. Patient is admitted for further evaluation and management for hematuria, pneumonia and to rule out ACS/cardiomyopathy.     # Chest pain:  Likely msk related pain vs. NSTEMI in the setting of elevated troponin  troponin stable, downtrending, proBNP 2k  4/2021 TTE normal LV, LVEF 55% per records  A. flutter noted on tele on day of admission - no further episodes since   Heparin drip completed for a total of 48 hours  ASA & Plavix restarted. (on hold in rehab due to hematuria)  c/w tele monitoring. remain in sinus.   Given hematuria, CKD patient is not a candidate for invasive cardiac eval such as cardiac cath at this time   Chest x-ray with BL pleural effusions, mod b/l pleural effusions with compressive atelectasis, no infiltrate or consolidation seen - no pna, likely HF exacerbation   IV Lasix started, but held after TTE results.  Repeat TTE --> moderate pericardial effusion  no signs of Tamponade  CT chest shows small pericardial effusion, no plan for IR eval for pericardiocentesis  Thoracentesis today     # Right shoulder pain - unclear etiology:  Np prior hx of gout  No recent trauma  Uric acid ordered 3.8  Shoulder x-ray neg for fracture  Lidocaine patch  PRN Tylenol  ROM exercise  Will consider ortho if pain persists     # Hematuria:  Follows Dr. Yadav, [facility]  urology on the case  no clots, sanchez - clear urine  Monitor HH  + hematuria resolved. s/p 22F 3-way placed, no clot evacuated, color improved to clear peach  CBI not warranted at this time, Monitor urine outpt/color  Monitor hgb   UCx >100k E.coli -- follow for sensitivities  c/w abx, plan for 7 days of Ceftriaxone 1G Q24H  ASA/Plavix okay to continue from  standpoint as long as urine color and H/H remain stable     # Anemia:   AOCD and acute blood loss 2/2 hematuria   Monitor HH - stable  Keep active T&S, transfuse to keep Hgb >8   Tx  of hematuria as above     # CAD (coronary artery disease):  Plavix and ASA ok to continue  Continue Lipitor    # Orthostatic hypotension:  Continue Florinef    # PAD (peripheral artery disease):  PAD s/p angioplasty with stent of left SFA was started on Plavix (4/16/21)  Follows Dr. Urias outpatient  Plavix and ASA to cont    # Prostate CA:   Hx of prostate ca s/p radiation about ?15 years ago    # Stage 3 chronic kidney disease:  CKD stage III, baseline Cr ~ 1.8-1.9  Follows Dr. Chan outpatient  Cr 1.68  Appears to be around baseline  Continue to monitor    # Need for prophylactic measure:  DVT PPX: SCDs  DASH diet  Continue pressure ulcer preventive measures per nursing   OOB to chair with assistance

## 2021-06-14 NOTE — PROGRESS NOTE ADULT - SUBJECTIVE AND OBJECTIVE BOX
Pt. seen and examined. Asleepand not awokened    Telemetry - aflutter with reasonable rate control  Vital Signs Last 24 Hrs  T(C): 36.5 (14 Jun 2021 04:53), Max: 36.9 (13 Jun 2021 14:39)  T(F): 97.7 (14 Jun 2021 04:53), Max: 98.4 (13 Jun 2021 14:39)  HR: 87 (14 Jun 2021 04:53) (87 - 94)  BP: 109/68 (14 Jun 2021 04:53) (109/68 - 133/81)  BP(mean): --  RR: 18 (14 Jun 2021 04:53) (18 - 18)  SpO2: 95% (14 Jun 2021 04:53) (94% - 99%)    I&O's Summary    13 Jun 2021 07:01  -  14 Jun 2021 07:00  --------------------------------------------------------  IN: 390 mL / OUT: 200 mL / NET: 190 mL        PHYSICAL EXAM:  GENERAL: asleep, NAD.  NECK: Supple, No JVD, no carotid bruit.  CHEST/LUNG: Grossly clear to auscultation bilaterally; No wheezes, rales, or rhonchi.  HEART: S1 S2 normal irregular; No changes  ABDOMEN: Soft, Nontender, Nondistended; Bowel sounds present  EXTREMITIES:  No LE edema.      LABS:                        8.7    9.61  )-----------( 238      ( 14 Jun 2021 06:50 )             28.1     06-14    138  |  100  |  37<H>  ----------------------------<  112<H>  3.6   |  27  |  1.54<H>    Ca    8.6      14 Jun 2021 06:50  Phos  2.3     06-13  Mg     2.2     06-13                    MEDICATIONS  (STANDING):  ascorbic acid 500 milliGRAM(s) Oral daily  atorvastatin 10 milliGRAM(s) Oral at bedtime  BACItracin   Ointment 1 Application(s) Topical daily  ferrous    sulfate 325 milliGRAM(s) Oral daily  fludroCORTISONE 0.4 milliGRAM(s) Oral daily  folic acid 1 milliGRAM(s) Oral daily  lidocaine   Patch 1 Patch Transdermal every 24 hours  multivitamin 1 Tablet(s) Oral daily  senna 2 Tablet(s) Oral at bedtime  sertraline 50 milliGRAM(s) Oral daily  silver sulfADIAZINE 1% Cream 1 Application(s) Topical two times a day    MEDICATIONS  (PRN):  acetaminophen   Tablet .. 650 milliGRAM(s) Oral every 6 hours PRN Mild Pain/Moderate Pain  bisacodyl Suppository 10 milliGRAM(s) Rectal daily PRN Constipation  polyethylene glycol 3350 17 Gram(s) Oral daily PRN Constipation      RADIOLOGY & ADDITIONAL TESTS:

## 2021-06-14 NOTE — PROGRESS NOTE ADULT - SUBJECTIVE AND OBJECTIVE BOX
Follow-up Pulm Progress Note  Arpit Rendon MD  [phone]    Stable respiratory status: on nasal cannula  Awaiting US guided thorocentesis for today        Vital Signs Last 24 Hrs  T(C): 36.7 (14 Jun 2021 12:12), Max: 36.9 (13 Jun 2021 14:39)  T(F): 98 (14 Jun 2021 12:12), Max: 98.4 (13 Jun 2021 14:39)  HR: 66 (14 Jun 2021 12:12) (66 - 94)  BP: 115/75 (14 Jun 2021 12:12) (109/68 - 133/81)  BP(mean): --  RR: 17 (14 Jun 2021 12:12) (17 - 18)  SpO2: 95% (14 Jun 2021 12:12) (94% - 99%)                          8.7    9.61  )-----------( 238      ( 14 Jun 2021 06:50 )             28.1       06-14    138  |  100  |  37<H>  ----------------------------<  112<H>  3.6   |  27  |  1.54<H>    Ca    8.6      14 Jun 2021 06:50  Phos  2.3     06-13  Mg     2.2     06-13      Physical Examination:  PULM: Diminished BS bases  CVS: Regular rate and rhythm, no murmurs, rubs, or gallops  ABD: Soft, non-tender  EXT:  No clubbing, cyanosis, or edema    RADIOLOGY REVIEWED  CXR:    CT chest:      PROCEDURE DATE:  06/11/2021        INTERPRETATION:  CLINICAL INFORMATION: Pericardial effusion on echo, evaluate.    COMPARISON: CT chest 6/8/2021.    CONTRAST/COMPLICATIONS:  IV Contrast: NONE  Oral Contrast: NONE  Complications: None reported at time of study completion    PROCEDURE:  CT of the Chest was performed.  Sagittal and coronal reformats were performed.    FINDINGS:    LUNGS AND AIRWAYS: Similar near complete compressive atelectasis of the bilateral lower lobes. Unchanged 3 mm nodule in the right middle lobe (3:89). Left upper lobe calcified granuloma.  PLEURA: Redemonstrated moderate bilateral pleural effusions which are slightly increased when compared to prior study 6/8/2021.  MEDIASTINUM AND ELIANA: No lymphadenopathy.  VESSELS: Aortic and coronary artery calcifications.  HEART: Similar appearing small pericardial effusion. Aortic and mitral valvular calcifications.Heart size is normal.  CHEST WALL AND LOWER NECK: Within normallimits.  VISUALIZED UPPER ABDOMEN: Within normal limits.  BONES: Degenerative changes. Redemonstrated age-indeterminate compression deformities of the T7 and T8 vertebral bodies.    IMPRESSION:    When compared with June 8, 2021 chest CT:    Similar-appearing small pericardial effusion.    Moderate bilateral pleural effusions which are slightly increased when compared to prior study 6/8/2021 with near complete atelectasis of the bilateral lower lobes    TTE:    PROCEDURE: Transthoracic echocardiogram with 2-D, M-Mode  and complete spectral and color flow Doppler.  INDICATION: Chest pain, unspecified (R07.9)  ------------------------------------------------------------------------  Dimensions:    Normal Values:  LA:     2.6    2.0 - 4.0 cm  Ao:     3.5    2.0 - 3.8 cm  SEPTUM: 0.6    0.6 - 1.2 cm  PWT:    0.8    0.6 - 1.1 cm  LVIDd:  3.9    3.0 - 5.6 cm  LVIDs:  2.3    1.8 - 4.0 cm  Derived variables:  LVMI: 41 g/m2  RWT: 0.41  Fractional short: 41 %  EF (Visual Estimate): >75 %  Doppler Peak Velocity (m/sec): AoV=1.2  ------------------------------------------------------------------------  Observations:  Mitral Valve: Normal mitral valve. Minimal mitral  regurgitation.  Aortic Valve/Aorta: Aortic valve not well visualized;  appears calcified. Peak transaortic valve gradient equals 6  mm Hg. Minimal aortic regurgitation. Peak left ventricular  outflow tract gradient equals 2 mm Hg.  Aortic Root: 3.5 cm.  Left Atrium: Left atrium not well visualized, probably  normal.  Left Ventricle: Hyperdynamic left ventricular systolic  function. Normal left ventricular internal dimensions and  wall thicknesses.  Right Heart: Normal right atrium. Normal right ventricular  size and function. Normal tricuspid valve. Mild tricuspid  regurgitation. Normal pulmonic valve. No pulmonic  regurgitation.  Pericardium/Pleura: Thickened pericardium. Moderate  circumferential pericardial effusion. The effusion measures  up to approximately 1.3 cm adjacent to the apex. There is  inversion of the RVOT in early ventricular diastole. On  subcostal imaging, there is inversion of the right  ventricle in early ventricular diastole. Dilated IVC  (diameterabout 2.1 cm) with less than 50% respiratory  variation in size consistent with elevated RA pressure.  Findings consistent with echocardiographic evidence of  pericardial tamponade physiology.  Left pleural effusion.  Hemodynamic: Estimated right atrial pressure is 8 mm Hg.  Estimated right ventricular systolic pressure equals 39 mm  Hg, assuming right atrial pressure equals 8 mm Hg,  consistent with borderline pulmonary hypertension.  ------------------------------------------------------------------------  Conclusions:  1. Normal mitral valve. Minimal mitral regurgitation.  2. Aortic valve not well visualized; appears calcified.  Minimal aortic regurgitation.  3. Hyperdynamic left ventricular systolic function.  4. Normal right ventricular size and function.  5. Thickened pericardium. Moderate circumferential  pericardial effusion. The effusion measures up to  approximately 1.3 cm adjacent to the apex. There is  inversion of the RVOT in early ventricular diastole. On  subcostal imaging, there is inversion of the right  ventricle in early ventricular diastole. Dilated IVC  (diameterabout 2.1 cm) with less than 50% respiratory  variation in size consistent with elevated RA pressure.  Findings consistent with echocardiographic evidence of  pericardial tamponade physiology.  6. Left pleural effusion.  Findings discussed with nurse anand Robb from  the PICU.

## 2021-06-14 NOTE — PROGRESS NOTE ADULT - SUBJECTIVE AND OBJECTIVE BOX
SUBJECTIVE / OVERNIGHT EVENTS:    no events overnight  patient seen and examined  denies cp/sob  no n/v/d  thoracentesis today     --------------------------------------------------------------------------------------------  LABS:                        8.7    9.61  )-----------( 238      ( 14 Jun 2021 06:50 )             28.1     06-14    138  |  100  |  37<H>  ----------------------------<  112<H>  3.6   |  27  |  1.54<H>    Ca    8.6      14 Jun 2021 06:50  Phos  2.3     06-13  Mg     2.2     06-13        CAPILLARY BLOOD GLUCOSE                RADIOLOGY & ADDITIONAL TESTS:    Imaging Personally Reviewed:  [x] YES  [ ] NO    Consultant(s) Notes Reviewed:  [x] YES  [ ] NO    MEDICATIONS  (STANDING):  ascorbic acid 500 milliGRAM(s) Oral daily  atorvastatin 10 milliGRAM(s) Oral at bedtime  BACItracin   Ointment 1 Application(s) Topical daily  ferrous    sulfate 325 milliGRAM(s) Oral daily  fludroCORTISONE 0.4 milliGRAM(s) Oral daily  folic acid 1 milliGRAM(s) Oral daily  lidocaine   Patch 1 Patch Transdermal every 24 hours  multivitamin 1 Tablet(s) Oral daily  senna 2 Tablet(s) Oral at bedtime  sertraline 50 milliGRAM(s) Oral daily  silver sulfADIAZINE 1% Cream 1 Application(s) Topical two times a day    MEDICATIONS  (PRN):  acetaminophen   Tablet .. 650 milliGRAM(s) Oral every 6 hours PRN Mild Pain/Moderate Pain  bisacodyl Suppository 10 milliGRAM(s) Rectal daily PRN Constipation  polyethylene glycol 3350 17 Gram(s) Oral daily PRN Constipation      Care Discussed with Consultants/Other Providers [x] YES  [ ] NO    Vital Signs Last 24 Hrs  T(C): 36.7 (14 Jun 2021 12:12), Max: 36.7 (14 Jun 2021 12:12)  T(F): 98 (14 Jun 2021 12:12), Max: 98 (14 Jun 2021 12:12)  HR: 66 (14 Jun 2021 12:12) (66 - 88)  BP: 115/75 (14 Jun 2021 12:12) (109/68 - 133/81)  BP(mean): --  RR: 17 (14 Jun 2021 12:12) (17 - 18)  SpO2: 95% (14 Jun 2021 12:12) (95% - 99%)  I&O's Summary    13 Jun 2021 07:01  -  14 Jun 2021 07:00  --------------------------------------------------------  IN: 390 mL / OUT: 200 mL / NET: 190 mL    PHYSICAL EXAM:  GENERAL: NAD, thin elderly, comfortable, lying in bed, on nasal canula  HEAD:  Atraumatic, Normocephalic  EYES: EOMI, PERRLA, conjunctiva and sclera clear  NECK: Supple, No JVD  CHEST/LUNG: mild decrease breath sounds bilaterally; No wheeze   HEART: Regular rate and rhythm; No murmurs, rubs, or gallops  ABDOMEN: Soft, Nontender, Nondistended; Bowel sounds present  Neuro: AAOx2, forgetful, no focal weakness   : Wang in place  EXTREMITIES:  2+ Peripheral Pulses, No clubbing, cyanosis, or edema, right should ROM limited due to pain, tenderness at acromioclavicular joint, minimal erythema, no edema sensitive to touch   SKIN: No rashes or lesions

## 2021-06-15 LAB
ANA TITR SER: NEGATIVE — SIGNIFICANT CHANGE UP
ANION GAP SERPL CALC-SCNC: 14 MMOL/L — SIGNIFICANT CHANGE UP (ref 5–17)
AUTO DIFF PNL BLD: NEGATIVE — SIGNIFICANT CHANGE UP
BUN SERPL-MCNC: 40 MG/DL — HIGH (ref 7–23)
C-ANCA SER-ACNC: NEGATIVE — SIGNIFICANT CHANGE UP
CALCIUM SERPL-MCNC: 8.4 MG/DL — SIGNIFICANT CHANGE UP (ref 8.4–10.5)
CHLORIDE SERPL-SCNC: 100 MMOL/L — SIGNIFICANT CHANGE UP (ref 96–108)
CO2 SERPL-SCNC: 24 MMOL/L — SIGNIFICANT CHANGE UP (ref 22–31)
CREAT SERPL-MCNC: 1.46 MG/DL — HIGH (ref 0.5–1.3)
GLUCOSE SERPL-MCNC: 93 MG/DL — SIGNIFICANT CHANGE UP (ref 70–99)
HCT VFR BLD CALC: 27.5 % — LOW (ref 39–50)
HGB BLD-MCNC: 8.3 G/DL — LOW (ref 13–17)
MCHC RBC-ENTMCNC: 29.4 PG — SIGNIFICANT CHANGE UP (ref 27–34)
MCHC RBC-ENTMCNC: 30.2 GM/DL — LOW (ref 32–36)
MCV RBC AUTO: 97.5 FL — SIGNIFICANT CHANGE UP (ref 80–100)
MPO AB + PR3 PNL SER: SIGNIFICANT CHANGE UP
NIGHT BLUE STAIN TISS: SIGNIFICANT CHANGE UP
NRBC # BLD: 0 /100 WBCS — SIGNIFICANT CHANGE UP (ref 0–0)
P-ANCA SER-ACNC: NEGATIVE — SIGNIFICANT CHANGE UP
PLATELET # BLD AUTO: 254 K/UL — SIGNIFICANT CHANGE UP (ref 150–400)
POTASSIUM SERPL-MCNC: 3.2 MMOL/L — LOW (ref 3.5–5.3)
POTASSIUM SERPL-SCNC: 3.2 MMOL/L — LOW (ref 3.5–5.3)
RBC # BLD: 2.82 M/UL — LOW (ref 4.2–5.8)
RBC # FLD: 13.7 % — SIGNIFICANT CHANGE UP (ref 10.3–14.5)
SODIUM SERPL-SCNC: 138 MMOL/L — SIGNIFICANT CHANGE UP (ref 135–145)
SPECIMEN SOURCE: SIGNIFICANT CHANGE UP
WBC # BLD: 9.44 K/UL — SIGNIFICANT CHANGE UP (ref 3.8–10.5)
WBC # FLD AUTO: 9.44 K/UL — SIGNIFICANT CHANGE UP (ref 3.8–10.5)

## 2021-06-15 RX ORDER — POTASSIUM CHLORIDE 20 MEQ
10 PACKET (EA) ORAL ONCE
Refills: 0 | Status: COMPLETED | OUTPATIENT
Start: 2021-06-15 | End: 2021-06-15

## 2021-06-15 RX ADMIN — LIDOCAINE 1 PATCH: 4 CREAM TOPICAL at 20:38

## 2021-06-15 RX ADMIN — SERTRALINE 50 MILLIGRAM(S): 25 TABLET, FILM COATED ORAL at 11:45

## 2021-06-15 RX ADMIN — Medication 10 MILLIEQUIVALENT(S): at 11:46

## 2021-06-15 RX ADMIN — Medication 1 APPLICATION(S): at 11:46

## 2021-06-15 RX ADMIN — Medication 325 MILLIGRAM(S): at 11:45

## 2021-06-15 RX ADMIN — Medication 1 APPLICATION(S): at 05:54

## 2021-06-15 RX ADMIN — Medication 1 TABLET(S): at 11:45

## 2021-06-15 RX ADMIN — Medication 1 MILLIGRAM(S): at 11:46

## 2021-06-15 RX ADMIN — FLUDROCORTISONE ACETATE 0.4 MILLIGRAM(S): 0.1 TABLET ORAL at 05:54

## 2021-06-15 RX ADMIN — ATORVASTATIN CALCIUM 10 MILLIGRAM(S): 80 TABLET, FILM COATED ORAL at 22:44

## 2021-06-15 RX ADMIN — Medication 1 APPLICATION(S): at 17:22

## 2021-06-15 RX ADMIN — LIDOCAINE 1 PATCH: 4 CREAM TOPICAL at 17:21

## 2021-06-15 RX ADMIN — Medication 500 MILLIGRAM(S): at 11:46

## 2021-06-15 RX ADMIN — LIDOCAINE 1 PATCH: 4 CREAM TOPICAL at 06:12

## 2021-06-15 NOTE — PROGRESS NOTE ADULT - ASSESSMENT
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

## 2021-06-15 NOTE — PROGRESS NOTE ADULT - ASSESSMENT
89 year old male with significant past medical history of prostate cancer s/p radiation, CAD, PVD s/p L SFA stent, CKD stage III, chronic anemia, recent admission for hematuria, is brought from [facility] for complaint of chest pain.    TTE  Hyperdynamic left ventricular systolic function.  Moderate circumferential pericardial effusion. The effusion measures up to  approximately 1.3 cm adjacent to the apex. Findings consistent with echocardiographic evidence of pericardial tamponade physiology.  Left pleural effusion.

## 2021-06-15 NOTE — PROGRESS NOTE ADULT - ASSESSMENT
89 year old male with significant past medical history of prostate cancer s/p radiation, CAD, PVD s/p L SFA stent, CKD stage III, chronic anemia, recent admission for hematuria, is brought from [facility] for complaint of chest pain. Patient is admitted for further evaluation and management for hematuria, pneumonia and to rule out ACS/cardiomyopathy.     # Chest pain:  Likely msk related pain vs. NSTEMI in the setting of elevated troponin  troponin stable, downtrending, proBNP 2k  4/2021 TTE normal LV, LVEF 55% per records  A. flutter noted on tele on day of admission - no further episodes since   Heparin drip completed for a total of 48 hours  ASA & Plavix restarted. (on hold in rehab due to hematuria)  c/w tele monitoring. remain in sinus.   Given hematuria, CKD patient is not a candidate for invasive cardiac eval such as cardiac cath at this time   Chest x-ray with BL pleural effusions, mod b/l pleural effusions with compressive atelectasis, no infiltrate or consolidation seen - no pna, likely HF exacerbation   IV Lasix started, but held after TTE results.  Repeat TTE --> moderate pericardial effusion  no signs of Tamponade  CT chest shows small pericardial effusion, no plan for IR eval for pericardiocentesis  s/p thoracentesis 6/14 - follow up cytology and cultures     # Right shoulder pain - unclear etiology:  Np prior hx of gout  No recent trauma  Uric acid ordered 3.8  Shoulder x-ray neg for fracture  Lidocaine patch  PRN Tylenol  ROM exercise  Will consider ortho if pain persists     # Hematuria:  Follows Dr. Yadav, [facility]  urology on the case  no clots, sanchez - clear urine  Monitor HH  + hematuria resolved. s/p 22F 3-way placed, no clot evacuated, color improved to clear peach  CBI not warranted at this time, Monitor urine outpt/color  Monitor hgb   UCx >100k E.coli -- follow for sensitivities  c/w abx, plan for 7 days of Ceftriaxone 1G Q24H  ASA/Plavix okay to continue from  standpoint as long as urine color and H/H remain stable     # Anemia:   AOCD and acute blood loss 2/2 hematuria   Monitor HH - stable  Keep active T&S, transfuse to keep Hgb >8   Tx  of hematuria as above     # CAD (coronary artery disease):  Plavix and ASA ok to continue  Continue Lipitor    # Orthostatic hypotension:  Continue Florinef    # PAD (peripheral artery disease):  PAD s/p angioplasty with stent of left SFA was started on Plavix (4/16/21)  Follows Dr. Urias outpatient  Plavix and ASA to cont    # Prostate CA:   Hx of prostate ca s/p radiation about ?15 years ago    # Stage 3 chronic kidney disease:  CKD stage III, baseline Cr ~ 1.8-1.9  Follows Dr. Chan outpatient  Cr 1.68  Appears to be around baseline  Continue to monitor    # Need for prophylactic measure:  DVT PPX: SCDs  DASH diet  Continue pressure ulcer preventive measures per nursing   OOB to chair with assistance

## 2021-06-15 NOTE — PROGRESS NOTE ADULT - SUBJECTIVE AND OBJECTIVE BOX
SUBJECTIVE / OVERNIGHT EVENTS:    s/p thoracentsis  patient seen and examined  denies cp/sob  no n/v/d    --------------------------------------------------------------------------------------------  LABS:                        8.3    9.44  )-----------( 254      ( 15 Aydin 2021 07:22 )             27.5     06-15    138  |  100  |  40<H>  ----------------------------<  93  3.2<L>   |  24  |  1.46<H>    Ca    8.4      15 Aydin 2021 07:12        CAPILLARY BLOOD GLUCOSE                RADIOLOGY & ADDITIONAL TESTS:    Imaging Personally Reviewed:  [x] YES  [ ] NO    Consultant(s) Notes Reviewed:  [x] YES  [ ] NO    MEDICATIONS  (STANDING):  ascorbic acid 500 milliGRAM(s) Oral daily  atorvastatin 10 milliGRAM(s) Oral at bedtime  BACItracin   Ointment 1 Application(s) Topical daily  ferrous    sulfate 325 milliGRAM(s) Oral daily  fludroCORTISONE 0.4 milliGRAM(s) Oral daily  folic acid 1 milliGRAM(s) Oral daily  lidocaine   Patch 1 Patch Transdermal every 24 hours  multivitamin 1 Tablet(s) Oral daily  senna 2 Tablet(s) Oral at bedtime  sertraline 50 milliGRAM(s) Oral daily  silver sulfADIAZINE 1% Cream 1 Application(s) Topical two times a day    MEDICATIONS  (PRN):  acetaminophen   Tablet .. 650 milliGRAM(s) Oral every 6 hours PRN Mild Pain/Moderate Pain  bisacodyl Suppository 10 milliGRAM(s) Rectal daily PRN Constipation  polyethylene glycol 3350 17 Gram(s) Oral daily PRN Constipation      Care Discussed with Consultants/Other Providers [x] YES  [ ] NO    Vital Signs Last 24 Hrs  T(C): 36.7 (15 Aydin 2021 12:05), Max: 36.7 (14 Jun 2021 12:12)  T(F): 98 (15 Aydin 2021 12:05), Max: 98.1 (15 Aydin 2021 05:59)  HR: 93 (15 Aydin 2021 12:05) (66 - 93)  BP: 114/72 (15 Aydin 2021 12:05) (101/63 - 115/75)  BP(mean): --  RR: 18 (15 Aydin 2021 12:05) (17 - 18)  SpO2: 98% (15 Aydin 2021 12:05) (93% - 99%)  I&O's Summary    14 Jun 2021 07:01  -  15 Aydin 2021 07:00  --------------------------------------------------------  IN: 0 mL / OUT: 750 mL / NET: -750 mL    PHYSICAL EXAM:  GENERAL: NAD, thin elderly, comfortable, lying in bed, on nasal canula  HEAD:  Atraumatic, Normocephalic  EYES: EOMI, PERRLA, conjunctiva and sclera clear  NECK: Supple, No JVD  CHEST/LUNG: mild decrease breath sounds bilaterally; No wheeze   HEART: Regular rate and rhythm; No murmurs, rubs, or gallops  ABDOMEN: Soft, Nontender, Nondistended; Bowel sounds present  Neuro: AAOx2, forgetful, no focal weakness   : Wang in place  EXTREMITIES:  2+ Peripheral Pulses, No clubbing, cyanosis, or edema, right should ROM limited due to pain, tenderness at acromioclavicular joint, minimal erythema, no edema sensitive to touch   SKIN: No rashes or lesions        SUBJECTIVE / OVERNIGHT EVENTS:    s/p thoracentesis - 1500ml removed  patient seen and examined  denies cp/sob  no n/v/d    --------------------------------------------------------------------------------------------  LABS:                        8.3    9.44  )-----------( 254      ( 15 Aydin 2021 07:22 )             27.5     06-15    138  |  100  |  40<H>  ----------------------------<  93  3.2<L>   |  24  |  1.46<H>    Ca    8.4      15 Aydin 2021 07:12        CAPILLARY BLOOD GLUCOSE                RADIOLOGY & ADDITIONAL TESTS:    Imaging Personally Reviewed:  [x] YES  [ ] NO    Consultant(s) Notes Reviewed:  [x] YES  [ ] NO    MEDICATIONS  (STANDING):  ascorbic acid 500 milliGRAM(s) Oral daily  atorvastatin 10 milliGRAM(s) Oral at bedtime  BACItracin   Ointment 1 Application(s) Topical daily  ferrous    sulfate 325 milliGRAM(s) Oral daily  fludroCORTISONE 0.4 milliGRAM(s) Oral daily  folic acid 1 milliGRAM(s) Oral daily  lidocaine   Patch 1 Patch Transdermal every 24 hours  multivitamin 1 Tablet(s) Oral daily  senna 2 Tablet(s) Oral at bedtime  sertraline 50 milliGRAM(s) Oral daily  silver sulfADIAZINE 1% Cream 1 Application(s) Topical two times a day    MEDICATIONS  (PRN):  acetaminophen   Tablet .. 650 milliGRAM(s) Oral every 6 hours PRN Mild Pain/Moderate Pain  bisacodyl Suppository 10 milliGRAM(s) Rectal daily PRN Constipation  polyethylene glycol 3350 17 Gram(s) Oral daily PRN Constipation      Care Discussed with Consultants/Other Providers [x] YES  [ ] NO    Vital Signs Last 24 Hrs  T(C): 36.7 (15 Aydin 2021 12:05), Max: 36.7 (14 Jun 2021 12:12)  T(F): 98 (15 Aydin 2021 12:05), Max: 98.1 (15 Aydin 2021 05:59)  HR: 93 (15 Aydin 2021 12:05) (66 - 93)  BP: 114/72 (15 Aydin 2021 12:05) (101/63 - 115/75)  BP(mean): --  RR: 18 (15 Aydin 2021 12:05) (17 - 18)  SpO2: 98% (15 Aydin 2021 12:05) (93% - 99%)  I&O's Summary    14 Jun 2021 07:01  -  15 Aydin 2021 07:00  --------------------------------------------------------  IN: 0 mL / OUT: 750 mL / NET: -750 mL    PHYSICAL EXAM:  GENERAL: NAD, thin elderly, comfortable, lying in bed, on nasal canula  HEAD:  Atraumatic, Normocephalic  EYES: EOMI, PERRLA, conjunctiva and sclera clear  NECK: Supple, No JVD  CHEST/LUNG: mild decrease breath sounds bilaterally; No wheeze   HEART: Regular rate and rhythm; No murmurs, rubs, or gallops  ABDOMEN: Soft, Nontender, Nondistended; Bowel sounds present  Neuro: AAOx2, forgetful, no focal weakness   : Wang in place  EXTREMITIES:  2+ Peripheral Pulses, No clubbing, cyanosis, or edema, right should ROM limited due to pain, tenderness at acromioclavicular joint, minimal erythema, no edema sensitive to touch   SKIN: No rashes or lesions

## 2021-06-15 NOTE — PROGRESS NOTE ADULT - SUBJECTIVE AND OBJECTIVE BOX
[facility] Cardiology Progress Note  _______________________________    Pt. seen and examined. No new cardiac-related complaints.    Telemetry -sinus 45-100s    T(C): 36.7 (06-15-21 @ 05:59), Max: 36.7 (06-14-21 @ 12:12)  HR: 93 (06-15-21 @ 05:59) (66 - 93)  BP: 101/63 (06-15-21 @ 05:59) (101/63 - 115/75)  RR: 18 (06-15-21 @ 05:59) (17 - 18)  SpO2: 95% (06-15-21 @ 05:59) (93% - 99%)  I&O's Summary    14 Jun 2021 07:01  -  15 Aydin 2021 07:00  --------------------------------------------------------  IN: 0 mL / OUT: 750 mL / NET: -750 mL        PHYSICAL EXAM:  GENERAL: Alert, NAD.  NECK: Supple  CHEST/LUNG: Clear to auscultation bilaterally; No wheezes, rales, or rhonchi.  HEART: S1 S2 normal, RRR; No murmurs, rubs, or gallops  EXTREMITIES:  No LE edema.      LABS:                        8.3    9.44  )-----------( 254      ( 15 Aydin 2021 07:22 )             27.5     06-15    138  |  100  |  40<H>  ----------------------------<  93  3.2<L>   |  24  |  1.46<H>    Ca    8.4      15 Aydin 2021 07:12                    MEDICATIONS  (STANDING):  ascorbic acid 500 milliGRAM(s) Oral daily  atorvastatin 10 milliGRAM(s) Oral at bedtime  BACItracin   Ointment 1 Application(s) Topical daily  ferrous    sulfate 325 milliGRAM(s) Oral daily  fludroCORTISONE 0.4 milliGRAM(s) Oral daily  folic acid 1 milliGRAM(s) Oral daily  lidocaine   Patch 1 Patch Transdermal every 24 hours  multivitamin 1 Tablet(s) Oral daily  senna 2 Tablet(s) Oral at bedtime  sertraline 50 milliGRAM(s) Oral daily  silver sulfADIAZINE 1% Cream 1 Application(s) Topical two times a day    MEDICATIONS  (PRN):  acetaminophen   Tablet .. 650 milliGRAM(s) Oral every 6 hours PRN Mild Pain/Moderate Pain  bisacodyl Suppository 10 milliGRAM(s) Rectal daily PRN Constipation  polyethylene glycol 3350 17 Gram(s) Oral daily PRN Constipation        RADIOLOGY & ADDITIONAL TESTS:

## 2021-06-15 NOTE — PROGRESS NOTE ADULT - ATTENDING COMMENTS
Pt seen and examined with the NP. Agree with the assessment and plan.  Vitals, labs and radiology results personally reviewed.  Above note edited as appropriate.  Discussed with the pt and answered all questions.    stable hemodynamics. s/p thoracentesis with 1.5L removed.  breathing better. The wife at bedside. All questions answered.  d/w pulm Dr. Rendon. Plan for thoracentesis another side this week.  card f/u. (Pleuralcardio effusions, ? pericarditis). no chest pain.   DVT ppx    Dr. Gooden will be covering for me starting 6/16/21. She can be reached at  if needed.     Dr. Heredia ([facility])  [phone] .

## 2021-06-15 NOTE — PROGRESS NOTE ADULT - SUBJECTIVE AND OBJECTIVE BOX
Follow-up Pulm Progress Note  Arpit Rendon MD  [phone]    S/P right US guided thorocentesis 6/14: 1500 ml yellow fluid aspirated  Post tap CXR without PTX and clearing of R lung  Fluid exudative by chemistry, moncyte predmonant, negative gram stain  Patient disgruntled today, though no obvious resp distress on nasal cannula  At time of visit, refused L tap    Vital Signs Last 24 Hrs  T(C): 36.7 (15 Aydin 2021 12:05), Max: 36.7 (15 Aydin 2021 05:59)  T(F): 98 (15 Aydin 2021 12:05), Max: 98.1 (15 Aydin 2021 05:59)  HR: 93 (15 Aydin 2021 12:05) (89 - 93)  BP: 114/72 (15 Aydin 2021 12:05) (101/63 - 114/72)  BP(mean): --  RR: 18 (15 Aydin 2021 12:05) (17 - 18)  SpO2: 98% (15 Aydin 2021 12:05) (93% - 99%)                        8.3    9.44  )-----------( 254      ( 15 Aydin 2021 07:22 )             27.5     06-15    138  |  100  |  40<H>  ----------------------------<  93  3.2<L>   |  24  |  1.46<H>    Ca    8.4      15 Aydin 2021 07:12      Physical Examination:  PULM: Diminished BS bases  CVS: Regular rate and rhythm, no murmurs, rubs, or gallops  ABD: Soft, non-tender  EXT:  No clubbing, cyanosis, or edema    RADIOLOGY REVIEWED  CXR:    CT chest:      PROCEDURE DATE:  06/11/2021        INTERPRETATION:  CLINICAL INFORMATION: Pericardial effusion on echo, evaluate.    COMPARISON: CT chest 6/8/2021.    CONTRAST/COMPLICATIONS:  IV Contrast: NONE  Oral Contrast: NONE  Complications: None reported at time of study completion    PROCEDURE:  CT of the Chest was performed.  Sagittal and coronal reformats were performed.    FINDINGS:    LUNGS AND AIRWAYS: Similar near complete compressive atelectasis of the bilateral lower lobes. Unchanged 3 mm nodule in the right middle lobe (3:89). Left upper lobe calcified granuloma.  PLEURA: Redemonstrated moderate bilateral pleural effusions which are slightly increased when compared to prior study 6/8/2021.  MEDIASTINUM AND ELIANA: No lymphadenopathy.  VESSELS: Aortic and coronary artery calcifications.  HEART: Similar appearing small pericardial effusion. Aortic and mitral valvular calcifications.Heart size is normal.  CHEST WALL AND LOWER NECK: Within normallimits.  VISUALIZED UPPER ABDOMEN: Within normal limits.  BONES: Degenerative changes. Redemonstrated age-indeterminate compression deformities of the T7 and T8 vertebral bodies.    IMPRESSION:    When compared with June 8, 2021 chest CT:    Similar-appearing small pericardial effusion.    Moderate bilateral pleural effusions which are slightly increased when compared to prior study 6/8/2021 with near complete atelectasis of the bilateral lower lobes    TTE:    PROCEDURE: Transthoracic echocardiogram with 2-D, M-Mode  and complete spectral and color flow Doppler.  INDICATION: Chest pain, unspecified (R07.9)  ------------------------------------------------------------------------  Dimensions:    Normal Values:  LA:     2.6    2.0 - 4.0 cm  Ao:     3.5    2.0 - 3.8 cm  SEPTUM: 0.6    0.6 - 1.2 cm  PWT:    0.8    0.6 - 1.1 cm  LVIDd:  3.9    3.0 - 5.6 cm  LVIDs:  2.3    1.8 - 4.0 cm  Derived variables:  LVMI: 41 g/m2  RWT: 0.41  Fractional short: 41 %  EF (Visual Estimate): >75 %  Doppler Peak Velocity (m/sec): AoV=1.2  ------------------------------------------------------------------------  Observations:  Mitral Valve: Normal mitral valve. Minimal mitral  regurgitation.  Aortic Valve/Aorta: Aortic valve not well visualized;  appears calcified. Peak transaortic valve gradient equals 6  mm Hg. Minimal aortic regurgitation. Peak left ventricular  outflow tract gradient equals 2 mm Hg.  Aortic Root: 3.5 cm.  Left Atrium: Left atrium not well visualized, probably  normal.  Left Ventricle: Hyperdynamic left ventricular systolic  function. Normal left ventricular internal dimensions and  wall thicknesses.  Right Heart: Normal right atrium. Normal right ventricular  size and function. Normal tricuspid valve. Mild tricuspid  regurgitation. Normal pulmonic valve. No pulmonic  regurgitation.  Pericardium/Pleura: Thickened pericardium. Moderate  circumferential pericardial effusion. The effusion measures  up to approximately 1.3 cm adjacent to the apex. There is  inversion of the RVOT in early ventricular diastole. On  subcostal imaging, there is inversion of the right  ventricle in early ventricular diastole. Dilated IVC  (diameterabout 2.1 cm) with less than 50% respiratory  variation in size consistent with elevated RA pressure.  Findings consistent with echocardiographic evidence of  pericardial tamponade physiology.  Left pleural effusion.  Hemodynamic: Estimated right atrial pressure is 8 mm Hg.  Estimated right ventricular systolic pressure equals 39 mm  Hg, assuming right atrial pressure equals 8 mm Hg,  consistent with borderline pulmonary hypertension.  ------------------------------------------------------------------------  Conclusions:  1. Normal mitral valve. Minimal mitral regurgitation.  2. Aortic valve not well visualized; appears calcified.  Minimal aortic regurgitation.  3. Hyperdynamic left ventricular systolic function.  4. Normal right ventricular size and function.  5. Thickened pericardium. Moderate circumferential  pericardial effusion. The effusion measures up to  approximately 1.3 cm adjacent to the apex. There is  inversion of the RVOT in early ventricular diastole. On  subcostal imaging, there is inversion of the right  ventricle in early ventricular diastole. Dilated IVC  (diameterabout 2.1 cm) with less than 50% respiratory  variation in size consistent with elevated RA pressure.  Findings consistent with echocardiographic evidence of  pericardial tamponade physiology.  6. Left pleural effusion.  Findings discussed with nurse anand Robb from  the PICU.

## 2021-06-16 ENCOUNTER — RESULT REVIEW (OUTPATIENT)
Age: 86
End: 2021-06-16

## 2021-06-16 LAB
ANION GAP SERPL CALC-SCNC: 11 MMOL/L — SIGNIFICANT CHANGE UP (ref 5–17)
BUN SERPL-MCNC: 42 MG/DL — HIGH (ref 7–23)
CALCIUM SERPL-MCNC: 8.7 MG/DL — SIGNIFICANT CHANGE UP (ref 8.4–10.5)
CHLORIDE SERPL-SCNC: 99 MMOL/L — SIGNIFICANT CHANGE UP (ref 96–108)
CO2 SERPL-SCNC: 27 MMOL/L — SIGNIFICANT CHANGE UP (ref 22–31)
CREAT SERPL-MCNC: 1.48 MG/DL — HIGH (ref 0.5–1.3)
GLUCOSE BLDC GLUCOMTR-MCNC: 142 MG/DL — HIGH (ref 70–99)
GLUCOSE SERPL-MCNC: 115 MG/DL — HIGH (ref 70–99)
HCT VFR BLD CALC: 28.7 % — LOW (ref 39–50)
HGB BLD-MCNC: 8.9 G/DL — LOW (ref 13–17)
MCHC RBC-ENTMCNC: 29.9 PG — SIGNIFICANT CHANGE UP (ref 27–34)
MCHC RBC-ENTMCNC: 31 GM/DL — LOW (ref 32–36)
MCV RBC AUTO: 96.3 FL — SIGNIFICANT CHANGE UP (ref 80–100)
NON-GYNECOLOGICAL CYTOLOGY STUDY: SIGNIFICANT CHANGE UP
NRBC # BLD: 0 /100 WBCS — SIGNIFICANT CHANGE UP (ref 0–0)
PLATELET # BLD AUTO: 255 K/UL — SIGNIFICANT CHANGE UP (ref 150–400)
POTASSIUM SERPL-MCNC: 3.5 MMOL/L — SIGNIFICANT CHANGE UP (ref 3.5–5.3)
POTASSIUM SERPL-SCNC: 3.5 MMOL/L — SIGNIFICANT CHANGE UP (ref 3.5–5.3)
RBC # BLD: 2.98 M/UL — LOW (ref 4.2–5.8)
RBC # FLD: 13.6 % — SIGNIFICANT CHANGE UP (ref 10.3–14.5)
SODIUM SERPL-SCNC: 137 MMOL/L — SIGNIFICANT CHANGE UP (ref 135–145)
WBC # BLD: 11.44 K/UL — HIGH (ref 3.8–10.5)
WBC # FLD AUTO: 11.44 K/UL — HIGH (ref 3.8–10.5)

## 2021-06-16 PROCEDURE — 88305 TISSUE EXAM BY PATHOLOGIST: CPT | Mod: 26

## 2021-06-16 PROCEDURE — 32555 ASPIRATE PLEURA W/ IMAGING: CPT | Mod: LT

## 2021-06-16 PROCEDURE — 88112 CYTOPATH CELL ENHANCE TECH: CPT | Mod: 26

## 2021-06-16 PROCEDURE — 71045 X-RAY EXAM CHEST 1 VIEW: CPT | Mod: 26

## 2021-06-16 RX ADMIN — Medication 1 APPLICATION(S): at 05:11

## 2021-06-16 RX ADMIN — Medication 1 APPLICATION(S): at 16:31

## 2021-06-16 RX ADMIN — SERTRALINE 50 MILLIGRAM(S): 25 TABLET, FILM COATED ORAL at 16:29

## 2021-06-16 RX ADMIN — ATORVASTATIN CALCIUM 10 MILLIGRAM(S): 80 TABLET, FILM COATED ORAL at 22:36

## 2021-06-16 RX ADMIN — LIDOCAINE 1 PATCH: 4 CREAM TOPICAL at 19:50

## 2021-06-16 RX ADMIN — LIDOCAINE 1 PATCH: 4 CREAM TOPICAL at 16:30

## 2021-06-16 RX ADMIN — SENNA PLUS 2 TABLET(S): 8.6 TABLET ORAL at 22:36

## 2021-06-16 RX ADMIN — Medication 1 TABLET(S): at 16:29

## 2021-06-16 RX ADMIN — Medication 500 MILLIGRAM(S): at 16:30

## 2021-06-16 RX ADMIN — Medication 325 MILLIGRAM(S): at 16:29

## 2021-06-16 RX ADMIN — LIDOCAINE 1 PATCH: 4 CREAM TOPICAL at 05:05

## 2021-06-16 RX ADMIN — Medication 1 APPLICATION(S): at 11:53

## 2021-06-16 RX ADMIN — FLUDROCORTISONE ACETATE 0.4 MILLIGRAM(S): 0.1 TABLET ORAL at 05:11

## 2021-06-16 RX ADMIN — Medication 1 MILLIGRAM(S): at 16:29

## 2021-06-16 NOTE — PROGRESS NOTE ADULT - SUBJECTIVE AND OBJECTIVE BOX
[facility] Cardiology Progress Note  _______________________________    Pt. seen and examined. No new cardiac-related complaints.    Telemetry -sinus 80s    T(C): 36.6 (06-16-21 @ 05:01), Max: 36.7 (06-15-21 @ 12:05)  HR: 63 (06-16-21 @ 05:01) (63 - 93)  BP: 114/62 (06-16-21 @ 05:01) (103/65 - 114/72)  RR: 18 (06-16-21 @ 05:01) (18 - 18)  SpO2: 93% (06-16-21 @ 05:01) (93% - 98%)  I&O's Summary    15 Aydin 2021 07:01  -  16 Jun 2021 07:00  --------------------------------------------------------  IN: 600 mL / OUT: 550 mL / NET: 50 mL        PHYSICAL EXAM:  GENERAL: Alert, NAD.  NECK: Supple  CHEST/LUNG: Clear to anterior auscultation bilaterally; No significant wheezes, rales, or rhonchi.  HEART: S1 S2 normal, RRR; No murmurs, rubs, or gallops  ABDOMEN: Soft, Nondistended  EXTREMITIES:  No LE edema.      LABS:                        8.9    11.44 )-----------( 255      ( 16 Jun 2021 07:22 )             28.7     06-16    137  |  99  |  42<H>  ----------------------------<  115<H>  3.5   |  27  |  1.48<H>    Ca    8.7      16 Jun 2021 07:20                    MEDICATIONS  (STANDING):  ascorbic acid 500 milliGRAM(s) Oral daily  atorvastatin 10 milliGRAM(s) Oral at bedtime  BACItracin   Ointment 1 Application(s) Topical daily  ferrous    sulfate 325 milliGRAM(s) Oral daily  fludroCORTISONE 0.4 milliGRAM(s) Oral daily  folic acid 1 milliGRAM(s) Oral daily  lidocaine   Patch 1 Patch Transdermal every 24 hours  multivitamin 1 Tablet(s) Oral daily  senna 2 Tablet(s) Oral at bedtime  sertraline 50 milliGRAM(s) Oral daily  silver sulfADIAZINE 1% Cream 1 Application(s) Topical two times a day    MEDICATIONS  (PRN):  acetaminophen   Tablet .. 650 milliGRAM(s) Oral every 6 hours PRN Mild Pain/Moderate Pain  bisacodyl Suppository 10 milliGRAM(s) Rectal daily PRN Constipation  polyethylene glycol 3350 17 Gram(s) Oral daily PRN Constipation        RADIOLOGY & ADDITIONAL TESTS:

## 2021-06-16 NOTE — PROGRESS NOTE ADULT - ASSESSMENT
89 year old male with significant past medical history of prostate cancer s/p radiation, CAD, PVD s/p L SFA stent, CKD stage III, chronic anemia, recent admission for hematuria, is brought from [facility] for complaint of chest pain. Patient is admitted for further evaluation and management for hematuria, pneumonia and to rule out ACS/cardiomyopathy.     # Chest pain:  Likely msk related pain vs. NSTEMI in the setting of elevated troponin  troponin stable, downtrending, proBNP 2k  4/2021 TTE normal LV, LVEF 55% per records  A. flutter noted on tele on day of admission - no further episodes since   Heparin drip completed for a total of 48 hours  ASA & Plavix restarted. (on hold in rehab due to hematuria)  c/w tele monitoring. remain in sinus.   Given hematuria, CKD patient is not a candidate for invasive cardiac eval such as cardiac cath at this time   Chest x-ray with BL pleural effusions, mod b/l pleural effusions with compressive atelectasis, no infiltrate or consolidation seen - no pna, likely HF exacerbation   IV Lasix started, but held after TTE results.  Repeat TTE --> moderate pericardial effusion  no signs of Tamponade  CT chest shows small pericardial effusion, no plan for IR eval for pericardiocentesis  s/p L side thoracentesis 6/14 - follow up cytology and cultures   Plan for R side thoracentesis? follow up pulm    # Right shoulder pain - unclear etiology:  Np prior hx of gout  No recent trauma  Uric acid ordered 3.8  Shoulder x-ray neg for fracture  Lidocaine patch  PRN Tylenol  ROM exercise  Will consider ortho if pain persists     # Hematuria:  Follows Iain Stafford  urology on the case  no clots, sanchez - clear urine  Monitor HH  + hematuria resolved. s/p 22F 3-way placed, no clot evacuated, color improved to clear peach  CBI not warranted at this time, Monitor urine outpt/color  Monitor hgb   UCx >100k E.coli -- follow for sensitivities  c/w abx, plan for 7 days of Ceftriaxone 1G Q24H  ASA/Plavix okay to continue from  standpoint as long as urine color and H/H remain stable     # Anemia:   AOCD and acute blood loss 2/2 hematuria   Monitor HH - stable  Keep active T&S, transfuse to keep Hgb >8   Tx  of hematuria as above     # CAD (coronary artery disease):  Plavix and ASA ok to continue  Continue Lipitor    # Orthostatic hypotension:  Continue Florinef    # PAD (peripheral artery disease):  PAD s/p angioplasty with stent of left SFA was started on Plavix (4/16/21)  Follows Dr. Urias outpatient  Plavix and ASA to cont    # Prostate CA:   Hx of prostate ca s/p radiation about ?15 years ago    # Stage 3 chronic kidney disease:  CKD stage III, baseline Cr ~ 1.8-1.9  Follows Dr. Chan outpatient  Cr improving, 1.48 today  Appears to be around baseline  Continue to monitor    # Need for prophylactic measure:  DVT PPX: SCDs  DASH diet  Continue pressure ulcer preventive measures per nursing   OOB to chair with assistance     89 year old male with significant past medical history of prostate cancer s/p radiation, CAD, PVD s/p L SFA stent, CKD stage III, chronic anemia, recent admission for hematuria, is brought from [facility] for complaint of chest pain. Patient is admitted for further evaluation and management for hematuria, pneumonia and to rule out ACS/cardiomyopathy.     # Chest pain:  Likely msk related pain vs. NSTEMI in the setting of elevated troponin  troponin stable, downtrending, proBNP 2k  4/2021 TTE normal LV, LVEF 55% per records  A. flutter noted on tele on day of admission - no further episodes since   Heparin drip completed for a total of 48 hours  ASA & Plavix restarted. (on hold in rehab due to hematuria)  c/w tele monitoring. remain in sinus.   Given hematuria, CKD patient is not a candidate for invasive cardiac eval such as cardiac cath at this time   Chest x-ray with BL pleural effusions, mod b/l pleural effusions with compressive atelectasis, no infiltrate or consolidation seen - no pna, likely HF exacerbation   IV Lasix started, but held after TTE results.  Repeat TTE --> moderate pericardial effusion  no signs of Tamponade  CT chest shows small pericardial effusion, no plan for IR eval for pericardiocentesis  s/p R side thoracentesis 6/14 - follow up cytology and cultures   Plan for L side thoracentesis? follow up pulm    # Right shoulder pain - unclear etiology:  Np prior hx of gout  No recent trauma  Uric acid ordered 3.8  Shoulder x-ray neg for fracture  Lidocaine patch  PRN Tylenol  ROM exercise  Will consider ortho if pain persists     # Hematuria:  Follows Iain Stafford  urology on the case  no clots, sanchez - clear urine  Monitor HH  + hematuria resolved. s/p 22F 3-way placed, no clot evacuated, color improved to clear peach  CBI not warranted at this time, Monitor urine outpt/color  Monitor hgb   UCx >100k E.coli -- follow for sensitivities  c/w abx, plan for 7 days of Ceftriaxone 1G Q24H  ASA/Plavix okay to continue from  standpoint as long as urine color and H/H remain stable     # Anemia:   AOCD and acute blood loss 2/2 hematuria   Monitor HH - stable  Keep active T&S, transfuse to keep Hgb >8   Tx  of hematuria as above     # CAD (coronary artery disease):  Plavix and ASA ok to continue  Continue Lipitor    # Orthostatic hypotension:  Continue Florinef    # PAD (peripheral artery disease):  PAD s/p angioplasty with stent of left SFA was started on Plavix (4/16/21)  Follows Dr. Urias outpatient  Plavix and ASA to cont    # Prostate CA:   Hx of prostate ca s/p radiation about ?15 years ago    # Stage 3 chronic kidney disease:  CKD stage III, baseline Cr ~ 1.8-1.9  Follows Dr. Chan outpatient  Cr improving, 1.48 today  Appears to be around baseline  Continue to monitor    # Need for prophylactic measure:  DVT PPX: SCDs  DASH diet  Continue pressure ulcer preventive measures per nursing   OOB to chair with assistance

## 2021-06-16 NOTE — PROGRESS NOTE ADULT - SUBJECTIVE AND OBJECTIVE BOX
Follow-up Pulm Progress Note  Arpit Rendon MD  [phone]    Afebrile with stble cardio-resp status  Anca negative  C-V serologies negative  Pleural fluid cytology negative for malignant cells  Appears comfortable on nasal cannula        Vital Signs Last 24 Hrs  T(C): 36.4 (16 Jun 2021 12:05), Max: 36.7 (16 Jun 2021 09:35)  T(F): 97.6 (16 Jun 2021 12:05), Max: 98.1 (16 Jun 2021 09:35)  HR: 93 (16 Jun 2021 12:22) (63 - 93)  BP: 104/64 (16 Jun 2021 12:22) (95/58 - 114/62)  BP(mean): --  RR: 18 (16 Jun 2021 12:05) (18 - 18)  SpO2: 95% (16 Jun 2021 12:05) (93% - 96%)                        8.9    11.44 )-----------( 255      ( 16 Jun 2021 07:22 )             28.7       06-16    137  |  99  |  42<H>  ----------------------------<  115<H>  3.5   |  27  |  1.48<H>    Ca    8.7      16 Jun 2021 07:20      Physical Examination:  PULM: Diminished BS bases  CVS: Regular rate and rhythm, no murmurs, rubs, or gallops  ABD: Soft, non-tender  EXT:  No clubbing, cyanosis, or edema    RADIOLOGY REVIEWED  CXR:    CT chest:      PROCEDURE DATE:  06/11/2021        INTERPRETATION:  CLINICAL INFORMATION: Pericardial effusion on echo, evaluate.    COMPARISON: CT chest 6/8/2021.    CONTRAST/COMPLICATIONS:  IV Contrast: NONE  Oral Contrast: NONE  Complications: None reported at time of study completion    PROCEDURE:  CT of the Chest was performed.  Sagittal and coronal reformats were performed.    FINDINGS:    LUNGS AND AIRWAYS: Similar near complete compressive atelectasis of the bilateral lower lobes. Unchanged 3 mm nodule in the right middle lobe (3:89). Left upper lobe calcified granuloma.  PLEURA: Redemonstrated moderate bilateral pleural effusions which are slightly increased when compared to prior study 6/8/2021.  MEDIASTINUM AND ELIANA: No lymphadenopathy.  VESSELS: Aortic and coronary artery calcifications.  HEART: Similar appearing small pericardial effusion. Aortic and mitral valvular calcifications.Heart size is normal.  CHEST WALL AND LOWER NECK: Within normallimits.  VISUALIZED UPPER ABDOMEN: Within normal limits.  BONES: Degenerative changes. Redemonstrated age-indeterminate compression deformities of the T7 and T8 vertebral bodies.    IMPRESSION:    When compared with June 8, 2021 chest CT:    Similar-appearing small pericardial effusion.    Moderate bilateral pleural effusions which are slightly increased when compared to prior study 6/8/2021 with near complete atelectasis of the bilateral lower lobes    TTE:    PROCEDURE: Transthoracic echocardiogram with 2-D, M-Mode  and complete spectral and color flow Doppler.  INDICATION: Chest pain, unspecified (R07.9)  ------------------------------------------------------------------------  Dimensions:    Normal Values:  LA:     2.6    2.0 - 4.0 cm  Ao:     3.5    2.0 - 3.8 cm  SEPTUM: 0.6    0.6 - 1.2 cm  PWT:    0.8    0.6 - 1.1 cm  LVIDd:  3.9    3.0 - 5.6 cm  LVIDs:  2.3    1.8 - 4.0 cm  Derived variables:  LVMI: 41 g/m2  RWT: 0.41  Fractional short: 41 %  EF (Visual Estimate): >75 %  Doppler Peak Velocity (m/sec): AoV=1.2  ------------------------------------------------------------------------  Observations:  Mitral Valve: Normal mitral valve. Minimal mitral  regurgitation.  Aortic Valve/Aorta: Aortic valve not well visualized;  appears calcified. Peak transaortic valve gradient equals 6  mm Hg. Minimal aortic regurgitation. Peak left ventricular  outflow tract gradient equals 2 mm Hg.  Aortic Root: 3.5 cm.  Left Atrium: Left atrium not well visualized, probably  normal.  Left Ventricle: Hyperdynamic left ventricular systolic  function. Normal left ventricular internal dimensions and  wall thicknesses.  Right Heart: Normal right atrium. Normal right ventricular  size and function. Normal tricuspid valve. Mild tricuspid  regurgitation. Normal pulmonic valve. No pulmonic  regurgitation.  Pericardium/Pleura: Thickened pericardium. Moderate  circumferential pericardial effusion. The effusion measures  up to approximately 1.3 cm adjacent to the apex. There is  inversion of the RVOT in early ventricular diastole. On  subcostal imaging, there is inversion of the right  ventricle in early ventricular diastole. Dilated IVC  (diameterabout 2.1 cm) with less than 50% respiratory  variation in size consistent with elevated RA pressure.  Findings consistent with echocardiographic evidence of  pericardial tamponade physiology.  Left pleural effusion.  Hemodynamic: Estimated right atrial pressure is 8 mm Hg.  Estimated right ventricular systolic pressure equals 39 mm  Hg, assuming right atrial pressure equals 8 mm Hg,  consistent with borderline pulmonary hypertension.  ------------------------------------------------------------------------  Conclusions:  1. Normal mitral valve. Minimal mitral regurgitation.  2. Aortic valve not well visualized; appears calcified.  Minimal aortic regurgitation.  3. Hyperdynamic left ventricular systolic function.  4. Normal right ventricular size and function.  5. Thickened pericardium. Moderate circumferential  pericardial effusion. The effusion measures up to  approximately 1.3 cm adjacent to the apex. There is  inversion of the RVOT in early ventricular diastole. On  subcostal imaging, there is inversion of the right  ventricle in early ventricular diastole. Dilated IVC  (diameterabout 2.1 cm) with less than 50% respiratory  variation in size consistent with elevated RA pressure.  Findings consistent with echocardiographic evidence of  pericardial tamponade physiology.  6. Left pleural effusion.  Findings discussed with nurse anand Robb from  the PICU.

## 2021-06-16 NOTE — CHART NOTE - NSCHARTNOTEFT_GEN_A_CORE
Nutrition Follow Up Note  Patient seen for: malnutrition follow up on PICU    Chart reviewed, events noted.    Source: [] Patient       [x] EMR        [] RN        [x] Family at bedside       [] Other:    -If unable to interview patient: [] Trach/Vent/BiPAP  [x] Disoriented/confused/inappropriate to interview    Diet Order:   Diet, NPO after Midnight:      NPO Start Date: 15-Aydin-2021,   NPO Start Time: 23:59 (06-15-21)  Diet, Regular:   Low Sodium  Supplement Feeding Modality:  Oral  Ensure Enlive Cans or Servings Per Day:  2       Frequency:  Daily (06-11-21)    - Is current order appropriate/adequate? [] Yes  [x]  No:     - PO intake meals :   [] >75%  Adequate    [] 50-75%  Fair       [] <50%  Poor-varies as per wife  - PO intake of supplements if pt receiving: [x]>75% []50% []25%   as per   []flow sheet  []patient  [x]family/aide  []PCA  []Nurse  []RD observation    - Nutrition-related concerns: pt NPO for procedure. spouse calls down to diet office to order pt meals when he is not NPO. she reports pt enjoying hamburger and ice cream.     pt seen by SLP []Yes [x]No    GI:  Last BM _6/16 fecal incontinence__.   Bowel Regimen? [x] Yes   [] No      Weights: no new weights available  Daily     Nutritionally Pertinent MEDICATIONS  (STANDING):  ascorbic acid  atorvastatin  ferrous    sulfate  fludroCORTISONE  folic acid  multivitamin  senna    Pertinent Labs: 06-16 @ 07:20: Na 137, BUN 42<H>, Cr 1.48<H>, <H>, K+ 3.5, Phos --, Mg --, Alk Phos --, ALT/SGPT --, AST/SGOT --, HbA1c --            Pressure Injuries as per nursing documentation: midline coccyx, spine stge 1, DTI upper  mid back  Edema:     Estimated Needs:   [x] no change since previous assessment  [] recalculated:     Previous Nutrition Diagnosis: Severe protein-calorie malnutrition and increased nutrient needs  Nutrition Diagnosis is: [x] ongoing  [] resolved [] not applicable     New Nutrition Diagnosis: [x] Not applicable    Nutrition Care Plan:  [x] In Progress  [] Achieved  [] Not applicable    Nutrition Interventions:     Education Provided:       [] Yes:  [x] No:   spouse was educated on the importance of supplements to increase calorie and protein intake in light of RD's nutritional findings [x]Yes []N/A         Recommendations:         [] Continue current diet order     [x] Change diet to: advance diet within 24-48 hours when medically feasible     [x] continue oral nutrition supplement: when diet advances 1 chocolate and 1 vanilla ensure enlive        [] Add micronutrient supplementation:      [x] Continue current micronutrient supplementation: multivitamin, VitC         []Discussed recommendations with provider     [] Needed to escalate to provider     []Placed pending verification     []Placed sticker (malnutrition/BMI/underweight)     []Recommend swallow evaluation     [] monitor need for diet ed reinforcement     [x] Other: monitor electrolytes and replete as needed (Mg, PHOS, K+)    Monitoring and Evaluation:   Continue to monitor nutritional intake, tolerance to diet prescription, weights, labs, skin integrity      RD remains available upon request and will follow up per protocol  Millicent Rogers MA, RD, CDN #[phone]

## 2021-06-16 NOTE — PROGRESS NOTE ADULT - SUBJECTIVE AND OBJECTIVE BOX
SUBJECTIVE / OVERNIGHT EVENTS:    no events overnight  patient seen and examined  denies cp/sob  no n/v/d      --------------------------------------------------------------------------------------------  LABS:                        8.9    11.44 )-----------( 255      ( 16 Jun 2021 07:22 )             28.7     06-16    137  |  99  |  42<H>  ----------------------------<  115<H>  3.5   |  27  |  1.48<H>    Ca    8.7      16 Jun 2021 07:20        CAPILLARY BLOOD GLUCOSE                RADIOLOGY & ADDITIONAL TESTS:    Imaging Personally Reviewed:  [x] YES  [ ] NO    Consultant(s) Notes Reviewed:  [x] YES  [ ] NO    MEDICATIONS  (STANDING):  ascorbic acid 500 milliGRAM(s) Oral daily  atorvastatin 10 milliGRAM(s) Oral at bedtime  BACItracin   Ointment 1 Application(s) Topical daily  ferrous    sulfate 325 milliGRAM(s) Oral daily  fludroCORTISONE 0.4 milliGRAM(s) Oral daily  folic acid 1 milliGRAM(s) Oral daily  lidocaine   Patch 1 Patch Transdermal every 24 hours  multivitamin 1 Tablet(s) Oral daily  senna 2 Tablet(s) Oral at bedtime  sertraline 50 milliGRAM(s) Oral daily  silver sulfADIAZINE 1% Cream 1 Application(s) Topical two times a day    MEDICATIONS  (PRN):  acetaminophen   Tablet .. 650 milliGRAM(s) Oral every 6 hours PRN Mild Pain/Moderate Pain  bisacodyl Suppository 10 milliGRAM(s) Rectal daily PRN Constipation  polyethylene glycol 3350 17 Gram(s) Oral daily PRN Constipation      Care Discussed with Consultants/Other Providers [x] YES  [ ] NO    Vital Signs Last 24 Hrs  T(C): 36.4 (16 Jun 2021 12:05), Max: 36.7 (16 Jun 2021 09:35)  T(F): 97.6 (16 Jun 2021 12:05), Max: 98.1 (16 Jun 2021 09:35)  HR: 93 (16 Jun 2021 12:22) (63 - 93)  BP: 104/64 (16 Jun 2021 12:22) (95/58 - 114/62)  BP(mean): --  RR: 18 (16 Jun 2021 12:05) (18 - 18)  SpO2: 95% (16 Jun 2021 12:05) (93% - 96%)  I&O's Summary    15 Aydin 2021 07:01  -  16 Jun 2021 07:00  --------------------------------------------------------  IN: 600 mL / OUT: 550 mL / NET: 50 mL    16 Jun 2021 07:01  -  16 Jun 2021 12:25  --------------------------------------------------------  IN: 0 mL / OUT: 400 mL / NET: -400 mL      PHYSICAL EXAM:  GENERAL: NAD, thin elderly, comfortable, lying in bed, on nasal canula  HEAD:  Atraumatic, Normocephalic  EYES: EOMI, PERRLA, conjunctiva and sclera clear  NECK: Supple, No JVD  CHEST/LUNG: mild decrease breath sounds bilaterally; No wheeze   HEART: Regular rate and rhythm; No murmurs, rubs, or gallops  ABDOMEN: Soft, Nontender, Nondistended; Bowel sounds present  NEURO: AAOx2, forgetful, no focal weakness   : Wang in place  EXTREMITIES:  2+ Peripheral Pulses, No clubbing, cyanosis, or edema, right should ROM limited due to pain, tenderness at acromioclavicular joint, minimal erythema, no edema sensitive to touch   SKIN: No rashes or lesions

## 2021-06-16 NOTE — PROGRESS NOTE ADULT - ASSESSMENT
89 year old male with significant past medical history of prostate cancer s/p radiation, CAD, PVD s/p L SFA stent, CKD stage III, chronic anemia, recent admission for hematuria, is brought from [facility] for complaint of chest pain.    TTE  Hyperdynamic left ventricular systolic function.  Moderate circumferential pericardial effusion. The effusion measures up to  approximately 1.3 cm adjacent to the apex. Findings consistent with echocardiographic evidence of pericardial tamponade physiology.  Left pleural effusion.

## 2021-06-16 NOTE — PROGRESS NOTE ADULT - PROBLEM SELECTOR PLAN 5
-small to moderate bilateral pleural effusions.  -noted on ct chest, likely HFpEF exacerbation.   -diuretic on hold  -wean o2 as tolerated.   -supplement potassium and mag to keep lytes above 4 and 2 respectively.  -f/u pulm in regards to thoracentesis. -small to moderate bilateral pleural effusions.  -noted on ct chest, likely HFpEF exacerbation.   -Creatinine downtrending. consider resuming lasix 40 mg po daily.   -wean o2 as tolerated.   -supplement potassium and mag to keep lytes above 4 and 2 respectively.  -f/u pulm in regards to thoracentesis.

## 2021-06-16 NOTE — PROGRESS NOTE ADULT - ASSESSMENT
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

## 2021-06-17 LAB
ANION GAP SERPL CALC-SCNC: 12 MMOL/L — SIGNIFICANT CHANGE UP (ref 5–17)
BUN SERPL-MCNC: 43 MG/DL — HIGH (ref 7–23)
CALCIUM SERPL-MCNC: 8.5 MG/DL — SIGNIFICANT CHANGE UP (ref 8.4–10.5)
CHLORIDE SERPL-SCNC: 99 MMOL/L — SIGNIFICANT CHANGE UP (ref 96–108)
CO2 SERPL-SCNC: 27 MMOL/L — SIGNIFICANT CHANGE UP (ref 22–31)
CREAT SERPL-MCNC: 1.58 MG/DL — HIGH (ref 0.5–1.3)
GLUCOSE BLDC GLUCOMTR-MCNC: 119 MG/DL — HIGH (ref 70–99)
GLUCOSE BLDC GLUCOMTR-MCNC: 136 MG/DL — HIGH (ref 70–99)
GLUCOSE SERPL-MCNC: 111 MG/DL — HIGH (ref 70–99)
HCT VFR BLD CALC: 27.9 % — LOW (ref 39–50)
HGB BLD-MCNC: 8.6 G/DL — LOW (ref 13–17)
MCHC RBC-ENTMCNC: 30.1 PG — SIGNIFICANT CHANGE UP (ref 27–34)
MCHC RBC-ENTMCNC: 30.8 GM/DL — LOW (ref 32–36)
MCV RBC AUTO: 97.6 FL — SIGNIFICANT CHANGE UP (ref 80–100)
NRBC # BLD: 0 /100 WBCS — SIGNIFICANT CHANGE UP (ref 0–0)
PLATELET # BLD AUTO: 252 K/UL — SIGNIFICANT CHANGE UP (ref 150–400)
POTASSIUM SERPL-MCNC: 3.4 MMOL/L — LOW (ref 3.5–5.3)
POTASSIUM SERPL-SCNC: 3.4 MMOL/L — LOW (ref 3.5–5.3)
RBC # BLD: 2.86 M/UL — LOW (ref 4.2–5.8)
RBC # FLD: 13.5 % — SIGNIFICANT CHANGE UP (ref 10.3–14.5)
SODIUM SERPL-SCNC: 138 MMOL/L — SIGNIFICANT CHANGE UP (ref 135–145)
WBC # BLD: 8.87 K/UL — SIGNIFICANT CHANGE UP (ref 3.8–10.5)
WBC # FLD AUTO: 8.87 K/UL — SIGNIFICANT CHANGE UP (ref 3.8–10.5)

## 2021-06-17 RX ORDER — FUROSEMIDE 40 MG
40 TABLET ORAL DAILY
Refills: 0 | Status: DISCONTINUED | OUTPATIENT
Start: 2021-06-17 | End: 2021-06-22

## 2021-06-17 RX ORDER — POTASSIUM CHLORIDE 20 MEQ
40 PACKET (EA) ORAL ONCE
Refills: 0 | Status: COMPLETED | OUTPATIENT
Start: 2021-06-17 | End: 2021-06-17

## 2021-06-17 RX ADMIN — SERTRALINE 50 MILLIGRAM(S): 25 TABLET, FILM COATED ORAL at 11:43

## 2021-06-17 RX ADMIN — ATORVASTATIN CALCIUM 10 MILLIGRAM(S): 80 TABLET, FILM COATED ORAL at 21:28

## 2021-06-17 RX ADMIN — Medication 1 MILLIGRAM(S): at 11:43

## 2021-06-17 RX ADMIN — FLUDROCORTISONE ACETATE 0.4 MILLIGRAM(S): 0.1 TABLET ORAL at 06:34

## 2021-06-17 RX ADMIN — Medication 40 MILLIEQUIVALENT(S): at 11:42

## 2021-06-17 RX ADMIN — Medication 325 MILLIGRAM(S): at 11:43

## 2021-06-17 RX ADMIN — Medication 500 MILLIGRAM(S): at 11:43

## 2021-06-17 RX ADMIN — Medication 1 APPLICATION(S): at 06:34

## 2021-06-17 RX ADMIN — Medication 40 MILLIGRAM(S): at 18:26

## 2021-06-17 RX ADMIN — SENNA PLUS 2 TABLET(S): 8.6 TABLET ORAL at 21:28

## 2021-06-17 RX ADMIN — Medication 1 APPLICATION(S): at 11:43

## 2021-06-17 RX ADMIN — Medication 1 TABLET(S): at 11:43

## 2021-06-17 RX ADMIN — Medication 1 APPLICATION(S): at 18:24

## 2021-06-17 RX ADMIN — LIDOCAINE 1 PATCH: 4 CREAM TOPICAL at 18:22

## 2021-06-17 RX ADMIN — LIDOCAINE 1 PATCH: 4 CREAM TOPICAL at 04:30

## 2021-06-17 NOTE — PROGRESS NOTE ADULT - SUBJECTIVE AND OBJECTIVE BOX
Follow-up Pulm Progress Note  Arpit Rendon MD  [phone]    S/P L US guided thorocentesis 6/16  CXR with clearing L chest; some subpulmonic effusion reaccumulating on R noted  100 % saturation on 2liters nasal  Feels well; less dyspnea talking      Vital Signs Last 24 Hrs  T(C): 37.1 (17 Jun 2021 11:06), Max: 37.1 (17 Jun 2021 11:06)  T(F): 98.8 (17 Jun 2021 11:06), Max: 98.8 (17 Jun 2021 11:06)  HR: 88 (17 Jun 2021 11:06) (86 - 98)  BP: 94/57 (17 Jun 2021 11:06) (86/48 - 101/65)  BP(mean): 3 (17 Jun 2021 11:06) (3 - 3)  RR: 18 (17 Jun 2021 11:06) (18 - 18)  SpO2: 100% (17 Jun 2021 11:06) (94% - 100%)                          8.6    8.87  )-----------( 252      ( 17 Jun 2021 07:01 )             27.9     06-17    138  |  99  |  43<H>  ----------------------------<  111<H>  3.4<L>   |  27  |  1.58<H>    Ca    8.5      17 Jun 2021 07:02      Physical Examination:  PULM: Diminished BS bases  CVS: Regular rate and rhythm, no murmurs, rubs, or gallops  ABD: Soft, non-tender  EXT:  No clubbing, cyanosis, or edema    RADIOLOGY REVIEWED  CXR:    CT chest:      PROCEDURE DATE:  06/11/2021        INTERPRETATION:  CLINICAL INFORMATION: Pericardial effusion on echo, evaluate.    COMPARISON: CT chest 6/8/2021.    CONTRAST/COMPLICATIONS:  IV Contrast: NONE  Oral Contrast: NONE  Complications: None reported at time of study completion    PROCEDURE:  CT of the Chest was performed.  Sagittal and coronal reformats were performed.    FINDINGS:    LUNGS AND AIRWAYS: Similar near complete compressive atelectasis of the bilateral lower lobes. Unchanged 3 mm nodule in the right middle lobe (3:89). Left upper lobe calcified granuloma.  PLEURA: Redemonstrated moderate bilateral pleural effusions which are slightly increased when compared to prior study 6/8/2021.  MEDIASTINUM AND ELIANA: No lymphadenopathy.  VESSELS: Aortic and coronary artery calcifications.  HEART: Similar appearing small pericardial effusion. Aortic and mitral valvular calcifications.Heart size is normal.  CHEST WALL AND LOWER NECK: Within normallimits.  VISUALIZED UPPER ABDOMEN: Within normal limits.  BONES: Degenerative changes. Redemonstrated age-indeterminate compression deformities of the T7 and T8 vertebral bodies.    IMPRESSION:    When compared with June 8, 2021 chest CT:    Similar-appearing small pericardial effusion.    Moderate bilateral pleural effusions which are slightly increased when compared to prior study 6/8/2021 with near complete atelectasis of the bilateral lower lobes    TTE:    PROCEDURE: Transthoracic echocardiogram with 2-D, M-Mode  and complete spectral and color flow Doppler.  INDICATION: Chest pain, unspecified (R07.9)  ------------------------------------------------------------------------  Dimensions:    Normal Values:  LA:     2.6    2.0 - 4.0 cm  Ao:     3.5    2.0 - 3.8 cm  SEPTUM: 0.6    0.6 - 1.2 cm  PWT:    0.8    0.6 - 1.1 cm  LVIDd:  3.9    3.0 - 5.6 cm  LVIDs:  2.3    1.8 - 4.0 cm  Derived variables:  LVMI: 41 g/m2  RWT: 0.41  Fractional short: 41 %  EF (Visual Estimate): >75 %  Doppler Peak Velocity (m/sec): AoV=1.2  ------------------------------------------------------------------------  Observations:  Mitral Valve: Normal mitral valve. Minimal mitral  regurgitation.  Aortic Valve/Aorta: Aortic valve not well visualized;  appears calcified. Peak transaortic valve gradient equals 6  mm Hg. Minimal aortic regurgitation. Peak left ventricular  outflow tract gradient equals 2 mm Hg.  Aortic Root: 3.5 cm.  Left Atrium: Left atrium not well visualized, probably  normal.  Left Ventricle: Hyperdynamic left ventricular systolic  function. Normal left ventricular internal dimensions and  wall thicknesses.  Right Heart: Normal right atrium. Normal right ventricular  size and function. Normal tricuspid valve. Mild tricuspid  regurgitation. Normal pulmonic valve. No pulmonic  regurgitation.  Pericardium/Pleura: Thickened pericardium. Moderate  circumferential pericardial effusion. The effusion measures  up to approximately 1.3 cm adjacent to the apex. There is  inversion of the RVOT in early ventricular diastole. On  subcostal imaging, there is inversion of the right  ventricle in early ventricular diastole. Dilated IVC  (diameterabout 2.1 cm) with less than 50% respiratory  variation in size consistent with elevated RA pressure.  Findings consistent with echocardiographic evidence of  pericardial tamponade physiology.  Left pleural effusion.  Hemodynamic: Estimated right atrial pressure is 8 mm Hg.  Estimated right ventricular systolic pressure equals 39 mm  Hg, assuming right atrial pressure equals 8 mm Hg,  consistent with borderline pulmonary hypertension.  ------------------------------------------------------------------------  Conclusions:  1. Normal mitral valve. Minimal mitral regurgitation.  2. Aortic valve not well visualized; appears calcified.  Minimal aortic regurgitation.  3. Hyperdynamic left ventricular systolic function.  4. Normal right ventricular size and function.  5. Thickened pericardium. Moderate circumferential  pericardial effusion. The effusion measures up to  approximately 1.3 cm adjacent to the apex. There is  inversion of the RVOT in early ventricular diastole. On  subcostal imaging, there is inversion of the right  ventricle in early ventricular diastole. Dilated IVC  (diameterabout 2.1 cm) with less than 50% respiratory  variation in size consistent with elevated RA pressure.  Findings consistent with echocardiographic evidence of  pericardial tamponade physiology.  6. Left pleural effusion.  Findings discussed with nurse anand Robb from  the PICU.

## 2021-06-17 NOTE — PROGRESS NOTE ADULT - SUBJECTIVE AND OBJECTIVE BOX
SUBJECTIVE / OVERNIGHT EVENTS:    + hypotension overnight  s/p L niraja  patient seen and examined  denies cp/sob  no n/v/d  Cr trending up     --------------------------------------------------------------------------------------------  LABS:                        8.6    8.87  )-----------( 252      ( 17 Jun 2021 07:01 )             27.9     06-17    138  |  99  |  43<H>  ----------------------------<  111<H>  3.4<L>   |  27  |  1.58<H>    Ca    8.5      17 Jun 2021 07:02        CAPILLARY BLOOD GLUCOSE      POCT Blood Glucose.: 142 mg/dL (16 Jun 2021 16:56)            RADIOLOGY & ADDITIONAL TESTS:    Imaging Personally Reviewed:  [x] YES  [ ] NO    Consultant(s) Notes Reviewed:  [x] YES  [ ] NO    MEDICATIONS  (STANDING):  ascorbic acid 500 milliGRAM(s) Oral daily  atorvastatin 10 milliGRAM(s) Oral at bedtime  BACItracin   Ointment 1 Application(s) Topical daily  ferrous    sulfate 325 milliGRAM(s) Oral daily  fludroCORTISONE 0.4 milliGRAM(s) Oral daily  folic acid 1 milliGRAM(s) Oral daily  lidocaine   Patch 1 Patch Transdermal every 24 hours  multivitamin 1 Tablet(s) Oral daily  senna 2 Tablet(s) Oral at bedtime  sertraline 50 milliGRAM(s) Oral daily  silver sulfADIAZINE 1% Cream 1 Application(s) Topical two times a day    MEDICATIONS  (PRN):  acetaminophen   Tablet .. 650 milliGRAM(s) Oral every 6 hours PRN Mild Pain/Moderate Pain  bisacodyl Suppository 10 milliGRAM(s) Rectal daily PRN Constipation  polyethylene glycol 3350 17 Gram(s) Oral daily PRN Constipation      Care Discussed with Consultants/Other Providers [x] YES  [ ] NO    Vital Signs Last 24 Hrs  T(C): 37.1 (17 Jun 2021 11:06), Max: 37.1 (17 Jun 2021 11:06)  T(F): 98.8 (17 Jun 2021 11:06), Max: 98.8 (17 Jun 2021 11:06)  HR: 88 (17 Jun 2021 11:06) (86 - 98)  BP: 94/57 (17 Jun 2021 11:06) (86/48 - 104/64)  BP(mean): 3 (17 Jun 2021 11:06) (3 - 3)  RR: 18 (17 Jun 2021 11:06) (18 - 18)  SpO2: 100% (17 Jun 2021 11:06) (94% - 100%)  I&O's Summary    16 Jun 2021 07:01  -  17 Jun 2021 07:00  --------------------------------------------------------  IN: 400 mL / OUT: 1300 mL / NET: -900 mL    PHYSICAL EXAM:  GENERAL: NAD, thin elderly, comfortable, lying in bed, on nasal canula  HEAD:  Atraumatic, Normocephalic  EYES: EOMI, PERRLA, conjunctiva and sclera clear  NECK: Supple, No JVD  CHEST/LUNG: mild decrease breath sounds bilaterally; No wheeze   HEART: Regular rate and rhythm; No murmurs, rubs, or gallops  ABDOMEN: Soft, Nontender, Nondistended; Bowel sounds present  NEURO: AAOx2, forgetful, no focal weakness   : Wang in place  EXTREMITIES:  2+ Peripheral Pulses, No clubbing, cyanosis, or edema, right should ROM limited due to pain, tenderness at acromioclavicular joint, minimal erythema, no edema sensitive to touch   SKIN: No rashes or lesions        SUBJECTIVE / OVERNIGHT EVENTS:    + hypotension overnight  s/p L thoracentesis   patient seen and examined  denies cp/sob  no n/v/d  Cr trending up     --------------------------------------------------------------------------------------------  LABS:                        8.6    8.87  )-----------( 252      ( 17 Jun 2021 07:01 )             27.9     06-17    138  |  99  |  43<H>  ----------------------------<  111<H>  3.4<L>   |  27  |  1.58<H>    Ca    8.5      17 Jun 2021 07:02        CAPILLARY BLOOD GLUCOSE      POCT Blood Glucose.: 142 mg/dL (16 Jun 2021 16:56)            RADIOLOGY & ADDITIONAL TESTS:    Imaging Personally Reviewed:  [x] YES  [ ] NO    Consultant(s) Notes Reviewed:  [x] YES  [ ] NO    MEDICATIONS  (STANDING):  ascorbic acid 500 milliGRAM(s) Oral daily  atorvastatin 10 milliGRAM(s) Oral at bedtime  BACItracin   Ointment 1 Application(s) Topical daily  ferrous    sulfate 325 milliGRAM(s) Oral daily  fludroCORTISONE 0.4 milliGRAM(s) Oral daily  folic acid 1 milliGRAM(s) Oral daily  lidocaine   Patch 1 Patch Transdermal every 24 hours  multivitamin 1 Tablet(s) Oral daily  senna 2 Tablet(s) Oral at bedtime  sertraline 50 milliGRAM(s) Oral daily  silver sulfADIAZINE 1% Cream 1 Application(s) Topical two times a day    MEDICATIONS  (PRN):  acetaminophen   Tablet .. 650 milliGRAM(s) Oral every 6 hours PRN Mild Pain/Moderate Pain  bisacodyl Suppository 10 milliGRAM(s) Rectal daily PRN Constipation  polyethylene glycol 3350 17 Gram(s) Oral daily PRN Constipation      Care Discussed with Consultants/Other Providers [x] YES  [ ] NO    Vital Signs Last 24 Hrs  T(C): 37.1 (17 Jun 2021 11:06), Max: 37.1 (17 Jun 2021 11:06)  T(F): 98.8 (17 Jun 2021 11:06), Max: 98.8 (17 Jun 2021 11:06)  HR: 88 (17 Jun 2021 11:06) (86 - 98)  BP: 94/57 (17 Jun 2021 11:06) (86/48 - 104/64)  BP(mean): 3 (17 Jun 2021 11:06) (3 - 3)  RR: 18 (17 Jun 2021 11:06) (18 - 18)  SpO2: 100% (17 Jun 2021 11:06) (94% - 100%)  I&O's Summary    16 Jun 2021 07:01  -  17 Jun 2021 07:00  --------------------------------------------------------  IN: 400 mL / OUT: 1300 mL / NET: -900 mL    PHYSICAL EXAM:  GENERAL: NAD, thin elderly, comfortable, lying in bed, on nasal canula  HEAD:  Atraumatic, Normocephalic  EYES: EOMI, PERRLA, conjunctiva and sclera clear  NECK: Supple, No JVD  CHEST/LUNG: mild decrease breath sounds bilaterally; No wheeze   HEART: Regular rate and rhythm; No murmurs, rubs, or gallops  ABDOMEN: Soft, Nontender, Nondistended; Bowel sounds present  NEURO: AAOx2, forgetful, no focal weakness   : Wang in place  EXTREMITIES:  2+ Peripheral Pulses, No clubbing, cyanosis, or edema, right should ROM limited due to pain, tenderness at acromioclavicular joint, minimal erythema, no edema sensitive to touch   SKIN: No rashes or lesions

## 2021-06-17 NOTE — PROGRESS NOTE ADULT - ASSESSMENT
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

## 2021-06-17 NOTE — PROGRESS NOTE ADULT - ASSESSMENT
89 year old male with significant past medical history of prostate cancer s/p radiation, CAD, PVD s/p L SFA stent, CKD stage III, chronic anemia, recent admission for hematuria, is brought from [facility] for complaint of chest pain. Patient is admitted for further evaluation and management for hematuria, pneumonia and to rule out ACS/cardiomyopathy.     # Chest pain:  Likely msk related pain vs. NSTEMI in the setting of elevated troponin  troponin stable, downtrending, proBNP 2k  4/2021 TTE normal LV, LVEF 55% per records  A. flutter noted on tele on day of admission - no further episodes since   Heparin drip completed for a total of 48 hours  ASA & Plavix restarted. (on hold in rehab due to hematuria)  c/w tele monitoring. remain in sinus.   Given hematuria, CKD patient is not a candidate for invasive cardiac eval such as cardiac cath at this time   Chest x-ray with BL pleural effusions, mod b/l pleural effusions with compressive atelectasis, no infiltrate or consolidation seen - no pna, likely HF exacerbation   IV Lasix started, but held after TTE results.  Repeat TTE --> moderate pericardial effusion  no signs of Tamponade  CT chest shows small pericardial effusion, no plan for IR eval for pericardiocentesis  s/p R side thoracentesis 6/14 - follow up cytology and cultures   s/p L side thoracentesis 6/16 - follow up cytology and cultures    # Right shoulder pain - unclear etiology:  Np prior hx of gout  No recent trauma  Uric acid ordered 3.8  Shoulder x-ray neg for fracture  Lidocaine patch  PRN Tylenol  ROM exercise  Will consider ortho if pain persists     # Hematuria:  Follows Iain Stafford  urology on the case  no clots, sanchez - clear urine  Monitor HH  + hematuria resolved. s/p 22F 3-way placed, no clot evacuated, color improved to clear peach  CBI not warranted at this time, Monitor urine outpt/color  Monitor hgb   UCx >100k E.coli -- follow for sensitivities  c/w abx, plan for 7 days of Ceftriaxone 1G Q24H  ASA/Plavix okay to continue from  standpoint as long as urine color and H/H remain stable     # Anemia:   AOCD and acute blood loss 2/2 hematuria   Monitor HH - stable  Keep active T&S, transfuse to keep Hgb >8   Tx of hematuria as above     # CAD (coronary artery disease):  Plavix and ASA ok to continue  Continue Lipitor    # Orthostatic hypotension:  Continue Florinef    # PAD (peripheral artery disease):  PAD s/p angioplasty with stent of left SFA was started on Plavix (4/16/21)  Follows Dr. Urias outpatient  Plavix and ASA to cont    # Prostate CA:   Hx of prostate ca s/p radiation about ?15 years ago    # Stage 3 chronic kidney disease:  CKD stage III, baseline Cr ~ 1.8-1.9  Follows Dr. Chan outpatient  Cr trending up, 1.58 today  Appears to be around baseline  Continue to monitor    # Need for prophylactic measure:  DVT PPX: SCDs  DASH diet  Continue pressure ulcer preventive measures per nursing   OOB to chair with assistance

## 2021-06-18 LAB
ANION GAP SERPL CALC-SCNC: 11 MMOL/L — SIGNIFICANT CHANGE UP (ref 5–17)
BUN SERPL-MCNC: 45 MG/DL — HIGH (ref 7–23)
CALCIUM SERPL-MCNC: 8.6 MG/DL — SIGNIFICANT CHANGE UP (ref 8.4–10.5)
CHLORIDE SERPL-SCNC: 100 MMOL/L — SIGNIFICANT CHANGE UP (ref 96–108)
CO2 SERPL-SCNC: 27 MMOL/L — SIGNIFICANT CHANGE UP (ref 22–31)
CREAT SERPL-MCNC: 1.58 MG/DL — HIGH (ref 0.5–1.3)
GLUCOSE SERPL-MCNC: 105 MG/DL — HIGH (ref 70–99)
HCT VFR BLD CALC: 27.4 % — LOW (ref 39–50)
HGB BLD-MCNC: 8.4 G/DL — LOW (ref 13–17)
MCHC RBC-ENTMCNC: 29.3 PG — SIGNIFICANT CHANGE UP (ref 27–34)
MCHC RBC-ENTMCNC: 30.7 GM/DL — LOW (ref 32–36)
MCV RBC AUTO: 95.5 FL — SIGNIFICANT CHANGE UP (ref 80–100)
NON-GYNECOLOGICAL CYTOLOGY STUDY: SIGNIFICANT CHANGE UP
NRBC # BLD: 0 /100 WBCS — SIGNIFICANT CHANGE UP (ref 0–0)
PLATELET # BLD AUTO: 258 K/UL — SIGNIFICANT CHANGE UP (ref 150–400)
POTASSIUM SERPL-MCNC: 4 MMOL/L — SIGNIFICANT CHANGE UP (ref 3.5–5.3)
POTASSIUM SERPL-SCNC: 4 MMOL/L — SIGNIFICANT CHANGE UP (ref 3.5–5.3)
RBC # BLD: 2.87 M/UL — LOW (ref 4.2–5.8)
RBC # FLD: 13.6 % — SIGNIFICANT CHANGE UP (ref 10.3–14.5)
SODIUM SERPL-SCNC: 138 MMOL/L — SIGNIFICANT CHANGE UP (ref 135–145)
WBC # BLD: 9.19 K/UL — SIGNIFICANT CHANGE UP (ref 3.8–10.5)
WBC # FLD AUTO: 9.19 K/UL — SIGNIFICANT CHANGE UP (ref 3.8–10.5)

## 2021-06-18 RX ADMIN — SERTRALINE 50 MILLIGRAM(S): 25 TABLET, FILM COATED ORAL at 11:13

## 2021-06-18 RX ADMIN — Medication 1 APPLICATION(S): at 05:20

## 2021-06-18 RX ADMIN — FLUDROCORTISONE ACETATE 0.4 MILLIGRAM(S): 0.1 TABLET ORAL at 05:20

## 2021-06-18 RX ADMIN — SENNA PLUS 2 TABLET(S): 8.6 TABLET ORAL at 21:20

## 2021-06-18 RX ADMIN — Medication 500 MILLIGRAM(S): at 11:13

## 2021-06-18 RX ADMIN — Medication 1 APPLICATION(S): at 18:15

## 2021-06-18 RX ADMIN — Medication 1 TABLET(S): at 11:13

## 2021-06-18 RX ADMIN — LIDOCAINE 1 PATCH: 4 CREAM TOPICAL at 18:14

## 2021-06-18 RX ADMIN — Medication 1 MILLIGRAM(S): at 11:13

## 2021-06-18 RX ADMIN — ATORVASTATIN CALCIUM 10 MILLIGRAM(S): 80 TABLET, FILM COATED ORAL at 21:20

## 2021-06-18 RX ADMIN — Medication 40 MILLIGRAM(S): at 05:20

## 2021-06-18 RX ADMIN — LIDOCAINE 1 PATCH: 4 CREAM TOPICAL at 06:45

## 2021-06-18 RX ADMIN — Medication 325 MILLIGRAM(S): at 11:13

## 2021-06-18 RX ADMIN — Medication 1 APPLICATION(S): at 11:14

## 2021-06-18 NOTE — PROGRESS NOTE ADULT - ASSESSMENT
89 year old male with significant past medical history of prostate cancer s/p radiation, CAD, PVD s/p L SFA stent, CKD stage III, chronic anemia, recent admission for hematuria, is brought from [facility] for complaint of chest pain. Patient is admitted for further evaluation and management for hematuria, pneumonia and to rule out ACS/cardiomyopathy.     # Chest pain:  Likely msk related pain vs. NSTEMI in the setting of elevated troponin  troponin stable, downtrending, proBNP 2k  4/2021 TTE normal LV, LVEF 55% per records  A. flutter noted on tele on day of admission - no further episodes since   Heparin drip completed for a total of 48 hours  ASA & Plavix restarted. (on hold in rehab due to hematuria)  c/w tele monitoring. remain in sinus.   Given hematuria, CKD patient is not a candidate for invasive cardiac eval such as cardiac cath at this time   Chest x-ray with BL pleural effusions, mod b/l pleural effusions with compressive atelectasis, no infiltrate or consolidation seen - no pna, likely HF exacerbation   IV Lasix started, but held after TTE results.  Repeat TTE --> moderate pericardial effusion  no signs of Tamponade  CT chest shows small pericardial effusion, no plan for IR eval for pericardiocentesis  s/p R side thoracentesis 6/14 - follow up cytology and cultures   s/p L side thoracentesis 6/16 - follow up cytology and cultures    # Right shoulder pain - unclear etiology:  Np prior hx of gout  No recent trauma  Uric acid ordered 3.8  Shoulder x-ray neg for fracture  Lidocaine patch  PRN Tylenol  ROM exercise  Will consider ortho if pain persists     # Hematuria:  Follows Iain Stafford  urology on the case  no clots, sanchez - clear urine  Monitor HH  + hematuria resolved. s/p 22F 3-way placed, no clot evacuated, color improved to clear peach  CBI not warranted at this time, Monitor urine outpt/color  Monitor hgb   UCx >100k E.coli -- follow for sensitivities  c/w abx, plan for 7 days of Ceftriaxone 1G Q24H  ASA/Plavix okay to continue from  standpoint as long as urine color and H/H remain stable     # Anemia:   AOCD and acute blood loss 2/2 hematuria   Monitor HH - stable  Keep active T&S, transfuse to keep Hgb >8   Tx of hematuria as above     # CAD (coronary artery disease):  Plavix and ASA ok to continue  Continue Lipitor    # Orthostatic hypotension:  Continue Florinef    # PAD (peripheral artery disease):  PAD s/p angioplasty with stent of left SFA was started on Plavix (4/16/21)  Follows Dr. Urias outpatient  Plavix and ASA to cont    # Prostate CA:   Hx of prostate ca s/p radiation about ?15 years ago    # Stage 3 chronic kidney disease:  CKD stage III, baseline Cr ~ 1.8-1.9  Follows Dr. Chan outpatient  Cr trending up, 1.58 today  Appears to be around baseline  Continue to monitor    # Need for prophylactic measure:  DVT PPX: SCDs  DASH diet  Continue pressure ulcer preventive measures per nursing   OOB to chair with assistance

## 2021-06-18 NOTE — PROGRESS NOTE ADULT - SUBJECTIVE AND OBJECTIVE BOX
Patient is a 89y old  Male who presents with a chief complaint of chest pain (18 Jun 2021 12:52)      INTERVAL HPI/OVERNIGHT EVENTS: noted  pt seen and examined this am   events noted  denies cp/sob  feels well  wife at bedside      Vital Signs Last 24 Hrs  T(C): 36.6 (18 Jun 2021 13:17), Max: 36.6 (18 Jun 2021 13:17)  T(F): 97.8 (18 Jun 2021 13:17), Max: 97.8 (18 Jun 2021 13:17)  HR: 98 (18 Jun 2021 13:37) (60 - 98)  BP: 105/66 (18 Jun 2021 13:17) (102/64 - 105/66)  BP(mean): --  RR: 18 (18 Jun 2021 13:37) (17 - 18)  SpO2: 94% (18 Jun 2021 13:37) (94% - 98%)    acetaminophen   Tablet .. 650 milliGRAM(s) Oral every 6 hours PRN  ascorbic acid 500 milliGRAM(s) Oral daily  atorvastatin 10 milliGRAM(s) Oral at bedtime  BACItracin   Ointment 1 Application(s) Topical daily  bisacodyl Suppository 10 milliGRAM(s) Rectal daily PRN  ferrous    sulfate 325 milliGRAM(s) Oral daily  fludroCORTISONE 0.4 milliGRAM(s) Oral daily  folic acid 1 milliGRAM(s) Oral daily  furosemide    Tablet 40 milliGRAM(s) Oral daily  lidocaine   Patch 1 Patch Transdermal every 24 hours  multivitamin 1 Tablet(s) Oral daily  polyethylene glycol 3350 17 Gram(s) Oral daily PRN  senna 2 Tablet(s) Oral at bedtime  sertraline 50 milliGRAM(s) Oral daily  silver sulfADIAZINE 1% Cream 1 Application(s) Topical two times a day      PHYSICAL EXAM:  GENERAL: NAD,   EYES: conjunctiva and sclera clear  ENMT: Moist mucous membranes  NECK: Supple, No JVD, Normal thyroid  CHEST/LUNG: non labored, cta b/l  HEART: Regular rate and rhythm; No murmurs, rubs, or gallops  ABDOMEN: Soft, Nontender, Nondistended; Bowel sounds present  EXTREMITIES:  2+ Peripheral Pulses, No clubbing, cyanosis, or edema  LYMPH: No lymphadenopathy noted  SKIN: No rashes or lesions    Consultant(s) Notes Reviewed:  [x ] YES  [ ] NO  Care Discussed with Consultants/Other Providers [ x] YES  [ ] NO    LABS:                        8.4    9.19  )-----------( 258      ( 18 Jun 2021 06:29 )             27.4     06-18    138  |  100  |  45<H>  ----------------------------<  105<H>  4.0   |  27  |  1.58<H>    Ca    8.6      18 Jun 2021 06:29          CAPILLARY BLOOD GLUCOSE                  RADIOLOGY & ADDITIONAL TESTS:    Imaging Personally Reviewed:  [x ] YES  [ ] NO

## 2021-06-18 NOTE — PROGRESS NOTE ADULT - ASSESSMENT
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

## 2021-06-18 NOTE — PROGRESS NOTE ADULT - SUBJECTIVE AND OBJECTIVE BOX
Follow-up Pulm Progress Note  Arpit Rendon MD  [phone]    Afebrile  No new resp complaints  O2 sats % on nasal cannula      Vital Signs Last 24 Hrs  T(C): 36.3 (18 Jun 2021 04:30), Max: 36.3 (17 Jun 2021 19:34)  T(F): 97.4 (18 Jun 2021 04:30), Max: 97.4 (18 Jun 2021 04:30)  HR: 60 (18 Jun 2021 04:30) (60 - 88)  BP: 102/64 (18 Jun 2021 04:30) (96/63 - 102/64)  BP(mean): --  RR: 17 (18 Jun 2021 04:30) (17 - 18)  SpO2: 97% (18 Jun 2021 04:30) (97% - 100%)                              8.4    9.19  )-----------( 258      ( 18 Jun 2021 06:29 )             27.4       06-18    138  |  100  |  45<H>  ----------------------------<  105<H>  4.0   |  27  |  1.58<H>    Ca    8.6      18 Jun 2021 06:29      Physical Examination:  PULM: Diminished BS bases  CVS: Regular rate and rhythm, no murmurs, rubs, or gallops  ABD: Soft, non-tender  EXT:  No clubbing, cyanosis, or edema    RADIOLOGY REVIEWED  CXR:    CT chest:      PROCEDURE DATE:  06/11/2021        INTERPRETATION:  CLINICAL INFORMATION: Pericardial effusion on echo, evaluate.    COMPARISON: CT chest 6/8/2021.    CONTRAST/COMPLICATIONS:  IV Contrast: NONE  Oral Contrast: NONE  Complications: None reported at time of study completion    PROCEDURE:  CT of the Chest was performed.  Sagittal and coronal reformats were performed.    FINDINGS:    LUNGS AND AIRWAYS: Similar near complete compressive atelectasis of the bilateral lower lobes. Unchanged 3 mm nodule in the right middle lobe (3:89). Left upper lobe calcified granuloma.  PLEURA: Redemonstrated moderate bilateral pleural effusions which are slightly increased when compared to prior study 6/8/2021.  MEDIASTINUM AND ELIANA: No lymphadenopathy.  VESSELS: Aortic and coronary artery calcifications.  HEART: Similar appearing small pericardial effusion. Aortic and mitral valvular calcifications.Heart size is normal.  CHEST WALL AND LOWER NECK: Within normallimits.  VISUALIZED UPPER ABDOMEN: Within normal limits.  BONES: Degenerative changes. Redemonstrated age-indeterminate compression deformities of the T7 and T8 vertebral bodies.    IMPRESSION:    When compared with June 8, 2021 chest CT:    Similar-appearing small pericardial effusion.    Moderate bilateral pleural effusions which are slightly increased when compared to prior study 6/8/2021 with near complete atelectasis of the bilateral lower lobes    TTE:    PROCEDURE: Transthoracic echocardiogram with 2-D, M-Mode  and complete spectral and color flow Doppler.  INDICATION: Chest pain, unspecified (R07.9)  ------------------------------------------------------------------------  Dimensions:    Normal Values:  LA:     2.6    2.0 - 4.0 cm  Ao:     3.5    2.0 - 3.8 cm  SEPTUM: 0.6    0.6 - 1.2 cm  PWT:    0.8    0.6 - 1.1 cm  LVIDd:  3.9    3.0 - 5.6 cm  LVIDs:  2.3    1.8 - 4.0 cm  Derived variables:  LVMI: 41 g/m2  RWT: 0.41  Fractional short: 41 %  EF (Visual Estimate): >75 %  Doppler Peak Velocity (m/sec): AoV=1.2  ------------------------------------------------------------------------  Observations:  Mitral Valve: Normal mitral valve. Minimal mitral  regurgitation.  Aortic Valve/Aorta: Aortic valve not well visualized;  appears calcified. Peak transaortic valve gradient equals 6  mm Hg. Minimal aortic regurgitation. Peak left ventricular  outflow tract gradient equals 2 mm Hg.  Aortic Root: 3.5 cm.  Left Atrium: Left atrium not well visualized, probably  normal.  Left Ventricle: Hyperdynamic left ventricular systolic  function. Normal left ventricular internal dimensions and  wall thicknesses.  Right Heart: Normal right atrium. Normal right ventricular  size and function. Normal tricuspid valve. Mild tricuspid  regurgitation. Normal pulmonic valve. No pulmonic  regurgitation.  Pericardium/Pleura: Thickened pericardium. Moderate  circumferential pericardial effusion. The effusion measures  up to approximately 1.3 cm adjacent to the apex. There is  inversion of the RVOT in early ventricular diastole. On  subcostal imaging, there is inversion of the right  ventricle in early ventricular diastole. Dilated IVC  (diameterabout 2.1 cm) with less than 50% respiratory  variation in size consistent with elevated RA pressure.  Findings consistent with echocardiographic evidence of  pericardial tamponade physiology.  Left pleural effusion.  Hemodynamic: Estimated right atrial pressure is 8 mm Hg.  Estimated right ventricular systolic pressure equals 39 mm  Hg, assuming right atrial pressure equals 8 mm Hg,  consistent with borderline pulmonary hypertension.  ------------------------------------------------------------------------  Conclusions:  1. Normal mitral valve. Minimal mitral regurgitation.  2. Aortic valve not well visualized; appears calcified.  Minimal aortic regurgitation.  3. Hyperdynamic left ventricular systolic function.  4. Normal right ventricular size and function.  5. Thickened pericardium. Moderate circumferential  pericardial effusion. The effusion measures up to  approximately 1.3 cm adjacent to the apex. There is  inversion of the RVOT in early ventricular diastole. On  subcostal imaging, there is inversion of the right  ventricle in early ventricular diastole. Dilated IVC  (diameterabout 2.1 cm) with less than 50% respiratory  variation in size consistent with elevated RA pressure.  Findings consistent with echocardiographic evidence of  pericardial tamponade physiology.  6. Left pleural effusion.  Findings discussed with nurse anand Robb from  the PICU.

## 2021-06-19 LAB
ANION GAP SERPL CALC-SCNC: 13 MMOL/L — SIGNIFICANT CHANGE UP (ref 5–17)
BUN SERPL-MCNC: 40 MG/DL — HIGH (ref 7–23)
CALCIUM SERPL-MCNC: 8.5 MG/DL — SIGNIFICANT CHANGE UP (ref 8.4–10.5)
CHLORIDE SERPL-SCNC: 98 MMOL/L — SIGNIFICANT CHANGE UP (ref 96–108)
CO2 SERPL-SCNC: 26 MMOL/L — SIGNIFICANT CHANGE UP (ref 22–31)
CREAT SERPL-MCNC: 1.58 MG/DL — HIGH (ref 0.5–1.3)
CULTURE RESULTS: SIGNIFICANT CHANGE UP
GLUCOSE SERPL-MCNC: 108 MG/DL — HIGH (ref 70–99)
HCT VFR BLD CALC: 28.1 % — LOW (ref 39–50)
HGB BLD-MCNC: 8.8 G/DL — LOW (ref 13–17)
MCHC RBC-ENTMCNC: 29.8 PG — SIGNIFICANT CHANGE UP (ref 27–34)
MCHC RBC-ENTMCNC: 31.3 GM/DL — LOW (ref 32–36)
MCV RBC AUTO: 95.3 FL — SIGNIFICANT CHANGE UP (ref 80–100)
NRBC # BLD: 0 /100 WBCS — SIGNIFICANT CHANGE UP (ref 0–0)
PLATELET # BLD AUTO: 284 K/UL — SIGNIFICANT CHANGE UP (ref 150–400)
POTASSIUM SERPL-MCNC: 3.2 MMOL/L — LOW (ref 3.5–5.3)
POTASSIUM SERPL-SCNC: 3.2 MMOL/L — LOW (ref 3.5–5.3)
RBC # BLD: 2.95 M/UL — LOW (ref 4.2–5.8)
RBC # FLD: 13.6 % — SIGNIFICANT CHANGE UP (ref 10.3–14.5)
SODIUM SERPL-SCNC: 137 MMOL/L — SIGNIFICANT CHANGE UP (ref 135–145)
SPECIMEN SOURCE: SIGNIFICANT CHANGE UP
WBC # BLD: 9.31 K/UL — SIGNIFICANT CHANGE UP (ref 3.8–10.5)
WBC # FLD AUTO: 9.31 K/UL — SIGNIFICANT CHANGE UP (ref 3.8–10.5)

## 2021-06-19 PROCEDURE — 93010 ELECTROCARDIOGRAM REPORT: CPT

## 2021-06-19 RX ORDER — POTASSIUM CHLORIDE 20 MEQ
20 PACKET (EA) ORAL
Refills: 0 | Status: COMPLETED | OUTPATIENT
Start: 2021-06-19 | End: 2021-06-19

## 2021-06-19 RX ADMIN — ATORVASTATIN CALCIUM 10 MILLIGRAM(S): 80 TABLET, FILM COATED ORAL at 21:28

## 2021-06-19 RX ADMIN — SENNA PLUS 2 TABLET(S): 8.6 TABLET ORAL at 21:28

## 2021-06-19 RX ADMIN — Medication 1 MILLIGRAM(S): at 12:32

## 2021-06-19 RX ADMIN — Medication 325 MILLIGRAM(S): at 12:33

## 2021-06-19 RX ADMIN — Medication 500 MILLIGRAM(S): at 12:33

## 2021-06-19 RX ADMIN — Medication 20 MILLIEQUIVALENT(S): at 21:28

## 2021-06-19 RX ADMIN — Medication 1 APPLICATION(S): at 11:49

## 2021-06-19 RX ADMIN — Medication 20 MILLIEQUIVALENT(S): at 19:45

## 2021-06-19 RX ADMIN — SERTRALINE 50 MILLIGRAM(S): 25 TABLET, FILM COATED ORAL at 12:32

## 2021-06-19 RX ADMIN — Medication 1 APPLICATION(S): at 17:43

## 2021-06-19 RX ADMIN — Medication 20 MILLIEQUIVALENT(S): at 17:44

## 2021-06-19 RX ADMIN — Medication 40 MILLIGRAM(S): at 05:47

## 2021-06-19 RX ADMIN — Medication 1 TABLET(S): at 12:33

## 2021-06-19 RX ADMIN — LIDOCAINE 1 PATCH: 4 CREAM TOPICAL at 05:51

## 2021-06-19 RX ADMIN — FLUDROCORTISONE ACETATE 0.4 MILLIGRAM(S): 0.1 TABLET ORAL at 05:47

## 2021-06-19 RX ADMIN — Medication 1 APPLICATION(S): at 05:51

## 2021-06-19 NOTE — PROGRESS NOTE ADULT - ASSESSMENT
89 year old male with significant past medical history of prostate cancer s/p radiation, CAD, PVD s/p L SFA stent, CKD stage III, chronic anemia, recent admission for hematuria, is brought from [facility] for complaint of chest pain. Patient is admitted for further evaluation and management for hematuria, pneumonia and to rule out ACS/cardiomyopathy.     # Chest pain:  Likely msk related pain vs. NSTEMI in the setting of elevated troponin  troponin stable, downtrending, proBNP 2k  4/2021 TTE normal LV, LVEF 55% per records  A. flutter noted on tele on day of admission - no further episodes since   Heparin drip completed for a total of 48 hours  ASA & Plavix restarted. (on hold in rehab due to hematuria)  c/w tele monitoring. remain in sinus.   Given hematuria, CKD patient is not a candidate for invasive cardiac eval such as cardiac cath at this time   Chest x-ray with BL pleural effusions, mod b/l pleural effusions with compressive atelectasis, no infiltrate or consolidation seen - no pna, likely HF exacerbation   IV Lasix started, but held after TTE results.  Repeat TTE --> moderate pericardial effusion  no signs of Tamponade  CT chest shows small pericardial effusion, no plan for IR eval for pericardiocentesis  s/p R side thoracentesis 6/14 - follow up cytology and cultures   s/p L side thoracentesis 6/16 - follow up cytology and cultures    # Right shoulder pain - unclear etiology:  Np prior hx of gout  No recent trauma  Uric acid ordered 3.8  Shoulder x-ray neg for fracture  Lidocaine patch  PRN Tylenol  ROM exercise  Will consider ortho if pain persists     # Hematuria:  Follows Iain Stafford  urology on the case  no clots, sanchez - clear urine  Monitor HH  + hematuria resolved. s/p 22F 3-way placed, no clot evacuated, color improved to clear peach  CBI not warranted at this time, Monitor urine outpt/color  Monitor hgb   UCx >100k E.coli -- follow for sensitivities  c/w abx, plan for 7 days of Ceftriaxone 1G Q24H  ASA/Plavix okay to continue from  standpoint as long as urine color and H/H remain stable     # Anemia:   AOCD and acute blood loss 2/2 hematuria   Monitor HH - stable  Keep active T&S, transfuse to keep Hgb >8   Tx of hematuria as above     # CAD (coronary artery disease):  Plavix and ASA ok to continue  Continue Lipitor    # Orthostatic hypotension:  Continue Florinef    # PAD (peripheral artery disease):  PAD s/p angioplasty with stent of left SFA was started on Plavix (4/16/21)  Follows Dr. Urias outpatient  Plavix and ASA to cont    # Prostate CA:   Hx of prostate ca s/p radiation about ?15 years ago    # Stage 3 chronic kidney disease:  CKD stage III, baseline Cr ~ 1.8-1.9  Follows Dr. Chan outpatient  Cr trending up, 1.58 today  Appears to be around baseline  Continue to monitor    # Need for prophylactic measure:  DVT PPX: SCDs  DASH diet  Continue pressure ulcer preventive measures per nursing   OOB to chair with assistance

## 2021-06-19 NOTE — PROGRESS NOTE ADULT - PROBLEM SELECTOR PLAN 5
-small to moderate bilateral pleural effusions.  -noted on ct chest, likely HFpEF exacerbation.   -Continue lasix 40 mg po daily.   -wean o2 as tolerated.   -supplement potassium and mag to keep lytes above 4 and 2 respectively.

## 2021-06-19 NOTE — PROGRESS NOTE ADULT - SUBJECTIVE AND OBJECTIVE BOX
Cardiology Dr. Bush [phone]    SUBJECTIVE / OVERNIGHT EVENTS:    Patient seen and examined, noted in atrial flutter this AM    T(C): 36.8 (06-19-21 @ 04:35), Max: 36.8 (06-19-21 @ 04:35)  HR: 91 (06-19-21 @ 04:35) (89 - 98)  BP: 101/63 (06-19-21 @ 04:35) (99/66 - 105/66)  RR: 18 (06-19-21 @ 04:35) (18 - 18)  SpO2: 92% (06-19-21 @ 04:35) (92% - 98%)  Wt(kg): --  Daily     Daily   I&O's Summary    18 Jun 2021 07:01  -  19 Jun 2021 07:00  --------------------------------------------------------  IN: 400 mL / OUT: 1450 mL / NET: -1050 mL        Telemetry: NSR    PHYSICAL EXAM:  GENERAL: Alert, NAD  HEAD:  Atraumatic, Normocephalic  NECK: Supple, No JVD  CHEST/LUNG: Clear to auscultation bilaterally; No wheeze, rhonchi or rales  HEART: S1S2, RR, No murmurs, rubs, or gallops  ABDOMEN: Soft, Nontender, Nondistended; Bowel sounds present  EXTREMITIES: No clubbing, cyanosis, or edema  NEUROLOGY: non-focal      LABS:                        8.8    9.31  )-----------( 284      ( 19 Jun 2021 06:56 )             28.1     06-19    137  |  98  |  40<H>  ----------------------------<  108<H>  3.2<L>   |  26  |  1.58<H>    Ca    8.5      19 Jun 2021 06:55                MEDICATIONS  (STANDING):  ascorbic acid 500 milliGRAM(s) Oral daily  atorvastatin 10 milliGRAM(s) Oral at bedtime  BACItracin   Ointment 1 Application(s) Topical daily  ferrous    sulfate 325 milliGRAM(s) Oral daily  fludroCORTISONE 0.4 milliGRAM(s) Oral daily  folic acid 1 milliGRAM(s) Oral daily  furosemide    Tablet 40 milliGRAM(s) Oral daily  lidocaine   Patch 1 Patch Transdermal every 24 hours  multivitamin 1 Tablet(s) Oral daily  senna 2 Tablet(s) Oral at bedtime  sertraline 50 milliGRAM(s) Oral daily  silver sulfADIAZINE 1% Cream 1 Application(s) Topical two times a day    MEDICATIONS  (PRN):  acetaminophen   Tablet .. 650 milliGRAM(s) Oral every 6 hours PRN Mild Pain/Moderate Pain  bisacodyl Suppository 10 milliGRAM(s) Rectal daily PRN Constipation  polyethylene glycol 3350 17 Gram(s) Oral daily PRN Constipation      RADIOLOGY & ADDITIONAL TESTS:

## 2021-06-19 NOTE — PROGRESS NOTE ADULT - ASSESSMENT
89 year old male with significant past medical history of prostate cancer s/p radiation, CAD, PVD s/p L SFA stent, CKD stage III, chronic anemia, recent admission for hematuria, is brought from [facility] for complaint of chest pain.    TTE  Hyperdynamic left ventricular systolic function.  Moderate circumferential pericardial effusion. The effusion measures up to  approximately 1.3 cm adjacent to the apex. Findings consistent with echocardiographic evidence of pericardial tamponade physiology.  Left pleural effusion.

## 2021-06-19 NOTE — CHART NOTE - NSCHARTNOTEFT_GEN_A_CORE
Informed by RN, pt on tele was showing Aflutter converting back and forth with SR this AM.   Pt seen and examined. Denies chest pain, palpitations, sob, dizziness, nausea.  Vital Signs Last 24 Hrs  T(C): 36.8 (19 Jun 2021 04:35), Max: 36.8 (19 Jun 2021 04:35)  T(F): 98.2 (19 Jun 2021 04:35), Max: 98.2 (19 Jun 2021 04:35)  HR: 91 (19 Jun 2021 04:35) (89 - 98)  BP: 101/63 (19 Jun 2021 04:35) (99/66 - 105/66)  BP(mean): --  RR: 18 (19 Jun 2021 04:35) (18 - 18)  SpO2: 92% (19 Jun 2021 04:35) (92% - 98%)  PE: Aox 2, CV, Irregular, irregular, Lungs-b/l LL-diminished, Ext-no edema      89 year old male with significant past medical history of prostate cancer s/p radiation, CAD, PVD s/p L SFA stent, CKD stage III, chronic anemia, recent admission for hematuria, is brought from [facility] for complaint of chest pain. Patient is admitted for further evaluation and management for hematuria, pneumonia and to rule out ACS/cardiomyopathy. Pt had episode of Aflutter in ED and converted back to SR.   -EKG- shows Afib @89bpm  -Cardiology following- attending will contact-will follow up recs.   -Continue tele monitoring  D/w Dr. Gooden

## 2021-06-19 NOTE — CHART NOTE - NSCHARTNOTESELECT_GEN_ALL_CORE
IR consult/Event Note
Nutrition Services
Event Note
IR follow up note/Event Note
Nutrition Services

## 2021-06-19 NOTE — PROGRESS NOTE ADULT - SUBJECTIVE AND OBJECTIVE BOX
Patient is a 89y old  Male who presents with a chief complaint of chest pain (19 Jun 2021 10:11)      INTERVAL HPI/OVERNIGHT EVENTS: noted  pt seen and examined this am   events noted  feels well  eating icecream      Vital Signs Last 24 Hrs  T(C): 36.3 (19 Jun 2021 12:43), Max: 36.8 (19 Jun 2021 04:35)  T(F): 97.3 (19 Jun 2021 12:43), Max: 98.2 (19 Jun 2021 04:35)  HR: 94 (19 Jun 2021 12:43) (91 - 98)  BP: 99/65 (19 Jun 2021 12:43) (99/65 - 101/63)  BP(mean): --  RR: 18 (19 Jun 2021 12:43) (18 - 18)  SpO2: 95% (19 Jun 2021 12:43) (92% - 95%)    acetaminophen   Tablet .. 650 milliGRAM(s) Oral every 6 hours PRN  ascorbic acid 500 milliGRAM(s) Oral daily  atorvastatin 10 milliGRAM(s) Oral at bedtime  BACItracin   Ointment 1 Application(s) Topical daily  bisacodyl Suppository 10 milliGRAM(s) Rectal daily PRN  ferrous    sulfate 325 milliGRAM(s) Oral daily  fludroCORTISONE 0.4 milliGRAM(s) Oral daily  folic acid 1 milliGRAM(s) Oral daily  furosemide    Tablet 40 milliGRAM(s) Oral daily  lidocaine   Patch 1 Patch Transdermal every 24 hours  multivitamin 1 Tablet(s) Oral daily  polyethylene glycol 3350 17 Gram(s) Oral daily PRN  potassium chloride    Tablet ER 20 milliEquivalent(s) Oral every 2 hours  senna 2 Tablet(s) Oral at bedtime  sertraline 50 milliGRAM(s) Oral daily  silver sulfADIAZINE 1% Cream 1 Application(s) Topical two times a day      PHYSICAL EXAM:  GENERAL: NAD,   EYES: conjunctiva and sclera clear  ENMT: Moist mucous membranes  NECK: Supple, No JVD, Normal thyroid  CHEST/LUNG: non labored, cta b/l  HEART: Regular rate and rhythm; No murmurs, rubs, or gallops  ABDOMEN: Soft, Nontender, Nondistended; Bowel sounds present  EXTREMITIES:  2+ Peripheral Pulses, No clubbing, cyanosis, or edema  LYMPH: No lymphadenopathy noted  SKIN: No rashes or lesions    Consultant(s) Notes Reviewed:  [x ] YES  [ ] NO  Care Discussed with Consultants/Other Providers [ x] YES  [ ] NO    LABS:                        8.8    9.31  )-----------( 284      ( 19 Jun 2021 06:56 )             28.1     06-19    137  |  98  |  40<H>  ----------------------------<  108<H>  3.2<L>   |  26  |  1.58<H>    Ca    8.5      19 Jun 2021 06:55          CAPILLARY BLOOD GLUCOSE                  RADIOLOGY & ADDITIONAL TESTS:    Imaging Personally Reviewed:  [x ] YES  [ ] NO

## 2021-06-20 LAB
ANION GAP SERPL CALC-SCNC: 13 MMOL/L — SIGNIFICANT CHANGE UP (ref 5–17)
BUN SERPL-MCNC: 36 MG/DL — HIGH (ref 7–23)
CALCIUM SERPL-MCNC: 8.9 MG/DL — SIGNIFICANT CHANGE UP (ref 8.4–10.5)
CHLORIDE SERPL-SCNC: 99 MMOL/L — SIGNIFICANT CHANGE UP (ref 96–108)
CO2 SERPL-SCNC: 26 MMOL/L — SIGNIFICANT CHANGE UP (ref 22–31)
CREAT SERPL-MCNC: 1.55 MG/DL — HIGH (ref 0.5–1.3)
GLUCOSE SERPL-MCNC: 105 MG/DL — HIGH (ref 70–99)
HCT VFR BLD CALC: 28.6 % — LOW (ref 39–50)
HGB BLD-MCNC: 8.9 G/DL — LOW (ref 13–17)
MCHC RBC-ENTMCNC: 29.7 PG — SIGNIFICANT CHANGE UP (ref 27–34)
MCHC RBC-ENTMCNC: 31.1 GM/DL — LOW (ref 32–36)
MCV RBC AUTO: 95.3 FL — SIGNIFICANT CHANGE UP (ref 80–100)
NRBC # BLD: 0 /100 WBCS — SIGNIFICANT CHANGE UP (ref 0–0)
PLATELET # BLD AUTO: 299 K/UL — SIGNIFICANT CHANGE UP (ref 150–400)
POTASSIUM SERPL-MCNC: 3.4 MMOL/L — LOW (ref 3.5–5.3)
POTASSIUM SERPL-SCNC: 3.4 MMOL/L — LOW (ref 3.5–5.3)
RBC # BLD: 3 M/UL — LOW (ref 4.2–5.8)
RBC # FLD: 13.4 % — SIGNIFICANT CHANGE UP (ref 10.3–14.5)
SODIUM SERPL-SCNC: 138 MMOL/L — SIGNIFICANT CHANGE UP (ref 135–145)
WBC # BLD: 10.09 K/UL — SIGNIFICANT CHANGE UP (ref 3.8–10.5)
WBC # FLD AUTO: 10.09 K/UL — SIGNIFICANT CHANGE UP (ref 3.8–10.5)

## 2021-06-20 RX ORDER — ACETAMINOPHEN 500 MG
650 TABLET ORAL ONCE
Refills: 0 | Status: DISCONTINUED | OUTPATIENT
Start: 2021-06-20 | End: 2021-06-20

## 2021-06-20 RX ADMIN — Medication 1 APPLICATION(S): at 05:29

## 2021-06-20 RX ADMIN — Medication 500 MILLIGRAM(S): at 13:33

## 2021-06-20 RX ADMIN — Medication 1 APPLICATION(S): at 18:45

## 2021-06-20 RX ADMIN — Medication 1 MILLIGRAM(S): at 13:33

## 2021-06-20 RX ADMIN — LIDOCAINE 1 PATCH: 4 CREAM TOPICAL at 20:50

## 2021-06-20 RX ADMIN — Medication 325 MILLIGRAM(S): at 13:33

## 2021-06-20 RX ADMIN — FLUDROCORTISONE ACETATE 0.4 MILLIGRAM(S): 0.1 TABLET ORAL at 05:29

## 2021-06-20 RX ADMIN — Medication 1 TABLET(S): at 13:33

## 2021-06-20 RX ADMIN — LIDOCAINE 1 PATCH: 4 CREAM TOPICAL at 18:34

## 2021-06-20 RX ADMIN — Medication 40 MILLIGRAM(S): at 05:29

## 2021-06-20 RX ADMIN — ATORVASTATIN CALCIUM 10 MILLIGRAM(S): 80 TABLET, FILM COATED ORAL at 21:45

## 2021-06-20 RX ADMIN — SENNA PLUS 2 TABLET(S): 8.6 TABLET ORAL at 21:45

## 2021-06-20 RX ADMIN — Medication 1 APPLICATION(S): at 13:32

## 2021-06-20 RX ADMIN — SERTRALINE 50 MILLIGRAM(S): 25 TABLET, FILM COATED ORAL at 13:33

## 2021-06-20 NOTE — PROGRESS NOTE ADULT - PROBLEM SELECTOR PLAN 3
-  -downtrending. likely demand related in the setting of anemia and ckd.
-urology following.  -stable.
-urology following.  -stable.
AOCD and acute blood loss 2/2 hematuria   Monitor HH- stable  Keep active T&S, transfuse to keep Hgb >8   Treatment of hematuria as above
-urology following.  -stable.
-  -downtrending. likely demand related in the setting of anemia and ckd.
-urology following.  -stable.
-urology following.
-urology following.
-urology following.  -stable.

## 2021-06-20 NOTE — PROGRESS NOTE ADULT - PROBLEM SELECTOR PROBLEM 5
Pleural effusion
Atrial flutter, unspecified type
Pleural effusion
Atrial flutter, unspecified type
Pleural effusion
PAD (peripheral artery disease)

## 2021-06-20 NOTE — PROGRESS NOTE ADULT - PROBLEM SELECTOR PROBLEM 2
Elevated troponin
Chest pain, unspecified type
Elevated troponin
Elevated troponin
Chest pain, unspecified type
Elevated troponin
Elevated troponin
Hematuria
(4) no impairment

## 2021-06-20 NOTE — PROGRESS NOTE ADULT - PROBLEM SELECTOR PROBLEM 3
Elevated troponin
Gross hematuria
Anemia
Elevated troponin
Gross hematuria

## 2021-06-20 NOTE — PROGRESS NOTE ADULT - PROBLEM SELECTOR PLAN 6
-  -noted on tele on day of admission. no further episodes since.   -if further episodes will need to consider anticoagulation    Patient of Dr. Patrick Castillo ([facility])    Jai Jay D.O.  [phone].
-  -noted on tele on day of admission. no further episodes since.   -if further episodes will need to consider anticoagulation    Patient of Dr. Patrick Castillo ([facility])    Jai Jay D.O.  [phone]
-  -noted on tele on day of admission. no further episodes since.   -if further episodes will need to consider anticoagulation    Patient of Dr. Patrick Castillo ([facility])    Jai Jay D.O.  [phone]
-  -noted on tele on day of admission. no further episodes since.   -if further episodes will need to consider anticoagulation    [facility]
-urology following.  -stable.
-  -noted on tele on day of admission. no further episodes since.   -if further episodes will need to consider anticoagulation    [facility]
-urology following.  -stable.
-  -noted on tele on day of admission. no further episodes since.   -if further episodes will need to consider anticoagulation    [facility]
History of prostate ca s/p radiation about ?15 years ago
-  -noted on tele on day of admission. no further episodes since.   -if further episodes will need to consider anticoagulation    Patient of Dr. Patrick Castillo ([facility])    Jai Jay D.O.  [phone].

## 2021-06-20 NOTE — PROGRESS NOTE ADULT - ASSESSMENT
89 year old male with significant past medical history of prostate cancer s/p radiation, CAD, PVD s/p L SFA stent, CKD stage III, chronic anemia, recent admission for hematuria, is brought from [facility] for complaint of chest pain. Patient is admitted for further evaluation and management for hematuria, pneumonia and to rule out ACS/cardiomyopathy.     # Chest pain:  Likely msk related pain vs. NSTEMI in the setting of elevated troponin  troponin stable, downtrending, proBNP 2k  4/2021 TTE normal LV, LVEF 55% per records  A. flutter noted on tele on day of admission - no further episodes since   Heparin drip completed for a total of 48 hours  ASA & Plavix restarted. (on hold in rehab due to hematuria)  c/w tele monitoring. remain in sinus.   Given hematuria, CKD patient is not a candidate for invasive cardiac eval such as cardiac cath at this time   Chest x-ray with BL pleural effusions, mod b/l pleural effusions with compressive atelectasis, no infiltrate or consolidation seen - no pna, likely HF exacerbation   IV Lasix started, but held after TTE results.  Repeat TTE --> moderate pericardial effusion  no signs of Tamponade  CT chest shows small pericardial effusion, no plan for IR eval for pericardiocentesis  s/p R side thoracentesis 6/14 - follow up cytology and cultures   s/p L side thoracentesis 6/16 - follow up cytology and cultures    # Right shoulder pain - unclear etiology:  Np prior hx of gout  No recent trauma  Uric acid ordered 3.8  Shoulder x-ray neg for fracture  Lidocaine patch  PRN Tylenol  ROM exercise  Will consider ortho if pain persists     # Hematuria:  Follows Iain Stafford  urology on the case  no clots, sanchez - clear urine  Monitor HH  + hematuria resolved. s/p 22F 3-way placed, no clot evacuated, color improved to clear peach  CBI not warranted at this time, Monitor urine outpt/color  Monitor hgb   UCx >100k E.coli -- follow for sensitivities  c/w abx, plan for 7 days of Ceftriaxone 1G Q24H  ASA/Plavix okay to continue from  standpoint as long as urine color and H/H remain stable     # Anemia:   AOCD and acute blood loss 2/2 hematuria   Monitor HH - stable  Keep active T&S, transfuse to keep Hgb >8   Tx of hematuria as above     # CAD (coronary artery disease):  Plavix and ASA ok to continue  Continue Lipitor    # Orthostatic hypotension:  Continue Florinef    # PAD (peripheral artery disease):  PAD s/p angioplasty with stent of left SFA was started on Plavix (4/16/21)  Follows Dr. Urias outpatient  Plavix and ASA to cont    # Prostate CA:   Hx of prostate ca s/p radiation about ?15 years ago    # Stage 3 chronic kidney disease:  CKD stage III, baseline Cr ~ 1.8-1.9  Follows Dr. Chan outpatient  Cr trending up, 1.58 today  Appears to be around baseline  Continue to monitor    # Need for prophylactic measure:  DVT PPX: SCDs  DASH diet  Continue pressure ulcer preventive measures per nursing   OOB to chair with assistance

## 2021-06-20 NOTE — PROGRESS NOTE ADULT - SUBJECTIVE AND OBJECTIVE BOX
Patient is a 89y old  Male who presents with a chief complaint of chest pain (20 Jun 2021 08:42)      INTERVAL HPI/OVERNIGHT EVENTS: noted  pt seen and examined this am   events noted  feels well      Vital Signs Last 24 Hrs  T(C): 37.1 (20 Jun 2021 20:20), Max: 37.1 (20 Jun 2021 20:20)  T(F): 98.8 (20 Jun 2021 20:20), Max: 98.8 (20 Jun 2021 20:20)  HR: 99 (20 Jun 2021 20:20) (90 - 99)  BP: 100/60 (20 Jun 2021 20:20) (100/60 - 101/68)  BP(mean): --  RR: 18 (20 Jun 2021 20:20) (18 - 18)  SpO2: 94% (20 Jun 2021 20:20) (94% - 98%)    acetaminophen   Tablet .. 650 milliGRAM(s) Oral every 6 hours PRN  ascorbic acid 500 milliGRAM(s) Oral daily  atorvastatin 10 milliGRAM(s) Oral at bedtime  BACItracin   Ointment 1 Application(s) Topical daily  bisacodyl Suppository 10 milliGRAM(s) Rectal daily PRN  ferrous    sulfate 325 milliGRAM(s) Oral daily  fludroCORTISONE 0.4 milliGRAM(s) Oral daily  folic acid 1 milliGRAM(s) Oral daily  furosemide    Tablet 40 milliGRAM(s) Oral daily  lidocaine   Patch 1 Patch Transdermal every 24 hours  multivitamin 1 Tablet(s) Oral daily  polyethylene glycol 3350 17 Gram(s) Oral daily PRN  senna 2 Tablet(s) Oral at bedtime  sertraline 50 milliGRAM(s) Oral daily  silver sulfADIAZINE 1% Cream 1 Application(s) Topical two times a day      PHYSICAL EXAM:  GENERAL: NAD,   EYES: conjunctiva and sclera clear  ENMT: Moist mucous membranes  NECK: Supple, No JVD, Normal thyroid  CHEST/LUNG: non labored, cta b/l  HEART: Regular rate and rhythm; No murmurs, rubs, or gallops  ABDOMEN: Soft, Nontender, Nondistended; Bowel sounds present  EXTREMITIES:  2+ Peripheral Pulses, No clubbing, cyanosis, or edema  LYMPH: No lymphadenopathy noted  SKIN: No rashes or lesions    Consultant(s) Notes Reviewed:  [x ] YES  [ ] NO  Care Discussed with Consultants/Other Providers [ x] YES  [ ] NO    LABS:                        8.9    10.09 )-----------( 299      ( 20 Jun 2021 07:03 )             28.6     06-20    138  |  99  |  36<H>  ----------------------------<  105<H>  3.4<L>   |  26  |  1.55<H>    Ca    8.9      20 Jun 2021 07:03          CAPILLARY BLOOD GLUCOSE                  RADIOLOGY & ADDITIONAL TESTS:    Imaging Personally Reviewed:  [x ] YES  [ ] NO

## 2021-06-20 NOTE — PROGRESS NOTE ADULT - TIME BILLING
dc planning    [facility]  [phone]
.
agree with above  cv stable  no plans for drainage per IR

## 2021-06-20 NOTE — PROGRESS NOTE ADULT - SUBJECTIVE AND OBJECTIVE BOX
Cardiology Dr. Bush [phone]    SUBJECTIVE / OVERNIGHT EVENTS:    Patient seen and examined, no events overnight    T(C): 36.6 (06-20-21 @ 03:10), Max: 36.6 (06-20-21 @ 03:10)  HR: 90 (06-20-21 @ 03:10) (90 - 94)  BP: 100/62 (06-20-21 @ 03:10) (99/65 - 100/65)  RR: 18 (06-20-21 @ 03:10) (18 - 18)  SpO2: 94% (06-20-21 @ 03:10) (94% - 95%)  Wt(kg): --  Daily     Daily   I&O's Summary    19 Jun 2021 07:01  -  20 Jun 2021 07:00  --------------------------------------------------------  IN: 240 mL / OUT: 1600 mL / NET: -1360 mL        Telemetry: NSR-->ST    PHYSICAL EXAM:  GENERAL: Alert, NAD  HEAD:  Atraumatic, Normocephalic  NECK: Supple, No JVD  CHEST/LUNG: Clear to auscultation bilaterally; No wheeze, rhonchi or rales  HEART: S1S2, RR, No murmurs, rubs, or gallops  ABDOMEN: Soft, Nontender, Nondistended; Bowel sounds present  EXTREMITIES: No clubbing, cyanosis, or edema  NEUROLOGY: non-focal      LABS:                        8.9    10.09 )-----------( 299      ( 20 Jun 2021 07:03 )             28.6     06-20    138  |  99  |  36<H>  ----------------------------<  105<H>  3.4<L>   |  26  |  1.55<H>    Ca    8.9      20 Jun 2021 07:03                MEDICATIONS  (STANDING):  ascorbic acid 500 milliGRAM(s) Oral daily  atorvastatin 10 milliGRAM(s) Oral at bedtime  BACItracin   Ointment 1 Application(s) Topical daily  ferrous    sulfate 325 milliGRAM(s) Oral daily  fludroCORTISONE 0.4 milliGRAM(s) Oral daily  folic acid 1 milliGRAM(s) Oral daily  furosemide    Tablet 40 milliGRAM(s) Oral daily  lidocaine   Patch 1 Patch Transdermal every 24 hours  multivitamin 1 Tablet(s) Oral daily  senna 2 Tablet(s) Oral at bedtime  sertraline 50 milliGRAM(s) Oral daily  silver sulfADIAZINE 1% Cream 1 Application(s) Topical two times a day    MEDICATIONS  (PRN):  acetaminophen   Tablet .. 650 milliGRAM(s) Oral every 6 hours PRN Mild Pain/Moderate Pain  bisacodyl Suppository 10 milliGRAM(s) Rectal daily PRN Constipation  polyethylene glycol 3350 17 Gram(s) Oral daily PRN Constipation      RADIOLOGY & ADDITIONAL TESTS:

## 2021-06-20 NOTE — PROGRESS NOTE ADULT - PROBLEM SELECTOR PROBLEM 1
Chest pain, unspecified type
Chest pain, unspecified type
Atrial flutter, unspecified type
Chest pain, unspecified type
Chest pain
Chest pain, unspecified type
Atrial flutter, unspecified type
Chest pain, unspecified type

## 2021-06-20 NOTE — PROGRESS NOTE ADULT - ASSESSMENT
89 year old male with significant past medical history of prostate cancer s/p radiation, CAD, PVD s/p L SFA stent, CKD stage III, chronic anemia, recent admission for hematuria, is brought from [facility] for complaint of chest pain.    TTE  Hyperdynamic left ventricular systolic function.  Moderate circumferential pericardial effusion. The effusion measures up to  approximately 1.3 cm adjacent to the apex. Findings consistent with echocardiographic evidence of pericardial tamponade physiology.  Left pleural effusion.

## 2021-06-20 NOTE — PROGRESS NOTE ADULT - PROBLEM SELECTOR PLAN 1
-  Evidence of pericardial effusion raising likeihood of pericarditis contributing here to original symtoms as wel.  Tamponade suggested.    IR consult appreciated  Plan for CT Chest  Still holding AC.  No evidence of LV dysfunction on echo
#Rule out ACS  #Pneumonia  #Volume overloaded, suspect cardiomyopathy   cp improved, trend C.E, hep gtt started, asa, plavix started-ok per   Chest xray with BL pleural effusions. On exam 2+ pitting edema LE upto hips   elev trops. proBNP 2k.  4/2021 TTE unremarkable, LVEF 55% per records  lasix 40 iv, rpt TTE  Start Ceftriaxone, Azithromycin IV  Hold ASA given hematuria  Check procal, RVP, sputum cx, urine legionella strep pneum ags   Monitor BMP, Is/O, trend troponin  Continue to monitor on tele  Incentive spirometry  Cardiology consult
-  -Evidence of pericardial effusion raising likelihood of pericarditis contributing to initial symptoms.  Tamponade suggested but IR consult appreciated and CT chest shows small pericardial effusion, no plan for IR guided pericardiocentesis  -Still holding AC.  No evidence of LV dysfunction on echo
-  - Patient with episode of atrial flutter this AM  - He is back in NSR  - Rates well controlled and given marginal BP would not start AV alm blockers at this time  - Will also continue to defer anticoagulation
-  -likely msk related pain however cannot rule out nstemi in the setting of rising troponin  -trend troponin until downtrending  -heparin drip for total of 48 hours  -aspirin and plavix  -tele monitoring  -repeat tte (limited study) to evaluate for wall motion abnormalities.   -given hematuria, ckd patient is not a candidate for invasive cardiac eval such as cardiac cath at this time.
-  Evidence of pericardial effusion raising likeihood of pericarditis contributing here to original symtoms as wel.  Tamponade suggested.  Spoke with team, will get eval by IR for paricardiocentesis  Still holding AC.  No evidence of LV dysfunction on echo
-  -likely msk related pain however cannot rule out nstemi in the setting of rising troponin  -trend troponin until downtrending  -heparin drip for total of 48 hours  -aspirin and plavix  -tele monitoring  -repeat tte (limited study) to evaluate for wall motion abnormalities.   -given hematuria, ckd patient is not a candidate for invasive cardiac eval such as cardiac cath at this time.
-  - Patient with episode of atrial flutter on saturday morning  - He remains in NSR  - Rates well controlled and given marginal BP would not start AV lam blockers at this time  - Will also continue to defer anticoagulation
-  -Evidence of pericardial effusion raising likelihood of pericarditis contributing to initial symptoms.  Tamponade suggested but IR consult appreciated and CT chest shows small pericardial effusion, no plan for IR guided pericardiocentesis  -Still holding AC.  No evidence of LV dysfunction on echo
-  Evidence of pericardial effusion raising likeihood of pericarditis contributing here to original symtoms as wel.  Tamponade suggested.    IR consult appreciated  CT chest shows small pericardial effusion, no plan for IR eval for pericardiocentesis  Still holding AC.  No evidence of LV dysfunction on echo
-  -likely msk related pain however cannot rule out nstemi in the setting of rising troponin  -trend troponin until downtrending  -completed heparin drip for 48 hours.  -aspirin and plavix  -tele monitoring  -repeat tte (limited study) to evaluate for wall motion abnormalities.   -given hematuria, ckd patient is not a candidate for invasive cardiac eval such as cardiac cath at this time.
-  Evidence of pericardial effusion raising likeihood of pericarditis contributing here to original symtoms as wel.  Tamponade suggested but IR consult appreciated and CT chest shows small pericardial effusion, no plan for IR eval for pericardiocentesis  Still holding AC.  No evidence of LV dysfunction on echo

## 2021-06-20 NOTE — PROGRESS NOTE ADULT - PROBLEM SELECTOR PROBLEM 4
Coronary artery disease involving native coronary artery of native heart, unspecified whether angina present
CAD (coronary artery disease)

## 2021-06-20 NOTE — PROGRESS NOTE ADULT - PROBLEM SELECTOR PLAN 4
-  -aspirin, plavix and statin  -repeat limited tte.
-  - Continue medical management with aspirin, Plavix, and atorvastatin
-  -aspirin, plavix ok to continue   - c.w statin
-  -aspirin, plavix and statin
-  -aspirin, plavix and statin  -repeat limited tte.
-  -aspirin, plavix ok to continue   - c.w statin
-  -aspirin, plavix and statin  -repeat limited tte.
-  -aspirin, plavix ok to continue   - c.w statin
-  -aspirin, plavix and statin
Plavix and ASA ok to continue  Continue Lipitor    #Orthostatic hypotension  Continue Florinef

## 2021-06-20 NOTE — PROGRESS NOTE ADULT - PROBLEM SELECTOR PLAN 2
-  -as above.
-  -Evidence of pericardial effusion raising likelihood of pericarditis contributing to initial symptoms.  Tamponade suggested but IR consult appreciated and CT chest shows small pericardial effusion, no plan for IR guided pericardiocentesis  -Still holding AC.    -No evidence of LV dysfunction on echo
-  -as above.
-  -downtrending. likely demand related in the setting of anemia and ckd.
-  -as above.
-  -Evidence of pericardial effusion raising likelihood of pericarditis contributing to initial symptoms.  Tamponade suggested but IR consult appreciated and CT chest shows small pericardial effusion, no plan for IR guided pericardiocentesis  -Still holding AC.  No evidence of LV dysfunction on echo
-  -as above.
Follows Iani Stafford  Consult urology noted  no clots, sanchez-clear urine  Monitor HH  Check UA, Urine culture  pt on ceftriaxone for pna/UTI
-  -downtrending. likely demand related in the setting of anemia and ckd.
-  -as above.

## 2021-06-21 ENCOUNTER — TRANSCRIPTION ENCOUNTER (OUTPATIENT)
Age: 86
End: 2021-06-21

## 2021-06-21 LAB
ANION GAP SERPL CALC-SCNC: 12 MMOL/L — SIGNIFICANT CHANGE UP (ref 5–17)
BUN SERPL-MCNC: 39 MG/DL — HIGH (ref 7–23)
CALCIUM SERPL-MCNC: 8.7 MG/DL — SIGNIFICANT CHANGE UP (ref 8.4–10.5)
CHLORIDE SERPL-SCNC: 98 MMOL/L — SIGNIFICANT CHANGE UP (ref 96–108)
CO2 SERPL-SCNC: 28 MMOL/L — SIGNIFICANT CHANGE UP (ref 22–31)
CREAT SERPL-MCNC: 1.79 MG/DL — HIGH (ref 0.5–1.3)
GLUCOSE SERPL-MCNC: 156 MG/DL — HIGH (ref 70–99)
HCT VFR BLD CALC: 26.6 % — LOW (ref 39–50)
HGB BLD-MCNC: 8.2 G/DL — LOW (ref 13–17)
MAGNESIUM SERPL-MCNC: 2.2 MG/DL — SIGNIFICANT CHANGE UP (ref 1.6–2.6)
MCHC RBC-ENTMCNC: 28.9 PG — SIGNIFICANT CHANGE UP (ref 27–34)
MCHC RBC-ENTMCNC: 30.8 GM/DL — LOW (ref 32–36)
MCV RBC AUTO: 93.7 FL — SIGNIFICANT CHANGE UP (ref 80–100)
NRBC # BLD: 0 /100 WBCS — SIGNIFICANT CHANGE UP (ref 0–0)
PLATELET # BLD AUTO: 279 K/UL — SIGNIFICANT CHANGE UP (ref 150–400)
POTASSIUM SERPL-MCNC: 3.2 MMOL/L — LOW (ref 3.5–5.3)
POTASSIUM SERPL-SCNC: 3.2 MMOL/L — LOW (ref 3.5–5.3)
RBC # BLD: 2.84 M/UL — LOW (ref 4.2–5.8)
RBC # FLD: 13.4 % — SIGNIFICANT CHANGE UP (ref 10.3–14.5)
SARS-COV-2 RNA SPEC QL NAA+PROBE: SIGNIFICANT CHANGE UP
SODIUM SERPL-SCNC: 138 MMOL/L — SIGNIFICANT CHANGE UP (ref 135–145)
WBC # BLD: 7.95 K/UL — SIGNIFICANT CHANGE UP (ref 3.8–10.5)
WBC # FLD AUTO: 7.95 K/UL — SIGNIFICANT CHANGE UP (ref 3.8–10.5)

## 2021-06-21 PROCEDURE — 93010 ELECTROCARDIOGRAM REPORT: CPT

## 2021-06-21 RX ORDER — POTASSIUM CHLORIDE 20 MEQ
40 PACKET (EA) ORAL EVERY 4 HOURS
Refills: 0 | Status: COMPLETED | OUTPATIENT
Start: 2021-06-21 | End: 2021-06-22

## 2021-06-21 RX ADMIN — LIDOCAINE 1 PATCH: 4 CREAM TOPICAL at 18:42

## 2021-06-21 RX ADMIN — Medication 1 APPLICATION(S): at 06:53

## 2021-06-21 RX ADMIN — ATORVASTATIN CALCIUM 10 MILLIGRAM(S): 80 TABLET, FILM COATED ORAL at 22:27

## 2021-06-21 RX ADMIN — Medication 40 MILLIGRAM(S): at 05:40

## 2021-06-21 RX ADMIN — LIDOCAINE 1 PATCH: 4 CREAM TOPICAL at 06:53

## 2021-06-21 RX ADMIN — Medication 1 APPLICATION(S): at 18:42

## 2021-06-21 RX ADMIN — Medication 1 APPLICATION(S): at 13:36

## 2021-06-21 RX ADMIN — Medication 1 MILLIGRAM(S): at 13:35

## 2021-06-21 RX ADMIN — Medication 1 TABLET(S): at 13:35

## 2021-06-21 RX ADMIN — SERTRALINE 50 MILLIGRAM(S): 25 TABLET, FILM COATED ORAL at 13:35

## 2021-06-21 RX ADMIN — LIDOCAINE 1 PATCH: 4 CREAM TOPICAL at 21:08

## 2021-06-21 RX ADMIN — Medication 40 MILLIEQUIVALENT(S): at 22:26

## 2021-06-21 RX ADMIN — Medication 500 MILLIGRAM(S): at 13:35

## 2021-06-21 RX ADMIN — SENNA PLUS 2 TABLET(S): 8.6 TABLET ORAL at 22:27

## 2021-06-21 RX ADMIN — Medication 325 MILLIGRAM(S): at 13:35

## 2021-06-21 RX ADMIN — FLUDROCORTISONE ACETATE 0.4 MILLIGRAM(S): 0.1 TABLET ORAL at 05:40

## 2021-06-21 NOTE — PROVIDER CONTACT NOTE (OTHER) - SITUATION
Pt dx chest pain
informed by tele tech that pt cardiac rhythm had 5 beats of 2:1 AV Block and Atrial Tachycardia that lasted 2 beats
informed by tele tech that pt cardiac rhythm is jumping back and forth from Sinus to a flutter.
Pt hypotensive 86/48
Statement Selected

## 2021-06-21 NOTE — PROGRESS NOTE ADULT - SUBJECTIVE AND OBJECTIVE BOX
Patient is a 89y old  Male who presents with a chief complaint of chest pain (21 Jun 2021 15:58)      INTERVAL HPI/OVERNIGHT EVENTS: noted  pt seen and examined this am   events noted  brief episode of afib/flutter on tele  now back to NSR      Vital Signs Last 24 Hrs  T(C): 36.4 (21 Jun 2021 12:43), Max: 37.1 (20 Jun 2021 20:20)  T(F): 97.5 (21 Jun 2021 12:43), Max: 98.8 (20 Jun 2021 20:20)  HR: 92 (21 Jun 2021 12:43) (92 - 99)  BP: 108/71 (21 Jun 2021 12:43) (100/60 - 108/71)  BP(mean): --  RR: 18 (21 Jun 2021 12:43) (18 - 18)  SpO2: 99% (21 Jun 2021 12:43) (92% - 99%)    acetaminophen   Tablet .. 650 milliGRAM(s) Oral every 6 hours PRN  ascorbic acid 500 milliGRAM(s) Oral daily  atorvastatin 10 milliGRAM(s) Oral at bedtime  BACItracin   Ointment 1 Application(s) Topical daily  bisacodyl Suppository 10 milliGRAM(s) Rectal daily PRN  ferrous    sulfate 325 milliGRAM(s) Oral daily  fludroCORTISONE 0.4 milliGRAM(s) Oral daily  folic acid 1 milliGRAM(s) Oral daily  furosemide    Tablet 40 milliGRAM(s) Oral daily  lidocaine   Patch 1 Patch Transdermal every 24 hours  multivitamin 1 Tablet(s) Oral daily  polyethylene glycol 3350 17 Gram(s) Oral daily PRN  senna 2 Tablet(s) Oral at bedtime  sertraline 50 milliGRAM(s) Oral daily  silver sulfADIAZINE 1% Cream 1 Application(s) Topical two times a day      PHYSICAL EXAM:  GENERAL: NAD,   EYES: conjunctiva and sclera clear  ENMT: Moist mucous membranes  NECK: Supple, No JVD, Normal thyroid  CHEST/LUNG: non labored, cta b/l  HEART: Regular rate and rhythm; No murmurs, rubs, or gallops  ABDOMEN: Soft, Nontender, Nondistended; Bowel sounds present  EXTREMITIES:  2+ Peripheral Pulses, No clubbing, cyanosis, or edema  LYMPH: No lymphadenopathy noted  SKIN: No rashes or lesions    Consultant(s) Notes Reviewed:  [x ] YES  [ ] NO  Care Discussed with Consultants/Other Providers [ x] YES  [ ] NO    LABS:                        8.2    7.95  )-----------( 279      ( 21 Jun 2021 06:35 )             26.6     06-20    138  |  99  |  36<H>  ----------------------------<  105<H>  3.4<L>   |  26  |  1.55<H>    Ca    8.9      20 Jun 2021 07:03          CAPILLARY BLOOD GLUCOSE                  RADIOLOGY & ADDITIONAL TESTS:    Imaging Personally Reviewed:  [x ] YES  [ ] NO

## 2021-06-21 NOTE — PROVIDER CONTACT NOTE (OTHER) - BACKGROUND
Pt admitted for CHF and UTI
Pt admitted for chest pain. PMH PAD, CAD, prostate Ca, CKD III, aflutter.
PMH PAD, CAD, anemia, prostate CA, CKD
Pt admitted with CP and hematouria.

## 2021-06-21 NOTE — DISCHARGE NOTE PROVIDER - CARE PROVIDERS DIRECT ADDRESSES
,DirectAddress_Unknown,DirectAddress_Unknown ,DirectAddress_Unknown,DirectAddress_Unknown,[email or web address],DirectAddress_Unknown,DirectAddress_Unknown

## 2021-06-21 NOTE — PROVIDER CONTACT NOTE (OTHER) - REASON
Pt hypotensive 86/48
5 beats of 2:1 AV Block and A-Tach
Pt converted from SR to A Flutter
informed by tele tech that pt cardiac rhythm is jumping back and forth from Sinus to a flutter.

## 2021-06-21 NOTE — PROVIDER CONTACT NOTE (OTHER) - ASSESSMENT
Pt a&ox 4. VSS as charted. Pt symptomatic, denies cp, palpitations or SOB. Pt resting in bed at time of event.
RN notified by tele tech that Pt converted from SR to A flutter, HR 70s-80s, denies at distress
Pt A&Ox 1-2, on tele, denies chest pain,sob or any discomfort. Pt asymptomatic, all other VSS (see flowsheet). Pt s/p R & L thorocentesis
Pt A&Ox 3 disoriented to situation. Pt is asleep during episode. Pt symptomatic, VSS, denies cp, palpitations or SOB.

## 2021-06-21 NOTE — DISCHARGE NOTE PROVIDER - CARE PROVIDER_API CALL
Patrick Castillo)  Cardiovascular Disease; Internal Medicine  [address] [facility] [address] [facility], [address]  [place] [address]  Phone: [phone]  Fax: [phone]  Established Patient  Follow Up Time:     Fei Chan  INTERNAL MEDICINE  [address] [facility] [facility], [address]  [place] [address]  Phone: [phone]  Fax: [phone]  Follow Up Time:    Patrick Castillo)  Cardiovascular Disease; Internal Medicine  [address] [facility], [address]  [place] [address]  Phone: [phone]  Fax: [phone]  Established Patient  Follow Up Time:     Fei Chan  INTERNAL MEDICINE  [address] [facility], [address]  [place] [address]  Phone: [phone]  Fax: [phone]  Follow Up Time:     Iain Yadav)  Urology  [address] [facility], [address]  [place] [address]  Phone: [phone]  Fax: [phone]  Established Patient  Follow Up Time: Routine    Sudhir Urias  CARDIOVASCULAR DISEASE  [address] [place] [address]  Phone: [phone]  Fax: [phone]  Follow Up Time: Routine    Wade Urias)  Vascular Surgery  [address] [facility], [address]  [place] [address]  Phone: [phone]  Fax: [phone]  Established Patient  Follow Up Time: Routine

## 2021-06-21 NOTE — PROGRESS NOTE ADULT - ASSESSMENT
89 year old male with significant past medical history of prostate cancer s/p radiation, CAD, PVD s/p L SFA stent, CKD stage III, chronic anemia, recent admission for hematuria, is brought from [facility] for complaint of chest pain. Patient is admitted for further evaluation and management for hematuria, pneumonia and to rule out ACS/cardiomyopathy.     # Chest pain:  Likely msk related pain vs. NSTEMI in the setting of elevated troponin  troponin stable, downtrending, proBNP 2k  4/2021 TTE normal LV, LVEF 55% per records  A. flutter noted on tele on day of admission - no further episodes since   Heparin drip completed for a total of 48 hours  ASA & Plavix restarted. (on hold in rehab due to hematuria)  c/w tele monitoring. remain in sinus.   Given hematuria, CKD patient is not a candidate for invasive cardiac eval such as cardiac cath at this time   Chest x-ray with BL pleural effusions, mod b/l pleural effusions with compressive atelectasis, no infiltrate or consolidation seen - no pna, likely HF exacerbation   IV Lasix started, but held after TTE results.  Repeat TTE --> moderate pericardial effusion  no signs of Tamponade  CT chest shows small pericardial effusion, no plan for IR eval for pericardiocentesis  s/p R side thoracentesis 6/14 - follow up cytology and cultures   s/p L side thoracentesis 6/16 - follow up cytology and cultures    #PAF on tele  cards cs fu re:  long term AC    # Right shoulder pain - unclear etiology:  Np prior hx of gout  No recent trauma  Uric acid ordered 3.8  Shoulder x-ray neg for fracture  Lidocaine patch  PRN Tylenol  ROM exercise  Will consider ortho if pain persists     # Hematuria:  Follows Dr. Yadav, Iain  urology on the case  no clots, sanchez - clear urine  Monitor HH  + hematuria resolved. s/p 22F 3-way placed, no clot evacuated, color improved to clear peach  CBI not warranted at this time, Monitor urine outpt/color  Monitor hgb   UCx >100k E.coli -- follow for sensitivities  c/w abx, plan for 7 days of Ceftriaxone 1G Q24H  ASA/Plavix okay to continue from  standpoint as long as urine color and H/H remain stable     # Anemia:   AOCD and acute blood loss 2/2 hematuria   Monitor HH - stable  Keep active T&S, transfuse to keep Hgb >8   Tx of hematuria as above     # CAD (coronary artery disease):  Plavix and ASA ok to continue  Continue Lipitor    # Orthostatic hypotension:  Continue Florinef    # PAD (peripheral artery disease):  PAD s/p angioplasty with stent of left SFA was started on Plavix (4/16/21)  Follows Dr. Urias outpatient  Plavix and ASA to cont    # Prostate CA:   Hx of prostate ca s/p radiation about ?15 years ago    # Stage 3 chronic kidney disease:  CKD stage III, baseline Cr ~ 1.8-1.9  Follows Dr. Chan outpatient  Cr trending up, 1.58 today  Appears to be around baseline  Continue to monitor    # Need for prophylactic measure:  DVT PPX: SCDs  DASH diet  Continue pressure ulcer preventive measures per nursing   OOB to chair with assistance

## 2021-06-21 NOTE — DISCHARGE NOTE PROVIDER - NSDCCPCAREPLAN_GEN_ALL_CORE_FT
PRINCIPAL DISCHARGE DIAGNOSIS  Diagnosis: Chest pain  Assessment and Plan of Treatment: Take meds as prescribed and follow up with      SECONDARY DISCHARGE DIAGNOSES  Diagnosis: Hematuria  Assessment and Plan of Treatment: resolved. monitor hemoglobin, follow up with pcp    Diagnosis: Atrial flutter, unspecified type  Assessment and Plan of Treatment: take meds as prescribed    Diagnosis: Stage 3 chronic kidney disease  Assessment and Plan of Treatment: Follow up with Dr Chan     PRINCIPAL DISCHARGE DIAGNOSIS  Diagnosis: Chest pain  Assessment and Plan of Treatment: Take meds as prescribed and follow up with      SECONDARY DISCHARGE DIAGNOSES  Diagnosis: Hematuria  Assessment and Plan of Treatment: resolved. monitor hemoglobin, follow up with pcp    Diagnosis: Atrial flutter, unspecified type  Assessment and Plan of Treatment: take meds as prescribed    Diagnosis: Stage 3 chronic kidney disease  Assessment and Plan of Treatment: Follow up with Dr Chan    Diagnosis: Urinary retention  Assessment and Plan of Treatment: chronic sanchez care     PRINCIPAL DISCHARGE DIAGNOSIS  Diagnosis: Chest pain  Assessment and Plan of Treatment:   Given hematuria and CKD, patient is not a candidate for invasive cardiac procedures such as cardiac cath at this time.  Chest x-ray with bilateral pleural effusions, no infiltrate or consolidation seen. No pneumonia, likely heart failure exacerbation.  IV Lasix started, but held after echo results.  Repeat echo with moderate pericardial effusion, no signs of cardiac tamponade.  CT chest shows small pericardial effusion, no plan for IR eval for pericardiocentesis.  Status post Right side thoracentesis 6/14 and Left side thoracentesis 6/16.  Follow up with cardiology/PCP Dr. Castillo upon discharge.        SECONDARY DISCHARGE DIAGNOSES  Diagnosis: Hematuria  Assessment and Plan of Treatment: Follow up with urologist Dr Dr. Yadav.    Diagnosis: Stage 3 chronic kidney disease  Assessment and Plan of Treatment: Follow up with nephrologist Dr Chan.    Diagnosis: Atrial flutter, unspecified type  Assessment and Plan of Treatment: Follow up with cardiology/PCP Dr. Castillo.    Diagnosis: Urinary retention  Assessment and Plan of Treatment: Follow up with urologist Dr Dr. Yadav.

## 2021-06-21 NOTE — DISCHARGE NOTE NURSING/CASE MANAGEMENT/SOCIAL WORK - PATIENT PORTAL LINK FT
You can access the FollowMyHealth Patient Portal offered by [facility] by registering at the following website: [email or web address]. By joining [facility]’s FollowMyHealth portal, you will also be able to view your health information using other applications (apps) compatible with our system.

## 2021-06-21 NOTE — PROGRESS NOTE ADULT - NUTRITIONAL ASSESSMENT
This patient has been assessed with a concern for Malnutrition and has been determined to have a diagnosis/diagnoses of Severe protein-calorie malnutrition.    This patient is being managed with:   Diet Regular-  Low Sodium  Supplement Feeding Modality:  Oral  Ensure Enlive Cans or Servings Per Day:  2       Frequency:  Daily  Entered: Jun 11 2021 11:40AM    
This patient has been assessed with a concern for Malnutrition and has been determined to have a diagnosis/diagnoses of Severe protein-calorie malnutrition.    This patient is being managed with:   Diet Regular-  Low Sodium  Supplement Feeding Modality:  Oral  Ensure Enlive Cans or Servings Per Day:  2       Frequency:  Daily  Entered: Jun 11 2021 11:40AM    Diet Regular-  DASH/TLC {Sodium & Cholesterol Restricted} (DASH)  Supplement Feeding Modality:  Oral  Ensure Enlive Cans or Servings Per Day:  1       Frequency:  Two Times a day  Entered: Aydin 10 2021 12:51PM    The following pending diet order is being considered for treatment of Severe protein-calorie malnutrition:null
This patient has been assessed with a concern for Malnutrition and has been determined to have a diagnosis/diagnoses of Severe protein-calorie malnutrition.    This patient is being managed with:   Diet NPO after Midnight-     NPO Start Date: 15-Aydin-2021   NPO Start Time: 23:59  Entered: Aydin 15 2021  6:20PM    Diet Regular-  Low Sodium  Supplement Feeding Modality:  Oral  Ensure Enlive Cans or Servings Per Day:  2       Frequency:  Daily  Entered: Jun 11 2021 11:40AM    
This patient has been assessed with a concern for Malnutrition and has been determined to have a diagnosis/diagnoses of Severe protein-calorie malnutrition.    This patient is being managed with:   Diet Regular-  Low Sodium  Supplement Feeding Modality:  Oral  Ensure Enlive Cans or Servings Per Day:  2       Frequency:  Daily  Entered: Jun 11 2021 11:40AM    
This patient has been assessed with a concern for Malnutrition and has been determined to have a diagnosis/diagnoses of Severe protein-calorie malnutrition.    This patient is being managed with:   Diet Regular-  DASH/TLC {Sodium & Cholesterol Restricted} (DASH)  Supplement Feeding Modality:  Oral  Ensure Enlive Cans or Servings Per Day:  1       Frequency:  Two Times a day  Entered: Aydin 10 2021 12:51PM    
This patient has been assessed with a concern for Malnutrition and has been determined to have a diagnosis/diagnoses of Severe protein-calorie malnutrition.    This patient is being managed with:   Diet Regular-  Low Sodium  Supplement Feeding Modality:  Oral  Ensure Enlive Cans or Servings Per Day:  2       Frequency:  Daily  Entered: Jun 11 2021 11:40AM    

## 2021-06-21 NOTE — DISCHARGE NOTE PROVIDER - PROVIDER TOKENS
PROVIDER:[TOKEN:[[id number]],ESTABLISHEDPATIENT:[T]],PROVIDER:[TOKEN:[[id number]]] PROVIDER:[TOKEN:[[id number]],ESTABLISHEDPATIENT:[T]],PROVIDER:[TOKEN:[[id number]]],PROVIDER:[TOKEN:[[id number]],FOLLOWUP:[Routine],ESTABLISHEDPATIENT:[T]],PROVIDER:[TOKEN:[[id number]],FOLLOWUP:[Routine]],PROVIDER:[TOKEN:[[id number]],FOLLOWUP:[Routine],ESTABLISHEDPATIENT:[T]]

## 2021-06-21 NOTE — DISCHARGE NOTE PROVIDER - NSDCMRMEDTOKEN_GEN_ALL_CORE_FT
A &amp; D topical ointment: Apply topically to affected area 2 times a day (left &amp; right heel)  acetaminophen 325 mg oral tablet: 2 tab(s) orally every 6 hours, As needed, Mild Pain (1 - 3), Moderate Pain (4 - 6)  atorvastatin 10 mg oral tablet: 1 tab(s) orally once a day (at bedtime)  bacitracin 500 units/g topical ointment: Apply topically to affected area once a day(left lower back, left lateral back, mid back)  Deion-Protect topical cream: Apply topically to affected area 2 times a day(sacrum &amp; left buttock)  bisacodyl 10 mg rectal suppository: 1 suppository(ies) rectal once a day, As needed, Constipation  cholecalciferol 2000 intl units (50 mcg) oral capsule: 1 cap(s) orally once a day  ferrous sulfate 325 mg (65 mg elemental iron) oral tablet: 1 tab(s) orally once a day  fludrocortisone 0.1 mg oral tablet: 4 tab(s) orally once a day  folic acid 1 mg oral tablet: 1 tab(s) orally once a day  Metamucil 525 mg oral capsule: 1 cap(s) orally once a day  Multiple Vitamins oral tablet: 1 tab(s) orally once a day  polyethylene glycol 3350 oral powder for reconstitution: 17 gram(s) orally once a day, As Needed  Senna 8.6 mg oral tablet: 1 tab(s) orally once a day (at bedtime), As Needed - for constipation  sertraline 50 mg oral tablet: 1 tab(s) orally once a day  silver sulfADIAZINE 1% topical cream: Apply topically to affected area 2 times a day(right buttock)   A &amp; D topical ointment: Apply topically to affected area 2 times a day (left &amp; right heel)  ascorbic acid 500 mg oral tablet: 1 tab(s) orally once a day  atorvastatin 10 mg oral tablet: 1 tab(s) orally once a day (at bedtime)  bacitracin 500 units/g topical ointment: Apply topically to affected area once a day(left lower back, left lateral back, mid back)  Deion-Protect topical cream: Apply topically to affected area 2 times a day(sacrum &amp; left buttock)  bisacodyl 10 mg rectal suppository: 1 suppository(ies) rectal once a day, As needed, Constipation  cholecalciferol 2000 intl units (50 mcg) oral capsule: 1 cap(s) orally once a day  ferrous sulfate 325 mg (65 mg elemental iron) oral tablet: 1 tab(s) orally once a day  fludrocortisone 0.1 mg oral tablet: 4 tab(s) orally once a day  folic acid 1 mg oral tablet: 1 tab(s) orally once a day  furosemide 40 mg oral tablet: 1 tab(s) orally once a day  lidocaine 5% topical film: Apply topically to affected area once a day  Metamucil 525 mg oral capsule: 1 cap(s) orally once a day  Multiple Vitamins oral tablet: 1 tab(s) orally once a day  polyethylene glycol 3350 oral powder for reconstitution: 17 gram(s) orally once a day, As Needed  Senna 8.6 mg oral tablet: 1 tab(s) orally once a day (at bedtime), As Needed - for constipation  sertraline 50 mg oral tablet: 1 tab(s) orally once a day  silver sulfADIAZINE 1% topical cream: Apply topically to affected area 2 times a day(right buttock)

## 2021-06-21 NOTE — DISCHARGE NOTE PROVIDER - DETAILS OF MALNUTRITION DIAGNOSIS/DIAGNOSES
This patient has been assessed with a concern for Malnutrition and was treated during this hospitalization for the following Nutrition diagnosis/diagnoses:     -  06/10/2021: Severe protein-calorie malnutrition   Product 4 Application Directions: Apply to the affected areas 1-2 times a day \\n\\nLot:\\nExp:

## 2021-06-21 NOTE — PROVIDER CONTACT NOTE (OTHER) - ACTION/TREATMENT ORDERED:
Cira CRONIN made aware and no intervention ordered at this time, Will continue to monitor
Provider made aware, Will assess pt. No other interventions at the time.
Con to monitor Pt, Cont plan of care, PA to notify RN of further orders
Provider made aware, 12 Lead EKG.

## 2021-06-21 NOTE — DISCHARGE NOTE PROVIDER - HOSPITAL COURSE
This is a 89 year old male with a significant hisotry of prostate cancer s/p radiation, CAD, PVD s/p L SFA stent, CKD stage III, chronic anemia, recent admission for hematuria. Patient is transferred from [facility] for chest pain. Patient admitted on 6/6/21 for evaluation and management of hematuria, pneumonia and rule out ACS/cardiomyopathy.     Transthoracic ech performed on 6/11- left pleural effusion. 6/12 CT chest- pericardial effusion. 6/15 and 6/16 thoracentesis.     pulmonology, Cardiology, interventional radiology consulted during the hospital stay.    Patient has received 2 doses of moderna. patient is discharged to [facility].    89 year old male with significant past medical history of prostate cancer s/p radiation, CAD, PVD s/p L SFA stent, CKD stage III, chronic anemia, recent admission for hematuria, is brought from [facility] for complaint of chest pain. Patient is admitted for further evaluation and management for hematuria, pneumonia and to rule out ACS/cardiomyopathy.     # Chest pain:  Likely msk related pain vs. NSTEMI in the setting of elevated troponin  troponin stable, downtrending, proBNP 2k  4/2021 TTE normal LV, LVEF 55% per records  A flutter noted on tele on day of admission - no further episodes since   Heparin drip completed for a total of 48 hours  ASA & Plavix restarted. (on hold in rehab due to hematuria)  c/w tele monitoring. remains in sinus.   Given hematuria, CKD patient is not a candidate for invasive cardiac eval such as cardiac cath at this time   Chest x-ray with BL pleural effusions, mod b/l pleural effusions with compressive atelectasis, no infiltrate or consolidation seen - no pna, likely HF exacerbation   IV Lasix started, but held after TTE results.  Repeat TTE --> moderate pericardial effusion  no signs of Tamponade  CT chest shows small pericardial effusion, no plan for IR eval for pericardiocentesis  s/p R side thoracentesis 6/14 - follow up cytology and cultures   s/p L side thoracentesis 6/16 - follow up cytology and cultures    #PAF on tele  cards cs fu re:  long term AC    # Right shoulder pain - unclear etiology:  Np prior hx of gout  No recent trauma  Uric acid ordered 3.8  Shoulder x-ray neg for fracture  Lidocaine patch  PRN Tylenol  ROM exercise  Will consider ortho if pain persists     # Hematuria:  Follows Dr. Yadav, Iain  urology on the case  no clots, sanchez - clear urine  Monitor HH  + hematuria resolved. s/p 22F 3-way placed, no clot evacuated, color improved to clear peach  CBI not warranted at this time, Monitor urine outpt/color  Monitor hgb   UCx >100k E.coli -- follow for sensitivities  c/w abx, plan for 7 days of Ceftriaxone 1G Q24H  ASA/Plavix okay to continue from  standpoint as long as urine color and H/H remain stable     # Anemia:   AOCD and acute blood loss 2/2 hematuria   Monitor HH - stable  Keep active T&S, transfuse to keep Hgb >8   Tx of hematuria as above     # CAD (coronary artery disease):  Plavix and ASA ok to continue  Continue Lipitor    # Orthostatic hypotension:  Continue Florinef    # PAD (peripheral artery disease):  PAD s/p angioplasty with stent of left SFA was started on Plavix (4/16/21)  Follows Dr. Urias outpatient  Plavix and ASA to cont    # Prostate CA:   Hx of prostate ca s/p radiation about ?15 years ago    # Stage 3 chronic kidney disease:  CKD stage III, baseline Cr ~ 1.8-1.9  Follows Dr. Chan outpatient  Cr trending up, 1.58 today  Appears to be around baseline  Continue to monitor    # Need for prophylactic measure:  DVT PPX: SCDs  DASH diet  Continue pressure ulcer preventive measures per nursing   OOB to chair with assistance    Patient has received 2 doses of moderna. patient is discharged to [facility].

## 2021-06-22 VITALS
SYSTOLIC BLOOD PRESSURE: 105 MMHG | DIASTOLIC BLOOD PRESSURE: 62 MMHG | TEMPERATURE: 98 F | RESPIRATION RATE: 18 BRPM | HEART RATE: 85 BPM | OXYGEN SATURATION: 95 %

## 2021-06-22 LAB
ANION GAP SERPL CALC-SCNC: 13 MMOL/L — SIGNIFICANT CHANGE UP (ref 5–17)
BUN SERPL-MCNC: 38 MG/DL — HIGH (ref 7–23)
CALCIUM SERPL-MCNC: 8.8 MG/DL — SIGNIFICANT CHANGE UP (ref 8.4–10.5)
CHLORIDE SERPL-SCNC: 98 MMOL/L — SIGNIFICANT CHANGE UP (ref 96–108)
CO2 SERPL-SCNC: 28 MMOL/L — SIGNIFICANT CHANGE UP (ref 22–31)
CREAT SERPL-MCNC: 1.75 MG/DL — HIGH (ref 0.5–1.3)
GLUCOSE BLDC GLUCOMTR-MCNC: 146 MG/DL — HIGH (ref 70–99)
GLUCOSE SERPL-MCNC: 104 MG/DL — HIGH (ref 70–99)
HCT VFR BLD CALC: 26.7 % — LOW (ref 39–50)
HGB BLD-MCNC: 8.2 G/DL — LOW (ref 13–17)
MAGNESIUM SERPL-MCNC: 2.2 MG/DL — SIGNIFICANT CHANGE UP (ref 1.6–2.6)
MCHC RBC-ENTMCNC: 29.2 PG — SIGNIFICANT CHANGE UP (ref 27–34)
MCHC RBC-ENTMCNC: 30.7 GM/DL — LOW (ref 32–36)
MCV RBC AUTO: 95 FL — SIGNIFICANT CHANGE UP (ref 80–100)
NRBC # BLD: 0 /100 WBCS — SIGNIFICANT CHANGE UP (ref 0–0)
PHOSPHATE SERPL-MCNC: 2.7 MG/DL — SIGNIFICANT CHANGE UP (ref 2.5–4.5)
PLATELET # BLD AUTO: 300 K/UL — SIGNIFICANT CHANGE UP (ref 150–400)
POTASSIUM SERPL-MCNC: 3.4 MMOL/L — LOW (ref 3.5–5.3)
POTASSIUM SERPL-SCNC: 3.4 MMOL/L — LOW (ref 3.5–5.3)
RBC # BLD: 2.81 M/UL — LOW (ref 4.2–5.8)
RBC # FLD: 13.4 % — SIGNIFICANT CHANGE UP (ref 10.3–14.5)
SODIUM SERPL-SCNC: 139 MMOL/L — SIGNIFICANT CHANGE UP (ref 135–145)
WBC # BLD: 7.23 K/UL — SIGNIFICANT CHANGE UP (ref 3.8–10.5)
WBC # FLD AUTO: 7.23 K/UL — SIGNIFICANT CHANGE UP (ref 3.8–10.5)

## 2021-06-22 PROCEDURE — U0005: CPT

## 2021-06-22 PROCEDURE — 86200 CCP ANTIBODY: CPT

## 2021-06-22 PROCEDURE — 83615 LACTATE (LD) (LDH) ENZYME: CPT

## 2021-06-22 PROCEDURE — 87116 MYCOBACTERIA CULTURE: CPT

## 2021-06-22 PROCEDURE — 85027 COMPLETE CBC AUTOMATED: CPT

## 2021-06-22 PROCEDURE — 97112 NEUROMUSCULAR REEDUCATION: CPT

## 2021-06-22 PROCEDURE — 86036 ANCA SCREEN EACH ANTIBODY: CPT

## 2021-06-22 PROCEDURE — 85610 PROTHROMBIN TIME: CPT

## 2021-06-22 PROCEDURE — 97162 PT EVAL MOD COMPLEX 30 MIN: CPT

## 2021-06-22 PROCEDURE — 83520 IMMUNOASSAY QUANT NOS NONAB: CPT

## 2021-06-22 PROCEDURE — 87015 SPECIMEN INFECT AGNT CONCNTJ: CPT

## 2021-06-22 PROCEDURE — 85730 THROMBOPLASTIN TIME PARTIAL: CPT

## 2021-06-22 PROCEDURE — 73030 X-RAY EXAM OF SHOULDER: CPT

## 2021-06-22 PROCEDURE — 80048 BASIC METABOLIC PNL TOTAL CA: CPT

## 2021-06-22 PROCEDURE — 87070 CULTURE OTHR SPECIMN AEROBIC: CPT

## 2021-06-22 PROCEDURE — 97110 THERAPEUTIC EXERCISES: CPT

## 2021-06-22 PROCEDURE — 86713 LEGIONELLA ANTIBODY: CPT

## 2021-06-22 PROCEDURE — 93970 EXTREMITY STUDY: CPT

## 2021-06-22 PROCEDURE — 82962 GLUCOSE BLOOD TEST: CPT

## 2021-06-22 PROCEDURE — 87205 SMEAR GRAM STAIN: CPT

## 2021-06-22 PROCEDURE — 86038 ANTINUCLEAR ANTIBODIES: CPT

## 2021-06-22 PROCEDURE — 88112 CYTOPATH CELL ENHANCE TECH: CPT

## 2021-06-22 PROCEDURE — 87075 CULTR BACTERIA EXCEPT BLOOD: CPT

## 2021-06-22 PROCEDURE — 81001 URINALYSIS AUTO W/SCOPE: CPT

## 2021-06-22 PROCEDURE — 87086 URINE CULTURE/COLONY COUNT: CPT

## 2021-06-22 PROCEDURE — 89051 BODY FLUID CELL COUNT: CPT

## 2021-06-22 PROCEDURE — 87899 AGENT NOS ASSAY W/OPTIC: CPT

## 2021-06-22 PROCEDURE — 87206 SMEAR FLUORESCENT/ACID STAI: CPT

## 2021-06-22 PROCEDURE — 71250 CT THORAX DX C-: CPT

## 2021-06-22 PROCEDURE — 84157 ASSAY OF PROTEIN OTHER: CPT

## 2021-06-22 PROCEDURE — 93005 ELECTROCARDIOGRAM TRACING: CPT

## 2021-06-22 PROCEDURE — 84100 ASSAY OF PHOSPHORUS: CPT

## 2021-06-22 PROCEDURE — 87635 SARS-COV-2 COVID-19 AMP PRB: CPT

## 2021-06-22 PROCEDURE — 84550 ASSAY OF BLOOD/URIC ACID: CPT

## 2021-06-22 PROCEDURE — 88305 TISSUE EXAM BY PATHOLOGIST: CPT

## 2021-06-22 PROCEDURE — 83880 ASSAY OF NATRIURETIC PEPTIDE: CPT

## 2021-06-22 PROCEDURE — 99285 EMERGENCY DEPT VISIT HI MDM: CPT

## 2021-06-22 PROCEDURE — 87186 SC STD MICRODIL/AGAR DIL: CPT

## 2021-06-22 PROCEDURE — 83735 ASSAY OF MAGNESIUM: CPT

## 2021-06-22 PROCEDURE — 80053 COMPREHEN METABOLIC PANEL: CPT

## 2021-06-22 PROCEDURE — 93306 TTE W/DOPPLER COMPLETE: CPT

## 2021-06-22 PROCEDURE — 86769 SARS-COV-2 COVID-19 ANTIBODY: CPT

## 2021-06-22 PROCEDURE — 32555 ASPIRATE PLEURA W/ IMAGING: CPT

## 2021-06-22 PROCEDURE — 97530 THERAPEUTIC ACTIVITIES: CPT

## 2021-06-22 PROCEDURE — 71045 X-RAY EXAM CHEST 1 VIEW: CPT

## 2021-06-22 PROCEDURE — 97116 GAIT TRAINING THERAPY: CPT

## 2021-06-22 PROCEDURE — 0225U NFCT DS DNA&RNA 21 SARSCOV2: CPT

## 2021-06-22 PROCEDURE — 84484 ASSAY OF TROPONIN QUANT: CPT

## 2021-06-22 PROCEDURE — U0003: CPT

## 2021-06-22 PROCEDURE — 84145 PROCALCITONIN (PCT): CPT

## 2021-06-22 PROCEDURE — 85025 COMPLETE CBC W/AUTO DIFF WBC: CPT

## 2021-06-22 PROCEDURE — 86431 RHEUMATOID FACTOR QUANT: CPT

## 2021-06-22 RX ORDER — LIDOCAINE 4 G/100G
1 CREAM TOPICAL
Qty: 0 | Refills: 0 | DISCHARGE
Start: 2021-06-22

## 2021-06-22 RX ORDER — FUROSEMIDE 40 MG
1 TABLET ORAL
Qty: 0 | Refills: 0 | DISCHARGE
Start: 2021-06-22

## 2021-06-22 RX ORDER — POTASSIUM CHLORIDE 20 MEQ
40 PACKET (EA) ORAL ONCE
Refills: 0 | Status: COMPLETED | OUTPATIENT
Start: 2021-06-22 | End: 2021-06-22

## 2021-06-22 RX ORDER — ASCORBIC ACID 60 MG
1 TABLET,CHEWABLE ORAL
Qty: 0 | Refills: 0 | DISCHARGE
Start: 2021-06-22

## 2021-06-22 RX ADMIN — SERTRALINE 50 MILLIGRAM(S): 25 TABLET, FILM COATED ORAL at 12:15

## 2021-06-22 RX ADMIN — Medication 40 MILLIGRAM(S): at 05:24

## 2021-06-22 RX ADMIN — Medication 325 MILLIGRAM(S): at 12:15

## 2021-06-22 RX ADMIN — Medication 1 APPLICATION(S): at 17:40

## 2021-06-22 RX ADMIN — Medication 1 APPLICATION(S): at 06:20

## 2021-06-22 RX ADMIN — FLUDROCORTISONE ACETATE 0.4 MILLIGRAM(S): 0.1 TABLET ORAL at 05:23

## 2021-06-22 RX ADMIN — Medication 1 APPLICATION(S): at 12:15

## 2021-06-22 RX ADMIN — Medication 40 MILLIEQUIVALENT(S): at 05:23

## 2021-06-22 RX ADMIN — Medication 1 TABLET(S): at 12:15

## 2021-06-22 RX ADMIN — Medication 1 MILLIGRAM(S): at 12:15

## 2021-06-22 RX ADMIN — Medication 40 MILLIEQUIVALENT(S): at 11:18

## 2021-06-22 RX ADMIN — LIDOCAINE 1 PATCH: 4 CREAM TOPICAL at 06:20

## 2021-06-22 RX ADMIN — Medication 500 MILLIGRAM(S): at 12:15

## 2021-06-22 NOTE — PROGRESS NOTE ADULT - REASON FOR ADMISSION
chest pain

## 2021-06-22 NOTE — PROGRESS NOTE ADULT - NSICDXPILOT_GEN_ALL_CORE
[place]

## 2021-06-22 NOTE — PROGRESS NOTE ADULT - ASSESSMENT
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

## 2021-06-22 NOTE — PROGRESS NOTE ADULT - SUBJECTIVE AND OBJECTIVE BOX
Follow-up Pulm Progress Note  Arpit Rendon MD  [phone]    Afebrile  No new resp complaints  Room air O2 sats normal        Vital Signs Last 24 Hrs  T(C): 36.6 (22 Jun 2021 11:56), Max: 36.6 (22 Jun 2021 11:56)  T(F): 97.9 (22 Jun 2021 11:56), Max: 97.9 (22 Jun 2021 11:56)  HR: 92 (22 Jun 2021 11:56) (81 - 92)  BP: 106/66 (22 Jun 2021 11:56) (100/60 - 106/66)  BP(mean): --  RR: 18 (22 Jun 2021 11:56) (18 - 18)  SpO2: 95% (22 Jun 2021 11:56) (94% - 95%)                        8.2    7.23  )-----------( 300      ( 22 Jun 2021 06:01 )             26.7       06-22    139  |  98  |  38<H>  ----------------------------<  104<H>  3.4<L>   |  28  |  1.75<H>    Ca    8.8      22 Jun 2021 06:01  Phos  2.7     06-22  Mg     2.2     06-22      Physical Examination:  PULM: Diminished BS bases  CVS: Regular rate and rhythm, no murmurs, rubs, or gallops  ABD: Soft, non-tender  EXT:  No clubbing, cyanosis, or edema    RADIOLOGY REVIEWED  CXR:    CT chest:      PROCEDURE DATE:  06/11/2021        INTERPRETATION:  CLINICAL INFORMATION: Pericardial effusion on echo, evaluate.    COMPARISON: CT chest 6/8/2021.    CONTRAST/COMPLICATIONS:  IV Contrast: NONE  Oral Contrast: NONE  Complications: None reported at time of study completion    PROCEDURE:  CT of the Chest was performed.  Sagittal and coronal reformats were performed.    FINDINGS:    LUNGS AND AIRWAYS: Similar near complete compressive atelectasis of the bilateral lower lobes. Unchanged 3 mm nodule in the right middle lobe (3:89). Left upper lobe calcified granuloma.  PLEURA: Redemonstrated moderate bilateral pleural effusions which are slightly increased when compared to prior study 6/8/2021.  MEDIASTINUM AND ELIANA: No lymphadenopathy.  VESSELS: Aortic and coronary artery calcifications.  HEART: Similar appearing small pericardial effusion. Aortic and mitral valvular calcifications.Heart size is normal.  CHEST WALL AND LOWER NECK: Within normallimits.  VISUALIZED UPPER ABDOMEN: Within normal limits.  BONES: Degenerative changes. Redemonstrated age-indeterminate compression deformities of the T7 and T8 vertebral bodies.    IMPRESSION:    When compared with June 8, 2021 chest CT:    Similar-appearing small pericardial effusion.    Moderate bilateral pleural effusions which are slightly increased when compared to prior study 6/8/2021 with near complete atelectasis of the bilateral lower lobes    TTE:    PROCEDURE: Transthoracic echocardiogram with 2-D, M-Mode  and complete spectral and color flow Doppler.  INDICATION: Chest pain, unspecified (R07.9)  ------------------------------------------------------------------------  Dimensions:    Normal Values:  LA:     2.6    2.0 - 4.0 cm  Ao:     3.5    2.0 - 3.8 cm  SEPTUM: 0.6    0.6 - 1.2 cm  PWT:    0.8    0.6 - 1.1 cm  LVIDd:  3.9    3.0 - 5.6 cm  LVIDs:  2.3    1.8 - 4.0 cm  Derived variables:  LVMI: 41 g/m2  RWT: 0.41  Fractional short: 41 %  EF (Visual Estimate): >75 %  Doppler Peak Velocity (m/sec): AoV=1.2  ------------------------------------------------------------------------  Observations:  Mitral Valve: Normal mitral valve. Minimal mitral  regurgitation.  Aortic Valve/Aorta: Aortic valve not well visualized;  appears calcified. Peak transaortic valve gradient equals 6  mm Hg. Minimal aortic regurgitation. Peak left ventricular  outflow tract gradient equals 2 mm Hg.  Aortic Root: 3.5 cm.  Left Atrium: Left atrium not well visualized, probably  normal.  Left Ventricle: Hyperdynamic left ventricular systolic  function. Normal left ventricular internal dimensions and  wall thicknesses.  Right Heart: Normal right atrium. Normal right ventricular  size and function. Normal tricuspid valve. Mild tricuspid  regurgitation. Normal pulmonic valve. No pulmonic  regurgitation.  Pericardium/Pleura: Thickened pericardium. Moderate  circumferential pericardial effusion. The effusion measures  up to approximately 1.3 cm adjacent to the apex. There is  inversion of the RVOT in early ventricular diastole. On  subcostal imaging, there is inversion of the right  ventricle in early ventricular diastole. Dilated IVC  (diameterabout 2.1 cm) with less than 50% respiratory  variation in size consistent with elevated RA pressure.  Findings consistent with echocardiographic evidence of  pericardial tamponade physiology.  Left pleural effusion.  Hemodynamic: Estimated right atrial pressure is 8 mm Hg.  Estimated right ventricular systolic pressure equals 39 mm  Hg, assuming right atrial pressure equals 8 mm Hg,  consistent with borderline pulmonary hypertension.  ------------------------------------------------------------------------  Conclusions:  1. Normal mitral valve. Minimal mitral regurgitation.  2. Aortic valve not well visualized; appears calcified.  Minimal aortic regurgitation.  3. Hyperdynamic left ventricular systolic function.  4. Normal right ventricular size and function.  5. Thickened pericardium. Moderate circumferential  pericardial effusion. The effusion measures up to  approximately 1.3 cm adjacent to the apex. There is  inversion of the RVOT in early ventricular diastole. On  subcostal imaging, there is inversion of the right  ventricle in early ventricular diastole. Dilated IVC  (diameterabout 2.1 cm) with less than 50% respiratory  variation in size consistent with elevated RA pressure.  Findings consistent with echocardiographic evidence of  pericardial tamponade physiology.  6. Left pleural effusion.  Findings discussed with nurse anand Robb from  the PICU.

## 2021-06-22 NOTE — PROGRESS NOTE ADULT - PROVIDER SPECIALTY LIST ADULT
Infectious Disease
Urology
Internal Medicine
Pulmonology
Internal Medicine
Internal Medicine
Pulmonology
Cardiology
Infectious Disease
Internal Medicine
Pulmonology
Internal Medicine
Pulmonology
Cardiology
Cardiology
Internal Medicine
Cardiology
Internal Medicine
Cardiology

## 2021-06-23 LAB
14-3-3 ETA ANTIGEN: <0.2 NG/ML — SIGNIFICANT CHANGE UP
CCP ANTIBODIES IGG/IGA: <20 UNITS — SIGNIFICANT CHANGE UP
RHEUMATOID FACTOR IDENTRA RESULT: 15.9 UNITS/ML — HIGH

## 2021-07-12 NOTE — PATIENT PROFILE ADULT - NSTRANSFERBELONGINGSRESP_GEN_A_NUR
"cc: Hypertension    Subjective:     HPI     Essential hypertension  Rashaad presents for follow-up hypertension.  He was advised to increase his losartan to 75 mg.  He did this and started having itchy eyes, thought it was due to the medication decreased losartan down to 50 mg.  He has been at this dose for about 2 weeks.  His blood pressure has consistently been ranging in the low 120 systolic.  He is tolerating it well.  Denies dizziness, chest pain, palpitations, shortness of breath, lower extremity edema.      Review of systems:  See above.       Current Outpatient Medications:   •  losartan (COZAAR) 50 MG Tab, TAKE 1 TABLET BY MOUTH EVERY DAY, Disp: 135 tablet, Rfl: 1  •  Blood Pressure Monitoring (BLOOD PRESSURE CUFF) Misc, 1 adult BP Cuff, Disp: 1 Each, Rfl: 0    Allergies, past medical history, past surgical history, family history, social history reviewed and updated    Objective:     Vitals: /80 (BP Location: Left arm, Patient Position: Sitting, BP Cuff Size: Adult)   Pulse 72   Temp 36.3 °C (97.4 °F) (Temporal)   Ht 1.651 m (5' 5\")   Wt 63.5 kg (140 lb)   SpO2 96%   BMI 23.30 kg/m²   General: Alert, pleasant, NAD  HEENT: Normocephalic. Neck supple.  No thyromegaly or masses palpated. No cervical or supraclavicular lymphadenopathy. No carotid bruits   Heart: Regular rate and rhythm.  S1 and S2 normal.  No murmurs appreciated.  Respiratory: Normal respiratory effort.  Clear to auscultation bilaterally.  Skin: Warm, dry, no rashes.  Extremities: No leg edema.  Radial pulses 2+ symmetric  Psych:  Affect/mood is normal, judgement is good, memory is intact, grooming is appropriate.    Assessment/Plan:     Rashaad was seen today for follow-up.    Diagnoses and all orders for this visit:    Essential hypertension  -Controlled.  We will continue on 50 mg of losartan.  Advised intermittent BP checks.      Return in about 4 weeks (around 8/9/2021) for Annual PX, Lab Review.  "
yes

## 2021-08-04 LAB
CULTURE RESULTS: SIGNIFICANT CHANGE UP
SPECIMEN SOURCE: SIGNIFICANT CHANGE UP

## 2021-11-30 ENCOUNTER — INPATIENT (INPATIENT)
Facility: HOSPITAL | Age: 86
LOS: 12 days | Discharge: NURSING, REHAB OR LONG-TERM CARE | End: 2021-12-13
Attending: INTERNAL MEDICINE
Payer: MEDICARE

## 2021-11-30 VITALS
HEIGHT: 71 IN | SYSTOLIC BLOOD PRESSURE: 100 MMHG | TEMPERATURE: 98 F | DIASTOLIC BLOOD PRESSURE: 61 MMHG | OXYGEN SATURATION: 95 % | HEART RATE: 123 BPM | RESPIRATION RATE: 14 BRPM

## 2021-11-30 DIAGNOSIS — A41.9 SEPSIS, UNSPECIFIED ORGANISM: ICD-10-CM

## 2021-11-30 DIAGNOSIS — Z98.890 OTHER SPECIFIED POSTPROCEDURAL STATES: Chronic | ICD-10-CM

## 2021-11-30 LAB
ALBUMIN SERPL ELPH-MCNC: 3.5 G/DL — SIGNIFICANT CHANGE UP (ref 3.3–5)
ALP SERPL-CCNC: 70 U/L — SIGNIFICANT CHANGE UP (ref 40–120)
ALT FLD-CCNC: 14 U/L — SIGNIFICANT CHANGE UP (ref 4–41)
ANION GAP SERPL CALC-SCNC: 11 MMOL/L — SIGNIFICANT CHANGE UP (ref 7–14)
APPEARANCE UR: ABNORMAL
APTT BLD: 26.9 SEC — LOW (ref 27–36.3)
AST SERPL-CCNC: 25 U/L — SIGNIFICANT CHANGE UP (ref 4–40)
BASOPHILS # BLD AUTO: 0 K/UL — SIGNIFICANT CHANGE UP (ref 0–0.2)
BASOPHILS NFR BLD AUTO: 0 % — SIGNIFICANT CHANGE UP (ref 0–2)
BILIRUB SERPL-MCNC: 0.4 MG/DL — SIGNIFICANT CHANGE UP (ref 0.2–1.2)
BILIRUB UR-MCNC: NEGATIVE — SIGNIFICANT CHANGE UP
BLOOD GAS VENOUS COMPREHENSIVE RESULT: SIGNIFICANT CHANGE UP
BUN SERPL-MCNC: 45 MG/DL — HIGH (ref 7–23)
CALCIUM SERPL-MCNC: 9.1 MG/DL — SIGNIFICANT CHANGE UP (ref 8.4–10.5)
CHLORIDE SERPL-SCNC: 100 MMOL/L — SIGNIFICANT CHANGE UP (ref 98–107)
CO2 SERPL-SCNC: 25 MMOL/L — SIGNIFICANT CHANGE UP (ref 22–31)
COLOR SPEC: YELLOW — SIGNIFICANT CHANGE UP
CREAT SERPL-MCNC: 1.9 MG/DL — HIGH (ref 0.5–1.3)
DIFF PNL FLD: ABNORMAL
EOSINOPHIL # BLD AUTO: 0 K/UL — SIGNIFICANT CHANGE UP (ref 0–0.5)
EOSINOPHIL NFR BLD AUTO: 0 % — SIGNIFICANT CHANGE UP (ref 0–6)
GLUCOSE SERPL-MCNC: 149 MG/DL — HIGH (ref 70–99)
GLUCOSE UR QL: NEGATIVE — SIGNIFICANT CHANGE UP
HCT VFR BLD CALC: 31.3 % — LOW (ref 39–50)
HGB BLD-MCNC: 9.7 G/DL — LOW (ref 13–17)
IANC: 16.36 K/UL — HIGH (ref 1.5–8.5)
INR BLD: 1.08 RATIO — SIGNIFICANT CHANGE UP (ref 0.88–1.16)
KETONES UR-MCNC: NEGATIVE — SIGNIFICANT CHANGE UP
LEUKOCYTE ESTERASE UR-ACNC: ABNORMAL
LYMPHOCYTES # BLD AUTO: 0.32 K/UL — LOW (ref 1–3.3)
LYMPHOCYTES # BLD AUTO: 1.7 % — LOW (ref 13–44)
MCHC RBC-ENTMCNC: 31 GM/DL — LOW (ref 32–36)
MCHC RBC-ENTMCNC: 31.5 PG — SIGNIFICANT CHANGE UP (ref 27–34)
MCV RBC AUTO: 101.6 FL — HIGH (ref 80–100)
MONOCYTES # BLD AUTO: 1.34 K/UL — HIGH (ref 0–0.9)
MONOCYTES NFR BLD AUTO: 7 % — SIGNIFICANT CHANGE UP (ref 2–14)
NEUTROPHILS # BLD AUTO: 17.44 K/UL — HIGH (ref 1.8–7.4)
NEUTROPHILS NFR BLD AUTO: 90.4 % — HIGH (ref 43–77)
NITRITE UR-MCNC: NEGATIVE — SIGNIFICANT CHANGE UP
PH UR: 8 — SIGNIFICANT CHANGE UP (ref 5–8)
PLATELET # BLD AUTO: 191 K/UL — SIGNIFICANT CHANGE UP (ref 150–400)
POTASSIUM SERPL-MCNC: 4.8 MMOL/L — SIGNIFICANT CHANGE UP (ref 3.5–5.3)
POTASSIUM SERPL-SCNC: 4.8 MMOL/L — SIGNIFICANT CHANGE UP (ref 3.5–5.3)
PROT SERPL-MCNC: 7.5 G/DL — SIGNIFICANT CHANGE UP (ref 6–8.3)
PROT UR-MCNC: ABNORMAL
PROTHROM AB SERPL-ACNC: 12.3 SEC — SIGNIFICANT CHANGE UP (ref 10.6–13.6)
RBC # BLD: 3.08 M/UL — LOW (ref 4.2–5.8)
RBC # FLD: 14.4 % — SIGNIFICANT CHANGE UP (ref 10.3–14.5)
SODIUM SERPL-SCNC: 136 MMOL/L — SIGNIFICANT CHANGE UP (ref 135–145)
SP GR SPEC: 1.01 — SIGNIFICANT CHANGE UP (ref 1–1.05)
UROBILINOGEN FLD QL: SIGNIFICANT CHANGE UP
WBC # BLD: 19.1 K/UL — HIGH (ref 3.8–10.5)
WBC # FLD AUTO: 19.1 K/UL — HIGH (ref 3.8–10.5)

## 2021-11-30 PROCEDURE — 99285 EMERGENCY DEPT VISIT HI MDM: CPT | Mod: CS

## 2021-11-30 PROCEDURE — 71045 X-RAY EXAM CHEST 1 VIEW: CPT | Mod: 26

## 2021-11-30 RX ORDER — SODIUM CHLORIDE 9 MG/ML
1000 INJECTION INTRAMUSCULAR; INTRAVENOUS; SUBCUTANEOUS ONCE
Refills: 0 | Status: COMPLETED | OUTPATIENT
Start: 2021-11-30 | End: 2021-11-30

## 2021-11-30 RX ORDER — ACETAMINOPHEN 500 MG
975 TABLET ORAL ONCE
Refills: 0 | Status: COMPLETED | OUTPATIENT
Start: 2021-11-30 | End: 2021-11-30

## 2021-11-30 RX ORDER — ACETAMINOPHEN 500 MG
650 TABLET ORAL ONCE
Refills: 0 | Status: COMPLETED | OUTPATIENT
Start: 2021-11-30 | End: 2021-11-30

## 2021-11-30 RX ORDER — PIPERACILLIN AND TAZOBACTAM 4; .5 G/20ML; G/20ML
3.38 INJECTION, POWDER, LYOPHILIZED, FOR SOLUTION INTRAVENOUS ONCE
Refills: 0 | Status: COMPLETED | OUTPATIENT
Start: 2021-11-30 | End: 2021-11-30

## 2021-11-30 RX ADMIN — SODIUM CHLORIDE 1000 MILLILITER(S): 9 INJECTION INTRAMUSCULAR; INTRAVENOUS; SUBCUTANEOUS at 23:48

## 2021-11-30 RX ADMIN — Medication 650 MILLIGRAM(S): at 21:11

## 2021-11-30 RX ADMIN — PIPERACILLIN AND TAZOBACTAM 200 GRAM(S): 4; .5 INJECTION, POWDER, LYOPHILIZED, FOR SOLUTION INTRAVENOUS at 21:26

## 2021-11-30 RX ADMIN — SODIUM CHLORIDE 1000 MILLILITER(S): 9 INJECTION INTRAMUSCULAR; INTRAVENOUS; SUBCUTANEOUS at 21:30

## 2021-11-30 NOTE — ED PROVIDER NOTE - CLINICAL SUMMARY MEDICAL DECISION MAKING FREE TEXT BOX
89 y/o M w/ h/o prostate cancer s/p radiation, CAD, PVD s/p L SFA stent, CKD3, chronic anemia sent in from home for weakness eating and drinking less, losing weight. Pt febrile with low BP, high HR shock index concern for likely sepsis 2/2 sacral ulcer. Will check wbc, lactate, cultures, and imaging. give IV fluids and antibiotics and reassess and need admission.

## 2021-11-30 NOTE — ED PROVIDER NOTE - OBJECTIVE STATEMENT
89 y/o M w/ h/o prostate cancer s/p radiation, CAD, PVD s/p L SFA stent, CKD stage III, chronic anemia, recent admission for hematuria, is brought from [facility] for complaint of chest pain. Patient is admitted for further evaluation and management for hematuria, pneumonia and to rule out ACS/cardiomyopathy.   Weakness for the past    PMD Miguel Castillo 89 y/o M w/ h/o prostate cancer s/p radiation, CAD, PVD s/p L SFA stent, CKD3, chronic anemia sent in from home for weakness. As per wife and pt, he has been feeling weak for the past several months but lately he has been eating and drinking less, losing weight. No fevers, chills, nausea, vomiting, chest pain, cough, sob, abdominal pain, back pain, dysuria, numbness, tingling.     PMD: Patrick Castillo MD

## 2021-11-30 NOTE — ED PROVIDER NOTE - ATTENDING CONTRIBUTION TO CARE
Attending Statement: I have personally seen and examined this patient. I have fully participated in the care of this patient. I have reviewed all pertinent clinical information, including history physical exam, plan and the Resident's note and agree except as noted  89 y/o M w/ h/o prostate cancer s/p radiation, CAD, PVD s/p L SFA stent, CKD3, chronic anemia from home co weakness. pt unable to give hx, states "my arms are heavy" 'im weak"  Unable to give further.   Dr Norwood spoke with wife, states he has been eating and drinking less, losing weight.   Vital signs noted. fragile elderly male sitting up. dry mm. no juandice. poor inspiratory effort. soft nondistended abdomen. no grimace to palpation. noted to have a stage one sacral ulcer, no discharge.   plan  sepsis workup, gentle IVF, cxr, ua, rvp, tba

## 2021-11-30 NOTE — ED ADULT TRIAGE NOTE - CHIEF COMPLAINT QUOTE
Pt brought in by EMS from home for decreased po intake, dehydration and hypotension. PMH hypotension, Pt tachycardic in triage, Denies cp, fevers. Pt appears pale in triage

## 2021-11-30 NOTE — ED ADULT TRIAGE NOTE - HEIGHT IN FEET
Page Sent      PAGER ID: [phone] paul  MESSAGE: Lucila Blandon 89.  since all Lasix. wheezes present, will admin PRN. coughing spells. states breathing is better. thanks javid Brito.  (136 character message out of a maximum of 240)       Close [X]    Send Another Page    Thank you for visiting [email or web address]     5

## 2021-11-30 NOTE — ED ADULT NURSE NOTE - NSIMPLEMENTINTERV_GEN_ALL_ED
Implemented All Fall with Harm Risk Interventions:  [place] to call system. Call bell, personal items and telephone within reach. Instruct patient to call for assistance. Room bathroom lighting operational. Non-slip footwear when patient is off stretcher. Physically safe environment: no spills, clutter or unnecessary equipment. Stretcher in lowest position, wheels locked, appropriate side rails in place. Provide visual cue, wrist band, yellow gown, etc. Monitor gait and stability. Monitor for mental status changes and reorient to person, place, and time. Review medications for side effects contributing to fall risk. Reinforce activity limits and safety measures with patient and family. Provide visual clues: red socks.

## 2021-11-30 NOTE — ED ADULT NURSE NOTE - OBJECTIVE STATEMENT
pt received to rm 1, a&ox3-4 , non - ambulatory , pmh of prostate cancer s/p radiation, CAD, PVD s/p L SFA stent, CKD3, chronic anemia , p/w generalized weakness for the past several months that has worsened recently. per pt wife the pt has been eating and drinking less.  Pt breathing even and unlabored on room air. NSR on cardiac monitor. Denies chills, cough, SOB, chest pain, palpitations, dizziness, N/V/D, constipation, numbness, tingling. IV placed R #20G . Labs collected and sent.  pending xray . pt educated on fall precautions and confirms understanding via teach back method. pressure Pt will remain free of Pt will remain free of injury. noted to the sacrum stage 2, and stage 1. Then on the mid spinal area is a stage 2. Stretcher locked in lowest position with siderails up x2. Call bell and personal items within reach.

## 2021-11-30 NOTE — ED PROVIDER NOTE - PHYSICAL EXAMINATION
Gen: NAD, non-toxic appearing  Head: normal appearing  HEENT: normal conjunctiva, oral mucosa dry  Lung: no respiratory distress, speaking in full sentences, CTA b/l     CV: regular rate and rhythm, no murmurs  Abd: soft, non distended, non tender   MSK: no visible deformities  Neuro: No focal deficits, AAOx3  Skin: Warm  Psych: normal affect

## 2021-12-01 DIAGNOSIS — I31.3 PERICARDIAL EFFUSION (NONINFLAMMATORY): ICD-10-CM

## 2021-12-01 DIAGNOSIS — A41.9 SEPSIS, UNSPECIFIED ORGANISM: ICD-10-CM

## 2021-12-01 DIAGNOSIS — I95.9 HYPOTENSION, UNSPECIFIED: ICD-10-CM

## 2021-12-01 DIAGNOSIS — D64.9 ANEMIA, UNSPECIFIED: ICD-10-CM

## 2021-12-01 DIAGNOSIS — N17.9 ACUTE KIDNEY FAILURE, UNSPECIFIED: ICD-10-CM

## 2021-12-01 DIAGNOSIS — I25.10 ATHEROSCLEROTIC HEART DISEASE OF NATIVE CORONARY ARTERY WITHOUT ANGINA PECTORIS: ICD-10-CM

## 2021-12-01 DIAGNOSIS — Z29.9 ENCOUNTER FOR PROPHYLACTIC MEASURES, UNSPECIFIED: ICD-10-CM

## 2021-12-01 LAB
ANION GAP SERPL CALC-SCNC: 9 MMOL/L — SIGNIFICANT CHANGE UP (ref 7–14)
BASOPHILS # BLD AUTO: 0.02 K/UL — SIGNIFICANT CHANGE UP (ref 0–0.2)
BASOPHILS NFR BLD AUTO: 0.1 % — SIGNIFICANT CHANGE UP (ref 0–2)
BLOOD GAS VENOUS COMPREHENSIVE RESULT: SIGNIFICANT CHANGE UP
BUN SERPL-MCNC: 44 MG/DL — HIGH (ref 7–23)
CALCIUM SERPL-MCNC: 8.4 MG/DL — SIGNIFICANT CHANGE UP (ref 8.4–10.5)
CHLORIDE SERPL-SCNC: 106 MMOL/L — SIGNIFICANT CHANGE UP (ref 98–107)
CO2 SERPL-SCNC: 22 MMOL/L — SIGNIFICANT CHANGE UP (ref 22–31)
CORTIS AM PEAK SERPL-MCNC: 55.8 UG/DL — HIGH (ref 6–18.4)
CREAT SERPL-MCNC: 1.97 MG/DL — HIGH (ref 0.5–1.3)
EOSINOPHIL # BLD AUTO: 0 K/UL — SIGNIFICANT CHANGE UP (ref 0–0.5)
EOSINOPHIL NFR BLD AUTO: 0 % — SIGNIFICANT CHANGE UP (ref 0–6)
GLUCOSE SERPL-MCNC: 97 MG/DL — SIGNIFICANT CHANGE UP (ref 70–99)
GRAM STN FLD: SIGNIFICANT CHANGE UP
HCT VFR BLD CALC: 26.9 % — LOW (ref 39–50)
HGB BLD-MCNC: 8.3 G/DL — LOW (ref 13–17)
IANC: 18.82 K/UL — HIGH (ref 1.5–8.5)
IMM GRANULOCYTES NFR BLD AUTO: 4.1 % — HIGH (ref 0–1.5)
LYMPHOCYTES # BLD AUTO: 0.23 K/UL — LOW (ref 1–3.3)
LYMPHOCYTES # BLD AUTO: 1.1 % — LOW (ref 13–44)
MCHC RBC-ENTMCNC: 30.9 GM/DL — LOW (ref 32–36)
MCHC RBC-ENTMCNC: 31.4 PG — SIGNIFICANT CHANGE UP (ref 27–34)
MCV RBC AUTO: 101.9 FL — HIGH (ref 80–100)
METHOD TYPE: SIGNIFICANT CHANGE UP
MONOCYTES # BLD AUTO: 0.8 K/UL — SIGNIFICANT CHANGE UP (ref 0–0.9)
MONOCYTES NFR BLD AUTO: 3.9 % — SIGNIFICANT CHANGE UP (ref 2–14)
NEUTROPHILS # BLD AUTO: 18.82 K/UL — HIGH (ref 1.8–7.4)
NEUTROPHILS NFR BLD AUTO: 90.8 % — HIGH (ref 43–77)
NRBC # BLD: 0 /100 WBCS — SIGNIFICANT CHANGE UP
NRBC # FLD: 0 K/UL — SIGNIFICANT CHANGE UP
P MIRABILIS DNA BLD POS QL NAA+PROBE: SIGNIFICANT CHANGE UP
PLATELET # BLD AUTO: 150 K/UL — SIGNIFICANT CHANGE UP (ref 150–400)
POTASSIUM SERPL-MCNC: 4.6 MMOL/L — SIGNIFICANT CHANGE UP (ref 3.5–5.3)
POTASSIUM SERPL-SCNC: 4.6 MMOL/L — SIGNIFICANT CHANGE UP (ref 3.5–5.3)
RBC # BLD: 2.64 M/UL — LOW (ref 4.2–5.8)
RBC # FLD: 14.6 % — HIGH (ref 10.3–14.5)
SARS-COV-2 RNA SPEC QL NAA+PROBE: SIGNIFICANT CHANGE UP
SODIUM SERPL-SCNC: 137 MMOL/L — SIGNIFICANT CHANGE UP (ref 135–145)
SPECIMEN SOURCE: SIGNIFICANT CHANGE UP
WBC # BLD: 20.72 K/UL — HIGH (ref 3.8–10.5)
WBC # FLD AUTO: 20.72 K/UL — HIGH (ref 3.8–10.5)

## 2021-12-01 PROCEDURE — 76770 US EXAM ABDO BACK WALL COMP: CPT | Mod: 26

## 2021-12-01 PROCEDURE — 70450 CT HEAD/BRAIN W/O DYE: CPT | Mod: 26

## 2021-12-01 PROCEDURE — 71250 CT THORAX DX C-: CPT | Mod: 26

## 2021-12-01 PROCEDURE — 99223 1ST HOSP IP/OBS HIGH 75: CPT

## 2021-12-01 RX ORDER — HEPARIN SODIUM 5000 [USP'U]/ML
5000 INJECTION INTRAVENOUS; SUBCUTANEOUS EVERY 12 HOURS
Refills: 0 | Status: DISCONTINUED | OUTPATIENT
Start: 2021-12-01 | End: 2021-12-13

## 2021-12-01 RX ORDER — INFLUENZA VIRUS VACCINE 15; 15; 15; 15 UG/.5ML; UG/.5ML; UG/.5ML; UG/.5ML
0.7 SUSPENSION INTRAMUSCULAR ONCE
Refills: 0 | Status: DISCONTINUED | OUTPATIENT
Start: 2021-12-01 | End: 2021-12-13

## 2021-12-01 RX ORDER — HYDROCORTISONE 20 MG
100 TABLET ORAL ONCE
Refills: 0 | Status: COMPLETED | OUTPATIENT
Start: 2021-12-01 | End: 2021-12-01

## 2021-12-01 RX ORDER — BACITRACIN ZINC 500 UNIT/G
1 OINTMENT IN PACKET (EA) TOPICAL
Qty: 0 | Refills: 0 | DISCHARGE

## 2021-12-01 RX ORDER — ONDANSETRON 8 MG/1
4 TABLET, FILM COATED ORAL EVERY 8 HOURS
Refills: 0 | Status: DISCONTINUED | OUTPATIENT
Start: 2021-12-01 | End: 2021-12-13

## 2021-12-01 RX ORDER — ACETAMINOPHEN 500 MG
1000 TABLET ORAL ONCE
Refills: 0 | Status: COMPLETED | OUTPATIENT
Start: 2021-12-01 | End: 2021-12-01

## 2021-12-01 RX ORDER — FOLIC ACID 0.8 MG
1 TABLET ORAL DAILY
Refills: 0 | Status: DISCONTINUED | OUTPATIENT
Start: 2021-12-01 | End: 2021-12-13

## 2021-12-01 RX ORDER — PSYLLIUM SEED (WITH DEXTROSE)
1 POWDER (GRAM) ORAL
Qty: 0 | Refills: 0 | DISCHARGE

## 2021-12-01 RX ORDER — ACETAMINOPHEN 500 MG
650 TABLET ORAL EVERY 6 HOURS
Refills: 0 | Status: DISCONTINUED | OUTPATIENT
Start: 2021-12-01 | End: 2021-12-13

## 2021-12-01 RX ORDER — HYDROCORTISONE 20 MG
50 TABLET ORAL EVERY 8 HOURS
Refills: 0 | Status: COMPLETED | OUTPATIENT
Start: 2021-12-01 | End: 2021-12-03

## 2021-12-01 RX ORDER — LANOLIN ALCOHOL/MO/W.PET/CERES
3 CREAM (GRAM) TOPICAL AT BEDTIME
Refills: 0 | Status: DISCONTINUED | OUTPATIENT
Start: 2021-12-01 | End: 2021-12-13

## 2021-12-01 RX ORDER — PIPERACILLIN AND TAZOBACTAM 4; .5 G/20ML; G/20ML
3.38 INJECTION, POWDER, LYOPHILIZED, FOR SOLUTION INTRAVENOUS EVERY 8 HOURS
Refills: 0 | Status: DISCONTINUED | OUTPATIENT
Start: 2021-12-01 | End: 2021-12-03

## 2021-12-01 RX ORDER — ASCORBIC ACID 60 MG
500 TABLET,CHEWABLE ORAL DAILY
Refills: 0 | Status: DISCONTINUED | OUTPATIENT
Start: 2021-12-01 | End: 2021-12-13

## 2021-12-01 RX ORDER — SERTRALINE 25 MG/1
50 TABLET, FILM COATED ORAL DAILY
Refills: 0 | Status: DISCONTINUED | OUTPATIENT
Start: 2021-12-01 | End: 2021-12-13

## 2021-12-01 RX ORDER — LANOLIN/MINERAL OIL
1 LOTION (ML) TOPICAL
Qty: 0 | Refills: 0 | DISCHARGE

## 2021-12-01 RX ADMIN — PIPERACILLIN AND TAZOBACTAM 25 GRAM(S): 4; .5 INJECTION, POWDER, LYOPHILIZED, FOR SOLUTION INTRAVENOUS at 13:52

## 2021-12-01 RX ADMIN — Medication 100 MILLIGRAM(S): at 02:26

## 2021-12-01 RX ADMIN — SERTRALINE 50 MILLIGRAM(S): 25 TABLET, FILM COATED ORAL at 11:46

## 2021-12-01 RX ADMIN — Medication 500 MILLIGRAM(S): at 11:46

## 2021-12-01 RX ADMIN — Medication 50 MILLIGRAM(S): at 18:15

## 2021-12-01 RX ADMIN — Medication 1 TABLET(S): at 11:46

## 2021-12-01 RX ADMIN — PIPERACILLIN AND TAZOBACTAM 25 GRAM(S): 4; .5 INJECTION, POWDER, LYOPHILIZED, FOR SOLUTION INTRAVENOUS at 06:05

## 2021-12-01 RX ADMIN — HEPARIN SODIUM 5000 UNIT(S): 5000 INJECTION INTRAVENOUS; SUBCUTANEOUS at 06:04

## 2021-12-01 RX ADMIN — Medication 400 MILLIGRAM(S): at 03:26

## 2021-12-01 RX ADMIN — Medication 1 MILLIGRAM(S): at 11:46

## 2021-12-01 RX ADMIN — HEPARIN SODIUM 5000 UNIT(S): 5000 INJECTION INTRAVENOUS; SUBCUTANEOUS at 18:15

## 2021-12-01 RX ADMIN — PIPERACILLIN AND TAZOBACTAM 25 GRAM(S): 4; .5 INJECTION, POWDER, LYOPHILIZED, FOR SOLUTION INTRAVENOUS at 21:37

## 2021-12-01 RX ADMIN — Medication 50 MILLIGRAM(S): at 11:46

## 2021-12-01 NOTE — H&P ADULT - PROBLEM SELECTOR PLAN 6
As per wife, pt is no longer on ASA/Plavix   -No active chest pain. EKG shows no ST changes  -F/u with cardiology in the AM

## 2021-12-01 NOTE — H&P ADULT - PROBLEM SELECTOR PLAN 2
Pt has hx of orthostatic hypotension on fludrocortisone 0.1 mg BID    -Now with sepsis, will dose stress dose steroids empirically   -Stat 100 mg IV hydrocortisone, followed by 50 mg Iv TID x 2 days  -F/u with AM cortisol

## 2021-12-01 NOTE — H&P ADULT - NSICDXPASTMEDICALHX_GEN_ALL_CORE_FT
PAST MEDICAL HISTORY:  Anemia of chronic disease     CAD (coronary artery disease) asa    Hematuria     History of iron deficiency     PAD (peripheral artery disease) s/p vascular stent    Pericardial effusion     Pleural effusion     Prostate CA s/p radiation    Stage 3 chronic kidney disease

## 2021-12-01 NOTE — H&P ADULT - PROBLEM SELECTOR PLAN 1
Patient with fever, tachycardia, leukocytosis, lactic acidosis and pyuria c/w sepsis due to UTI. Urine cx from the previous episode grew pansensitive E.coli   -C/w zosyn for now  -F/u with blood cx and urine cx   -De-escalate abx depending on ucx   -Consider CT scan A/P if symptoms do not resolve with abx

## 2021-12-01 NOTE — H&P ADULT - ASSESSMENT
89 yo M with Pmhx of prostate cancer s/p radiation, CAD s/p stent, CKD III, pleural and pericardial effusion, ?afib not on AC presented to the ED from home with weakness and lethargy, found to have sepsis due to UTI.

## 2021-12-01 NOTE — H&P ADULT - NSHPLABSRESULTS_GEN_ALL_CORE
9.7    19.10 )-----------( 191      ( 2021 21:54 )             31.3     Hgb Trend: 9.7<--  1130    136  |  100  |  45<H>  ----------------------------<  149<H>  4.8   |  25  |  1.90<H>    Ca    9.1      2021 21:54    TPro  7.5  /  Alb  3.5  /  TBili  0.4  /  DBili  x   /  AST  25  /  ALT  14  /  AlkPhos  70      Creatinine Trend: 1.90<--  PT/INR - ( 2021 21:54 )   PT: 12.3 sec;   INR: 1.08 ratio         PTT - ( 2021 21:54 )  PTT:26.9 sec      Urinalysis Basic - ( 2021 21:54 )    Color: Yellow / Appearance: Turbid / S.015 / pH: x  Gluc: x / Ketone: Negative  / Bili: Negative / Urobili: <2 mg/dL   Blood: x / Protein: 300 mg/dL / Nitrite: Negative   Leuk Esterase: Large / RBC: tntc /HPF / WBC tntc /HPF   Sq Epi: x / Non Sq Epi: x / Bacteria: Many        EKG si reviewed by me. Sinus tachycardia with low voltage. No ST elevation      CXR as reviewed by the radiologist: Evaluation is limited due to patient positioning. Question of a left lower lung opacity, recommend repeat radiograph or cross-sectional imaging for further evaluation.      TTE is ordered.

## 2021-12-01 NOTE — PATIENT PROFILE ADULT - FALL HARM RISK - HARM RISK INTERVENTIONS

## 2021-12-01 NOTE — H&P ADULT - PROBLEM SELECTOR PLAN 3
Most likely due to iron deficiency anemia vs. AOCD   -No signs of active bleeding   -Hold ferrous sulfate due to active infection   -Trend CBC daily

## 2021-12-01 NOTE — H&P ADULT - NSHPOUTPATIENTPROVIDERS_GEN_ALL_CORE
Vascular surgery Dr. Urias  Cardiology/PCP Dr. Castlilo, Patrick   Urology Dr. Yadav, Iain  Nephorlogy Dr. Chan

## 2021-12-01 NOTE — CHART NOTE - NSCHARTNOTEFT_GEN_A_CORE
Notified by RN that pt noted to be "shaking" and stating that he feels like he "can't breathe" 10 mins s/p receiving solu-cortef loading dose, unable to get good O2 sat. Advised RN to place on NRB and  Pt seen at bedside in E[facility] 1. Upon a Notified by RN that pt noted to be "shaking" and stating that he feels like he "can't breathe" 10 mins s/p receiving solu-cortef loading dose, unable to get good O2 sat. Advised RN to place on NRB and  Pt seen at bedside in [facility] 1. Upon arrival at bedside, placed on NRB and O2 sat checked on ear now reading 100%. Rectal temp 99.7 however likely not accurate. Pt is awake and alert, able to express concerns. States that he feels very cold and feels better on O2. CXR reviewed- limited study however questionable opacity, imaging reviewed does not appear to be fluid overloaded. Labs/UA reviewed. UA noted to be +. Pt got zosyn x1 at 21:00. On exam: Pt noted to be rigoring, not in acute distress. Pt is very warm to touch. Cards: Tachycardic to 120s, sinus on monitor. Lungs: CTA B/L.     Vital Signs Last 24 Hrs  T(C): 37.2 (01 Dec 2021 03:05), Max: 38.6 (30 Nov 2021 21:52)  T(F): 99 (01 Dec 2021 03:05), Max: 101.4 (30 Nov 2021 21:52)  HR: 90 (01 Dec 2021 00:56) (90 - 123)  BP: 105/61 (01 Dec 2021 00:56) (100/61 - 107/63)  RR: 18 (01 Dec 2021 00:56) (14 - 18)  SpO2: 99% (01 Dec 2021 00:56) (95% - 100%)    -IV tylenol x1 stat   -Will continue w/zosyn to cover for UTI and possible PNA.   -CT chest ordered for further assessment of opacity   -Bcx and ucx pending   -RN to titrate down O2 as tolerated     Will continue to monitor closely.  BLADE Dominguez PA-C  [id number]

## 2021-12-01 NOTE — H&P ADULT - PROBLEM SELECTOR PLAN 4
Scr eleavted to 1.9 from baseline 1.5-1.6   -Most likely due to sepsis   -S/p 2L NS  -F/u with post void bladder scan   -US renal and urine lytes

## 2021-12-01 NOTE — H&P ADULT - HISTORY OF PRESENT ILLNESS
89 yo M with Pmhx of prostate cancer s/p radiation, CAD s/p stent, CKD III, pleural and pericardial effusion, ?afib not on AC presented to the ED from home with weakness and lethargy. Patient is AAOx3 but is a poor historian. Most of the information was obtained from his wife (Shanelle [phone]). For the last couple of days patient has been feeling very tired and somnolent. He has been sleeping a lot and not participating in his ALDs. He also have been having difficulty getting up from the wheelchair and moving around the house. However, he has been in good spirit. He endorses subjective fever but denies any chills, SOB, cough, sore throat, dysuria, diarrhea, or LE edema. He was admitted to hospital in 6/2021 for chest pain and found to have pleural and pericardial effusion. he underwent thoracentesis but no pericardial window was performed.   In the ED, his vitals were notable fever, tachycardia, and mild hypotension. His labs were significant for leukocytosis, anemia, JOSELITO, lactic acidosis and pyuria. EKG showed sinus tachycardia with low voltage. CXR was a limited study.

## 2021-12-01 NOTE — H&P ADULT - NSHPPHYSICALEXAM_GEN_ALL_CORE
Vital Signs Last 24 Hrs  T(C): 36.6 (01 Dec 2021 00:56), Max: 38.6 (30 Nov 2021 21:52)  T(F): 97.9 (01 Dec 2021 00:56), Max: 101.4 (30 Nov 2021 21:52)  HR: 90 (01 Dec 2021 00:56) (90 - 123)  BP: 105/61 (01 Dec 2021 00:56) (100/61 - 107/63)  BP(mean): --  RR: 18 (01 Dec 2021 00:56) (14 - 18)  SpO2: 99% (01 Dec 2021 00:56) (95% - 100%)    GENERAL: alert and awake, nontoxic appearing, in no acute distress   HEENT:  Atraumatic, Normocephalic, Conjunctiva and sclera clear, oral mucosa moist, clear w/o any exudate   NECK: Supple, No JVD  CHEST/LUNG: Breathing comfortably, clear to auscultation bilaterally; No wheeze  HEART: Difficult to auscultate heart sound; No murmurs, rubs, or gallops  ABDOMEN: Soft, Nontender, Nondistended; Bowel sounds present  EXTREMITIES:  2+ Peripheral Pulses, No clubbing, cyanosis, or edema  PSYCH: AAOx3, normal affect  NEUROLOGY: non-focal, moving all extremities.  SKIN: No rashes or lesions

## 2021-12-02 LAB
ANION GAP SERPL CALC-SCNC: 10 MMOL/L — SIGNIFICANT CHANGE UP (ref 7–14)
BUN SERPL-MCNC: 49 MG/DL — HIGH (ref 7–23)
CALCIUM SERPL-MCNC: 8.7 MG/DL — SIGNIFICANT CHANGE UP (ref 8.4–10.5)
CHLORIDE SERPL-SCNC: 106 MMOL/L — SIGNIFICANT CHANGE UP (ref 98–107)
CO2 SERPL-SCNC: 22 MMOL/L — SIGNIFICANT CHANGE UP (ref 22–31)
CORTIS AM PEAK SERPL-MCNC: 79.6 UG/DL — HIGH (ref 6–18.4)
CREAT SERPL-MCNC: 2.13 MG/DL — HIGH (ref 0.5–1.3)
GLUCOSE SERPL-MCNC: 121 MG/DL — HIGH (ref 70–99)
GRAM STN FLD: SIGNIFICANT CHANGE UP
HCT VFR BLD CALC: 26.2 % — LOW (ref 39–50)
HGB BLD-MCNC: 8.3 G/DL — LOW (ref 13–17)
MAGNESIUM SERPL-MCNC: 2.3 MG/DL — SIGNIFICANT CHANGE UP (ref 1.6–2.6)
MCHC RBC-ENTMCNC: 31.4 PG — SIGNIFICANT CHANGE UP (ref 27–34)
MCHC RBC-ENTMCNC: 31.7 GM/DL — LOW (ref 32–36)
MCV RBC AUTO: 99.2 FL — SIGNIFICANT CHANGE UP (ref 80–100)
NRBC # BLD: 0 /100 WBCS — SIGNIFICANT CHANGE UP
NRBC # FLD: 0 K/UL — SIGNIFICANT CHANGE UP
OB PNL STL: NEGATIVE — SIGNIFICANT CHANGE UP
PLATELET # BLD AUTO: 155 K/UL — SIGNIFICANT CHANGE UP (ref 150–400)
POTASSIUM SERPL-MCNC: 4.2 MMOL/L — SIGNIFICANT CHANGE UP (ref 3.5–5.3)
POTASSIUM SERPL-SCNC: 4.2 MMOL/L — SIGNIFICANT CHANGE UP (ref 3.5–5.3)
RBC # BLD: 2.64 M/UL — LOW (ref 4.2–5.8)
RBC # FLD: 14.7 % — HIGH (ref 10.3–14.5)
SODIUM SERPL-SCNC: 138 MMOL/L — SIGNIFICANT CHANGE UP (ref 135–145)
WBC # BLD: 19.44 K/UL — HIGH (ref 3.8–10.5)
WBC # FLD AUTO: 19.44 K/UL — HIGH (ref 3.8–10.5)

## 2021-12-02 RX ADMIN — Medication 50 MILLIGRAM(S): at 18:24

## 2021-12-02 RX ADMIN — PIPERACILLIN AND TAZOBACTAM 25 GRAM(S): 4; .5 INJECTION, POWDER, LYOPHILIZED, FOR SOLUTION INTRAVENOUS at 05:20

## 2021-12-02 RX ADMIN — Medication 50 MILLIGRAM(S): at 12:55

## 2021-12-02 RX ADMIN — HEPARIN SODIUM 5000 UNIT(S): 5000 INJECTION INTRAVENOUS; SUBCUTANEOUS at 05:20

## 2021-12-02 RX ADMIN — PIPERACILLIN AND TAZOBACTAM 25 GRAM(S): 4; .5 INJECTION, POWDER, LYOPHILIZED, FOR SOLUTION INTRAVENOUS at 22:39

## 2021-12-02 RX ADMIN — Medication 1 TABLET(S): at 12:55

## 2021-12-02 RX ADMIN — PIPERACILLIN AND TAZOBACTAM 25 GRAM(S): 4; .5 INJECTION, POWDER, LYOPHILIZED, FOR SOLUTION INTRAVENOUS at 12:54

## 2021-12-02 RX ADMIN — SERTRALINE 50 MILLIGRAM(S): 25 TABLET, FILM COATED ORAL at 18:25

## 2021-12-02 RX ADMIN — HEPARIN SODIUM 5000 UNIT(S): 5000 INJECTION INTRAVENOUS; SUBCUTANEOUS at 18:25

## 2021-12-02 RX ADMIN — Medication 50 MILLIGRAM(S): at 04:43

## 2021-12-02 RX ADMIN — Medication 500 MILLIGRAM(S): at 12:55

## 2021-12-02 RX ADMIN — Medication 1 MILLIGRAM(S): at 12:55

## 2021-12-02 NOTE — PROGRESS NOTE ADULT - ASSESSMENT
89 yo M with Pmhx of prostate cancer s/p radiation, CAD s/p stent, CKD III, pleural and pericardial effusion, ?afib not on AC presented to the ED from home with weakness and lethargy, found to have sepsis due to UTI.

## 2021-12-02 NOTE — CONSULT NOTE ADULT - SUBJECTIVE AND OBJECTIVE BOX
[facility], Division of Infectious Diseases  DORIAN Patino, JANIE Schofield  [phone]  ([phone] - weekdays after 5pm and weekends)    KIANNA GUILLEN  89y, Male  [id number]    HPI--  HPI:  89 yo M with Pmhx of prostate cancer s/p radiation, CAD s/p stent, CKD III, pleural and pericardial effusion, ?afib not on AC presented to the ED from home with weakness and lethargy. Patient is AAOx3 but is a poor historian. Most of the information was obtained from his wife (Shanelle [phone]). For the last couple of days patient has been feeling very tired and somnolent. He has been sleeping a lot and not participating in his ALDs. He also have been having difficulty getting up from the wheelchair and moving around the house. However, he has been in good spirit. He endorses subjective fever but denies any chills, SOB, cough, sore throat, dysuria, diarrhea, or LE edema. He was admitted to hospital in 2021 for chest pain and found to have pleural and pericardial effusion. he underwent thoracentesis but no pericardial window was performed.   In the ED, his vitals were notable fever, tachycardia, and mild hypotension. His labs were significant for leukocytosis, anemia, JOSELITO, lactic acidosis and pyuria. EKG showed sinus tachycardia with low voltage. CXR was a limited study.  (01 Dec 2021 01:25)    Patient AOx2 on my evaluation. Unable to provide history therefore obtained from chart review.    ROS: 10 point review of systems completed, pertinent positives and negatives as per HPI.    Allergies: No Known Allergies    PMH -- CAD (coronary artery disease)    DM (diabetes mellitus)    Prostate CA    Stage 3 chronic kidney disease    PAD (peripheral artery disease)    Anemia of chronic disease    Pericardial effusion    Pleural effusion    Hematuria    History of iron deficiency      PSH -- H/O vascular surgery      FH -- No pertinent family history in first degree relatives      Social History -- denies tobacco, alcohol or illicit drug use  Travel/Environmental/Occupational History:     Physical Exam--  Vital Signs Last 24 Hrs  T(F): 97.6 (02 Dec 2021 05:19), Max: 97.8 (01 Dec 2021 16:29)  HR: 71 (02 Dec 2021 05:19) (71 - 78)  BP: 101/60 (02 Dec 2021 05:19) (99/49 - 117/63)  RR: 17 (02 Dec 2021 05:19) (16 - 18)  SpO2: 99% (02 Dec 2021 05:19) (97% - 99%)  General: nontoxic-appearing, no acute distress  HEENT: NC/AT, EOMI, anicteric  Neck: Not rigid. No LAD  Lungs: Clear bilaterally without rales, wheezing or rhonchi  Heart: Regular rate and rhythm. No murmur  Abdomen: Soft. Nondistended. Nontender  Neuro: AAOx2  Back: No costovertebral angle tenderness.  Extremities: No edema.   Skin: Warm. Dry. Good turgor. No rash.   Psychiatric: Appropriate affect and mood for situation.   Lines:    Laboratory & Imaging Data--  CBC:                       8.3    19.44 )-----------( 155      ( 02 Dec 2021 07:21 )             26.2     WBC Count: 19.44 K/uL (21 @ 07:21)  WBC Count: 20.72 K/uL (21 @ 07:06)  WBC Count: 19.10 K/uL (21 @ 21:54)    CMP:     138  |  106  |  49<H>  ----------------------------<  121<H>  4.2   |  22  |  2.13<H>    Ca    8.7      02 Dec 2021 07:21  Mg     2.30         TPro  7.5  /  Alb  3.5  /  TBili  0.4  /  DBili  x   /  AST  25  /  ALT  14  /  AlkPhos  70      LIVER FUNCTIONS - ( 2021 21:54 )  Alb: 3.5 g/dL / Pro: 7.5 g/dL / ALK PHOS: 70 U/L / ALT: 14 U/L / AST: 25 U/L / GGT: x           Urinalysis Basic - ( 2021 21:54 )    Color: Yellow / Appearance: Turbid / S.015 / pH: x  Gluc: x / Ketone: Negative  / Bili: Negative / Urobili: <2 mg/dL   Blood: x / Protein: 300 mg/dL / Nitrite: Negative   Leuk Esterase: Large / RBC: tntc /HPF / WBC tntc /HPF   Sq Epi: x / Non Sq Epi: x / Bacteria: Many      Microbiology:     Culture - Urine (collected 21 @ 00:56)  Source: Clean Catch Clean Catch (Midstream)  Preliminary Report (21 @ 10:55):    >100,000 CFU/ml Gram Negative Rods    Culture - Blood (collected 21 @ 22:57)  Source: .Blood Blood-Peripheral  Gram Stain (21 @ 03:57):    Growth in anaerobic bottle:    Gram Negative Rods  Preliminary Report (21 @ 03:58):    Growth in anaerobic bottle:    Gram Negative Rods    Culture - Blood (collected 21 @ 22:57)  Source: .Blood Blood-Peripheral  Gram Stain (21 @ 16:43):    Growth in aerobic and anaerobic bottles: Gram Negative Rods  Preliminary Report (21 @ 16:43):    Growth in aerobic and anaerobic bottles: Gram Negative Rods    ***Blood Panel PCR results on this specimen are available    approximately 3 hours after the Gram stain result.***    Gram stain, PCR, and/or culture results may not always    correspond due todifference in methodologies.    ************************************************************    This PCR assay was performed by multiplex PCR. This    Assay tests for 66 bacterial and resistance gene targets.    Please refer to the [facility] testdirectory    at [email or web address] for details.  Organism: Blood Culture PCR (21 @ 20:24)  Organism: Blood Culture PCR (21 @ 20:24)      -  Proteus mirabilis: Detec      Method Type: PCR      Radiology--  see below     Active Medications--  acetaminophen     Tablet .. 650 milliGRAM(s) Oral every 6 hours PRN  aluminum hydroxide/magnesium hydroxide/simethicone Suspension 30 milliLiter(s) Oral every 4 hours PRN  ascorbic acid 500 milliGRAM(s) Oral daily  folic acid 1 milliGRAM(s) Oral daily  heparin   Injectable 5000 Unit(s) SubCutaneous every 12 hours  hydrocortisone sodium succinate Injectable 50 milliGRAM(s) IV Push every 8 hours  influenza  Vaccine (HIGH DOSE) 0.7 milliLiter(s) IntraMuscular once  melatonin 3 milliGRAM(s) Oral at bedtime PRN  multivitamin 1 Tablet(s) Oral daily  ondansetron Injectable 4 milliGRAM(s) IV Push every 8 hours PRN  piperacillin/tazobactam IVPB.. 3.375 Gram(s) IV Intermittent every 8 hours  sertraline 50 milliGRAM(s) Oral daily    Antimicrobials:   piperacillin/tazobactam IVPB.. 3.375 Gram(s) IV Intermittent every 8 hours    Immunologic: influenza  Vaccine (HIGH DOSE) 0.7 milliLiter(s) IntraMuscular once

## 2021-12-02 NOTE — CONSULT NOTE ADULT - ASSESSMENT
89 yo M with Pmhx of prostate cancer s/p radiation, CAD s/p stent, CKD III, pleural and pericardial effusion who presented with weakness and lethargy    GNR bacteremia/ sepsis/ leukocytosis/ JOSELITO  on admission leukocytosis, JOSELITO, elevated lactate, UA positive  CT head unrevealing  renal/bladder US w/ Moderate left hydronephrosis & thickening of bladder wall  CT chest w/ mod b/l pleural effusions  pt w/o O2 requirements or pulmonary symptoms  blood cultures 11/20 positive for GNR in 3/4 bottles  f/u repeat blood cultures (ordered for tomorrow)  likely urinary source given recent instrumentation, however, f/u GNR identification from urine to confirm  f/u GNR susceptibilities  c/w zosyn for now  will plan to de-escalate pending culture data    Babar Sibley M.D.  [phone]  [facility], Division of Infectious Diseases  [phone]  After 5pm on weekdays and all day on weekends - please call [phone]

## 2021-12-03 DIAGNOSIS — N13.30 UNSPECIFIED HYDRONEPHROSIS: ICD-10-CM

## 2021-12-03 LAB
-  AMIKACIN: SIGNIFICANT CHANGE UP
-  AMIKACIN: SIGNIFICANT CHANGE UP
-  AMOXICILLIN/CLAVULANIC ACID: SIGNIFICANT CHANGE UP
-  AMPICILLIN/SULBACTAM: SIGNIFICANT CHANGE UP
-  AMPICILLIN/SULBACTAM: SIGNIFICANT CHANGE UP
-  AMPICILLIN: SIGNIFICANT CHANGE UP
-  AMPICILLIN: SIGNIFICANT CHANGE UP
-  AZTREONAM: SIGNIFICANT CHANGE UP
-  AZTREONAM: SIGNIFICANT CHANGE UP
-  CEFAZOLIN: SIGNIFICANT CHANGE UP
-  CEFAZOLIN: SIGNIFICANT CHANGE UP
-  CEFEPIME: SIGNIFICANT CHANGE UP
-  CEFEPIME: SIGNIFICANT CHANGE UP
-  CEFOXITIN: SIGNIFICANT CHANGE UP
-  CEFOXITIN: SIGNIFICANT CHANGE UP
-  CEFTRIAXONE: SIGNIFICANT CHANGE UP
-  CEFTRIAXONE: SIGNIFICANT CHANGE UP
-  CIPROFLOXACIN: SIGNIFICANT CHANGE UP
-  CIPROFLOXACIN: SIGNIFICANT CHANGE UP
-  ERTAPENEM: SIGNIFICANT CHANGE UP
-  ERTAPENEM: SIGNIFICANT CHANGE UP
-  GENTAMICIN: SIGNIFICANT CHANGE UP
-  GENTAMICIN: SIGNIFICANT CHANGE UP
-  LEVOFLOXACIN: SIGNIFICANT CHANGE UP
-  LEVOFLOXACIN: SIGNIFICANT CHANGE UP
-  MEROPENEM: SIGNIFICANT CHANGE UP
-  MEROPENEM: SIGNIFICANT CHANGE UP
-  NITROFURANTOIN: SIGNIFICANT CHANGE UP
-  PIPERACILLIN/TAZOBACTAM: SIGNIFICANT CHANGE UP
-  PIPERACILLIN/TAZOBACTAM: SIGNIFICANT CHANGE UP
-  TOBRAMYCIN: SIGNIFICANT CHANGE UP
-  TOBRAMYCIN: SIGNIFICANT CHANGE UP
-  TRIMETHOPRIM/SULFAMETHOXAZOLE: SIGNIFICANT CHANGE UP
-  TRIMETHOPRIM/SULFAMETHOXAZOLE: SIGNIFICANT CHANGE UP
ANION GAP SERPL CALC-SCNC: 12 MMOL/L — SIGNIFICANT CHANGE UP (ref 7–14)
BUN SERPL-MCNC: 53 MG/DL — HIGH (ref 7–23)
CALCIUM SERPL-MCNC: 8.9 MG/DL — SIGNIFICANT CHANGE UP (ref 8.4–10.5)
CHLORIDE SERPL-SCNC: 104 MMOL/L — SIGNIFICANT CHANGE UP (ref 98–107)
CO2 SERPL-SCNC: 20 MMOL/L — LOW (ref 22–31)
CREAT SERPL-MCNC: 1.95 MG/DL — HIGH (ref 0.5–1.3)
CULTURE RESULTS: SIGNIFICANT CHANGE UP
CULTURE RESULTS: SIGNIFICANT CHANGE UP
GLUCOSE SERPL-MCNC: 109 MG/DL — HIGH (ref 70–99)
GRAM STN FLD: SIGNIFICANT CHANGE UP
HCT VFR BLD CALC: 28.7 % — LOW (ref 39–50)
HGB BLD-MCNC: 9.3 G/DL — LOW (ref 13–17)
MAGNESIUM SERPL-MCNC: 2.2 MG/DL — SIGNIFICANT CHANGE UP (ref 1.6–2.6)
MCHC RBC-ENTMCNC: 31.7 PG — SIGNIFICANT CHANGE UP (ref 27–34)
MCHC RBC-ENTMCNC: 32.4 GM/DL — SIGNIFICANT CHANGE UP (ref 32–36)
MCV RBC AUTO: 98 FL — SIGNIFICANT CHANGE UP (ref 80–100)
METHOD TYPE: SIGNIFICANT CHANGE UP
METHOD TYPE: SIGNIFICANT CHANGE UP
NRBC # BLD: 0 /100 WBCS — SIGNIFICANT CHANGE UP
NRBC # FLD: 0 K/UL — SIGNIFICANT CHANGE UP
ORGANISM # SPEC MICROSCOPIC CNT: SIGNIFICANT CHANGE UP
PHOSPHATE SERPL-MCNC: 2.9 MG/DL — SIGNIFICANT CHANGE UP (ref 2.5–4.5)
PLATELET # BLD AUTO: 176 K/UL — SIGNIFICANT CHANGE UP (ref 150–400)
POTASSIUM SERPL-MCNC: 3.8 MMOL/L — SIGNIFICANT CHANGE UP (ref 3.5–5.3)
POTASSIUM SERPL-SCNC: 3.8 MMOL/L — SIGNIFICANT CHANGE UP (ref 3.5–5.3)
RBC # BLD: 2.93 M/UL — LOW (ref 4.2–5.8)
RBC # FLD: 14.5 % — SIGNIFICANT CHANGE UP (ref 10.3–14.5)
SODIUM SERPL-SCNC: 136 MMOL/L — SIGNIFICANT CHANGE UP (ref 135–145)
SPECIMEN SOURCE: SIGNIFICANT CHANGE UP
SPECIMEN SOURCE: SIGNIFICANT CHANGE UP
WBC # BLD: 13.76 K/UL — HIGH (ref 3.8–10.5)
WBC # FLD AUTO: 13.76 K/UL — HIGH (ref 3.8–10.5)

## 2021-12-03 PROCEDURE — 93306 TTE W/DOPPLER COMPLETE: CPT | Mod: 26

## 2021-12-03 RX ORDER — CEFTRIAXONE 500 MG/1
1000 INJECTION, POWDER, FOR SOLUTION INTRAMUSCULAR; INTRAVENOUS EVERY 24 HOURS
Refills: 0 | Status: DISCONTINUED | OUTPATIENT
Start: 2021-12-03 | End: 2021-12-10

## 2021-12-03 RX ORDER — SODIUM CHLORIDE 0.65 %
1 AEROSOL, SPRAY (ML) NASAL EVERY 6 HOURS
Refills: 0 | Status: DISCONTINUED | OUTPATIENT
Start: 2021-12-03 | End: 2021-12-13

## 2021-12-03 RX ADMIN — SERTRALINE 50 MILLIGRAM(S): 25 TABLET, FILM COATED ORAL at 13:45

## 2021-12-03 RX ADMIN — Medication 50 MILLIGRAM(S): at 03:41

## 2021-12-03 RX ADMIN — HEPARIN SODIUM 5000 UNIT(S): 5000 INJECTION INTRAVENOUS; SUBCUTANEOUS at 17:04

## 2021-12-03 RX ADMIN — PIPERACILLIN AND TAZOBACTAM 25 GRAM(S): 4; .5 INJECTION, POWDER, LYOPHILIZED, FOR SOLUTION INTRAVENOUS at 05:51

## 2021-12-03 RX ADMIN — Medication 500 MILLIGRAM(S): at 12:06

## 2021-12-03 RX ADMIN — Medication 1 TABLET(S): at 12:06

## 2021-12-03 RX ADMIN — CEFTRIAXONE 100 MILLIGRAM(S): 500 INJECTION, POWDER, FOR SOLUTION INTRAMUSCULAR; INTRAVENOUS at 15:07

## 2021-12-03 RX ADMIN — HEPARIN SODIUM 5000 UNIT(S): 5000 INJECTION INTRAVENOUS; SUBCUTANEOUS at 05:51

## 2021-12-03 RX ADMIN — Medication 1 MILLIGRAM(S): at 12:06

## 2021-12-03 RX ADMIN — PIPERACILLIN AND TAZOBACTAM 25 GRAM(S): 4; .5 INJECTION, POWDER, LYOPHILIZED, FOR SOLUTION INTRAVENOUS at 12:06

## 2021-12-03 NOTE — PROGRESS NOTE ADULT - ASSESSMENT
89 yo M with Pmhx of prostate cancer s/p radiation, CAD s/p stent, CKD III, pleural and pericardial effusion who presented with weakness and lethargy  sepsis secondary to proteus bacteremia -- lik gu source      bacteremia/ sepsis/ leukocytosis/ JOSELITO  on admission leukocytosis, JOSELITO, elevated lactate, UA positive  renal/bladder US w/ Moderate left hydronephrosis & thickening of bladder wall    clinically better, wbc down    blood cultures 11/30 positive for proteus sensitivities noted  f/u repeat blood cultures pending   urine cx proteus   zosyn >> tailor to ceftriaxone  duration will depend on clinical course and repeat blood cx  but likely 2 weeks total

## 2021-12-03 NOTE — CONSULT NOTE ADULT - ASSESSMENT
89M PMHx of prostate cancer s/p radiation, CAD s/p stent, CKD III, now admitted to medicine for GNR sepsis with Urine and blood both growing Proteus. Urology consulted for incidental L hydro found on u/s.     consult note not final.  89M PMHx of prostate cancer s/p radiation, CAD s/p stent, CKD III, now admitted to medicine for GNR sepsis with Urine and blood both growing Proteus. Urology consulted for incidental L hydro found on u/s.       --please obtain CT A/P noncon.       consult note not final.  89M PMHx of prostate cancer s/p radiation, CAD s/p stent, CKD III, now admitted to medicine for GNR sepsis with Urine and blood both growing Proteus. Urology consulted for incidental L hydro found on u/s.     --Imaging reviewed. Hydro as above on u/s  --Labs reviewed. Pt w/ leukocytosis and JOSELITO. Cultures reviewed  --please obtain urgent CT A/P noncon. New left hydro in setting of urosepsis c/f possible ureteral obstruction 2/2 stone.   --Continue w/ Zosyn. F/u ID recs  --F/u speciation of cultures  --Please continue with Wang for now. Recommend against TOV this admission given urosepsis. Would d/c w/ Wang. Can have TOV as outpatient with urology    D/w Dr. Ortega

## 2021-12-03 NOTE — ADVANCED PRACTICE NURSE CONSULT - REASON FOR CONSULT
Patient seen on skin care rounds after wound care referral received for assessment of skin impairment and recommendations of topical management. Chart reviewed: Carloz 12, WBC 13.76, BMI 19.8kg/m2. Patient H/O of prostate cancer s/p radiation, CAD s/p stent, CKD III, pleural and pericardial effusion, ?afib not on AC presented to the ED from home with weakness and lethargy. Patient is AAOx3 but is a poor historian. Most of the information was obtained from his wife (Shanelle [phone]). For the last couple of days patient has been feeling very tired and somnolent. He has been sleeping a lot and not participating in his ALDs. He also have been having difficulty getting up from the wheelchair and moving around the house. However, he has been in good spirit. He endorses subjective fever but denies any chills, SOB, cough, sore throat, dysuria, diarrhea, or LE edema. He was admitted to hospital in 6/2021 for chest pain and found to have pleural and pericardial effusion. he underwent thoracentesis but no pericardial window was performed.   In the ED, his vitals were notable fever, tachycardia, and mild hypotension. His labs were significant for leukocytosis, anemia, JOSELITO, lactic acidosis and pyuria. EKG showed sinus tachycardia with low voltage. CXR was a limited study. Patient has being seeing by ID for bacteremia, and urology for hydronephrosis.

## 2021-12-03 NOTE — ADVANCED PRACTICE NURSE CONSULT - ASSESSMENT
A&Ox3, [place], disoriented to situation. Bedbound, able to follow commands like raising arms, incontinent of urine and stool. Incontinent of urin and soft brown stool during skin assessment, incontinence care provided. Skin warm, dry with increased moisture in intertriginous folds, adequate skin turgor, scattered areas of hyperpigmentation and hypopigmentation, scattered areas of ecchymosis on bilateral upper extremities. Blanchable erythema on bilateral heels. Claw toes. Right dorsal first and second toe with blanching erythema.     Kyphosis- Mid back with area of blanching erythema entire area measuring [id number]. Within blanching erythema an area of opening measuring 0.3cmx0.5cmx0.1cm. No drainage. Exposing 100% pink dermis. Otherwise intact. No increased warmth, no erythema, no induration. Goals of care: prevent friction, monitor for tissue type changes.     Sacrum with area of hypopigmentation and hyperpigmentation secondary to incontinence/moisture associated dermatitis. Areas of blanching erythema around sacrum.

## 2021-12-03 NOTE — CONSULT NOTE ADULT - SUBJECTIVE AND OBJECTIVE BOX
HPI:  89 yo M with Pmhx of prostate cancer s/p radiation, CAD s/p stent, CKD III, pleural and pericardial effusion,presented to the ED from home with weakness and lethargy. Patient is AAOx3 but is a poor historian. Most of the information was obtained from his wife. In the ED, his vitals were notable fever, tachycardia, and mild hypotension. His labs were significant for leukocytosis, anemia, JOSELITO, lactic acidosis and pyuria and was admitted for complicated UTI. Currently has UCx growing Proteus and BCx growing Proteus in 2/2 cultures. Given JOSELITO, u/s was obtained found to have L hydro.       PAST MEDICAL & SURGICAL HISTORY:  CAD (coronary artery disease)  asa    Prostate CA  s/p radiation    Stage 3 chronic kidney disease    PAD (peripheral artery disease)  s/p vascular stent    Anemia of chronic disease    Pericardial effusion    Pleural effusion    Hematuria    History of iron deficiency    H/O vascular surgery        MEDICATIONS  (STANDING):  ascorbic acid 500 milliGRAM(s) Oral daily  folic acid 1 milliGRAM(s) Oral daily  heparin   Injectable 5000 Unit(s) SubCutaneous every 12 hours  influenza  Vaccine (HIGH DOSE) 0.7 milliLiter(s) IntraMuscular once  multivitamin 1 Tablet(s) Oral daily  piperacillin/tazobactam IVPB.. 3.375 Gram(s) IV Intermittent every 8 hours  sertraline 50 milliGRAM(s) Oral daily    MEDICATIONS  (PRN):  acetaminophen     Tablet .. 650 milliGRAM(s) Oral every 6 hours PRN Temp greater or equal to 38C (100.4F), Mild Pain (1 - 3)  aluminum hydroxide/magnesium hydroxide/simethicone Suspension 30 milliLiter(s) Oral every 4 hours PRN Dyspepsia  melatonin 3 milliGRAM(s) Oral at bedtime PRN Insomnia  ondansetron Injectable 4 milliGRAM(s) IV Push every 8 hours PRN Nausea and/or Vomiting  sodium chloride 0.65% Nasal 1 Spray(s) Both Nostrils every 6 hours PRN Nasal Congestion      FAMILY HISTORY:  No pertinent family history in first degree relatives        Allergies    No Known Allergies    Intolerances        SOCIAL HISTORY:    REVIEW OF SYSTEMS: Otherwise negative as stated in HPI    Physical Exam  Vital signs  T(C): 36.7 (12-03-21 @ 05:49), Max: 36.9 (12-02-21 @ 09:30)  HR: 92 (12-03-21 @ 05:49)  BP: 148/94 (12-03-21 @ 05:49)  SpO2: 100% (12-03-21 @ 05:49)  Wt(kg): --    Output    OUT:    Incontinent per Condom Catheter (mL): 400 mL  Total OUT: 400 mL    Total NET: -400 mL          Gen: awake and alert. NAD.   Pulm: Normal work of breathing on RA  :   Psych:  AOx3        LABS:      12-03 @ 06:55    WBC 13.76 / Hct 28.7  / SCr 1.95     12-02 @ 07:21    WBC 19.44 / Hct 26.2  / SCr 2.13     12-03    136  |  104  |  53<H>  ----------------------------<  109<H>  3.8   |  20<L>  |  1.95<H>    Ca    8.9      03 Dec 2021 06:55  Phos  2.9     12-03  Mg     2.20     12-03            Urine Cx:  Cuture Results:   >100,000 CFU/ml Proteus mirabilis (12.01.21 @ 00:56)   Blood Cx:  Gram Stain:   Growth in anaerobic bottle:   Gram Negative Rods   Growth in aerobic bottle:   Gram Negative Rods (11.30.21 @ 22:57) Culture - Blood (11.30.21 @ 22:57)   - Proteus mirabilis: Detec   Gram Stain:   Growth in aerobic and anaerobic bottles: Gram Negative Rods   Specimen Source: .Blood Blood-Peripheral   Organism: Blood Culture PCR   Culture Results:   Growth in aerobic and anaerobic bottles: Proteus mirabilis       RADIOLOGY:  < from: CT Head No Cont (12.01.21 @ 09:40) >    EXAM:  CT BRAIN        PROCEDURE DATE:  Dec  1 2021         INTERPRETATION:  Clinical indication: Weakness.    Multiple axial sections were performed from base skull to vertex without contrast enhancement. Coronal and sagittal destructions were performed as well    Parenchymal volume loss and chronic microvascular ischemic changes are identified.    There is no evidence acute hemorrhage mass or mass effect seen.    Evaluation of the osseous structures with the appropriate window appears normal    The visualized paranasal sinuses mastoid and middle ear regions appear clear    IMPRESSION: No evidence of acute hemorrhage mass or mass effect.    --- End of Report ---              JAMAL NAPIER MD; Attending Radiologist  This document has been electronically signed. Dec  1 2021  9:46AM    < end of copied text >  < from: CT Chest No Cont (12.01.21 @ 09:39) >  EXAM:  CT CHEST        PROCEDURE DATE:  Dec  1 2021         INTERPRETATION:  CLINICAL INFORMATION: Shortness of breath. History of pleural and pericardial effusions. Evaluate for pneumonia.    COMPARISON: Chest CT dated 6/11/2021.    CONTRAST/COMPLICATIONS:  IV Contrast: NONE  Oral Contrast: NONE  Complications: None reported at time of study completion    PROCEDURE:  CT of the Chest was performed.  Sagittal and coronal reformats were performed.    FINDINGS:    LUNGS, AIRWAYS AND PLEURA: Patentcentral airways.  Moderate bilateral pleural effusions, decreased since June 2021, with adjacent compressive atelectasis. Mild groundglass opacity likely related to mild pulmonary edema.    MEDIASTINUM AND ELIANA: No lymphadenopathy.    VESSELS: The great vessels are normal in size.    HEART: Heart size is normal. Previously seen pericardial effusion has resolved. Severe coronary arterial calcification. Mild aortic valvular and mitral annular calcification.    CHEST WALL AND LOWER NECK: Unremarkable thyroid.    VISUALIZED UPPER ABDOMEN: Subcentimeter right hepatic hypodensities too small to characterize the may represent a small cyst.    BONES: Situated kyphosis. T7 and T8 compression deformities are unchanged since at least June 2021 with underlying mixed lytic and sclerotic changes. Remote left posterolateral eighth through 10th rib fractures.    IMPRESSION:  Moderate pleural effusions, decreased since June 2021, with bibasilar compressive atelectasis. No findings to suggest pneumonia on this noncontrast CT.    Resolved pericardial effusion.    --- End of Report ---            KATHERINE ROBLEDO M.D., RADIOLOGY RESIDENT  This document has been electronically signed.  MADIE MELARA M.D., Attending Radiologist  This document has beenelectronically signed. Dec  1 2021 12:04PM    < end of copied text >  < from: US Kidney and Bladder (12.01.21 @ 08:51) >    EXAM:  US KIDNEYS AND BLADDER        PROCEDURE DATE:  Dec  1 2021         INTERPRETATION:  CLINICAL INFORMATION: Acute kidney injury.    COMPARISON: None available.    TECHNIQUE: Sonography of the kidneys and bladder.    FINDINGS:    Right kidney: 8.8 cm. No renal mass, hydronephrosis or calculi.    Left kidney: 10.4 cm. Moderate hydronephrosis. No renal mass or calculi.    Urinary bladder: Thickening and trabeculation of the bladder wall. Specular debris within the bladder.    IMPRESSION:  *  Thickening and trabeculation of the bladder wall, likely chronic changes of bladder outlet obstruction.  *  Specular debris in the bladder. Correlate with urinalysis.  *  Moderate left hydronephrosis.        --- End of Report ---              MATTIE CARMICHAEL; Attending Radiologist  This document has been electronically signed. Dec  1 2021  9:00AM    < end of copied text >     HPI:  89 yo M with Pmhx of prostate cancer s/p radiation, CAD s/p stent, CKD III, pleural and pericardial effusion,presented to the ED from home with weakness and lethargy. Patient is AAOx3 but is a poor historian. Most of the information was obtained from his wife. In the ED, his vitals were notable fever, tachycardia, and mild hypotension. His labs were significant for leukocytosis, anemia, JOSELITO, lactic acidosis and pyuria and was admitted for complicated UTI. Currently has UCx growing Proteus and BCx growing Proteus in 2/2 cultures. Given JOSELITO, u/s was obtained found to have L hydro.     Pt states that he began having fevers/chills/AMS prior to admission. Febrile to 101, tachy to 123 at ED presentation. Currently however back to his usual state of health. Has been afebrile since admission 3 days ago. States he has had no recent gross hematuria. 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 Now with Wang catheter after failed straight cath x2. Wang in place draining well. No hx of kidney stones. Has not seen a urologist outpatient.     PAST MEDICAL & SURGICAL HISTORY:  CAD (coronary artery disease)  asa    Prostate CA  s/p radiation    Stage 3 chronic kidney disease    PAD (peripheral artery disease)  s/p vascular stent    Anemia of chronic disease    Pericardial effusion    Pleural effusion    Hematuria    History of iron deficiency    H/O vascular surgery        MEDICATIONS  (STANDING):  ascorbic acid 500 milliGRAM(s) Oral daily  folic acid 1 milliGRAM(s) Oral daily  heparin   Injectable 5000 Unit(s) SubCutaneous every 12 hours  influenza  Vaccine (HIGH DOSE) 0.7 milliLiter(s) IntraMuscular once  multivitamin 1 Tablet(s) Oral daily  piperacillin/tazobactam IVPB.. 3.375 Gram(s) IV Intermittent every 8 hours  sertraline 50 milliGRAM(s) Oral daily    MEDICATIONS  (PRN):  acetaminophen     Tablet .. 650 milliGRAM(s) Oral every 6 hours PRN Temp greater or equal to 38C (100.4F), Mild Pain (1 - 3)  aluminum hydroxide/magnesium hydroxide/simethicone Suspension 30 milliLiter(s) Oral every 4 hours PRN Dyspepsia  melatonin 3 milliGRAM(s) Oral at bedtime PRN Insomnia  ondansetron Injectable 4 milliGRAM(s) IV Push every 8 hours PRN Nausea and/or Vomiting  sodium chloride 0.65% Nasal 1 Spray(s) Both Nostrils every 6 hours PRN Nasal Congestion      FAMILY HISTORY:  No pertinent family history in first degree relatives        Allergies    No Known Allergies    Intolerances        SOCIAL HISTORY:    REVIEW OF SYSTEMS: Otherwise negative as stated in HPI    Physical Exam  Vital signs  T(C): 36.7 (12-03-21 @ 05:49), Max: 36.9 (12-02-21 @ 09:30)  HR: 92 (12-03-21 @ 05:49)  BP: 148/94 (12-03-21 @ 05:49)  SpO2: 100% (12-03-21 @ 05:49)  Wt(kg): --    Output    OUT:    Incontinent per Condom Catheter (mL): 400 mL  Total OUT: 400 mL    Total NET: -400 mL          Gen: awake and alert. NAD.   Pulm: Normal work of breathing on RA  Back: No CVA ttp b/l.   : Wang in place. CYU  Psych:  AOx3        LABS:      12-03 @ 06:55    WBC 13.76 / Hct 28.7  / SCr 1.95     12-02 @ 07:21    WBC 19.44 / Hct 26.2  / SCr 2.13     12-03    136  |  104  |  53<H>  ----------------------------<  109<H>  3.8   |  20<L>  |  1.95<H>    Ca    8.9      03 Dec 2021 06:55  Phos  2.9     12-03  Mg     2.20     12-03            Urine Cx:  Cuture Results:   >100,000 CFU/ml Proteus mirabilis (12.01.21 @ 00:56)   Blood Cx:  Gram Stain:   Growth in anaerobic bottle:   Gram Negative Rods   Growth in aerobic bottle:   Gram Negative Rods (11.30.21 @ 22:57) Culture - Blood (11.30.21 @ 22:57)   - Proteus mirabilis: Detec   Gram Stain:   Growth in aerobic and anaerobic bottles: Gram Negative Rods   Specimen Source: .Blood Blood-Peripheral   Organism: Blood Culture PCR   Culture Results:   Growth in aerobic and anaerobic bottles: Proteus mirabilis       RADIOLOGY:  < from: CT Head No Cont (12.01.21 @ 09:40) >    EXAM:  CT BRAIN        PROCEDURE DATE:  Dec  1 2021         INTERPRETATION:  Clinical indication: Weakness.    Multiple axial sections were performed from base skull to vertex without contrast enhancement. Coronal and sagittal destructions were performed as well    Parenchymal volume loss and chronic microvascular ischemic changes are identified.    There is no evidence acute hemorrhage mass or mass effect seen.    Evaluation of the osseous structures with the appropriate window appears normal    The visualized paranasal sinuses mastoid and middle ear regions appear clear    IMPRESSION: No evidence of acute hemorrhage mass or mass effect.    --- End of Report ---              JAMAL NAPIER MD; Attending Radiologist  This document has been electronically signed. Dec  1 2021  9:46AM    < end of copied text >  < from: CT Chest No Cont (12.01.21 @ 09:39) >  EXAM:  CT CHEST        PROCEDURE DATE:  Dec  1 2021         INTERPRETATION:  CLINICAL INFORMATION: Shortness of breath. History of pleural and pericardial effusions. Evaluate for pneumonia.    COMPARISON: Chest CT dated 6/11/2021.    CONTRAST/COMPLICATIONS:  IV Contrast: NONE  Oral Contrast: NONE  Complications: None reported at time of study completion    PROCEDURE:  CT of the Chest was performed.  Sagittal and coronal reformats were performed.    FINDINGS:    LUNGS, AIRWAYS AND PLEURA: Patentcentral airways.  Moderate bilateral pleural effusions, decreased since June 2021, with adjacent compressive atelectasis. Mild groundglass opacity likely related to mild pulmonary edema.    MEDIASTINUM AND ELIANA: No lymphadenopathy.    VESSELS: The great vessels are normal in size.    HEART: Heart size is normal. Previously seen pericardial effusion has resolved. Severe coronary arterial calcification. Mild aortic valvular and mitral annular calcification.    CHEST WALL AND LOWER NECK: Unremarkable thyroid.    VISUALIZED UPPER ABDOMEN: Subcentimeter right hepatic hypodensities too small to characterize the may represent a small cyst.    BONES: Situated kyphosis. T7 and T8 compression deformities are unchanged since at least June 2021 with underlying mixed lytic and sclerotic changes. Remote left posterolateral eighth through 10th rib fractures.    IMPRESSION:  Moderate pleural effusions, decreased since June 2021, with bibasilar compressive atelectasis. No findings to suggest pneumonia on this noncontrast CT.    Resolved pericardial effusion.    --- End of Report ---            KATHERINE ROBLEDO M.D., RADIOLOGY RESIDENT  This document has been electronically signed.  MADIE MELARA M.D., Attending Radiologist  This document has beenelectronically signed. Dec  1 2021 12:04PM    < end of copied text >  < from: US Kidney and Bladder (12.01.21 @ 08:51) >    EXAM:  US KIDNEYS AND BLADDER        PROCEDURE DATE:  Dec  1 2021         INTERPRETATION:  CLINICAL INFORMATION: Acute kidney injury.    COMPARISON: None available.    TECHNIQUE: Sonography of the kidneys and bladder.    FINDINGS:    Right kidney: 8.8 cm. No renal mass, hydronephrosis or calculi.    Left kidney: 10.4 cm. Moderate hydronephrosis. No renal mass or calculi.    Urinary bladder: Thickening and trabeculation of the bladder wall. Specular debris within the bladder.    IMPRESSION:  *  Thickening and trabeculation of the bladder wall, likely chronic changes of bladder outlet obstruction.  *  Specular debris in the bladder. Correlate with urinalysis.  *  Moderate left hydronephrosis.        --- End of Report ---              MATTIE CARMICHAEL; Attending Radiologist  This document has been electronically signed. Dec  1 2021  9:00AM    < end of copied text >

## 2021-12-04 LAB
ANION GAP SERPL CALC-SCNC: 11 MMOL/L — SIGNIFICANT CHANGE UP (ref 7–14)
ANISOCYTOSIS BLD QL: SLIGHT — SIGNIFICANT CHANGE UP
BASOPHILS # BLD AUTO: 0 K/UL — SIGNIFICANT CHANGE UP (ref 0–0.2)
BASOPHILS NFR BLD AUTO: 0 % — SIGNIFICANT CHANGE UP (ref 0–2)
BUN SERPL-MCNC: 46 MG/DL — HIGH (ref 7–23)
CALCIUM SERPL-MCNC: 8.9 MG/DL — SIGNIFICANT CHANGE UP (ref 8.4–10.5)
CHLORIDE SERPL-SCNC: 104 MMOL/L — SIGNIFICANT CHANGE UP (ref 98–107)
CO2 SERPL-SCNC: 23 MMOL/L — SIGNIFICANT CHANGE UP (ref 22–31)
CREAT SERPL-MCNC: 1.73 MG/DL — HIGH (ref 0.5–1.3)
CULTURE RESULTS: SIGNIFICANT CHANGE UP
EOSINOPHIL # BLD AUTO: 0.18 K/UL — SIGNIFICANT CHANGE UP (ref 0–0.5)
EOSINOPHIL NFR BLD AUTO: 1.7 % — SIGNIFICANT CHANGE UP (ref 0–6)
GLUCOSE SERPL-MCNC: 89 MG/DL — SIGNIFICANT CHANGE UP (ref 70–99)
HCT VFR BLD CALC: 31.1 % — LOW (ref 39–50)
HGB BLD-MCNC: 10.1 G/DL — LOW (ref 13–17)
IANC: 7.91 K/UL — SIGNIFICANT CHANGE UP (ref 1.5–8.5)
LYMPHOCYTES # BLD AUTO: 1.28 K/UL — SIGNIFICANT CHANGE UP (ref 1–3.3)
LYMPHOCYTES # BLD AUTO: 12.2 % — LOW (ref 13–44)
MACROCYTES BLD QL: SLIGHT — SIGNIFICANT CHANGE UP
MAGNESIUM SERPL-MCNC: 2.1 MG/DL — SIGNIFICANT CHANGE UP (ref 1.6–2.6)
MANUAL SMEAR VERIFICATION: SIGNIFICANT CHANGE UP
MCHC RBC-ENTMCNC: 30.9 PG — SIGNIFICANT CHANGE UP (ref 27–34)
MCHC RBC-ENTMCNC: 32.5 GM/DL — SIGNIFICANT CHANGE UP (ref 32–36)
MCV RBC AUTO: 95.1 FL — SIGNIFICANT CHANGE UP (ref 80–100)
MONOCYTES # BLD AUTO: 0.46 K/UL — SIGNIFICANT CHANGE UP (ref 0–0.9)
MONOCYTES NFR BLD AUTO: 4.4 % — SIGNIFICANT CHANGE UP (ref 2–14)
NEUTROPHILS # BLD AUTO: 8.59 K/UL — HIGH (ref 1.8–7.4)
NEUTROPHILS NFR BLD AUTO: 81.7 % — HIGH (ref 43–77)
PHOSPHATE SERPL-MCNC: 2.2 MG/DL — LOW (ref 2.5–4.5)
PLAT MORPH BLD: NORMAL — SIGNIFICANT CHANGE UP
PLATELET # BLD AUTO: 196 K/UL — SIGNIFICANT CHANGE UP (ref 150–400)
PLATELET COUNT - ESTIMATE: NORMAL — SIGNIFICANT CHANGE UP
POLYCHROMASIA BLD QL SMEAR: SLIGHT — SIGNIFICANT CHANGE UP
POTASSIUM SERPL-MCNC: 3.8 MMOL/L — SIGNIFICANT CHANGE UP (ref 3.5–5.3)
POTASSIUM SERPL-SCNC: 3.8 MMOL/L — SIGNIFICANT CHANGE UP (ref 3.5–5.3)
RBC # BLD: 3.27 M/UL — LOW (ref 4.2–5.8)
RBC # FLD: 14.1 % — SIGNIFICANT CHANGE UP (ref 10.3–14.5)
RBC BLD AUTO: NORMAL — SIGNIFICANT CHANGE UP
SODIUM SERPL-SCNC: 138 MMOL/L — SIGNIFICANT CHANGE UP (ref 135–145)
SPECIMEN SOURCE: SIGNIFICANT CHANGE UP
WBC # BLD: 10.51 K/UL — HIGH (ref 3.8–10.5)
WBC # FLD AUTO: 10.51 K/UL — HIGH (ref 3.8–10.5)

## 2021-12-04 PROCEDURE — 51702 INSERT TEMP BLADDER CATH: CPT

## 2021-12-04 PROCEDURE — 99233 SBSQ HOSP IP/OBS HIGH 50: CPT | Mod: 25

## 2021-12-04 PROCEDURE — 51703 INSERT BLADDER CATH COMPLEX: CPT

## 2021-12-04 PROCEDURE — 74176 CT ABD & PELVIS W/O CONTRAST: CPT | Mod: 26

## 2021-12-04 RX ORDER — SODIUM,POTASSIUM PHOSPHATES 278-250MG
1 POWDER IN PACKET (EA) ORAL ONCE
Refills: 0 | Status: COMPLETED | OUTPATIENT
Start: 2021-12-04 | End: 2021-12-04

## 2021-12-04 RX ORDER — TAMSULOSIN HYDROCHLORIDE 0.4 MG/1
0.4 CAPSULE ORAL AT BEDTIME
Refills: 0 | Status: DISCONTINUED | OUTPATIENT
Start: 2021-12-04 | End: 2021-12-13

## 2021-12-04 RX ADMIN — Medication 1 TABLET(S): at 11:16

## 2021-12-04 RX ADMIN — TAMSULOSIN HYDROCHLORIDE 0.4 MILLIGRAM(S): 0.4 CAPSULE ORAL at 21:57

## 2021-12-04 RX ADMIN — CEFTRIAXONE 100 MILLIGRAM(S): 500 INJECTION, POWDER, FOR SOLUTION INTRAMUSCULAR; INTRAVENOUS at 14:08

## 2021-12-04 RX ADMIN — Medication 500 MILLIGRAM(S): at 11:16

## 2021-12-04 RX ADMIN — HEPARIN SODIUM 5000 UNIT(S): 5000 INJECTION INTRAVENOUS; SUBCUTANEOUS at 06:13

## 2021-12-04 RX ADMIN — SERTRALINE 50 MILLIGRAM(S): 25 TABLET, FILM COATED ORAL at 11:16

## 2021-12-04 RX ADMIN — Medication 1 PACKET(S): at 11:16

## 2021-12-04 RX ADMIN — HEPARIN SODIUM 5000 UNIT(S): 5000 INJECTION INTRAVENOUS; SUBCUTANEOUS at 17:12

## 2021-12-04 RX ADMIN — Medication 1 MILLIGRAM(S): at 11:16

## 2021-12-04 NOTE — PROVIDER CONTACT NOTE (OTHER) - ASSESSMENT
/100, , Temp 98.1 (axillary), RR 20, O2 100% /100, , Temp 98.1 (axillary), RR 20, O2 100%. Pt is alert and responsive. No acute distress noted.

## 2021-12-04 NOTE — PROCEDURE NOTE - ADDITIONAL PROCEDURE DETAILS
CTAP reviewed, demonstrates BL hydro L>R, to level of trabeculated bladder consistent with bladder outlet obstruction; no obstructing calculi noted  -Sanchez catheter placed given evidence of bladder outlet obstruction in setting of urosepsis and BL hydronephrosis  -FU urine culture  -Can repeat renal US in 48-72 hours to assess hydronephrosis  -Would not recommend TOV on this admission, discharge home with sanchez catheter for outpatient -[phone]

## 2021-12-04 NOTE — PROVIDER CONTACT NOTE (OTHER) - ACTION/TREATMENT ORDERED:
NP Bennie Herrera aware. Will followup with orders NP Bennie Herrera aware. No new orders at this time.

## 2021-12-04 NOTE — PROVIDER CONTACT NOTE (OTHER) - SITUATION
Pt combative, agitated, hitting staff during Q4 VS. RN assisted by 2 other staff to obtain VS. Pt sleeping once staff left room. Pt combative, agitated, hitting staff during 0200 VS. RN assisted by 2 other staff to obtain VS. Pt sleeping once staff left room.

## 2021-12-04 NOTE — PROGRESS NOTE ADULT - ASSESSMENT
89M PMHx of prostate cancer s/p radiation, CAD s/p stent, CKD III, now admitted to medicine for GNR sepsis with Urine and blood both growing Proteus. Urology consulted for incidental L hydro found on u/s. CTAP demonstrates BL hydro L>R to level of trabeculated bladder, consistent with bladder outlet obstruction.      -CTAP reviewed, demonstrates BL hydro L>R, to level of trabeculated bladder consistent with bladder outlet obstruction; no obstructing calculi noted  -Please place sanchez catheter given evidence of bladder outlet obstruction in setting of urosepsis and BL hydronephrosis  --Cr remains elevated, monitor s/p sanchez catheter insertion  --Continue antibiotics, narrow per culture results, appreciate ID recommendations for agent and duration  -Would not recommend TOV on this admission, discharge home with sanchez catheter for outpatient TOV 89M PMHx of prostate cancer s/p radiation, CAD s/p stent, CKD III, now admitted to medicine for GNR sepsis with Urine and blood both growing Proteus. Urology consulted for incidental L hydro found on u/s. CTAP demonstrates BL hydro L>R to level of trabeculated bladder, consistent with bladder outlet obstruction.      -CTAP reviewed, demonstrates BL hydro L>R, to level of trabeculated bladder consistent with bladder outlet obstruction; no obstructing calculi noted  -Sanchez catheter placed given evidence of bladder outlet obstruction in setting of urosepsis and BL hydronephrosis  -Cr remains elevated, monitor s/p sanchez catheter insertion  -Continue antibiotics, narrow per culture results, appreciate ID recommendations for agent and duration  -Would not recommend TOV on this admission, discharge home with sanchez catheter for outpatient TOV 89M PMHx of prostate cancer s/p radiation, CAD s/p stent, CKD III, now admitted to medicine for GNR sepsis with Urine and blood both growing Proteus. Urology consulted for incidental L hydro found on u/s. CTAP demonstrates BL hydro L>R to level of trabeculated bladder, consistent with bladder outlet obstruction.      -CTAP reviewed, demonstrates BL hydro L>R, to level of trabeculated bladder consistent with bladder outlet obstruction; no obstructing calculi noted  -Sanchez catheter placed given evidence of bladder outlet obstruction in setting of urosepsis and BL hydronephrosis  -FU urine culture  -Can repeat renal US in 48-72 hours to assess hydronephrosis  -Cr remains elevated, monitor s/p sanchez catheter insertion  -Flomax 0.4 qhs unles otherwise contraindicated, send Rx upon discharge  -Continue antibiotics, narrow per culture results, appreciate ID recommendations for agent and duration  -Would not recommend TOV on this admission, discharge home with sanchez catheter for outpatient TOV  -Discussed with Dr. Ortega

## 2021-12-05 LAB
ANION GAP SERPL CALC-SCNC: 11 MMOL/L — SIGNIFICANT CHANGE UP (ref 7–14)
BASOPHILS # BLD AUTO: 0.01 K/UL — SIGNIFICANT CHANGE UP (ref 0–0.2)
BASOPHILS NFR BLD AUTO: 0.1 % — SIGNIFICANT CHANGE UP (ref 0–2)
BUN SERPL-MCNC: 46 MG/DL — HIGH (ref 7–23)
CALCIUM SERPL-MCNC: 8.7 MG/DL — SIGNIFICANT CHANGE UP (ref 8.4–10.5)
CHLORIDE SERPL-SCNC: 105 MMOL/L — SIGNIFICANT CHANGE UP (ref 98–107)
CO2 SERPL-SCNC: 23 MMOL/L — SIGNIFICANT CHANGE UP (ref 22–31)
CREAT SERPL-MCNC: 1.71 MG/DL — HIGH (ref 0.5–1.3)
CULTURE RESULTS: NO GROWTH — SIGNIFICANT CHANGE UP
EOSINOPHIL # BLD AUTO: 0.09 K/UL — SIGNIFICANT CHANGE UP (ref 0–0.5)
EOSINOPHIL NFR BLD AUTO: 1.1 % — SIGNIFICANT CHANGE UP (ref 0–6)
GLUCOSE SERPL-MCNC: 96 MG/DL — SIGNIFICANT CHANGE UP (ref 70–99)
HCT VFR BLD CALC: 29.3 % — LOW (ref 39–50)
HGB BLD-MCNC: 9.2 G/DL — LOW (ref 13–17)
IANC: 5.53 K/UL — SIGNIFICANT CHANGE UP (ref 1.5–8.5)
IMM GRANULOCYTES NFR BLD AUTO: 1.9 % — HIGH (ref 0–1.5)
LYMPHOCYTES # BLD AUTO: 1.65 K/UL — SIGNIFICANT CHANGE UP (ref 1–3.3)
LYMPHOCYTES # BLD AUTO: 20 % — SIGNIFICANT CHANGE UP (ref 13–44)
MAGNESIUM SERPL-MCNC: 2 MG/DL — SIGNIFICANT CHANGE UP (ref 1.6–2.6)
MCHC RBC-ENTMCNC: 31 PG — SIGNIFICANT CHANGE UP (ref 27–34)
MCHC RBC-ENTMCNC: 31.4 GM/DL — LOW (ref 32–36)
MCV RBC AUTO: 98.7 FL — SIGNIFICANT CHANGE UP (ref 80–100)
MONOCYTES # BLD AUTO: 0.81 K/UL — SIGNIFICANT CHANGE UP (ref 0–0.9)
MONOCYTES NFR BLD AUTO: 9.8 % — SIGNIFICANT CHANGE UP (ref 2–14)
NEUTROPHILS # BLD AUTO: 5.53 K/UL — SIGNIFICANT CHANGE UP (ref 1.8–7.4)
NEUTROPHILS NFR BLD AUTO: 67.1 % — SIGNIFICANT CHANGE UP (ref 43–77)
NRBC # BLD: 0 /100 WBCS — SIGNIFICANT CHANGE UP
NRBC # FLD: 0 K/UL — SIGNIFICANT CHANGE UP
PHOSPHATE SERPL-MCNC: 2.8 MG/DL — SIGNIFICANT CHANGE UP (ref 2.5–4.5)
PLATELET # BLD AUTO: 188 K/UL — SIGNIFICANT CHANGE UP (ref 150–400)
POTASSIUM SERPL-MCNC: 4.2 MMOL/L — SIGNIFICANT CHANGE UP (ref 3.5–5.3)
POTASSIUM SERPL-SCNC: 4.2 MMOL/L — SIGNIFICANT CHANGE UP (ref 3.5–5.3)
RBC # BLD: 2.97 M/UL — LOW (ref 4.2–5.8)
RBC # FLD: 14.5 % — SIGNIFICANT CHANGE UP (ref 10.3–14.5)
SODIUM SERPL-SCNC: 139 MMOL/L — SIGNIFICANT CHANGE UP (ref 135–145)
SPECIMEN SOURCE: SIGNIFICANT CHANGE UP
WBC # BLD: 8.25 K/UL — SIGNIFICANT CHANGE UP (ref 3.8–10.5)
WBC # FLD AUTO: 8.25 K/UL — SIGNIFICANT CHANGE UP (ref 3.8–10.5)

## 2021-12-05 PROCEDURE — 99232 SBSQ HOSP IP/OBS MODERATE 35: CPT

## 2021-12-05 RX ORDER — FLUDROCORTISONE ACETATE 0.1 MG/1
0.1 TABLET ORAL
Refills: 0 | Status: DISCONTINUED | OUTPATIENT
Start: 2021-12-05 | End: 2021-12-13

## 2021-12-05 RX ADMIN — CEFTRIAXONE 100 MILLIGRAM(S): 500 INJECTION, POWDER, FOR SOLUTION INTRAMUSCULAR; INTRAVENOUS at 16:23

## 2021-12-05 RX ADMIN — Medication 1 TABLET(S): at 11:07

## 2021-12-05 RX ADMIN — Medication 1 MILLIGRAM(S): at 11:07

## 2021-12-05 RX ADMIN — SERTRALINE 50 MILLIGRAM(S): 25 TABLET, FILM COATED ORAL at 11:08

## 2021-12-05 RX ADMIN — HEPARIN SODIUM 5000 UNIT(S): 5000 INJECTION INTRAVENOUS; SUBCUTANEOUS at 05:25

## 2021-12-05 RX ADMIN — FLUDROCORTISONE ACETATE 0.1 MILLIGRAM(S): 0.1 TABLET ORAL at 18:16

## 2021-12-05 RX ADMIN — TAMSULOSIN HYDROCHLORIDE 0.4 MILLIGRAM(S): 0.4 CAPSULE ORAL at 21:25

## 2021-12-05 RX ADMIN — HEPARIN SODIUM 5000 UNIT(S): 5000 INJECTION INTRAVENOUS; SUBCUTANEOUS at 17:48

## 2021-12-05 RX ADMIN — Medication 500 MILLIGRAM(S): at 11:09

## 2021-12-05 NOTE — PROGRESS NOTE ADULT - ASSESSMENT
89M PMHx of prostate cancer s/p radiation, CAD s/p stent, CKD III, now admitted to medicine for GNR sepsis with Urine and blood both growing Proteus. Urology consulted for incidental L hydro found on u/s. CTAP demonstrates BL hydro L>R to level of trabeculated bladder, consistent with bladder outlet obstruction.    -Labs reviewed. Cr stable, however down trending compared to admission.   -Sanchez catheter placed given evidence of bladder outlet obstruction in setting of urosepsis and BL hydronephrosis  -FU urine culture from cath   -Can repeat renal US in 48-72 hours to assess hydronephrosis  -Flomax 0.4 qhs unles otherwise contraindicated, send Rx upon discharge  -Continue antibiotics, narrow per culture results, appreciate ID recommendations for agent and duration  -Would not recommend TOV on this admission, discharge home with sanchez catheter for outpatient TOV

## 2021-12-06 LAB
ANION GAP SERPL CALC-SCNC: 10 MMOL/L — SIGNIFICANT CHANGE UP (ref 7–14)
BASOPHILS # BLD AUTO: 0.01 K/UL — SIGNIFICANT CHANGE UP (ref 0–0.2)
BASOPHILS NFR BLD AUTO: 0.1 % — SIGNIFICANT CHANGE UP (ref 0–2)
BUN SERPL-MCNC: 41 MG/DL — HIGH (ref 7–23)
CALCIUM SERPL-MCNC: 8.9 MG/DL — SIGNIFICANT CHANGE UP (ref 8.4–10.5)
CHLORIDE SERPL-SCNC: 103 MMOL/L — SIGNIFICANT CHANGE UP (ref 98–107)
CO2 SERPL-SCNC: 26 MMOL/L — SIGNIFICANT CHANGE UP (ref 22–31)
CREAT SERPL-MCNC: 1.63 MG/DL — HIGH (ref 0.5–1.3)
EOSINOPHIL # BLD AUTO: 0.14 K/UL — SIGNIFICANT CHANGE UP (ref 0–0.5)
EOSINOPHIL NFR BLD AUTO: 1.6 % — SIGNIFICANT CHANGE UP (ref 0–6)
GLUCOSE BLDC GLUCOMTR-MCNC: 122 MG/DL — HIGH (ref 70–99)
GLUCOSE SERPL-MCNC: 98 MG/DL — SIGNIFICANT CHANGE UP (ref 70–99)
HCT VFR BLD CALC: 29.9 % — LOW (ref 39–50)
HGB BLD-MCNC: 9.5 G/DL — LOW (ref 13–17)
IANC: 6.12 K/UL — SIGNIFICANT CHANGE UP (ref 1.5–8.5)
IMM GRANULOCYTES NFR BLD AUTO: 1.6 % — HIGH (ref 0–1.5)
LYMPHOCYTES # BLD AUTO: 1.32 K/UL — SIGNIFICANT CHANGE UP (ref 1–3.3)
LYMPHOCYTES # BLD AUTO: 15.2 % — SIGNIFICANT CHANGE UP (ref 13–44)
MAGNESIUM SERPL-MCNC: 2 MG/DL — SIGNIFICANT CHANGE UP (ref 1.6–2.6)
MCHC RBC-ENTMCNC: 30.8 PG — SIGNIFICANT CHANGE UP (ref 27–34)
MCHC RBC-ENTMCNC: 31.8 GM/DL — LOW (ref 32–36)
MCV RBC AUTO: 97.1 FL — SIGNIFICANT CHANGE UP (ref 80–100)
MONOCYTES # BLD AUTO: 0.96 K/UL — HIGH (ref 0–0.9)
MONOCYTES NFR BLD AUTO: 11 % — SIGNIFICANT CHANGE UP (ref 2–14)
NEUTROPHILS # BLD AUTO: 6.12 K/UL — SIGNIFICANT CHANGE UP (ref 1.8–7.4)
NEUTROPHILS NFR BLD AUTO: 70.5 % — SIGNIFICANT CHANGE UP (ref 43–77)
NRBC # BLD: 0 /100 WBCS — SIGNIFICANT CHANGE UP
NRBC # FLD: 0 K/UL — SIGNIFICANT CHANGE UP
PHOSPHATE SERPL-MCNC: 2.9 MG/DL — SIGNIFICANT CHANGE UP (ref 2.5–4.5)
PLATELET # BLD AUTO: 185 K/UL — SIGNIFICANT CHANGE UP (ref 150–400)
POTASSIUM SERPL-MCNC: 3.7 MMOL/L — SIGNIFICANT CHANGE UP (ref 3.5–5.3)
POTASSIUM SERPL-SCNC: 3.7 MMOL/L — SIGNIFICANT CHANGE UP (ref 3.5–5.3)
RBC # BLD: 3.08 M/UL — LOW (ref 4.2–5.8)
RBC # FLD: 14.4 % — SIGNIFICANT CHANGE UP (ref 10.3–14.5)
SODIUM SERPL-SCNC: 139 MMOL/L — SIGNIFICANT CHANGE UP (ref 135–145)
WBC # BLD: 8.69 K/UL — SIGNIFICANT CHANGE UP (ref 3.8–10.5)
WBC # FLD AUTO: 8.69 K/UL — SIGNIFICANT CHANGE UP (ref 3.8–10.5)

## 2021-12-06 PROCEDURE — 76770 US EXAM ABDO BACK WALL COMP: CPT | Mod: 26

## 2021-12-06 RX ADMIN — FLUDROCORTISONE ACETATE 0.1 MILLIGRAM(S): 0.1 TABLET ORAL at 19:34

## 2021-12-06 RX ADMIN — HEPARIN SODIUM 5000 UNIT(S): 5000 INJECTION INTRAVENOUS; SUBCUTANEOUS at 05:54

## 2021-12-06 RX ADMIN — CEFTRIAXONE 100 MILLIGRAM(S): 500 INJECTION, POWDER, FOR SOLUTION INTRAMUSCULAR; INTRAVENOUS at 15:16

## 2021-12-06 RX ADMIN — Medication 500 MILLIGRAM(S): at 12:10

## 2021-12-06 RX ADMIN — SERTRALINE 50 MILLIGRAM(S): 25 TABLET, FILM COATED ORAL at 12:10

## 2021-12-06 RX ADMIN — HEPARIN SODIUM 5000 UNIT(S): 5000 INJECTION INTRAVENOUS; SUBCUTANEOUS at 18:29

## 2021-12-06 RX ADMIN — Medication 1 TABLET(S): at 12:12

## 2021-12-06 RX ADMIN — TAMSULOSIN HYDROCHLORIDE 0.4 MILLIGRAM(S): 0.4 CAPSULE ORAL at 21:47

## 2021-12-06 RX ADMIN — FLUDROCORTISONE ACETATE 0.1 MILLIGRAM(S): 0.1 TABLET ORAL at 06:43

## 2021-12-06 RX ADMIN — Medication 1 MILLIGRAM(S): at 12:12

## 2021-12-06 NOTE — PROGRESS NOTE ADULT - ASSESSMENT
Patient is a 89 year old male with PMH of prostate cancer s/p radiation, CAD s/p stent, CKD III, pleural and pericardial effusion who presented with weakness and lethargy    Sepsis secondary to Proteus bacteremia due to /UTI source    - on admission leukocytosis, JOSELITO, elevated lactate, UA positive; sepsis resolved    - renal/bladder US w/ Moderate left hydronephrosis & thickening of bladder wall   - clinically better, leukocytosis normalized, afebrile    - blood cultures 11/30 positive for Proteus sensitivities noted   - repeat blood cultures NGTD x2   - urine cx Proteus, sensitivities noted    - s/p pip-tazo   - continue on ceftriaxone -- will plan to complete total 2 weeks - end 12/9   - will avoid FQs given age and prolonged Qtc on prior EKGGs    Reed Sue M.D.  [facility], Division of Infectious Diseases  [phone]  After 5pm on weekdays and all day on weekends - please call [phone] Patient is a 89 year old male with PMH of prostate cancer s/p radiation, CAD s/p stent, CKD III, pleural and pericardial effusion who presented with weakness and lethargy    Sepsis secondary to Proteus bacteremia due to /UTI source    - on admission leukocytosis, JOSELITO, elevated lactate, UA positive; sepsis resolved    - renal/bladder US w/ Moderate left hydronephrosis & thickening of bladder wall   - clinically better, leukocytosis normalized, afebrile    - blood cultures 11/30 positive for Proteus sensitivities noted   - repeat blood cultures NGTD x2   - urine cx Proteus, sensitivities noted    - s/p pip-tazo   - continue on ceftriaxone -- will plan to complete total 2 weeks - end 12/9   - will avoid FQs given age and reviewed prior EKGs which show prolonged Qtc    Reed Sue M.D.  [facility], Division of Infectious Diseases  [phone]  After 5pm on weekdays and all day on weekends - please call [phone]

## 2021-12-07 LAB
ANION GAP SERPL CALC-SCNC: 12 MMOL/L — SIGNIFICANT CHANGE UP (ref 7–14)
BUN SERPL-MCNC: 37 MG/DL — HIGH (ref 7–23)
CALCIUM SERPL-MCNC: 9.4 MG/DL — SIGNIFICANT CHANGE UP (ref 8.4–10.5)
CHLORIDE SERPL-SCNC: 102 MMOL/L — SIGNIFICANT CHANGE UP (ref 98–107)
CO2 SERPL-SCNC: 24 MMOL/L — SIGNIFICANT CHANGE UP (ref 22–31)
CREAT SERPL-MCNC: 1.6 MG/DL — HIGH (ref 0.5–1.3)
GLUCOSE SERPL-MCNC: 107 MG/DL — HIGH (ref 70–99)
HCT VFR BLD CALC: 30.7 % — LOW (ref 39–50)
HGB BLD-MCNC: 9.8 G/DL — LOW (ref 13–17)
MCHC RBC-ENTMCNC: 31 PG — SIGNIFICANT CHANGE UP (ref 27–34)
MCHC RBC-ENTMCNC: 31.9 GM/DL — LOW (ref 32–36)
MCV RBC AUTO: 97.2 FL — SIGNIFICANT CHANGE UP (ref 80–100)
NRBC # BLD: 0 /100 WBCS — SIGNIFICANT CHANGE UP
NRBC # FLD: 0 K/UL — SIGNIFICANT CHANGE UP
PLATELET # BLD AUTO: 195 K/UL — SIGNIFICANT CHANGE UP (ref 150–400)
POTASSIUM SERPL-MCNC: 3.3 MMOL/L — LOW (ref 3.5–5.3)
POTASSIUM SERPL-SCNC: 3.3 MMOL/L — LOW (ref 3.5–5.3)
RBC # BLD: 3.16 M/UL — LOW (ref 4.2–5.8)
RBC # FLD: 14.3 % — SIGNIFICANT CHANGE UP (ref 10.3–14.5)
SODIUM SERPL-SCNC: 138 MMOL/L — SIGNIFICANT CHANGE UP (ref 135–145)
WBC # BLD: 9.89 K/UL — SIGNIFICANT CHANGE UP (ref 3.8–10.5)
WBC # FLD AUTO: 9.89 K/UL — SIGNIFICANT CHANGE UP (ref 3.8–10.5)

## 2021-12-07 RX ORDER — POTASSIUM CHLORIDE 20 MEQ
40 PACKET (EA) ORAL ONCE
Refills: 0 | Status: COMPLETED | OUTPATIENT
Start: 2021-12-07 | End: 2021-12-07

## 2021-12-07 RX ADMIN — FLUDROCORTISONE ACETATE 0.1 MILLIGRAM(S): 0.1 TABLET ORAL at 17:08

## 2021-12-07 RX ADMIN — HEPARIN SODIUM 5000 UNIT(S): 5000 INJECTION INTRAVENOUS; SUBCUTANEOUS at 17:09

## 2021-12-07 RX ADMIN — Medication 500 MILLIGRAM(S): at 12:16

## 2021-12-07 RX ADMIN — CEFTRIAXONE 100 MILLIGRAM(S): 500 INJECTION, POWDER, FOR SOLUTION INTRAMUSCULAR; INTRAVENOUS at 14:27

## 2021-12-07 RX ADMIN — SERTRALINE 50 MILLIGRAM(S): 25 TABLET, FILM COATED ORAL at 12:16

## 2021-12-07 RX ADMIN — FLUDROCORTISONE ACETATE 0.1 MILLIGRAM(S): 0.1 TABLET ORAL at 07:39

## 2021-12-07 RX ADMIN — Medication 40 MILLIEQUIVALENT(S): at 12:19

## 2021-12-07 RX ADMIN — Medication 1 MILLIGRAM(S): at 12:16

## 2021-12-07 RX ADMIN — Medication 1 TABLET(S): at 12:15

## 2021-12-07 RX ADMIN — TAMSULOSIN HYDROCHLORIDE 0.4 MILLIGRAM(S): 0.4 CAPSULE ORAL at 22:14

## 2021-12-07 RX ADMIN — HEPARIN SODIUM 5000 UNIT(S): 5000 INJECTION INTRAVENOUS; SUBCUTANEOUS at 05:53

## 2021-12-07 NOTE — DIETITIAN INITIAL EVALUATION ADULT. - ADD RECOMMEND
1. Monitor weights, labs, BM's, skin integrity, p.o. intake. 2. Continue Multivitamin, folic acid, ascorbic acid for micronutrient coverage and promote wound healing. 3. Honor food and beverage preferences within diet restriction of patient in an effort to maximize level of nutrient intake

## 2021-12-07 NOTE — DIETITIAN INITIAL EVALUATION ADULT. - CHIEF COMPLAINT
89 y/o Male with medical history of Prostate Cancer s/p Radiation, CAD s/p stent, CKD III, pleural and pericardial effusion presented with weakness and lethargy, now found to have sepsis due to UTI per chart review.

## 2021-12-07 NOTE — DIETITIAN INITIAL EVALUATION ADULT. - OTHER INFO
Patient unable to contribute to nutrition related history due to AMS. RD attempted to contact wife at ([phone]) but unable to reach at this time. Patient currently ordered for Soft and Bite si Patient unable to contribute to nutrition related history due to AMS. RD attempted to contact wife at ([phone]/ [phone]) but unable to reach at this time. Patient currently ordered for Soft and Bite Sized per MD. Patient with variable po intake during the course of admission, po intake ranging mostly >50% of meals. Occasionally <50% po intake documented in nursing flowsheet. Patient had about 50% of breakfast today. Reports taking PO supplement Ensure Enlive between meals. No nausea/vomiting/diarrhea/constipation or difficulty chewing and swallowing. Last recorded bowel movement on 12/6/21. Patient unable to provide weight related history. Downtrending weight history, 4/16/21-68.9kg, 5/6/21-68kg, 6/7/21-65.9kg and now 11/30/21-64.2kg. This is indicative of 8% weight loss over 6-7months. Patient w/ 1+ edema noted may be masking amount of weight loss. Patient also noted with overt physical sign of severe temple and clavicle muscle wasting and moderate orbital and triceps loss of subcutaneous fat loss. Also noted with stage I pressure injury to sacrum. Continue good po intake and po supplement between meals encouraged.

## 2021-12-07 NOTE — DIETITIAN INITIAL EVALUATION ADULT. - SIGNS/SYMPTOMS
impaired skin integrity, stage I pressure injury to sacrum, prostate cancer s/p radiation 8% Weight loss, moderate-severe muscle wasting and fat loss and 1+ edema noted.

## 2021-12-07 NOTE — PROGRESS NOTE ADULT - ASSESSMENT
Patient is a 89 year old male with PMH of prostate cancer s/p radiation, CAD s/p stent, CKD III, pleural and pericardial effusion who presented with weakness and lethargy    Proteus bacteremia due to /UTI source    - renal/bladder US w/ Moderate left hydronephrosis & thickening of bladder wall   - blood cultures 11/30 positive for Proteus sensitivities noted   - repeat blood cultures NGTD x2   - UA with pyuria, urine cx grew Proteus, sensitivities noted    - clinically better, sepsis resolved, leukocytosis normalized, afebrile    - s/p pip-tazo   - continue on ceftriaxone -- will plan to complete total 2 weeks - end 12/9   - will avoid FQs given age and reviewed prior EKGs which show prolonged Qtc      Reed Sue M.D.  [facility], Division of Infectious Diseases  [phone]  After 5pm on weekdays and all day on weekends - please call [phone]

## 2021-12-07 NOTE — DIETITIAN INITIAL EVALUATION ADULT. - ETIOLOGY
increased demand for nutrients patient meets criteria for severe malnutrition in the context of chronic illness (prostate cancer, s/p radiation)

## 2021-12-07 NOTE — DIETITIAN INITIAL EVALUATION ADULT. - CONTINUE CURRENT NUTRITION CARE PLAN
Recommend liberalizing diet to Soft and Bite Sized and D/c DASH/TLC (cholesterol and sodium restricted) diet. Continue PO supplement Ensure Enlive 240mls 3x daily (1050kcal, 60g protein) and add No Carb Prosource (15 grams protein/ 30 mL packet.) x 1 to promote wound healing.

## 2021-12-08 LAB
ANION GAP SERPL CALC-SCNC: 11 MMOL/L — SIGNIFICANT CHANGE UP (ref 7–14)
BUN SERPL-MCNC: 38 MG/DL — HIGH (ref 7–23)
CALCIUM SERPL-MCNC: 9.1 MG/DL — SIGNIFICANT CHANGE UP (ref 8.4–10.5)
CHLORIDE SERPL-SCNC: 102 MMOL/L — SIGNIFICANT CHANGE UP (ref 98–107)
CO2 SERPL-SCNC: 26 MMOL/L — SIGNIFICANT CHANGE UP (ref 22–31)
CREAT SERPL-MCNC: 1.52 MG/DL — HIGH (ref 0.5–1.3)
CULTURE RESULTS: SIGNIFICANT CHANGE UP
CULTURE RESULTS: SIGNIFICANT CHANGE UP
GLUCOSE SERPL-MCNC: 111 MG/DL — HIGH (ref 70–99)
HCT VFR BLD CALC: 30 % — LOW (ref 39–50)
HGB BLD-MCNC: 9.5 G/DL — LOW (ref 13–17)
MCHC RBC-ENTMCNC: 31.5 PG — SIGNIFICANT CHANGE UP (ref 27–34)
MCHC RBC-ENTMCNC: 31.7 GM/DL — LOW (ref 32–36)
MCV RBC AUTO: 99.3 FL — SIGNIFICANT CHANGE UP (ref 80–100)
NRBC # BLD: 0 /100 WBCS — SIGNIFICANT CHANGE UP
NRBC # FLD: 0 K/UL — SIGNIFICANT CHANGE UP
PLATELET # BLD AUTO: 185 K/UL — SIGNIFICANT CHANGE UP (ref 150–400)
POTASSIUM SERPL-MCNC: 3.6 MMOL/L — SIGNIFICANT CHANGE UP (ref 3.5–5.3)
POTASSIUM SERPL-SCNC: 3.6 MMOL/L — SIGNIFICANT CHANGE UP (ref 3.5–5.3)
RBC # BLD: 3.02 M/UL — LOW (ref 4.2–5.8)
RBC # FLD: 14.5 % — SIGNIFICANT CHANGE UP (ref 10.3–14.5)
SODIUM SERPL-SCNC: 139 MMOL/L — SIGNIFICANT CHANGE UP (ref 135–145)
SPECIMEN SOURCE: SIGNIFICANT CHANGE UP
SPECIMEN SOURCE: SIGNIFICANT CHANGE UP
WBC # BLD: 8.93 K/UL — SIGNIFICANT CHANGE UP (ref 3.8–10.5)
WBC # FLD AUTO: 8.93 K/UL — SIGNIFICANT CHANGE UP (ref 3.8–10.5)

## 2021-12-08 RX ADMIN — TAMSULOSIN HYDROCHLORIDE 0.4 MILLIGRAM(S): 0.4 CAPSULE ORAL at 21:30

## 2021-12-08 RX ADMIN — Medication 500 MILLIGRAM(S): at 12:06

## 2021-12-08 RX ADMIN — FLUDROCORTISONE ACETATE 0.1 MILLIGRAM(S): 0.1 TABLET ORAL at 17:42

## 2021-12-08 RX ADMIN — SERTRALINE 50 MILLIGRAM(S): 25 TABLET, FILM COATED ORAL at 12:06

## 2021-12-08 RX ADMIN — Medication 1 TABLET(S): at 12:06

## 2021-12-08 RX ADMIN — HEPARIN SODIUM 5000 UNIT(S): 5000 INJECTION INTRAVENOUS; SUBCUTANEOUS at 05:32

## 2021-12-08 RX ADMIN — Medication 650 MILLIGRAM(S): at 05:42

## 2021-12-08 RX ADMIN — FLUDROCORTISONE ACETATE 0.1 MILLIGRAM(S): 0.1 TABLET ORAL at 05:30

## 2021-12-08 RX ADMIN — CEFTRIAXONE 100 MILLIGRAM(S): 500 INJECTION, POWDER, FOR SOLUTION INTRAMUSCULAR; INTRAVENOUS at 14:29

## 2021-12-08 RX ADMIN — Medication 1 MILLIGRAM(S): at 12:06

## 2021-12-08 RX ADMIN — Medication 650 MILLIGRAM(S): at 06:42

## 2021-12-08 RX ADMIN — HEPARIN SODIUM 5000 UNIT(S): 5000 INJECTION INTRAVENOUS; SUBCUTANEOUS at 17:42

## 2021-12-08 NOTE — PHYSICAL THERAPY INITIAL EVALUATION ADULT - PERTINENT HX OF CURRENT PROBLEM, REHAB EVAL
89 year old male with PMH of prostate cancer s/p radiation, CAD s/p stent, CKD III, pleural and pericardial effusion who presented with weakness and lethargy secondary to UTI.

## 2021-12-08 NOTE — PHYSICAL THERAPY INITIAL EVALUATION ADULT - GENERAL OBSERVATIONS, REHAB EVAL
pt received in semi-supine in NAD with family member at bedside. RN cleared pt for PT eval and pt agreeable.

## 2021-12-08 NOTE — PROGRESS NOTE ADULT - ASSESSMENT
Patient is a 89 year old male with PMH of prostate cancer s/p radiation, CAD s/p stent, CKD III, pleural and pericardial effusion who presented with weakness and lethargy    Proteus bacteremia due to /UTI source    - renal/bladder US w/ Moderate left hydronephrosis & thickening of bladder wall   - blood cultures 11/30 positive for Proteus sensitivities noted   - 12/3 repeat blood cultures NGTD x2   - UA with pyuria, urine cx grew Proteus, sensitivities noted    - repeat urine culture 12/4 with no growth    - clinically better, sepsis resolved, leukocytosis normalized, afebrile    - s/p pip-tazo   - continue ceftriaxone -- complete total 2 week course - end tomorrow 12/9      Reed Sue M.D.  [facility], Division of Infectious Diseases  [phone]  After 5pm on weekdays and all day on weekends - please call [phone] Patient is a 89 year old male with PMH of prostate cancer s/p radiation, CAD s/p stent, CKD III, pleural and pericardial effusion who presented with weakness and lethargy    Proteus bacteremia due to /UTI source    - renal/bladder US w/ Moderate left hydronephrosis & thickening of bladder wall   - blood cultures 11/30 positive for Proteus sensitivities noted   - 12/3 repeat blood cultures NGTD x2   - UA with pyuria, urine cx grew Proteus, sensitivities noted    - repeat urine culture 12/4 with no growth    - clinically better, sepsis resolved, leukocytosis normalized, afebrile    - s/p pip-tazo   - continue ceftriaxone -- complete total 10 day course - end tomorrow 12/9      Reed Sue M.D.  [facility], Division of Infectious Diseases  [phone]  After 5pm on weekdays and all day on weekends - please call [phone]

## 2021-12-08 NOTE — PHYSICAL THERAPY INITIAL EVALUATION ADULT - ADDITIONAL COMMENTS
Patient is poor historian, social history and prior level of function obtained from family member at bedside. pt lives in a townhouse with family, + chair lift. Prior to admission he was a short distance ambulator and required maximum assistance however a couple months ago, he was ambulating with just a rolling walker.     Patient was left in semi-supine in NAD, + call bell in reach, + family member present. RN aware.

## 2021-12-09 LAB
ANION GAP SERPL CALC-SCNC: 8 MMOL/L — SIGNIFICANT CHANGE UP (ref 7–14)
BUN SERPL-MCNC: 38 MG/DL — HIGH (ref 7–23)
CALCIUM SERPL-MCNC: 8.8 MG/DL — SIGNIFICANT CHANGE UP (ref 8.4–10.5)
CHLORIDE SERPL-SCNC: 103 MMOL/L — SIGNIFICANT CHANGE UP (ref 98–107)
CO2 SERPL-SCNC: 27 MMOL/L — SIGNIFICANT CHANGE UP (ref 22–31)
CREAT SERPL-MCNC: 1.54 MG/DL — HIGH (ref 0.5–1.3)
GLUCOSE SERPL-MCNC: 106 MG/DL — HIGH (ref 70–99)
HCT VFR BLD CALC: 29.5 % — LOW (ref 39–50)
HGB BLD-MCNC: 9.1 G/DL — LOW (ref 13–17)
MCHC RBC-ENTMCNC: 30.8 GM/DL — LOW (ref 32–36)
MCHC RBC-ENTMCNC: 31.3 PG — SIGNIFICANT CHANGE UP (ref 27–34)
MCV RBC AUTO: 101.4 FL — HIGH (ref 80–100)
NRBC # BLD: 0 /100 WBCS — SIGNIFICANT CHANGE UP
NRBC # FLD: 0 K/UL — SIGNIFICANT CHANGE UP
PLATELET # BLD AUTO: 196 K/UL — SIGNIFICANT CHANGE UP (ref 150–400)
POTASSIUM SERPL-MCNC: 3.7 MMOL/L — SIGNIFICANT CHANGE UP (ref 3.5–5.3)
POTASSIUM SERPL-SCNC: 3.7 MMOL/L — SIGNIFICANT CHANGE UP (ref 3.5–5.3)
RBC # BLD: 2.91 M/UL — LOW (ref 4.2–5.8)
RBC # FLD: 14.3 % — SIGNIFICANT CHANGE UP (ref 10.3–14.5)
SODIUM SERPL-SCNC: 138 MMOL/L — SIGNIFICANT CHANGE UP (ref 135–145)
WBC # BLD: 8.48 K/UL — SIGNIFICANT CHANGE UP (ref 3.8–10.5)
WBC # FLD AUTO: 8.48 K/UL — SIGNIFICANT CHANGE UP (ref 3.8–10.5)

## 2021-12-09 RX ADMIN — FLUDROCORTISONE ACETATE 0.1 MILLIGRAM(S): 0.1 TABLET ORAL at 18:43

## 2021-12-09 RX ADMIN — Medication 1 MILLIGRAM(S): at 12:24

## 2021-12-09 RX ADMIN — HEPARIN SODIUM 5000 UNIT(S): 5000 INJECTION INTRAVENOUS; SUBCUTANEOUS at 05:45

## 2021-12-09 RX ADMIN — TAMSULOSIN HYDROCHLORIDE 0.4 MILLIGRAM(S): 0.4 CAPSULE ORAL at 22:13

## 2021-12-09 RX ADMIN — Medication 1 TABLET(S): at 12:24

## 2021-12-09 RX ADMIN — SERTRALINE 50 MILLIGRAM(S): 25 TABLET, FILM COATED ORAL at 12:24

## 2021-12-09 RX ADMIN — CEFTRIAXONE 100 MILLIGRAM(S): 500 INJECTION, POWDER, FOR SOLUTION INTRAMUSCULAR; INTRAVENOUS at 15:13

## 2021-12-09 RX ADMIN — FLUDROCORTISONE ACETATE 0.1 MILLIGRAM(S): 0.1 TABLET ORAL at 05:45

## 2021-12-09 RX ADMIN — HEPARIN SODIUM 5000 UNIT(S): 5000 INJECTION INTRAVENOUS; SUBCUTANEOUS at 18:43

## 2021-12-09 RX ADMIN — Medication 500 MILLIGRAM(S): at 12:24

## 2021-12-09 NOTE — PROGRESS NOTE ADULT - ASSESSMENT
Patient is a 89 year old male with PMH of prostate cancer s/p radiation, CAD s/p stent, CKD III, pleural and pericardial effusion who presented with weakness and lethargy    Proteus bacteremia due to /UTI source    - renal/bladder US w/ Moderate left hydronephrosis & thickening of bladder wall   - blood cultures 11/30 positive for Proteus sensitivities noted   - 12/3 repeat blood cultures NGTD x2   - UA with pyuria, urine cx grew Proteus, sensitivities noted    - repeat urine culture 12/4 with no growth    - clinically better, sepsis resolved, leukocytosis normalized, afebrile    - s/p pip-tazo   - continue ceftriaxone -- complete total 10 day course - end today 12/9      Reed Sue M.D.  [facility], Division of Infectious Diseases  [phone]  After 5pm on weekdays and all day on weekends - please call [phone]

## 2021-12-10 RX ADMIN — Medication 500 MILLIGRAM(S): at 12:07

## 2021-12-10 RX ADMIN — HEPARIN SODIUM 5000 UNIT(S): 5000 INJECTION INTRAVENOUS; SUBCUTANEOUS at 18:19

## 2021-12-10 RX ADMIN — Medication 1 TABLET(S): at 12:07

## 2021-12-10 RX ADMIN — FLUDROCORTISONE ACETATE 0.1 MILLIGRAM(S): 0.1 TABLET ORAL at 18:20

## 2021-12-10 RX ADMIN — Medication 1 MILLIGRAM(S): at 12:07

## 2021-12-10 RX ADMIN — TAMSULOSIN HYDROCHLORIDE 0.4 MILLIGRAM(S): 0.4 CAPSULE ORAL at 23:01

## 2021-12-10 RX ADMIN — HEPARIN SODIUM 5000 UNIT(S): 5000 INJECTION INTRAVENOUS; SUBCUTANEOUS at 05:33

## 2021-12-10 RX ADMIN — FLUDROCORTISONE ACETATE 0.1 MILLIGRAM(S): 0.1 TABLET ORAL at 05:33

## 2021-12-10 RX ADMIN — SERTRALINE 50 MILLIGRAM(S): 25 TABLET, FILM COATED ORAL at 12:06

## 2021-12-10 NOTE — PROGRESS NOTE ADULT - ASSESSMENT
Patient is a 89 year old male with PMH of prostate cancer s/p radiation, CAD s/p stent, CKD III, pleural and pericardial effusion who presented with weakness and lethargy    Proteus bacteremia due to /UTI source    - renal/bladder US w/ Moderate left hydronephrosis & thickening of bladder wall   - blood cultures 11/30 positive for Proteus sensitivities noted   - 12/3 repeat blood cultures NGTD x2   - UA with pyuria, urine cx grew Proteus, sensitivities noted    - repeat urine culture 12/4 with no growth    - clinically better, sepsis resolved, leukocytosis normalized, afebrile    - s/p pip-tazo   - will discontinue ceftriaxone - completed total 10 day course on 12/9    Dr. Castellanos will be covering over the weekend, please feel free to call [phone] with any questions.    Reed Sue M.D.  [facility], Division of Infectious Diseases  [phone]  After 5pm on weekdays and all day on weekends - please call [phone]

## 2021-12-11 RX ADMIN — SERTRALINE 50 MILLIGRAM(S): 25 TABLET, FILM COATED ORAL at 11:40

## 2021-12-11 RX ADMIN — TAMSULOSIN HYDROCHLORIDE 0.4 MILLIGRAM(S): 0.4 CAPSULE ORAL at 21:29

## 2021-12-11 RX ADMIN — HEPARIN SODIUM 5000 UNIT(S): 5000 INJECTION INTRAVENOUS; SUBCUTANEOUS at 05:59

## 2021-12-11 RX ADMIN — Medication 1 MILLIGRAM(S): at 11:40

## 2021-12-11 RX ADMIN — HEPARIN SODIUM 5000 UNIT(S): 5000 INJECTION INTRAVENOUS; SUBCUTANEOUS at 17:34

## 2021-12-11 RX ADMIN — FLUDROCORTISONE ACETATE 0.1 MILLIGRAM(S): 0.1 TABLET ORAL at 17:34

## 2021-12-11 RX ADMIN — Medication 1 TABLET(S): at 11:40

## 2021-12-11 RX ADMIN — Medication 500 MILLIGRAM(S): at 11:40

## 2021-12-11 RX ADMIN — FLUDROCORTISONE ACETATE 0.1 MILLIGRAM(S): 0.1 TABLET ORAL at 06:01

## 2021-12-12 ENCOUNTER — TRANSCRIPTION ENCOUNTER (OUTPATIENT)
Age: 86
End: 2021-12-12

## 2021-12-12 LAB — SARS-COV-2 RNA SPEC QL NAA+PROBE: SIGNIFICANT CHANGE UP

## 2021-12-12 RX ADMIN — HEPARIN SODIUM 5000 UNIT(S): 5000 INJECTION INTRAVENOUS; SUBCUTANEOUS at 17:21

## 2021-12-12 RX ADMIN — Medication 500 MILLIGRAM(S): at 11:50

## 2021-12-12 RX ADMIN — TAMSULOSIN HYDROCHLORIDE 0.4 MILLIGRAM(S): 0.4 CAPSULE ORAL at 21:27

## 2021-12-12 RX ADMIN — Medication 1 TABLET(S): at 11:50

## 2021-12-12 RX ADMIN — FLUDROCORTISONE ACETATE 0.1 MILLIGRAM(S): 0.1 TABLET ORAL at 17:21

## 2021-12-12 RX ADMIN — HEPARIN SODIUM 5000 UNIT(S): 5000 INJECTION INTRAVENOUS; SUBCUTANEOUS at 05:47

## 2021-12-12 RX ADMIN — Medication 1 MILLIGRAM(S): at 11:50

## 2021-12-12 RX ADMIN — SERTRALINE 50 MILLIGRAM(S): 25 TABLET, FILM COATED ORAL at 11:50

## 2021-12-12 RX ADMIN — FLUDROCORTISONE ACETATE 0.1 MILLIGRAM(S): 0.1 TABLET ORAL at 05:47

## 2021-12-12 NOTE — PROGRESS NOTE ADULT - PROBLEM SELECTOR PLAN 7
As per wife, pt is no longer on ASA/Plavix   -No active chest pain. EKG shows no ST changes
DVT heparin subQ
As per wife, pt is no longer on ASA/Plavix   -No active chest pain. EKG shows no ST changes

## 2021-12-12 NOTE — DISCHARGE NOTE PROVIDER - PROVIDER TOKENS
FREE:[LAST:[PCP at Rehab facility],PHONE:[(   )    -],FAX:[(   )    -]],FREE:[LAST:[[facility]],PHONE:[phone]:[(   )    -]]

## 2021-12-12 NOTE — PROGRESS NOTE ADULT - PROBLEM SELECTOR PROBLEM 3
Hydronephrosis of left kidney
Anemia
Hydronephrosis of left kidney

## 2021-12-12 NOTE — PROGRESS NOTE ADULT - PROBLEM SELECTOR PROBLEM 4
Anemia
Acute kidney injury superimposed on CKD
Anemia

## 2021-12-12 NOTE — PROGRESS NOTE ADULT - PROBLEM SELECTOR PLAN 5
Scr elevated to 1.9 from baseline 1.5-1.6   -Most likely due to sepsis   -S/p 2L NS  -Avoid nephrotoxins
EKG shows low voltage QRS in the setting of past pericardial effusion   -TTE
Scr elevated to 1.9 from baseline 1.5-1.6   -Most likely due to sepsis   -S/p 2L NS  -Avoid nephrotoxins

## 2021-12-12 NOTE — DISCHARGE NOTE PROVIDER - HOSPITAL COURSE
89 yo M with Pmhx of prostate cancer s/p radiation, CAD s/p stent, CKD III, pleural and pericardial effusion, ?afib not on AC presented to the ED from home with weakness and lethargy, found to have sepsis due to UTI.     Sepsis secondary to UTI  - patient with fever, tachycardia, leukocytosis, lactic acidosis and pyuria c/w sepsis due to UTI; urine cx from the previous episode grew pansensitive E.coli   - BCx grew -Proteus Mirabilis  -ID consulted  - CTH negative   - repeat BCx 12/3-neg  -Ucx-grew Proteus Mirabilis  - Pt completed 2 weeks of Ceftriaxone course on 12/9 as per ID recs     Hypotension  - pt has hx of orthostatic hypotension on fludrocortisone 0.1 mg BID    - now with sepsis, will dose stress dose steroids empirically   - s/p100 mg IV hydrocortisone, followed by 50 mg Iv TID x 2 days  - Cortisol 55.8 -> 79.6    Anemia  - most likely due to iron deficiency anemia vs. AOCD   - no signs of active bleeding   - hold ferrous sulfate due to active infection   - trend CBC daily    Acute kidney injury superimposed on CKD   - Scr elevated to 1.9 from baseline 1.5-1.6   - most likely due to sepsis   - s/p 2L NS  - bladder scan   - US renal shows thickening and trabeculation of the bladder wall, likely chronic changes of bladder outlet obstruction, specular debris in the bladder, moderate left hydronephrosis  -Urology consulted  -s/p Wang cathter placement, Pt started on Flomax 0.4mg q hs on 12/4. Repeat Renal US 12/6-Mild right hydronephrosis. No left hydronephrosis.    Pericardial effusion  - EKG shows low voltage QRS in the setting of past pericardial effusion   - TTE - mild MR , mod AI , normal LVF, no pericardial effusion   - CT chest shows moderate pleural effusions, decreased since June 2021, with bibasilar compressive atelectasis, no findings to suggest pneumonia on this noncontrast CT, resolved pericardial effusion    CAD  - as per wife, pt is no longer on ASA/Plavix   - no active chest pain, EKG shows no ST changes   89 y/o M with PNH of prostate cancer s/p radiation, CAD s/p stent, CKD III, pleural and pericardial effusion, ?afib not on AC presented to the ED from home with weakness and lethargy, found to have sepsis due to UTI.     Sepsis secondary to UTI  - patient with fever, tachycardia, leukocytosis, lactic acidosis and pyuria c/w sepsis due to UTI; urine cx from the previous episode grew pansensitive E.coli   - UCx and BCx both grew Proteus Mirabilis  - CTH negative   - repeat BCx 12/3 negative  - ID consulted: patient s/p Zosyn 11/30-12/3, Ceftriaxone 12/3-12/9 per recommendations    Hypotension  - patient has hx of orthostatic hypotension on fludrocortisone 0.1 mg BID    - with sepsis, steroids were stress dosed empirically   - s/p 100 mg IV hydrocortisone, followed by 50 mg IV TID x 2 days    Anemia  - most likely due to iron deficiency anemia vs. AOCD   - no signs of active bleeding   - ferrous sulfate held during admission due to active infection     Acute kidney injury superimposed on CKD   - SCr elevated to 1.9 from baseline 1.5-1.6; back to baseline at 1.54 on 12/13  - most likely 2/2 sepsis   - s/p 2L NS  - bladder scan w/   - US renal showed thickening and trabeculation of the bladder wall, likely chronic changes of bladder outlet obstruction, specular debris in the bladder, moderate left hydronephrosis  - urology consulted: s/p Wang cathter placement, patient started on Flomax 0.4mg qhs on 12/4; repeat renal US on 12/6 showed mild right hydronephrosis, no left hydronephrosis  - can repeat US renal outpatient to ensure resolution of hydronephrosis  - patient to follow-up outpatient w/ urology due to Wang    Pericardial effusion  - EKG shows low voltage QRS in the setting of past pericardial effusion   - TTE - mild MR , mod AI , normal LVF, no pericardial effusion   - CT chest shows moderate pleural effusions, decreased since June 2021, with bibasilar compressive atelectasis, no findings to suggest pneumonia on this noncontrast CT, resolved pericardial effusion    CAD  - as per wife, pt is no longer on ASA/Plavix   - no active chest pain, EKG shows no ST changes   89 y/o M with PNH of prostate cancer s/p radiation, CAD s/p stent, CKD III, pleural and pericardial effusion, ?afib not on AC presented to the ED from home with weakness and lethargy, found to have sepsis due to UTI.     Sepsis secondary to UTI  - patient with fever, tachycardia, leukocytosis, lactic acidosis and pyuria c/w sepsis due to UTI; urine cx from the previous episode grew pansensitive E.coli   - UCx and BCx both grew Proteus Mirabilis  - CTH negative   - repeat BCx 12/3 negative  - ID consulted: patient s/p Zosyn 11/30-12/3, Ceftriaxone 12/3-12/9 per recommendations    Hypotension  - patient has hx of orthostatic hypotension on fludrocortisone 0.1 mg BID    - with sepsis, steroids were stress dosed empirically   - s/p 100 mg IV hydrocortisone, followed by 50 mg IV TID x 2 days    Anemia  - most likely due to iron deficiency anemia vs. AOCD   - no signs of active bleeding   - ferrous sulfate held during admission due to active infection     Acute kidney injury superimposed on CKD   - SCr elevated to 1.9 from baseline 1.5-1.6; back to baseline at 1.54 on 12/13  - most likely 2/2 sepsis   - s/p 2L NS  - bladder scan w/   - US renal showed thickening and trabeculation of the bladder wall, likely chronic changes of bladder outlet obstruction, specular debris in the bladder, moderate left hydronephrosis  - urology consulted: s/p Wang cathter placement, patient started on Flomax 0.4mg qhs on 12/4; repeat renal US on 12/6 showed mild right hydronephrosis, no left hydronephrosis  - can repeat US renal outpatient to ensure resolution of hydronephrosis  - patient to follow-up outpatient w/ urology due to Wang    Pericardial effusion  - EKG showed low voltage QRS in the setting of past pericardial effusion   - TTE - showed mild MR , moderate  AI , normal LVF, no pericardial effusion   - CT chest showed moderate pleural effusions, decreased since June 2021, with bibasilar compressive atelectasis, no findings to suggest pneumonia; resolved pericardial effusion    CAD  - as per wife, pt is no longer on ASA/Plavix   - no active chest pain, EKG shows no ST changes    Case discussed with Dr. Heredia on 12/13. Patient is medically stable for discharge to rehab. 89 y/o M with PNH of prostate cancer s/p radiation, CAD s/p stent, CKD III, pleural and pericardial effusion, ?afib not on AC presented to the ED from home with weakness and lethargy, found to have sepsis due to UTI.     Sepsis secondary to UTI  - patient with fever, tachycardia, leukocytosis, lactic acidosis and pyuria c/w sepsis due to UTI; urine cx from the previous episode grew pansensitive E.coli   - UCx and BCx both grew Proteus Mirabilis  - CTH negative   - repeat BCx 12/3 negative  - ID consulted: patient s/p Zosyn 11/30-12/3, Ceftriaxone 12/3-12/9 per recommendations    Hypotension  - patient has hx of orthostatic hypotension on fludrocortisone 0.1 mg BID    - with sepsis, steroids were stress dosed empirically   - s/p 100 mg IV hydrocortisone, followed by 50 mg IV TID x 2 days    Anemia  - most likely due to iron deficiency anemia vs. AOCD   - no signs of active bleeding   - ferrous sulfate held during admission due to active infection     Acute kidney injury superimposed on CKD   - SCr elevated to 1.9 from baseline 1.5-1.6; back to baseline at 1.54 on 12/13  - most likely 2/2 sepsis   - s/p 2L NS  - bladder scan w/   - US renal showed thickening and trabeculation of the bladder wall, likely chronic changes of bladder outlet obstruction, specular debris in the bladder, moderate left hydronephrosis  - urology consulted: s/p Wang cathter placement, patient started on Flomax 0.4mg qhs on 12/4; repeat renal US on 12/6 showed mild right hydronephrosis, no left hydronephrosis  - can repeat US renal outpatient to ensure resolution of hydronephrosis  - patient to follow-up outpatient w/ urology due to Wang    Pericardial effusion  - EKG showed low voltage QRS in the setting of past pericardial effusion   - TTE - showed mild MR , moderate  AI , normal LVF, no pericardial effusion   - CT chest showed moderate pleural effusions, decreased since June 2021, with bibasilar compressive atelectasis, no findings to suggest pneumonia; resolved pericardial effusion    CAD  - as per wife, pt is no longer on ASA/Plavix   - no active chest pain, EKG shows no ST changes    Case discussed with Dr. Heredia on 12/13. Patient is medically stable for discharge to rehab.    Attending Addendum:  Patient seen and examined by me on the discharge day. Medications reviewed.  All questions answered in details. Follow up plan explained.  More than 30 mins were spent evaluating patient and coordinating care for discharge.  Discharge summary sent to pt's primary care physician at [facility]./

## 2021-12-12 NOTE — PROGRESS NOTE ADULT - PROBLEM SELECTOR PLAN 6
EKG shows low voltage QRS in the setting of past pericardial effusion   -TTE
EKG shows low voltage QRS in the setting of past pericardial effusion   -TTE 12/3/21 showed Stage I diastolic dysfunction, left pleural effusion; no pericardial effusion
As per wife, pt is no longer on ASA/Plavix   -No active chest pain. EKG shows no ST changes
EKG shows low voltage QRS in the setting of past pericardial effusion   -TTE
EKG shows low voltage QRS in the setting of past pericardial effusion   -TTE 12/3/21 showed Stage I diastolic dysfunction, left pleural effusion; no pericardial effusion
EKG shows low voltage QRS in the setting of past pericardial effusion   -TTE

## 2021-12-12 NOTE — PROGRESS NOTE ADULT - PROBLEM SELECTOR PROBLEM 1
Sepsis secondary to UTI

## 2021-12-12 NOTE — PROGRESS NOTE ADULT - PROBLEM SELECTOR PROBLEM 7
CAD (coronary artery disease)
CAD (coronary artery disease)
Preventive measure
CAD (coronary artery disease)

## 2021-12-12 NOTE — PROGRESS NOTE ADULT - PROBLEM SELECTOR PROBLEM 5
Acute kidney injury superimposed on CKD
Pericardial effusion
Acute kidney injury superimposed on CKD

## 2021-12-12 NOTE — PROGRESS NOTE ADULT - PROBLEM SELECTOR PLAN 8
DVT heparin subQ

## 2021-12-12 NOTE — PROGRESS NOTE ADULT - PROBLEM SELECTOR PROBLEM 6
Pericardial effusion
CAD (coronary artery disease)
Pericardial effusion

## 2021-12-12 NOTE — PROGRESS NOTE ADULT - NUTRITIONAL ASSESSMENT
This patient has been assessed with a concern for Malnutrition and has been determined to have a diagnosis/diagnoses of Severe protein-calorie malnutrition.    This patient is being managed with:   Diet Soft and Bite Sized-  No Carb Prosource (1pkg = 15gms Protein)     Qty per Day:  1  Supplement Feeding Modality:  Oral  Ensure Enlive Cans or Servings Per Day:  3       Frequency:  Daily  Entered: Dec  7 2021  3:07PM    

## 2021-12-12 NOTE — DISCHARGE NOTE PROVIDER - NSDCFUADDINST_GEN_ALL_CORE_FT
Follow up with urology at [facility] in 1-2 weeks for Wang catheter.

## 2021-12-12 NOTE — DISCHARGE NOTE PROVIDER - NSDCCPCAREPLAN_GEN_ALL_CORE_FT
PRINCIPAL DISCHARGE DIAGNOSIS  Diagnosis: Sepsis due to urinary tract infection  Assessment and Plan of Treatment: you completed antibiotics in the hospital  Follow up with Primary care provider at Rehab facility      SECONDARY DISCHARGE DIAGNOSES  Diagnosis: Acute kidney injury superimposed on CKD  Assessment and Plan of Treatment: Follow up with Primary care provider at Rehab facility    Diagnosis: Hypotension  Assessment and Plan of Treatment: continue Florinef as preescribed    Diagnosis: CAD (coronary artery disease)  Assessment and Plan of Treatment: Follow up with Primary care provider at Rehab facility    Diagnosis: Bilateral hydronephrosis  Assessment and Plan of Treatment: Maintain sanchez to gravity drainage  continue Flomas as prescribed  Follow up with Urology at [facility] in 1-2 weeks, call to schedule an appointment     PRINCIPAL DISCHARGE DIAGNOSIS  Diagnosis: Sepsis due to urinary tract infection  Assessment and Plan of Treatment: You were found to have a urinary tract infection. You completed a course of antibiotics while admitted. Follow up with your primary care provider.      SECONDARY DISCHARGE DIAGNOSES  Diagnosis: Hypotension  Assessment and Plan of Treatment: Continue Florinef as prescribed.    Diagnosis: Acute kidney injury superimposed on CKD  Assessment and Plan of Treatment: Follow up with your primary care provider.    Diagnosis: CAD (coronary artery disease)  Assessment and Plan of Treatment: Follow up with your primary care provider.    Diagnosis: Bilateral hydronephrosis  Assessment and Plan of Treatment: A Wang catheter was placed during your hospitalization. Continue to take Flomax as prescribed. Follow up with Urology at [facility] for [facility] in 1-2 weeks, call to schedule an appointment.

## 2021-12-12 NOTE — PROGRESS NOTE ADULT - PROBLEM SELECTOR PROBLEM 2
Hypotension

## 2021-12-12 NOTE — DISCHARGE NOTE PROVIDER - DETAILS OF MALNUTRITION DIAGNOSIS/DIAGNOSES
This patient has been assessed with a concern for Malnutrition and was treated during this hospitalization for the following Nutrition diagnosis/diagnoses:     -  12/07/2021: Severe protein-calorie malnutrition

## 2021-12-12 NOTE — DISCHARGE NOTE PROVIDER - CARE PROVIDER_API CALL
PCP at Rehab facility,   Phone: (   )    -  Fax: (   )    -  Follow Up Time:     [facility],   Phone: [phone]  Fax: (   )    -  Follow Up Time:

## 2021-12-12 NOTE — PROGRESS NOTE ADULT - PROBLEM SELECTOR PLAN 2
Pt has hx of orthostatic hypotension on fludrocortisone 0.1 mg BID    -Now with sepsis, will dose stress dose steroids empirically   -Received 100 mg IV hydrocortisone, followed by 50 mg Iv TID x 2 days  -AM cortisol elevated
Pt has hx of orthostatic hypotension on fludrocortisone 0.1 mg BID    -Now with sepsis, will dose stress dose steroids empirically   -Received 100 mg IV hydrocortisone, followed by 50 mg Iv TID x 2 days  -AM cortisol elevated  -Continue fludrocortisone
Pt has hx of orthostatic hypotension on fludrocortisone 0.1 mg BID    -Now with sepsis, will dose stress dose steroids empirically   -Received 100 mg IV hydrocortisone, followed by 50 mg Iv TID x 2 days --> off  -AM cortisol elevated appropriately   -Continue fludrocortisone
Pt has hx of orthostatic hypotension on fludrocortisone 0.1 mg BID    -Now with sepsis, will dose stress dose steroids empirically   -Received 100 mg IV hydrocortisone, followed by 50 mg Iv TID x 2 days  -AM cortisol elevated
Pt has hx of orthostatic hypotension on fludrocortisone 0.1 mg BID    -Now with sepsis, will dose stress dose steroids empirically   -Received 100 mg IV hydrocortisone, followed by 50 mg Iv TID x 2 days  -AM cortisol elevated  -Continue fludrocortisone
Pt has hx of orthostatic hypotension on fludrocortisone 0.1 mg BID    -Now with sepsis, will dose stress dose steroids empirically   -Stat 100 mg IV hydrocortisone, followed by 50 mg Iv TID x 2 days  -AM cortisol elevated
No
Pt has hx of orthostatic hypotension on fludrocortisone 0.1 mg BID    -Now with sepsis, will dose stress dose steroids empirically   -Received 100 mg IV hydrocortisone, followed by 50 mg Iv TID x 2 days --> off  -AM cortisol elevated  -Continue fludrocortisone
Pt has hx of orthostatic hypotension on fludrocortisone 0.1 mg BID    -Now with sepsis, will dose stress dose steroids empirically   -Received 100 mg IV hydrocortisone, followed by 50 mg Iv TID x 2 days --> off  -AM cortisol elevated  -Continue fludrocortisone
Pt has hx of orthostatic hypotension on fludrocortisone 0.1 mg BID    -Now with sepsis, will dose stress dose steroids empirically   -Received 100 mg IV hydrocortisone, followed by 50 mg Iv TID x 2 days --> off  -AM cortisol elevated appropriately   -Continue fludrocortisone
Pt has hx of orthostatic hypotension on fludrocortisone 0.1 mg BID    -Now with sepsis, will dose stress dose steroids empirically   -Received 100 mg IV hydrocortisone, followed by 50 mg Iv TID x 2 days --> off  -AM cortisol elevated appropriately   -Continue fludrocortisone
Pt has hx of orthostatic hypotension on fludrocortisone 0.1 mg BID    -Now with sepsis, will dose stress dose steroids empirically   -Received 100 mg IV hydrocortisone, followed by 50 mg Iv TID x 2 days  -AM cortisol elevated  -Continue fludrocortisone

## 2021-12-12 NOTE — PROGRESS NOTE ADULT - PROBLEM SELECTOR PLAN 4
Most likely due to iron deficiency anemia vs. AOCD   -No signs of active bleeding   -Hold ferrous sulfate due to active infection   -Trend CBC daily
Scr eleavted to 1.9 from baseline 1.5-1.6   -Most likely due to sepsis   -S/p 2L NS  -F/u with post void bladder scan   -US renal and urine lytes
Most likely due to iron deficiency anemia vs. AOCD   -No signs of active bleeding   -Hold ferrous sulfate due to active infection   -Trend CBC daily

## 2021-12-12 NOTE — PROGRESS NOTE ADULT - PROBLEM SELECTOR PLAN 3
2' chronic bladder outlet obstruction  -CT abd/pelvis noncontrast 12/4/21 --> Bilateral hydronephrosis, left greater than right with left ureteral dilatation to the level of the diffusely trabeculated urinary bladder. No renal calculi.  -Will need sanchez upon discharge  -Appreciate urology
2' chronic bladder outlet obstruction  -CT abd/pelvis noncontrast 12/4/21 --> Bilateral hydronephrosis, left greater than right with left ureteral dilatation to the level of the diffusely trabeculated urinary bladder. No renal calculi.  -Will need sanchez upon discharge  -Appreciate urology  - outpt follow up reinforced. d/w pt and the wife
2' chronic bladder outlet obstruction  - CT abd/pelvis noncontrast 12/4/21 --> Bilateral hydronephrosis, left greater than right with left ureteral dilatation to the level of the diffusely trabeculated urinary bladder. No renal calculi.  - Will need sanchez upon discharge  - Appreciate urology  - outpt follow up reinforced. d/w pt and the wife  - can repeat US renal outpt to ensure resolution of hydronephrosis
2' chronic bladder outlet obstruction  - CT abd/pelvis noncontrast 12/4/21 --> Bilateral hydronephrosis, left greater than right with left ureteral dilatation to the level of the diffusely trabeculated urinary bladder. No renal calculi.  - Will need sanchez upon discharge  - Appreciate urology  - outpt follow up reinforced. d/w pt and the wife  - can repeat US renal outpt to ensure resolution of hydronephrosis
Most likely due to iron deficiency anemia vs. AOCD   -No signs of active bleeding   -Hold ferrous sulfate due to active infection   -Trend CBC daily
2' chronic bladder outlet obstruction  -CT abd/pelvis noncontrast 12/4/21 --> Bilateral hydronephrosis, left greater than right with left ureteral dilatation to the level of the diffusely trabeculated urinary bladder. No renal calculi.  -Will need sanchez upon discharge  -Appreciate urology
2' chronic bladder outlet obstruction  -CT abd/pelvis noncontrast 12/4/21 --> Bilateral hydronephrosis, left greater than right with left ureteral dilatation to the level of the diffusely trabeculated urinary bladder. No renal calculi.  -Will need sanchez upon discharge  -Appreciate urology
On an IV antibiotic in event that obstructing stone is found  -CT abd/pelvis noncontrast -->  -Appreciate urology
2' chronic bladder outlet obstruction  -CT abd/pelvis noncontrast 12/4/21 --> Bilateral hydronephrosis, left greater than right with left ureteral dilatation to the level of the diffusely trabeculated urinary bladder. No renal calculi.  -Will need sanchez upon discharge  -Appreciate urology  - outpt follow up reinforced. d/w pt and the wife
2' chronic bladder outlet obstruction  -CT abd/pelvis noncontrast 12/4/21 --> Bilateral hydronephrosis, left greater than right with left ureteral dilatation to the level of the diffusely trabeculated urinary bladder. No renal calculi.  -Will need sanchez upon discharge  -Appreciate urology  - outpt follow up reinforced. d/w pt and the wife
On an IV antibiotic in event that obstructing stone is found  -CT abd/pelvis noncontrast -->  -Appreciate urology

## 2021-12-12 NOTE — PROGRESS NOTE ADULT - NSPROGADDITIONALINFOA_GEN_ALL_CORE
d/w pt and the wife.  d/w NP and CM.   Dc planning to rehab. (the wife prefers [facility])     - Dr. ZEV Heredia ([facility])  - [phone]
d/w pt and the wife.  d/w NP and CM.   Dc planning to rehab. (the wife prefers [facility])     - Dr. ZEV Heredia ([facility])  - [phone]
d/w pt and the wife.  d/w NP and CM.   Dc planning to rehab. (the wife prefers [facility])   await placement.     - Dr. ZEV Heredia ([facility])  - [phone]
d/w pt and the wife.  d/w NP and CM.   Dc planning to rehab. (the wife prefers [facility])   await placement.     - Dr. ZEV Heredia ([facility])  - [phone]
d/w pt and the wife.  d/w NP and CM.   Dc planning to rehab. (the wife prefers [facility])     - Dr. ZEV Heredia ([facility])  - [phone]

## 2021-12-12 NOTE — DISCHARGE NOTE PROVIDER - NSDCMRMEDTOKEN_GEN_ALL_CORE_FT
ascorbic acid 500 mg oral tablet: 1 tab(s) orally once a day  ferrous sulfate 325 mg (65 mg elemental iron) oral tablet: 1 tab(s) orally once a day  fludrocortisone 0.1 mg oral tablet: 1 tab(s) orally 2 times a day  folic acid 1 mg oral tablet: 1 tab(s) orally once a day  furosemide 20 mg oral tablet: 1 tab(s) orally once a day  Melatonin 5 mg oral tablet: 1 tab(s) orally once a day (at bedtime)  Multiple Vitamins oral tablet: 1 tab(s) orally once a day  sertraline 50 mg oral tablet: 1 tab(s) orally once a day   ascorbic acid 500 mg oral tablet: 1 tab(s) orally once a day  ferrous sulfate 325 mg (65 mg elemental iron) oral tablet: 1 tab(s) orally once a day  fludrocortisone 0.1 mg oral tablet: 1 tab(s) orally 2 times a day  folic acid 1 mg oral tablet: 1 tab(s) orally once a day  furosemide 20 mg oral tablet: 1 tab(s) orally once a day  Melatonin 5 mg oral tablet: 1 tab(s) orally once a day (at bedtime)  Multiple Vitamins oral tablet: 1 tab(s) orally once a day  sertraline 50 mg oral tablet: 1 tab(s) orally once a day  tamsulosin 0.4 mg oral capsule: 1 cap(s) orally once a day (at bedtime)

## 2021-12-13 ENCOUNTER — TRANSCRIPTION ENCOUNTER (OUTPATIENT)
Age: 86
End: 2021-12-13

## 2021-12-13 VITALS — SYSTOLIC BLOOD PRESSURE: 107 MMHG | DIASTOLIC BLOOD PRESSURE: 60 MMHG

## 2021-12-13 RX ORDER — TAMSULOSIN HYDROCHLORIDE 0.4 MG/1
1 CAPSULE ORAL
Qty: 0 | Refills: 0 | DISCHARGE
Start: 2021-12-13

## 2021-12-13 RX ADMIN — SERTRALINE 50 MILLIGRAM(S): 25 TABLET, FILM COATED ORAL at 11:53

## 2021-12-13 RX ADMIN — Medication 500 MILLIGRAM(S): at 11:53

## 2021-12-13 RX ADMIN — HEPARIN SODIUM 5000 UNIT(S): 5000 INJECTION INTRAVENOUS; SUBCUTANEOUS at 05:51

## 2021-12-13 RX ADMIN — Medication 1 MILLIGRAM(S): at 11:53

## 2021-12-13 RX ADMIN — FLUDROCORTISONE ACETATE 0.1 MILLIGRAM(S): 0.1 TABLET ORAL at 05:51

## 2021-12-13 RX ADMIN — Medication 1 TABLET(S): at 11:53

## 2021-12-13 NOTE — PROGRESS NOTE ADULT - PROVIDER SPECIALTY LIST ADULT
Infectious Disease
Internal Medicine
Urology
Urology
Internal Medicine

## 2021-12-13 NOTE — PROGRESS NOTE ADULT - ASSESSMENT
Patient is a 89 year old male with PMH of prostate cancer s/p radiation, CAD s/p stent, CKD III, pleural and pericardial effusion who presented with weakness and lethargy    Proteus bacteremia due to /UTI source, treated    - renal/bladder US w/ Moderate left hydronephrosis & thickening of bladder wall   - blood cultures 11/30 positive for Proteus sensitivities noted   - 12/3 repeat blood cultures NGTD x2   - UA with pyuria, urine cx grew Proteus, sensitivities noted    - repeat urine culture 12/4 with no growth    - clinically improved, sepsis resolved, afebrile    - s/p pip-tazo   - s/p ceftriaxone - completed total 10 day course on 12/9   - discharge planning per primary team, stable from ID standpoint.       ID will sign off at this time but remains available for any further questions/concerns.  Reed Sue M.D.  [facility], Division of Infectious Diseases  [phone]  After 5pm on weekdays and all day on weekends - please call [phone]

## 2021-12-13 NOTE — DISCHARGE NOTE NURSING/CASE MANAGEMENT/SOCIAL WORK - NSDCPEFALRISK_GEN_ALL_CORE
For information on Fall & Injury Prevention, visit: [email or web address] OR  [email or web address] OR  https://www.cdc.gov/steadi/patient.html

## 2021-12-13 NOTE — PROGRESS NOTE ADULT - SUBJECTIVE AND OBJECTIVE BOX
[facility] [address], Division of Infectious Diseases  DORIAN Patino Y. Patel, S. Shah, G. Casimir  [phone]  ([phone] - weekdays after 5pm and weekends)    Name: KIANNA GUILLEN  Age/Gender: 89y Male  MRN: [id number]    Interval History:  Patient seen this morning, feels well.  Denies fever, pain or any new complaints.  Notes reviewed  No concerning overnight events  Afebrile     Allergies: No Known Allergies      Objective:  Vitals:   T(F): 98.5 (12-07-21 @ 05:51), Max: 98.5 (12-07-21 @ 05:51)  HR: 94 (12-07-21 @ 05:51) (83 - 94)  BP: 109/58 (12-07-21 @ 05:51) (88/44 - 120/61)  RR: 18 (12-07-21 @ 05:51) (18 - 20)  SpO2: 97% (12-07-21 @ 05:51) (97% - 100%)  Physical Examination:  General: no acute distress  HEENT: NC/AT, anicteric, neck supple  Respiratory: no acc muscle use, breathing comfortably  Cardiovascular: S1 and S2 present  Gastrointestinal: normal appearing, nondistended  Extremities: no edema, no cyanosis  Skin: no visible rash    Laboratory Studies:  CBC:                       9.8    9.89  )-----------( 195      ( 07 Dec 2021 06:36 )             30.7     WBC Trend:  9.89 12-07-21 @ 06:36  8.69 12-06-21 @ 08:04  8.25 12-05-21 @ 06:49  10.51 12-04-21 @ 07:33  13.76 12-03-21 @ 06:55  19.44 12-02-21 @ 07:21  20.72 12-01-21 @ 07:06  19.10 11-30-21 @ 21:54    CMP: 12-07    138  |  102  |  37<H>  ----------------------------<  107<H>  3.3<L>   |  24  |  1.60<H>    Ca    9.4      07 Dec 2021 06:36  Phos  2.9     12-06  Mg     2.00     12-06      Microbiology: reviewed   Culture - Urine (collected 12-04-21 @ 14:25)  Source: Catheterized Catheterized  Final Report (12-05-21 @ 14:46):    No growth    Culture - Blood (collected 12-03-21 @ 09:09)  Source: .Blood Blood-Peripheral  Preliminary Report (12-04-21 @ 10:01):    No growth to date.    Culture - Blood (collected 12-03-21 @ 09:09)  Source: .Blood Blood-Peripheral  Preliminary Report (12-04-21 @ 10:01):    No growth to date.    Culture - Urine (collected 11-30-21 @ 21:30)  Source: Clean Catch Clean Catch (Midstream)  Final Report (12-03-21 @ 09:34):    >100,000 CFU/ml Proteus mirabilis  Organism: Proteus mirabilis (12-03-21 @ 09:34)  Organism: Proteus mirabilis (12-03-21 @ 09:34)      -  Amikacin: S <=16      -  Amoxicillin/Clavulanic Acid: S <=8/4      -  Ampicillin: S <=8 These ampicillin results predict results for amoxicillin      -  Ampicillin/Sulbactam: S <=4/2 Enterobacter, Klebsiella aerogenes, Citrobacter, and Serratia may develop resistance during prolonged therapy (3-4 days)      -  Aztreonam: S <=4      -  Cefazolin: S <=2 (MIC_CL_COM_ENTERIC_CEFAZU) For uncomplicated UTI with K. pneumoniae, E. coli, or P. mirablis: CRUZ <=16 is sensitive and CRUZ >=32 is resistant. This also predicts results for oral agents cefaclor, cefdinir, cefpodoxime, cefprozil, cefuroxime axetil, cephalexin and locarbef for uncomplicated UTI. Note that some isolates may be susceptible to these agents while testing resistant to cefazolin.      -  Cefepime: S <=2      -  Cefoxitin: S <=8      -  Ceftriaxone: S <=1 Enterobacter, Klebsiella aerogenes, Citrobacter, and Serratia may develop resistance during prolonged therapy      -  Ciprofloxacin: S <=0.25      -  Ertapenem: S <=0.5      -  Gentamicin: S <=2      -  Levofloxacin: S <=0.5      -  Meropenem: S <=1      -  Nitrofurantoin: R >64 Should not be used to treat pyelonephritis      -  Piperacillin/Tazobactam: S <=8      -  Tobramycin: S <=2      -  Trimethoprim/Sulfamethoxazole: S 1/19      Method Type: CRUZ    Culture - Blood (collected 11-30-21 @ 21:30)  Source: .Blood Blood-Peripheral  Gram Stain (12-01-21 @ 16:43):    Growth in aerobic and anaerobic bottles: Gram Negative Rods  Final Report (12-03-21 @ 10:59):    Growth in aerobic and anaerobic bottles: Proteus mirabilis    ***Blood Panel PCR results on this specimen are available    approximately 3 hours after the Gram stain result.***    Gram stain, PCR, and/or culture results may not always    correspond due to difference in methodologies.    ************************************************************    This PCR assay was performed by multiplex PCR. This    Assay tests for 66 bacterial and resistance gene targets.    Please refer to the [facility] test directory    at [email or web address] for details.  Organism: Blood Culture PCR  Proteus mirabilis (12-03-21 @ 10:59)  Organism: Proteus mirabilis (12-03-21 @ 10:59)      -  Amikacin: S <=16      -  Ampicillin: S <=8 These ampicillin results predict results for amoxicillin      -  Ampicillin/Sulbactam: S <=4/2 Enterobacter, Klebsiella aerogenes, Citrobacter, and Serratia may develop resistance during prolonged therapy (3-4 days)      -  Aztreonam: S <=4      -  Cefazolin: S <=2 Enterobacter, Klebsiella aerogenes, Citrobacter, and Serratia may develop resistance during prolonged therapy (3-4 days)      -  Cefepime: S <=2      -  Cefoxitin: S <=8      -  Ceftriaxone: S <=1 Enterobacter, Klebsiella aerogenes, Citrobacter, and Serratia may develop resistance during prolonged therapy      -  Ciprofloxacin: S <=0.25      -  Ertapenem: S <=0.5      -  Gentamicin: S <=2      -  Levofloxacin: S <=0.5      -  Meropenem: S <=1      -  Piperacillin/Tazobactam: S <=8      -  Tobramycin: S <=2      -  Trimethoprim/Sulfamethoxazole: S <=0.5/9.5      Method Type: CRUZ  Organism: Blood Culture PCR (12-03-21 @ 10:59)      -  Proteus mirabilis: Detec      Method Type: PCR    Culture - Blood (collected 11-30-21 @ 21:00)  Source: .Blood Blood-Peripheral  Gram Stain (12-03-21 @ 08:27):    Growth in anaerobic bottle:    Gram Negative Rods    Growth in aerobic bottle:    Gram Negative Rods  Final Report (12-04-21 @ 14:11):    Growth in aerobic and anaerobic bottles: Proteus mirabilis    See previous culture [id number]      Radiology: reviewed     Medications:  acetaminophen     Tablet .. 650 milliGRAM(s) Oral every 6 hours PRN  aluminum hydroxide/magnesium hydroxide/simethicone Suspension 30 milliLiter(s) Oral every 4 hours PRN  ascorbic acid 500 milliGRAM(s) Oral daily  cefTRIAXone   IVPB 1000 milliGRAM(s) IV Intermittent every 24 hours  fludroCORTISONE 0.1 milliGRAM(s) Oral two times a day  folic acid 1 milliGRAM(s) Oral daily  heparin   Injectable 5000 Unit(s) SubCutaneous every 12 hours  influenza  Vaccine (HIGH DOSE) 0.7 milliLiter(s) IntraMuscular once  melatonin 3 milliGRAM(s) Oral at bedtime PRN  multivitamin 1 Tablet(s) Oral daily  ondansetron Injectable 4 milliGRAM(s) IV Push every 8 hours PRN  sertraline 50 milliGRAM(s) Oral daily  sodium chloride 0.65% Nasal 1 Spray(s) Both Nostrils every 6 hours PRN  tamsulosin 0.4 milliGRAM(s) Oral at bedtime    Antimicrobials:  cefTRIAXone   IVPB 1000 milliGRAM(s) IV Intermittent every 24 hours  
[facility], Division of Infectious Diseases  DORIAN Patino Y. Patel, S. Shah, G. Casimir  [phone]  ([phone] - weekdays after 5pm and weekends)    Name: KIANNA GUILLEN  Age/Gender: 89y Male  MRN: [id number]    Interval History:  Patient seen this morning.   No new complaints  Notes reviewed  No concerning overnight events  Afebrile     Allergies: No Known Allergies    Objective:  Vitals:   T(F): 98.5 (12-08-21 @ 06:10), Max: 98.5 (12-08-21 @ 06:10)  HR: 92 (12-08-21 @ 06:10) (85 - 96)  BP: 113/67 (12-08-21 @ 06:10) (110/65 - 116/60)  RR: 18 (12-08-21 @ 06:10) (18 - 18)  SpO2: 96% (12-08-21 @ 06:10) (96% - 99%)  Physical Examination:  General: no acute distress  HEENT: NC/AT, anicteric, neck supple  Respiratory: no acc muscle use, breathing comfortably  Cardiovascular: S1 and S2 present  Gastrointestinal: normal appearing, nondistended  Extremities: no edema, no cyanosis  Skin: no visible rash    Laboratory Studies:  CBC:                       9.5    8.93  )-----------( 185      ( 08 Dec 2021 07:20 )             30.0     WBC Trend:  8.93 12-08-21 @ 07:20  9.89 12-07-21 @ 06:36  8.69 12-06-21 @ 08:04  8.25 12-05-21 @ 06:49  10.51 12-04-21 @ 07:33  13.76 12-03-21 @ 06:55  19.44 12-02-21 @ 07:21    CMP: 12-08    139  |  102  |  38<H>  ----------------------------<  111<H>  3.6   |  26  |  1.52<H>    Ca    9.1      08 Dec 2021 07:20    Microbiology: reviewed     Culture - Urine (collected 12-04-21 @ 14:25)  Source: Catheterized Catheterized  Final Report (12-05-21 @ 14:46):    No growth    Culture - Blood (collected 12-03-21 @ 09:09)  Source: .Blood Blood-Peripheral  Preliminary Report (12-04-21 @ 10:01):    No growth to date.    Culture - Blood (collected 12-03-21 @ 09:09)  Source: .Blood Blood-Peripheral  Preliminary Report (12-04-21 @ 10:01):    No growth to date.    Culture - Urine (collected 11-30-21 @ 21:30)  Source: Clean Catch Clean Catch (Midstream)  Final Report (12-03-21 @ 09:34):    >100,000 CFU/ml Proteus mirabilis  Organism: Proteus mirabilis (12-03-21 @ 09:34)  Organism: Proteus mirabilis (12-03-21 @ 09:34)      -  Amikacin: S <=16      -  Amoxicillin/Clavulanic Acid: S <=8/4      -  Ampicillin: S <=8 These ampicillin results predict results for amoxicillin      -  Ampicillin/Sulbactam: S <=4/2 Enterobacter, Klebsiella aerogenes, Citrobacter, and Serratia may develop resistance during prolonged therapy (3-4 days)      -  Aztreonam: S <=4      -  Cefazolin: S <=2 (MIC_CL_COM_ENTERIC_CEFAZU) For uncomplicated UTI with K. pneumoniae, E. coli, or P. mirablis: CRUZ <=16 is sensitive and CRUZ >=32 is resistant. This also predicts results for oral agents cefaclor, cefdinir, cefpodoxime, cefprozil, cefuroxime axetil, cephalexin and locarbef for uncomplicated UTI. Note that some isolates may be susceptible to these agents while testing resistant to cefazolin.      -  Cefepime: S <=2      -  Cefoxitin: S <=8      -  Ceftriaxone: S <=1 Enterobacter, Klebsiella aerogenes, Citrobacter, and Serratia may develop resistance during prolonged therapy      -  Ciprofloxacin: S <=0.25      -  Ertapenem: S <=0.5      -  Gentamicin: S <=2      -  Levofloxacin: S <=0.5      -  Meropenem: S <=1      -  Nitrofurantoin: R >64 Should not be used to treat pyelonephritis      -  Piperacillin/Tazobactam: S <=8      -  Tobramycin: S <=2      -  Trimethoprim/Sulfamethoxazole: S 1/19      Method Type: CRUZ    Culture - Blood (collected 11-30-21 @ 21:30)  Source: .Blood Blood-Peripheral  Gram Stain (12-01-21 @ 16:43):    Growth in aerobic and anaerobic bottles: Gram Negative Rods  Final Report (12-03-21 @ 10:59):    Growth in aerobic and anaerobic bottles: Proteus mirabilis    ***Blood Panel PCR results on this specimen are available    approximately 3 hours after the Gram stain result.***    Gram stain, PCR, and/or culture results may not always    correspond due to difference in methodologies.    ************************************************************    This PCR assay was performed by multiplex PCR. This    Assay tests for 66 bacterial and resistance gene targets.    Please refer to the [facility] test directory    at [email or web address] for details.  Organism: Blood Culture PCR  Proteus mirabilis (12-03-21 @ 10:59)  Organism: Proteus mirabilis (12-03-21 @ 10:59)      -  Amikacin: S <=16      -  Ampicillin: S <=8 These ampicillin results predict results for amoxicillin      -  Ampicillin/Sulbactam: S <=4/2 Enterobacter, Klebsiella aerogenes, Citrobacter, and Serratia may develop resistance during prolonged therapy (3-4 days)      -  Aztreonam: S <=4      -  Cefazolin: S <=2 Enterobacter, Klebsiella aerogenes, Citrobacter, and Serratia may develop resistance during prolonged therapy (3-4 days)      -  Cefepime: S <=2      -  Cefoxitin: S <=8      -  Ceftriaxone: S <=1 Enterobacter, Klebsiella aerogenes, Citrobacter, and Serratia may develop resistance during prolonged therapy      -  Ciprofloxacin: S <=0.25      -  Ertapenem: S <=0.5      -  Gentamicin: S <=2      -  Levofloxacin: S <=0.5      -  Meropenem: S <=1      -  Piperacillin/Tazobactam: S <=8      -  Tobramycin: S <=2      -  Trimethoprim/Sulfamethoxazole: S <=0.5/9.5      Method Type: CRUZ  Organism: Blood Culture PCR (12-03-21 @ 10:59)      -  Proteus mirabilis: Detec      Method Type: PCR    Culture - Blood (collected 11-30-21 @ 21:00)  Source: .Blood Blood-Peripheral  Gram Stain (12-03-21 @ 08:27):    Growth in anaerobic bottle:    Gram Negative Rods    Growth in aerobic bottle:    Gram Negative Rods  Final Report (12-04-21 @ 14:11):    Growth in aerobic and anaerobic bottles: Proteus mirabilis    See previous culture [id number]    Radiology: reviewed     Medications:  acetaminophen     Tablet .. 650 milliGRAM(s) Oral every 6 hours PRN  aluminum hydroxide/magnesium hydroxide/simethicone Suspension 30 milliLiter(s) Oral every 4 hours PRN  ascorbic acid 500 milliGRAM(s) Oral daily  cefTRIAXone   IVPB 1000 milliGRAM(s) IV Intermittent every 24 hours  fludroCORTISONE 0.1 milliGRAM(s) Oral two times a day  folic acid 1 milliGRAM(s) Oral daily  heparin   Injectable 5000 Unit(s) SubCutaneous every 12 hours  influenza  Vaccine (HIGH DOSE) 0.7 milliLiter(s) IntraMuscular once  melatonin 3 milliGRAM(s) Oral at bedtime PRN  multivitamin 1 Tablet(s) Oral daily  ondansetron Injectable 4 milliGRAM(s) IV Push every 8 hours PRN  sertraline 50 milliGRAM(s) Oral daily  sodium chloride 0.65% Nasal 1 Spray(s) Both Nostrils every 6 hours PRN  tamsulosin 0.4 milliGRAM(s) Oral at bedtime    Antimicrobials:  cefTRIAXone   IVPB 1000 milliGRAM(s) IV Intermittent every 24 hours  
SUBJECTIVE / OVERNIGHT EVENTS:  Pt seen and examined at bedside.   No overnight event.  Feeling better.  no cp, no sob, no n/v/d.   the wife at bedside.         --------------------------------------------------------------------------------------------  LABS:                        9.5    8.93  )-----------( 185      ( 08 Dec 2021 07:20 )             30.0     12-08    139  |  102  |  38<H>  ----------------------------<  111<H>  3.6   |  26  |  1.52<H>    Ca    9.1      08 Dec 2021 07:20        CAPILLARY BLOOD GLUCOSE                RADIOLOGY & ADDITIONAL TESTS:    Imaging Personally Reviewed:  [x] YES  [ ] NO    Consultant(s) Notes Reviewed:  [x] YES  [ ] NO    MEDICATIONS  (STANDING):  ascorbic acid 500 milliGRAM(s) Oral daily  cefTRIAXone   IVPB 1000 milliGRAM(s) IV Intermittent every 24 hours  fludroCORTISONE 0.1 milliGRAM(s) Oral two times a day  folic acid 1 milliGRAM(s) Oral daily  heparin   Injectable 5000 Unit(s) SubCutaneous every 12 hours  influenza  Vaccine (HIGH DOSE) 0.7 milliLiter(s) IntraMuscular once  multivitamin 1 Tablet(s) Oral daily  sertraline 50 milliGRAM(s) Oral daily  tamsulosin 0.4 milliGRAM(s) Oral at bedtime    MEDICATIONS  (PRN):  acetaminophen     Tablet .. 650 milliGRAM(s) Oral every 6 hours PRN Temp greater or equal to 38C (100.4F), Mild Pain (1 - 3)  aluminum hydroxide/magnesium hydroxide/simethicone Suspension 30 milliLiter(s) Oral every 4 hours PRN Dyspepsia  melatonin 3 milliGRAM(s) Oral at bedtime PRN Insomnia  ondansetron Injectable 4 milliGRAM(s) IV Push every 8 hours PRN Nausea and/or Vomiting  sodium chloride 0.65% Nasal 1 Spray(s) Both Nostrils every 6 hours PRN Nasal Congestion      Care Discussed with Consultants/Other Providers [x] YES  [ ] NO    Vital Signs Last 24 Hrs  T(C): 36.7 (08 Dec 2021 14:05), Max: 36.9 (08 Dec 2021 06:10)  T(F): 98 (08 Dec 2021 14:05), Max: 98.5 (08 Dec 2021 06:10)  HR: 85 (08 Dec 2021 14:05) (85 - 96)  BP: 101/79 (08 Dec 2021 14:05) (101/79 - 116/60)  BP(mean): --  RR: 16 (08 Dec 2021 14:05) (16 - 18)  SpO2: 99% (08 Dec 2021 14:05) (96% - 99%)  I&O's Summary    07 Dec 2021 07:01  -  08 Dec 2021 07:00  --------------------------------------------------------  IN: 1600 mL / OUT: 1000 mL / NET: 600 mL        PHYSICAL EXAM:   GENERAL: alert and awake, nontoxic appearing, in no acute distress. hard of hearing   HEENT:  Atraumatic, Normocephalic, Conjunctiva and sclera clear, oral mucosa moist, clear w/o any exudate   NECK: Supple, No JVD  CHEST/LUNG: Breathing comfortably, clear to auscultation bilaterally; No wheeze  HEART: Difficult to auscultate heart sound; No murmurs, rubs, or gallops  ABDOMEN: Soft, Nontender, Nondistended; Bowel sounds present  EXTREMITIES:  2+ Peripheral Pulses, No clubbing, cyanosis, or edema  PSYCH: AAOx3, normal affect  NEUROLOGY: non-focal, moving all extremities.  SKIN: No rashes or lesions  : Wang with clear, nonbloody urine
Subjective      Objective    Vital signs  T(F): , Max: 98.3 (12-03-21 @ 13:45)  HR: 81 (12-04-21 @ 12:38)  BP: 122/97 (12-04-21 @ 12:38)  SpO2: 100% (12-04-21 @ 12:38)  Wt(kg): --    Output     OUT:    Incontinent per Condom Catheter (mL): 400 mL    Voided (mL): 1 mL  Total OUT: 401 mL    Total NET: -401 mL            Labs      12-04 @ 07:33    WBC 10.51 / Hct 31.1  / SCr 1.73     12-03 @ 06:55    WBC 13.76 / Hct 28.7  / SCr 1.95         Culture - Blood (collected 12-03-21 @ 09:09)  Source: .Blood Blood-Peripheral  Preliminary Report (12-04-21 @ 10:01):    No growth to date.    Culture - Blood (collected 12-03-21 @ 09:09)  Source: .Blood Blood-Peripheral  Preliminary Report (12-04-21 @ 10:01):    No growth to date.    Culture - Blood (collected 11-30-21 @ 22:57)  Source: .Blood Blood-Peripheral  Gram Stain (12-03-21 @ 08:27):    Growth in anaerobic bottle:    Gram Negative Rods    Growth in aerobic bottle:    Gram Negative Rods  Preliminary Report (12-03-21 @ 12:04):    Growth in anaerobic bottle: Proteus mirabilis See previous culture    [id number]    Growth in aerobic bottle: Gram Negative Rods    Culture - Urine (collected 11-30-21 @ 21:30)  Source: Clean Catch Clean Catch (Midstream)  Final Report (12-03-21 @ 09:34):    >100,000 CFU/ml Proteus mirabilis  Organism: Proteus mirabilis (12-03-21 @ 09:34)  Organism: Proteus mirabilis (12-03-21 @ 09:34)      -  Amikacin: S <=16      -  Amoxicillin/Clavulanic Acid: S <=8/4      -  Ampicillin: S <=8 These ampicillin results predict results for amoxicillin      -  Ampicillin/Sulbactam: S <=4/2 Enterobacter, Klebsiella aerogenes, Citrobacter, and Serratia may develop resistance during prolonged therapy (3-4 days)      -  Aztreonam: S <=4      -  Cefazolin: S <=2 (MIC_CL_COM_ENTERIC_CEFAZU) For uncomplicated UTI with K. pneumoniae, E. coli, or P. mirablis: CRUZ <=16 is sensitive and CRUZ >=32 is resistant. This also predicts results for oral agents cefaclor, cefdinir, cefpodoxime, cefprozil, cefuroxime axetil, cephalexin and locarbef for uncomplicated UTI. Note that some isolates may be susceptible to these agents while testing resistant to cefazolin.      -  Cefepime: S <=2      -  Cefoxitin: S <=8      -  Ceftriaxone: S <=1 Enterobacter, Klebsiella aerogenes, Citrobacter, and Serratia may develop resistance during prolonged therapy      -  Ciprofloxacin: S <=0.25      -  Ertapenem: S <=0.5      -  Gentamicin: S <=2      -  Levofloxacin: S <=0.5      -  Meropenem: S <=1      -  Nitrofurantoin: R >64 Should not be used to treat pyelonephritis      -  Piperacillin/Tazobactam: S <=8      -  Tobramycin: S <=2      -  Trimethoprim/Sulfamethoxazole: S 1/19      Method Type: CRUZ    Culture - Blood (collected 11-30-21 @ 21:30)  Source: .Blood Blood-Peripheral  Gram Stain (12-01-21 @ 16:43):    Growth in aerobic and anaerobic bottles: Gram Negative Rods  Final Report (12-03-21 @ 10:59):    Growth in aerobic and anaerobic bottles: Proteus mirabilis    ***Blood Panel PCR results on this specimen are available    approximately 3 hours after the Gram stain result.***    Gram stain, PCR, and/or culture results may not always    correspond due to difference in methodologies.    ************************************************************    This PCR assay was performed by multiplex PCR. This    Assay tests for 66 bacterial and resistance gene targets.    Please refer to the [facility] test directory    at [email or web address] for details.  Organism: Blood Culture PCR  Proteus mirabilis (12-03-21 @ 10:59)  Organism: Proteus mirabilis (12-03-21 @ 10:59)      -  Amikacin: S <=16      -  Ampicillin: S <=8 These ampicillin results predict results for amoxicillin      -  Ampicillin/Sulbactam: S <=4/2 Enterobacter, Klebsiella aerogenes, Citrobacter, and Serratia may develop resistance during prolonged therapy (3-4 days)      -  Aztreonam: S <=4      -  Cefazolin: S <=2 Enterobacter, Klebsiella aerogenes, Citrobacter, and Serratia may develop resistance during prolonged therapy (3-4 days)      -  Cefepime: S <=2      -  Cefoxitin: S <=8      -  Ceftriaxone: S <=1 Enterobacter, Klebsiella aerogenes, Citrobacter, and Serratia may develop resistance during prolonged therapy      -  Ciprofloxacin: S <=0.25      -  Ertapenem: S <=0.5      -  Gentamicin: S <=2      -  Levofloxacin: S <=0.5      -  Meropenem: S <=1      -  Piperacillin/Tazobactam: S <=8      -  Tobramycin: S <=2      -  Trimethoprim/Sulfamethoxazole: S <=0.5/9.5      Method Type: CRUZ  Organism: Blood Culture PCR (12-03-21 @ 10:59)      -  Proteus mirabilis: Detec      Method Type: PCR      Imaging  < from: CT Abdomen and Pelvis No Cont (12.04.21 @ 11:05) >    EXAM:  CT ABDOMEN AND PELVIS        PROCEDURE DATE:  Dec  4 2021         INTERPRETATION:  CLINICAL INFORMATION: Bacteremia, hydronephrosis. Evaluate for nephrolithiasis.    COMPARISON: CT abdomen and pelvis 4/17/2021    CONTRAST/COMPLICATIONS:  IV Contrast: None.  Oral Contrast: None.  Complications: None reported.    PROCEDURE:  CT of the Abdomen and Pelvis was performed.  Sagittal and coronal reformats were performed.    FINDINGS: Evaluation of the solid organs and vasculature is limited in the absence of intravenous contrast.  LOWER CHEST: Bilateral pleural effusions with subsegmental bibasilar atelectasis, left greater than right. Coronary artery calcifications.    LIVER: Small right hepatic cyst and a subcentimeter left hepatic hypodense nodule, too small to characterize but unchanged and likely to represent a cyst.  BILE DUCTS: Normal caliber.  GALLBLADDER: Within normal limits.  SPLEEN: Within normal limits.  PANCREAS: Within normal limits.  ADRENALS: Within normal limits.  KIDNEYS/URETERS: Moderate left and mild right hydronephrosis on the left ureteral dilatation to the level of the bladder, which is diffusely trabeculated. No renal or ureteral calculi are evident.    BLADDER: Diffusely trabeculated. Limited visualization of the pelvis from left hip streak artifact.  REPRODUCTIVE ORGANS: Prostate within normal limits.    BOWEL: Moderate volume colonic fecal loading. No bowel obstruction.  PERITONEUM: No ascites.  VESSELS: Atherosclerotic changes.  RETROPERITONEUM/LYMPH NODES: No lymphadenopathy.  ABDOMINAL WALL: Within normal limits.  BONES: Degenerative changes. Left hip arthroplasty, creating streak artifact that limits visualization of the left hemipelvis.    IMPRESSION:  Bilateral hydronephrosis, left greater than right with left ureteral dilatation to the level of the diffusely trabeculated urinary bladder. No renal calculi.    Bilateral pleural effusions.    --- End of Report ---              MARVEL VERNON MD; Attending Radiologist  This document has been electronically signed. Dec  4 2021 12:44PM    < end of copied text >  
[facility], Division of Infectious Diseases  DORIAN Patino Y. Patel, S. Shah, G. Toñito  [phone]  after hours and weekends [phone]    Name: KIANNA GUILLEN  Age: 89y  Gender: Male  MRN: [id number]    Interval History--  Notes reviewed  wife at bedside states much improved mental status  pt states he feels better      Allergies    No Known Allergies    Intolerances        Medications--  Antibiotics:  piperacillin/tazobactam IVPB.. 3.375 Gram(s) IV Intermittent every 8 hours    Immunologic:  influenza  Vaccine (HIGH DOSE) 0.7 milliLiter(s) IntraMuscular once    Other:  acetaminophen     Tablet .. PRN  aluminum hydroxide/magnesium hydroxide/simethicone Suspension PRN  ascorbic acid  folic acid  heparin   Injectable  melatonin PRN  multivitamin  ondansetron Injectable PRN  sertraline  sodium chloride 0.65% Nasal PRN      Review of Systems--  A 10-point review of systems was obtained.     Pertinent positives and negatives--  Constitutional: No fevers. No Chills. No Rigors.   Cardiovascular: No chest pain. No palpitations.  Respiratory: No shortness of breath. No cough.  Gastrointestinal: No nausea or vomiting. No diarrhea or constipation.   Psychiatric: no anxiety     Review of systems otherwise negative except as previously noted.    Physical Examination--  Vital Signs: T(F): 97.5 (12-03-21 @ 09:46), Max: 98.2 (12-02-21 @ 18:52)  HR: 86 (12-03-21 @ 09:46)  BP: 145/80 (12-03-21 @ 09:46)  RR: 18 (12-03-21 @ 09:46)  SpO2: 98% (12-03-21 @ 09:46)  Wt(kg): --  General: Nontoxic-appearing Male in no acute distress.  HEENT: AT/NC. Perfecto fair.  Neck: Not rigid. No sense of mass.  Nodes: None palpable.  Lungs: Clear bilaterally without rales, wheezing or rhonchi  Heart: Regular rate and rhythm. + Murmur.   Abdomen: Bowel sounds present and normoactive  Back: No spinal tenderness. No costovertebral angle tenderness.   Extremities: No cyanosis or clubbing. trace edema.   Skin: Warm. Dry. Good turgor. No rash. No vasculitic stigmata.  Psychiatric: Appropriate affect and mood for situation.         Laboratory Studies--  CBC                        9.3    13.76 )-----------( 176      ( 03 Dec 2021 06:55 )             28.7       Chemistries  12-03    136  |  104  |  53<H>  ----------------------------<  109<H>  3.8   |  20<L>  |  1.95<H>    Ca    8.9      03 Dec 2021 06:55  Phos  2.9     12-03  Mg     2.20     12-03        Culture Data    Culture - Blood (collected 30 Nov 2021 22:57)  Source: .Blood Blood-Peripheral  Gram Stain (03 Dec 2021 08:27):    Growth in anaerobic bottle:    Gram Negative Rods    Growth in aerobic bottle:    Gram Negative Rods  Preliminary Report (03 Dec 2021 12:04):    Growth in anaerobic bottle: Proteus mirabilis See previous culture    [id number]    Growth in aerobic bottle: Gram Negative Rods    Culture - Urine (collected 30 Nov 2021 21:30)  Source: Clean Catch Clean Catch (Midstream)  Final Report (03 Dec 2021 09:34):    >100,000 CFU/ml Proteus mirabilis  Organism: Proteus mirabilis (03 Dec 2021 09:34)  Organism: Proteus mirabilis (03 Dec 2021 09:34)    Culture - Blood (collected 30 Nov 2021 21:30)  Source: .Blood Blood-Peripheral  Gram Stain (01 Dec 2021 16:43):    Growth in aerobic and anaerobic bottles: Gram Negative Rods  Final Report (03 Dec 2021 10:59):    Growth in aerobic and anaerobic bottles: Proteus mirabilis    ***Blood Panel PCR results on this specimen are available    approximately 3 hours after the Gram stain result.***    Gram stain, PCR, and/or culture results may not always    correspond due to difference in methodologies.    ************************************************************    This PCR assay was performed by multiplex PCR. This    Assay tests for 66 bacterial and resistance gene targets.    Please refer to the [facility] test directory    at [email or web address] for details.  Organism: Blood Culture PCR  Proteus mirabilis (03 Dec 2021 10:59)  Organism: Proteus mirabilis (03 Dec 2021 10:59)  Organism: Blood Culture PCR (03 Dec 2021 10:59)            
[facility], Division of Infectious Diseases  DORIAN Patino Y. Patel, S. Shah, G. Casimir  [phone]  ([phone] - weekdays after 5pm and weekends)    Name: KIANNA GUILLEN  Age/Gender: 89y Male  MRN: [id number]    Interval History:  Patient seen this morning.   Feels good, no new complaints  Notes reviewed  No concerning overnight events  Afebrile     Allergies: No Known Allergies    Objective:  Vitals:   T(F): 97.8 (12-09-21 @ 05:08), Max: 98.4 (12-08-21 @ 10:15)  HR: 76 (12-09-21 @ 05:08) (76 - 86)  BP: 118/75 (12-09-21 @ 05:08) (101/79 - 118/75)  RR: 18 (12-09-21 @ 05:08) (16 - 18)  SpO2: 100% (12-09-21 @ 05:08) (98% - 100%)  Physical Examination:  General: no acute distress  HEENT: NC/AT, anicteric, neck supple  Respiratory: no acc muscle use, breathing comfortably  Cardiovascular: S1 and S2 present  Gastrointestinal: normal appearing, nondistended  Extremities: no edema, no cyanosis  Skin: no visible rash    Laboratory Studies:  CBC:                       9.1    8.48  )-----------( 196      ( 09 Dec 2021 06:51 )             29.5     WBC Trend:  8.48 12-09-21 @ 06:51  8.93 12-08-21 @ 07:20  9.89 12-07-21 @ 06:36  8.69 12-06-21 @ 08:04  8.25 12-05-21 @ 06:49  10.51 12-04-21 @ 07:33  13.76 12-03-21 @ 06:55    CMP: 12-09    138  |  103  |  38<H>  ----------------------------<  106<H>  3.7   |  27  |  1.54<H>    Ca    8.8      09 Dec 2021 06:51    Microbiology: reviewed   Culture - Urine (collected 12-04-21 @ 14:25)  Source: Catheterized Catheterized  Final Report (12-05-21 @ 14:46):    No growth    Culture - Blood (collected 12-03-21 @ 05:45)  Source: .Blood Blood-Peripheral  Final Report (12-08-21 @ 10:00):    No Growth Final    Culture - Blood (collected 12-03-21 @ 05:45)  Source: .Blood Blood-Peripheral  Final Report (12-08-21 @ 10:00):    No Growth Final    Culture - Urine (collected 11-30-21 @ 21:30)  Source: Clean Catch Clean Catch (Midstream)  Final Report (12-03-21 @ 09:34):    >100,000 CFU/ml Proteus mirabilis  Organism: Proteus mirabilis (12-03-21 @ 09:34)  Organism: Proteus mirabilis (12-03-21 @ 09:34)      -  Amikacin: S <=16      -  Amoxicillin/Clavulanic Acid: S <=8/4      -  Ampicillin: S <=8 These ampicillin results predict results for amoxicillin      -  Ampicillin/Sulbactam: S <=4/2 Enterobacter, Klebsiella aerogenes, Citrobacter, and Serratia may develop resistance during prolonged therapy (3-4 days)      -  Aztreonam: S <=4      -  Cefazolin: S <=2 (MIC_CL_COM_ENTERIC_CEFAZU) For uncomplicated UTI with K. pneumoniae, E. coli, or P. mirablis: CRUZ <=16 is sensitive and CRUZ >=32 is resistant. This also predicts results for oral agents cefaclor, cefdinir, cefpodoxime, cefprozil, cefuroxime axetil, cephalexin and locarbef for uncomplicated UTI. Note that some isolates may be susceptible to these agents while testing resistant to cefazolin.      -  Cefepime: S <=2      -  Cefoxitin: S <=8      -  Ceftriaxone: S <=1 Enterobacter, Klebsiella aerogenes, Citrobacter, and Serratia may develop resistance during prolonged therapy      -  Ciprofloxacin: S <=0.25      -  Ertapenem: S <=0.5      -  Gentamicin: S <=2      -  Levofloxacin: S <=0.5      -  Meropenem: S <=1      -  Nitrofurantoin: R >64 Should not be used to treat pyelonephritis      -  Piperacillin/Tazobactam: S <=8      -  Tobramycin: S <=2      -  Trimethoprim/Sulfamethoxazole: S 1/19      Method Type: CRUZ    Culture - Blood (collected 11-30-21 @ 21:30)  Source: .Blood Blood-Peripheral  Gram Stain (12-01-21 @ 16:43):    Growth in aerobic and anaerobic bottles: Gram Negative Rods  Final Report (12-03-21 @ 10:59):    Growth in aerobic and anaerobic bottles: Proteus mirabilis    ***Blood Panel PCR results on this specimen are available    approximately 3 hours after the Gram stain result.***    Gram stain, PCR, and/or culture results may not always    correspond due to difference in methodologies.    ************************************************************    This PCR assay was performed by multiplex PCR. This    Assay tests for 66 bacterial and resistance gene targets.    Please refer to the [facility] test directory    at [email or web address] for details.  Organism: Blood Culture PCR  Proteus mirabilis (12-03-21 @ 10:59)  Organism: Proteus mirabilis (12-03-21 @ 10:59)      -  Amikacin: S <=16      -  Ampicillin: S <=8 These ampicillin results predict results for amoxicillin      -  Ampicillin/Sulbactam: S <=4/2 Enterobacter, Klebsiella aerogenes, Citrobacter, and Serratia may develop resistance during prolonged therapy (3-4 days)      -  Aztreonam: S <=4      -  Cefazolin: S <=2 Enterobacter, Klebsiella aerogenes, Citrobacter, and Serratia may develop resistance during prolonged therapy (3-4 days)      -  Cefepime: S <=2      -  Cefoxitin: S <=8      -  Ceftriaxone: S <=1 Enterobacter, Klebsiella aerogenes, Citrobacter, and Serratia may develop resistance during prolonged therapy      -  Ciprofloxacin: S <=0.25      -  Ertapenem: S <=0.5      -  Gentamicin: S <=2      -  Levofloxacin: S <=0.5      -  Meropenem: S <=1      -  Piperacillin/Tazobactam: S <=8      -  Tobramycin: S <=2      -  Trimethoprim/Sulfamethoxazole: S <=0.5/9.5      Method Type: CRUZ  Organism: Blood Culture PCR (12-03-21 @ 10:59)      -  Proteus mirabilis: Detec      Method Type: PCR    Culture - Blood (collected 11-30-21 @ 21:00)  Source: .Blood Blood-Peripheral  Gram Stain (12-03-21 @ 08:27):    Growth in anaerobic bottle:    Gram Negative Rods    Growth in aerobic bottle:    Gram Negative Rods  Final Report (12-04-21 @ 14:11):    Growth in aerobic and anaerobic bottles: Proteus mirabilis    See previous culture [id number]    Radiology: reviewed     Medications:  acetaminophen     Tablet .. 650 milliGRAM(s) Oral every 6 hours PRN  aluminum hydroxide/magnesium hydroxide/simethicone Suspension 30 milliLiter(s) Oral every 4 hours PRN  ascorbic acid 500 milliGRAM(s) Oral daily  cefTRIAXone   IVPB 1000 milliGRAM(s) IV Intermittent every 24 hours  fludroCORTISONE 0.1 milliGRAM(s) Oral two times a day  folic acid 1 milliGRAM(s) Oral daily  heparin   Injectable 5000 Unit(s) SubCutaneous every 12 hours  influenza  Vaccine (HIGH DOSE) 0.7 milliLiter(s) IntraMuscular once  melatonin 3 milliGRAM(s) Oral at bedtime PRN  multivitamin 1 Tablet(s) Oral daily  ondansetron Injectable 4 milliGRAM(s) IV Push every 8 hours PRN  sertraline 50 milliGRAM(s) Oral daily  sodium chloride 0.65% Nasal 1 Spray(s) Both Nostrils every 6 hours PRN  tamsulosin 0.4 milliGRAM(s) Oral at bedtime    Antimicrobials:  cefTRIAXone   IVPB 1000 milliGRAM(s) IV Intermittent every 24 hours  
[facility], Division of Infectious Diseases  DORIAN Patino Y. Patel, S. Shah, G. Casimir  [phone]  ([phone] - weekdays after 5pm and weekends)    Name: KIANNA GUILLEN  Age/Gender: 89y Male  MRN: [id number]    Interval History:  Patient seen this morning, having breakfast.   No new complaints. Notes reviewed  No concerning overnight events.  Afebrile.     Allergies: No Known Allergies    Objective:  Vitals:   T(F): 98.5 (12-10-21 @ 05:04), Max: 98.5 (12-09-21 @ 16:47)  HR: 91 (12-10-21 @ 05:04) (84 - 91)  BP: 111/57 (12-10-21 @ 05:04) (96/54 - 116/58)  RR: 17 (12-10-21 @ 05:04) (17 - 18)  SpO2: 97% (12-10-21 @ 05:04) (96% - 98%)  Physical Examination:  General: no acute distress, nontoxic   HEENT: NC/AT, anicteric, neck supple  Respiratory: no acc muscle use, breathing comfortably  Cardiovascular: S1 and S2 present  Gastrointestinal: normal appearing, nondistended  Extremities: no edema, no cyanosis  Skin: no visible rash    Laboratory Studies:  CBC:                       9.1    8.48  )-----------( 196      ( 09 Dec 2021 06:51 )             29.5     WBC Trend:  8.48 12-09-21 @ 06:51  8.93 12-08-21 @ 07:20  9.89 12-07-21 @ 06:36  8.69 12-06-21 @ 08:04  8.25 12-05-21 @ 06:49  10.51 12-04-21 @ 07:33    CMP: 12-09    138  |  103  |  38<H>  ----------------------------<  106<H>  3.7   |  27  |  1.54<H>    Ca    8.8      09 Dec 2021 06:51    Microbiology: reviewed   Culture - Urine (collected 12-04-21 @ 14:25)  Source: Catheterized Catheterized  Final Report (12-05-21 @ 14:46):    No growth    Culture - Blood (collected 12-03-21 @ 05:45)  Source: .Blood Blood-Peripheral  Final Report (12-08-21 @ 10:00):    No Growth Final    Culture - Blood (collected 12-03-21 @ 05:45)  Source: .Blood Blood-Peripheral  Final Report (12-08-21 @ 10:00):    No Growth Final    Culture - Urine (collected 11-30-21 @ 21:30)  Source: Clean Catch Clean Catch (Midstream)  Final Report (12-03-21 @ 09:34):    >100,000 CFU/ml Proteus mirabilis  Organism: Proteus mirabilis (12-03-21 @ 09:34)  Organism: Proteus mirabilis (12-03-21 @ 09:34)      -  Amikacin: S <=16      -  Amoxicillin/Clavulanic Acid: S <=8/4      -  Ampicillin: S <=8 These ampicillin results predict results for amoxicillin      -  Ampicillin/Sulbactam: S <=4/2 Enterobacter, Klebsiella aerogenes, Citrobacter, and Serratia may develop resistance during prolonged therapy (3-4 days)      -  Aztreonam: S <=4      -  Cefazolin: S <=2 (MIC_CL_COM_ENTERIC_CEFAZU) For uncomplicated UTI with K. pneumoniae, E. coli, or P. mirablis: CURZ <=16 is sensitive and CRUZ >=32 is resistant. This also predicts results for oral agents cefaclor, cefdinir, cefpodoxime, cefprozil, cefuroxime axetil, cephalexin and locarbef for uncomplicated UTI. Note that some isolates may be susceptible to these agents while testing resistant to cefazolin.      -  Cefepime: S <=2      -  Cefoxitin: S <=8      -  Ceftriaxone: S <=1 Enterobacter, Klebsiella aerogenes, Citrobacter, and Serratia may develop resistance during prolonged therapy      -  Ciprofloxacin: S <=0.25      -  Ertapenem: S <=0.5      -  Gentamicin: S <=2      -  Levofloxacin: S <=0.5      -  Meropenem: S <=1      -  Nitrofurantoin: R >64 Should not be used to treat pyelonephritis      -  Piperacillin/Tazobactam: S <=8      -  Tobramycin: S <=2      -  Trimethoprim/Sulfamethoxazole: S 1/19      Method Type: CRUZ    Culture - Blood (collected 11-30-21 @ 21:30)  Source: .Blood Blood-Peripheral  Gram Stain (12-01-21 @ 16:43):    Growth in aerobic and anaerobic bottles: Gram Negative Rods  Final Report (12-03-21 @ 10:59):    Growth in aerobic and anaerobic bottles: Proteus mirabilis    ***Blood Panel PCR results on this specimen are available    approximately 3 hours after the Gram stain result.***    Gram stain, PCR, and/or culture results may not always    correspond due to difference in methodologies.    ************************************************************    This PCR assay was performed by multiplex PCR. This    Assay tests for 66 bacterial and resistance gene targets.    Please refer to the [facility] test directory    at [email or web address] for details.  Organism: Blood Culture PCR  Proteus mirabilis (12-03-21 @ 10:59)  Organism: Proteus mirabilis (12-03-21 @ 10:59)      -  Amikacin: S <=16      -  Ampicillin: S <=8 These ampicillin results predict results for amoxicillin      -  Ampicillin/Sulbactam: S <=4/2 Enterobacter, Klebsiella aerogenes, Citrobacter, and Serratia may develop resistance during prolonged therapy (3-4 days)      -  Aztreonam: S <=4      -  Cefazolin: S <=2 Enterobacter, Klebsiella aerogenes, Citrobacter, and Serratia may develop resistance during prolonged therapy (3-4 days)      -  Cefepime: S <=2      -  Cefoxitin: S <=8      -  Ceftriaxone: S <=1 Enterobacter, Klebsiella aerogenes, Citrobacter, and Serratia may develop resistance during prolonged therapy      -  Ciprofloxacin: S <=0.25      -  Ertapenem: S <=0.5      -  Gentamicin: S <=2      -  Levofloxacin: S <=0.5      -  Meropenem: S <=1      -  Piperacillin/Tazobactam: S <=8      -  Tobramycin: S <=2      -  Trimethoprim/Sulfamethoxazole: S <=0.5/9.5      Method Type: CRUZ  Organism: Blood Culture PCR (12-03-21 @ 10:59)      -  Proteus mirabilis: Detec      Method Type: PCR    Culture - Blood (collected 11-30-21 @ 21:00)  Source: .Blood Blood-Peripheral  Gram Stain (12-03-21 @ 08:27):    Growth in anaerobic bottle:    Gram Negative Rods    Growth in aerobic bottle:    Gram Negative Rods  Final Report (12-04-21 @ 14:11):    Growth in aerobic and anaerobic bottles: Proteus mirabilis    See previous culture [id number]    Radiology: reviewed     Medications:  acetaminophen     Tablet .. 650 milliGRAM(s) Oral every 6 hours PRN  aluminum hydroxide/magnesium hydroxide/simethicone Suspension 30 milliLiter(s) Oral every 4 hours PRN  ascorbic acid 500 milliGRAM(s) Oral daily  cefTRIAXone   IVPB 1000 milliGRAM(s) IV Intermittent every 24 hours  fludroCORTISONE 0.1 milliGRAM(s) Oral two times a day  folic acid 1 milliGRAM(s) Oral daily  heparin   Injectable 5000 Unit(s) SubCutaneous every 12 hours  influenza  Vaccine (HIGH DOSE) 0.7 milliLiter(s) IntraMuscular once  melatonin 3 milliGRAM(s) Oral at bedtime PRN  multivitamin 1 Tablet(s) Oral daily  ondansetron Injectable 4 milliGRAM(s) IV Push every 8 hours PRN  sertraline 50 milliGRAM(s) Oral daily  sodium chloride 0.65% Nasal 1 Spray(s) Both Nostrils every 6 hours PRN  tamsulosin 0.4 milliGRAM(s) Oral at bedtime    Antimicrobials:  cefTRIAXone   IVPB 1000 milliGRAM(s) IV Intermittent every 24 hours  
[facility], Division of Infectious Diseases  DORIAN Patino Y. Patel, S. Shah, G. Casimir  [phone]  ([phone] - weekdays after 5pm and weekends)    Name: KIANNA GUILLEN  Age/Gender: 89y Male  MRN: [id number]    Interval History:  Patient seen this morning, feels well.   No new complaints. Notes reviewed  No concerning overnight events  Afebrile     Allergies: No Known Allergies    Objective:  Vitals:   T(F): 98.6 (12-13-21 @ 06:02), Max: 98.6 (12-13-21 @ 06:02)  HR: 84 (12-13-21 @ 06:02) (82 - 84)  BP: 105/54 (12-13-21 @ 06:02) (96/72 - 130/59)  RR: 19 (12-13-21 @ 06:02) (16 - 19)  SpO2: 96% (12-13-21 @ 06:02) (96% - 100%)  Physical Examination:  General: no acute distress  HEENT: NC/AT, anicteric, neck supple  Respiratory: no acc muscle use, breathing comfortably  Cardiovascular: S1 and S2 present  Gastrointestinal: normal appearing, nondistended  Extremities: no edema, no cyanosis  Skin: no visible rash    Laboratory Studies:  CBC:   WBC Trend:  8.48 12-09-21 @ 06:51  8.93 12-08-21 @ 07:20  9.89 12-07-21 @ 06:36    CMP:     Microbiology: reviewed   Culture - Urine (collected 12-04-21 @ 14:25)  Source: Catheterized Catheterized  Final Report (12-05-21 @ 14:46):    No growth    Culture - Blood (collected 12-03-21 @ 05:45)  Source: .Blood Blood-Peripheral  Final Report (12-08-21 @ 10:00):    No Growth Final    Culture - Blood (collected 12-03-21 @ 05:45)  Source: .Blood Blood-Peripheral  Final Report (12-08-21 @ 10:00):    No Growth Final    Culture - Urine (collected 11-30-21 @ 21:30)  Source: Clean Catch Clean Catch (Midstream)  Final Report (12-03-21 @ 09:34):    >100,000 CFU/ml Proteus mirabilis  Organism: Proteus mirabilis (12-03-21 @ 09:34)  Organism: Proteus mirabilis (12-03-21 @ 09:34)      -  Amikacin: S <=16      -  Amoxicillin/Clavulanic Acid: S <=8/4      -  Ampicillin: S <=8 These ampicillin results predict results for amoxicillin      -  Ampicillin/Sulbactam: S <=4/2 Enterobacter, Klebsiella aerogenes, Citrobacter, and Serratia may develop resistance during prolonged therapy (3-4 days)      -  Aztreonam: S <=4      -  Cefazolin: S <=2 (MIC_CL_COM_ENTERIC_CEFAZU) For uncomplicated UTI with K. pneumoniae, E. coli, or P. mirablis: CRUZ <=16 is sensitive and CRUZ >=32 is resistant. This also predicts results for oral agents cefaclor, cefdinir, cefpodoxime, cefprozil, cefuroxime axetil, cephalexin and locarbef for uncomplicated UTI. Note that some isolates may be susceptible to these agents while testing resistant to cefazolin.      -  Cefepime: S <=2      -  Cefoxitin: S <=8      -  Ceftriaxone: S <=1 Enterobacter, Klebsiella aerogenes, Citrobacter, and Serratia may develop resistance during prolonged therapy      -  Ciprofloxacin: S <=0.25      -  Ertapenem: S <=0.5      -  Gentamicin: S <=2      -  Levofloxacin: S <=0.5      -  Meropenem: S <=1      -  Nitrofurantoin: R >64 Should not be used to treat pyelonephritis      -  Piperacillin/Tazobactam: S <=8      -  Tobramycin: S <=2      -  Trimethoprim/Sulfamethoxazole: S 1/19      Method Type: CRUZ    Culture - Blood (collected 11-30-21 @ 21:30)  Source: .Blood Blood-Peripheral  Gram Stain (12-01-21 @ 16:43):    Growth in aerobic and anaerobic bottles: Gram Negative Rods  Final Report (12-03-21 @ 10:59):    Growth in aerobic and anaerobic bottles: Proteus mirabilis    ***Blood Panel PCR results on this specimen are available    approximately 3 hours after the Gram stain result.***    Gram stain, PCR, and/or culture results may not always    correspond due to difference in methodologies.    ************************************************************    This PCR assay was performed by multiplex PCR. This    Assay tests for 66 bacterial and resistance gene targets.    Please refer to the [facility] test directory    at [email or web address] for details.  Organism: Blood Culture PCR  Proteus mirabilis (12-03-21 @ 10:59)  Organism: Proteus mirabilis (12-03-21 @ 10:59)      -  Amikacin: S <=16      -  Ampicillin: S <=8 These ampicillin results predict results for amoxicillin      -  Ampicillin/Sulbactam: S <=4/2 Enterobacter, Klebsiella aerogenes, Citrobacter, and Serratia may develop resistance during prolonged therapy (3-4 days)      -  Aztreonam: S <=4      -  Cefazolin: S <=2 Enterobacter, Klebsiella aerogenes, Citrobacter, and Serratia may develop resistance during prolonged therapy (3-4 days)      -  Cefepime: S <=2      -  Cefoxitin: S <=8      -  Ceftriaxone: S <=1 Enterobacter, Klebsiella aerogenes, Citrobacter, and Serratia may develop resistance during prolonged therapy      -  Ciprofloxacin: S <=0.25      -  Ertapenem: S <=0.5      -  Gentamicin: S <=2      -  Levofloxacin: S <=0.5      -  Meropenem: S <=1      -  Piperacillin/Tazobactam: S <=8      -  Tobramycin: S <=2      -  Trimethoprim/Sulfamethoxazole: S <=0.5/9.5      Method Type: CRUZ  Organism: Blood Culture PCR (12-03-21 @ 10:59)      -  Proteus mirabilis: Detec      Method Type: PCR    Culture - Blood (collected 11-30-21 @ 21:00)  Source: .Blood Blood-Peripheral  Gram Stain (12-03-21 @ 08:27):    Growth in anaerobic bottle:    Gram Negative Rods    Growth in aerobic bottle:    Gram Negative Rods  Final Report (12-04-21 @ 14:11):    Growth in aerobic and anaerobic bottles: Proteus mirabilis    See previous culture [id number]      Radiology: reviewed     Medications:  acetaminophen     Tablet .. 650 milliGRAM(s) Oral every 6 hours PRN  aluminum hydroxide/magnesium hydroxide/simethicone Suspension 30 milliLiter(s) Oral every 4 hours PRN  ascorbic acid 500 milliGRAM(s) Oral daily  fludroCORTISONE 0.1 milliGRAM(s) Oral two times a day  folic acid 1 milliGRAM(s) Oral daily  heparin   Injectable 5000 Unit(s) SubCutaneous every 12 hours  influenza  Vaccine (HIGH DOSE) 0.7 milliLiter(s) IntraMuscular once  melatonin 3 milliGRAM(s) Oral at bedtime PRN  multivitamin 1 Tablet(s) Oral daily  ondansetron Injectable 4 milliGRAM(s) IV Push every 8 hours PRN  sertraline 50 milliGRAM(s) Oral daily  sodium chloride 0.65% Nasal 1 Spray(s) Both Nostrils every 6 hours PRN  tamsulosin 0.4 milliGRAM(s) Oral at bedtime    Antimicrobials:  
DAILY PROGRESS NOTE:         24 hr events:  sanchez placed yesterday. now draining well  no acute complaints     Objective:    Vital Signs Last 24 Hrs  T(C): 36.8 (05 Dec 2021 05:00), Max: 36.8 (04 Dec 2021 21:27)  T(F): 98.3 (05 Dec 2021 05:00), Max: 98.3 (04 Dec 2021 21:27)  HR: 91 (05 Dec 2021 05:00) (81 - 92)  BP: 116/72 (05 Dec 2021 05:00) (12/72 - 122/97)  BP(mean): --  RR: 18 (05 Dec 2021 05:00) (18 - 18)  SpO2: 97% (05 Dec 2021 05:00) (97% - 100%)    I&O's Detail    04 Dec 2021 07:01  -  05 Dec 2021 07:00  --------------------------------------------------------  IN:    Oral Fluid: 360 mL  Total IN: 360 mL    OUT:    Indwelling Catheter - Urethral (mL): 1200 mL  Total OUT: 1200 mL    Total NET: -840 mL          Physical Exam:    General: NAD, well-nourished  Resp: Breathing comfortably on RA  CV: regular rate   : Sanchez in place draining CYU   Psych: AOx3  Neuro: No focal deficits       Laboratory Results:                          9.2    8.25  )-----------( 188      ( 05 Dec 2021 06:49 )             29.3     12-05    139  |  105  |  46<H>  ----------------------------<  96  4.2   |  23  |  1.71<H>    Ca    8.7      05 Dec 2021 06:49  Phos  2.8     12-05  Mg     2.00     12-05                        
[facility], Division of Infectious Diseases  DORIAN Patino Y. Patel, S. Shah, G. Casimir  [phone]  ([phone] - weekdays after 5pm and weekends)    Name: KIANNA GUILLEN  Age/Gender: 89y Male  MRN: [id number]    Interval History:  Patient seen earlier this morning.   Feeling better, has no new complaints  Notes reviewed. No concerning overnight events  Afebrile     Allergies: No Known Allergies    Objective:  Vitals:   T(F): 98 (12-06-21 @ 09:54), Max: 98.6 (12-05-21 @ 21:34)  HR: 86 (12-06-21 @ 09:54) (86 - 89)  BP: 98/55 (12-06-21 @ 11:45) (88/44 - 130/62)  RR: 20 (12-06-21 @ 09:54) (17 - 20)  SpO2: 100% (12-06-21 @ 09:54) (98% - 100%)  Physical Examination:  General: no acute distress  HEENT: NC/AT, anicteric, neck supple  Respiratory: no acc muscle use, breathing comfortably  Cardiovascular: S1 and S2 present  Gastrointestinal: normal appearing, nondistended  Extremities: no edema, no cyanosis  Skin: no visible rash    Laboratory Studies:  CBC:                       9.5    8.69  )-----------( 185      ( 06 Dec 2021 08:04 )             29.9     WBC Trend:  8.69 12-06-21 @ 08:04  8.25 12-05-21 @ 06:49  10.51 12-04-21 @ 07:33  13.76 12-03-21 @ 06:55  19.44 12-02-21 @ 07:21  20.72 12-01-21 @ 07:06  19.10 11-30-21 @ 21:54    CMP: 12-06    139  |  103  |  41<H>  ----------------------------<  98  3.7   |  26  |  1.63<H>    Ca    8.9      06 Dec 2021 08:04  Phos  2.9     12-06  Mg     2.00     12-06    Microbiology: reviewed   Culture - Urine (collected 12-04-21 @ 14:25)  Source: Catheterized Catheterized  Final Report (12-05-21 @ 14:46):    No growth    Culture - Blood (collected 12-03-21 @ 09:09)  Source: .Blood Blood-Peripheral  Preliminary Report (12-04-21 @ 10:01):    No growth to date.    Culture - Blood (collected 12-03-21 @ 09:09)  Source: .Blood Blood-Peripheral  Preliminary Report (12-04-21 @ 10:01):    No growth to date.    Culture - Urine (collected 11-30-21 @ 21:30)  Source: Clean Catch Clean Catch (Midstream)  Final Report (12-03-21 @ 09:34):    >100,000 CFU/ml Proteus mirabilis  Organism: Proteus mirabilis (12-03-21 @ 09:34)  Organism: Proteus mirabilis (12-03-21 @ 09:34)      -  Amikacin: S <=16      -  Amoxicillin/Clavulanic Acid: S <=8/4      -  Ampicillin: S <=8 These ampicillin results predict results for amoxicillin      -  Ampicillin/Sulbactam: S <=4/2 Enterobacter, Klebsiella aerogenes, Citrobacter, and Serratia may develop resistance during prolonged therapy (3-4 days)      -  Aztreonam: S <=4      -  Cefazolin: S <=2 (MIC_CL_[id number]) For uncomplicated UTI with K. pneumoniae, E. coli, or P. mirablis: CRUZ <=16 is sensitive and CRUZ >=32 is resistant. This also predicts results for oral agents cefaclor, cefdinir, cefpodoxime, cefprozil, cefuroxime axetil, cephalexin and locarbef for uncomplicated UTI. Note that some isolates may be susceptible to these agents while testing resistant to cefazolin.      -  Cefepime: S <=2      -  Cefoxitin: S <=8      -  Ceftriaxone: S <=1 Enterobacter, Klebsiella aerogenes, Citrobacter, and Serratia may develop resistance during prolonged therapy      -  Ciprofloxacin: S <=0.25      -  Ertapenem: S <=0.5      -  Gentamicin: S <=2      -  Levofloxacin: S <=0.5      -  Meropenem: S <=1      -  Nitrofurantoin: R >64 Should not be used to treat pyelonephritis      -  Piperacillin/Tazobactam: S <=8      -  Tobramycin: S <=2      -  Trimethoprim/Sulfamethoxazole: S 1/19      Method Type: CRUZ    Culture - Blood (collected 11-30-21 @ 21:30)  Source: .Blood Blood-Peripheral  Gram Stain (12-01-21 @ 16:43):    Growth in aerobic and anaerobic bottles: Gram Negative Rods  Final Report (12-03-21 @ 10:59):    Growth in aerobic and anaerobic bottles: Proteus mirabilis    ***Blood Panel PCR results on this specimen are available    approximately 3 hours after the Gram stain result.***    Gram stain, PCR, and/or culture results may not always    correspond due to difference in methodologies.    ************************************************************    This PCR assay was performed by multiplex PCR. This    Assay tests for 66 bacterial and resistance gene targets.    Please refer to the [facility] test directory    at [email or web address] for details.  Organism: Blood Culture PCR  Proteus mirabilis (12-03-21 @ 10:59)  Organism: Proteus mirabilis (12-03-21 @ 10:59)      -  Amikacin: S <=16      -  Ampicillin: S <=8 These ampicillin results predict results for amoxicillin      -  Ampicillin/Sulbactam: S <=4/2 Enterobacter, Klebsiella aerogenes, Citrobacter, and Serratia may develop resistance during prolonged therapy (3-4 days)      -  Aztreonam: S <=4      -  Cefazolin: S <=2 Enterobacter, Klebsiella aerogenes, Citrobacter, and Serratia may develop resistance during prolonged therapy (3-4 days)      -  Cefepime: S <=2      -  Cefoxitin: S <=8      -  Ceftriaxone: S <=1 Enterobacter, Klebsiella aerogenes, Citrobacter, and Serratia may develop resistance during prolonged therapy      -  Ciprofloxacin: S <=0.25      -  Ertapenem: S <=0.5      -  Gentamicin: S <=2      -  Levofloxacin: S <=0.5      -  Meropenem: S <=1      -  Piperacillin/Tazobactam: S <=8      -  Tobramycin: S <=2      -  Trimethoprim/Sulfamethoxazole: S <=0.5/9.5      Method Type: CRUZ  Organism: Blood Culture PCR (12-03-21 @ 10:59)      -  Proteus mirabilis: Detec      Method Type: PCR    Culture - Blood (collected 11-30-21 @ 21:00)  Source: .Blood Blood-Peripheral  Gram Stain (12-03-21 @ 08:27):    Growth in anaerobic bottle:    Gram Negative Rods    Growth in aerobic bottle:    Gram Negative Rods  Final Report (12-04-21 @ 14:11):    Growth in aerobic and anaerobic bottles: Proteus mirabilis    See previous culture [id number]    Radiology: reviewed     Medications:  acetaminophen     Tablet .. 650 milliGRAM(s) Oral every 6 hours PRN  aluminum hydroxide/magnesium hydroxide/simethicone Suspension 30 milliLiter(s) Oral every 4 hours PRN  ascorbic acid 500 milliGRAM(s) Oral daily  cefTRIAXone   IVPB 1000 milliGRAM(s) IV Intermittent every 24 hours  fludroCORTISONE 0.1 milliGRAM(s) Oral two times a day  folic acid 1 milliGRAM(s) Oral daily  heparin   Injectable 5000 Unit(s) SubCutaneous every 12 hours  influenza  Vaccine (HIGH DOSE) 0.7 milliLiter(s) IntraMuscular once  melatonin 3 milliGRAM(s) Oral at bedtime PRN  multivitamin 1 Tablet(s) Oral daily  ondansetron Injectable 4 milliGRAM(s) IV Push every 8 hours PRN  sertraline 50 milliGRAM(s) Oral daily  sodium chloride 0.65% Nasal 1 Spray(s) Both Nostrils every 6 hours PRN  tamsulosin 0.4 milliGRAM(s) Oral at bedtime    Antimicrobials:  cefTRIAXone   IVPB 1000 milliGRAM(s) IV Intermittent every 24 hours  
Eyes closed but readily opens to verbal stimuli  Son @ bedside    Vital Signs Last 24 Hrs  T(C): 36.5 (06 Dec 2021 13:26), Max: 37 (05 Dec 2021 21:34)  T(F): 97.7 (06 Dec 2021 13:26), Max: 98.6 (05 Dec 2021 21:34)  HR: 84 (06 Dec 2021 13:26) (84 - 89)  BP: 100/51 (06 Dec 2021 13:26) (88/44 - 116/54)  BP(mean): --  RR: 20 (06 Dec 2021 09:54) (17 - 20)  SpO2: 98% (06 Dec 2021 13:26) (98% - 100%)    I&O's Summary    12-05-21 @ 07:01  -  12-06-21 @ 07:00  --------------------------------------------------------  IN: 740 mL / OUT: 1500 mL / NET: -760 mL        GENERAL: alert and awake, nontoxic appearing, in no acute distress   HEENT:  Atraumatic, Normocephalic, Conjunctiva and sclera clear, oral mucosa moist, clear w/o any exudate   NECK: Supple, No JVD  CHEST/LUNG: Breathing comfortably, clear to auscultation bilaterally; No wheeze  HEART: Difficult to auscultate heart sound; No murmurs, rubs, or gallops  ABDOMEN: Soft, Nontender, Nondistended; Bowel sounds present  EXTREMITIES:  2+ Peripheral Pulses, No clubbing, cyanosis, or edema  PSYCH: AAOx3, normal affect  NEUROLOGY: non-focal, moving all extremities.  SKIN: No rashes or lesions  : Wang with clear, nonbloody urine    LABS:                        9.5    8.69  )-----------( 185      ( 06 Dec 2021 08:04 )             29.9     12-06    139  |  103  |  41<H>  ----------------------------<  98  3.7   |  26  |  1.63<H>    Ca    8.9      06 Dec 2021 08:04  Phos  2.9     12-06  Mg     2.00     12-06        CAPILLARY BLOOD GLUCOSE                RADIOLOGY & ADDITIONAL TESTS:    Imaging Personally Reviewed:  [x] YES  [ ] NO    Will obtain old records:  [ ] YES  [x] NO                      
Eyes closed but readily opens to verbal stimuli  No acute SOB or CP    Vital Signs Last 24 Hrs  T(C): 36.6 (04 Dec 2021 06:26), Max: 36.8 (03 Dec 2021 13:45)  T(F): 97.8 (04 Dec 2021 06:26), Max: 98.3 (03 Dec 2021 13:45)  HR: 87 (04 Dec 2021 06:26) (82 - 116)  BP: 144/89 (04 Dec 2021 06:26) (144/89 - 158/100)  BP(mean): --  RR: 18 (04 Dec 2021 06:26) (18 - 20)  SpO2: 99% (04 Dec 2021 06:26) (98% - 100%)    I&O's Summary    12-03-21 @ 07:01  -  12-04-21 @ 07:00  --------------------------------------------------------  IN: 20 mL / OUT: 401 mL / NET: -381 mL          GENERAL: alert and awake, nontoxic appearing, in no acute distress   HEENT:  Atraumatic, Normocephalic, Conjunctiva and sclera clear, oral mucosa moist, clear w/o any exudate   NECK: Supple, No JVD  CHEST/LUNG: Breathing comfortably, clear to auscultation bilaterally; No wheeze  HEART: Difficult to auscultate heart sound; No murmurs, rubs, or gallops  ABDOMEN: Soft, Nontender, Nondistended; Bowel sounds present  EXTREMITIES:  2+ Peripheral Pulses, No clubbing, cyanosis, or edema  PSYCH: AAOx3, normal affect  NEUROLOGY: non-focal, moving all extremities.  SKIN: No rashes or lesions    LABS:                        10.1   10.51 )-----------( 196      ( 04 Dec 2021 07:33 )             31.1     12-04    138  |  104  |  46<H>  ----------------------------<  89  3.8   |  23  |  1.73<H>    Ca    8.9      04 Dec 2021 07:33  Phos  2.2     12-04  Mg     2.10     12-04        CAPILLARY BLOOD GLUCOSE                RADIOLOGY & ADDITIONAL TESTS:    Imaging Personally Reviewed:  [x] YES  [ ] NO    Will obtain old records:  [ ] YES  [x] NO      
SUBJECTIVE / OVERNIGHT EVENTS:  stable overall.  awake alert. comfortable.  denied pain.  no n/v/d.   tolerating diet.   no dizziness        --------------------------------------------------------------------------------------------  LABS:                        9.1    8.48  )-----------( 196      ( 09 Dec 2021 06:51 )             29.5     12-09    138  |  103  |  38<H>  ----------------------------<  106<H>  3.7   |  27  |  1.54<H>    Ca    8.8      09 Dec 2021 06:51        CAPILLARY BLOOD GLUCOSE                RADIOLOGY & ADDITIONAL TESTS:    Imaging Personally Reviewed:  [x] YES  [ ] NO    Consultant(s) Notes Reviewed:  [x] YES  [ ] NO    MEDICATIONS  (STANDING):  ascorbic acid 500 milliGRAM(s) Oral daily  fludroCORTISONE 0.1 milliGRAM(s) Oral two times a day  folic acid 1 milliGRAM(s) Oral daily  heparin   Injectable 5000 Unit(s) SubCutaneous every 12 hours  influenza  Vaccine (HIGH DOSE) 0.7 milliLiter(s) IntraMuscular once  multivitamin 1 Tablet(s) Oral daily  sertraline 50 milliGRAM(s) Oral daily  tamsulosin 0.4 milliGRAM(s) Oral at bedtime    MEDICATIONS  (PRN):  acetaminophen     Tablet .. 650 milliGRAM(s) Oral every 6 hours PRN Temp greater or equal to 38C (100.4F), Mild Pain (1 - 3)  aluminum hydroxide/magnesium hydroxide/simethicone Suspension 30 milliLiter(s) Oral every 4 hours PRN Dyspepsia  melatonin 3 milliGRAM(s) Oral at bedtime PRN Insomnia  ondansetron Injectable 4 milliGRAM(s) IV Push every 8 hours PRN Nausea and/or Vomiting  sodium chloride 0.65% Nasal 1 Spray(s) Both Nostrils every 6 hours PRN Nasal Congestion      Care Discussed with Consultants/Other Providers [x] YES  [ ] NO    Vital Signs Last 24 Hrs  T(C): 36.8 (10 Dec 2021 13:15), Max: 36.9 (10 Dec 2021 02:00)  T(F): 98.2 (10 Dec 2021 13:15), Max: 98.5 (10 Dec 2021 05:04)  HR: 93 (10 Dec 2021 13:15) (84 - 93)  BP: 99/59 (10 Dec 2021 13:15) (99/59 - 115/62)  BP(mean): --  RR: 17 (10 Dec 2021 13:15) (17 - 18)  SpO2: 97% (10 Dec 2021 13:15) (96% - 97%)  I&O's Summary    09 Dec 2021 07:01  -  10 Dec 2021 07:00  --------------------------------------------------------  IN: 0 mL / OUT: 2100 mL / NET: -2100 mL    10 Dec 2021 07:01  -  10 Dec 2021 19:10  --------------------------------------------------------  IN: 0 mL / OUT: 500 mL / NET: -500 mL          PHYSICAL EXAM:   GENERAL: alert and awake, nontoxic appearing, in no acute distress. hard of hearing   HEENT:  Atraumatic, Normocephalic, Conjunctiva and sclera clear, oral mucosa moist, clear w/o any exudate   NECK: Supple, No JVD  CHEST/LUNG: Breathing comfortably, clear to auscultation bilaterally; No wheeze  HEART: Difficult to auscultate heart sound; No murmurs, rubs, or gallops  ABDOMEN: Soft, Nontender, Nondistended; Bowel sounds present  EXTREMITIES:  2+ Peripheral Pulses, No clubbing, cyanosis, or edema  PSYCH: AAOx3, normal affect  NEUROLOGY: non-focal, moving all extremities.  SKIN: No rashes or lesions  : Wang with clear, nonbloody urine
SUBJECTIVE / OVERNIGHT EVENTS:  No events.  Feel well.  no complaints.   no cp, no sob, no n/v/d.  no abd pain.   await rehab.    --------------------------------------------------------------------------------------------  LABS:            CAPILLARY BLOOD GLUCOSE                RADIOLOGY & ADDITIONAL TESTS:    Imaging Personally Reviewed:  [x] YES  [ ] NO    Consultant(s) Notes Reviewed:  [x] YES  [ ] NO    MEDICATIONS  (STANDING):  ascorbic acid 500 milliGRAM(s) Oral daily  fludroCORTISONE 0.1 milliGRAM(s) Oral two times a day  folic acid 1 milliGRAM(s) Oral daily  heparin   Injectable 5000 Unit(s) SubCutaneous every 12 hours  influenza  Vaccine (HIGH DOSE) 0.7 milliLiter(s) IntraMuscular once  multivitamin 1 Tablet(s) Oral daily  sertraline 50 milliGRAM(s) Oral daily  tamsulosin 0.4 milliGRAM(s) Oral at bedtime    MEDICATIONS  (PRN):  acetaminophen     Tablet .. 650 milliGRAM(s) Oral every 6 hours PRN Temp greater or equal to 38C (100.4F), Mild Pain (1 - 3)  aluminum hydroxide/magnesium hydroxide/simethicone Suspension 30 milliLiter(s) Oral every 4 hours PRN Dyspepsia  melatonin 3 milliGRAM(s) Oral at bedtime PRN Insomnia  ondansetron Injectable 4 milliGRAM(s) IV Push every 8 hours PRN Nausea and/or Vomiting  sodium chloride 0.65% Nasal 1 Spray(s) Both Nostrils every 6 hours PRN Nasal Congestion      Care Discussed with Consultants/Other Providers [x] YES  [ ] NO    Vital Signs Last 24 Hrs  T(C): 36.8 (11 Dec 2021 10:00), Max: 36.8 (10 Dec 2021 13:15)  T(F): 98.3 (11 Dec 2021 10:00), Max: 98.3 (11 Dec 2021 10:00)  HR: 88 (11 Dec 2021 10:00) (78 - 93)  BP: 126/60 (11 Dec 2021 10:00) (99/59 - 128/72)  BP(mean): --  RR: 16 (11 Dec 2021 10:00) (16 - 18)  SpO2: 97% (11 Dec 2021 10:00) (94% - 98%)  I&O's Summary    10 Dec 2021 07:01  -  11 Dec 2021 07:00  --------------------------------------------------------  IN: 0 mL / OUT: 1700 mL / NET: -1700 mL        PHYSICAL EXAM:   GENERAL: alert and awake, nontoxic appearing, in no acute distress. hard of hearing   HEENT:  Atraumatic, Normocephalic, Conjunctiva and sclera clear, oral mucosa moist, clear w/o any exudate   NECK: Supple, No JVD  CHEST/LUNG: Breathing comfortably, clear to auscultation bilaterally; No wheeze  HEART: Difficult to auscultate heart sound; No murmurs, rubs, or gallops  ABDOMEN: Soft, Nontender, Nondistended; Bowel sounds present  EXTREMITIES:  2+ Peripheral Pulses, No clubbing, cyanosis, or edema  PSYCH: AAOx3, normal affect  NEUROLOGY: non-focal, moving all extremities.  SKIN: No rashes or lesions  : Wang with clear, nonbloody urine
SUBJECTIVE / OVERNIGHT EVENTS:  stable overall.  awake alert. comfortable.  denied pain.  no n/v/d.   tolerating diet.   await rehab.  laura in place       --------------------------------------------------------------------------------------------  LABS:            CAPILLARY BLOOD GLUCOSE                RADIOLOGY & ADDITIONAL TESTS:    Imaging Personally Reviewed:  [x] YES  [ ] NO    Consultant(s) Notes Reviewed:  [x] YES  [ ] NO    MEDICATIONS  (STANDING):  ascorbic acid 500 milliGRAM(s) Oral daily  fludroCORTISONE 0.1 milliGRAM(s) Oral two times a day  folic acid 1 milliGRAM(s) Oral daily  heparin   Injectable 5000 Unit(s) SubCutaneous every 12 hours  influenza  Vaccine (HIGH DOSE) 0.7 milliLiter(s) IntraMuscular once  multivitamin 1 Tablet(s) Oral daily  sertraline 50 milliGRAM(s) Oral daily  tamsulosin 0.4 milliGRAM(s) Oral at bedtime    MEDICATIONS  (PRN):  acetaminophen     Tablet .. 650 milliGRAM(s) Oral every 6 hours PRN Temp greater or equal to 38C (100.4F), Mild Pain (1 - 3)  aluminum hydroxide/magnesium hydroxide/simethicone Suspension 30 milliLiter(s) Oral every 4 hours PRN Dyspepsia  melatonin 3 milliGRAM(s) Oral at bedtime PRN Insomnia  ondansetron Injectable 4 milliGRAM(s) IV Push every 8 hours PRN Nausea and/or Vomiting  sodium chloride 0.65% Nasal 1 Spray(s) Both Nostrils every 6 hours PRN Nasal Congestion      Care Discussed with Consultants/Other Providers [x] YES  [ ] NO    Vital Signs Last 24 Hrs  T(C): 36.4 (12 Dec 2021 17:17), Max: 37.1 (11 Dec 2021 21:29)  T(F): 97.5 (12 Dec 2021 17:17), Max: 98.8 (12 Dec 2021 02:06)  HR: 82 (12 Dec 2021 17:17) (75 - 98)  BP: 96/72 (12 Dec 2021 17:17) (96/72 - 113/50)  BP(mean): 70 (12 Dec 2021 09:30) (70 - 70)  RR: 16 (12 Dec 2021 17:17) (16 - 18)  SpO2: 100% (12 Dec 2021 17:17) (95% - 100%)  I&O's Summary    11 Dec 2021 07:01  -  12 Dec 2021 07:00  --------------------------------------------------------  IN: 237 mL / OUT: 1000 mL / NET: -763 mL    12 Dec 2021 07:01  -  12 Dec 2021 20:26  --------------------------------------------------------  IN: 237 mL / OUT: 1500 mL / NET: -1263 mL        PHYSICAL EXAM:   GENERAL: alert and awake, nontoxic appearing, in no acute distress. hard of hearing   HEENT:  Atraumatic, Normocephalic, Conjunctiva and sclera clear, oral mucosa moist, clear w/o any exudate   NECK: Supple, No JVD  CHEST/LUNG: Breathing comfortably, clear to auscultation bilaterally; No wheeze  HEART: Difficult to auscultate heart sound; No murmurs, rubs, or gallops  ABDOMEN: Soft, Nontender, Nondistended; Bowel sounds present  EXTREMITIES:  2+ Peripheral Pulses, No clubbing, cyanosis, or edema  : Wang in place, jose color urine, neg CVAT   PSYCH: AAOx3, normal affect  NEUROLOGY: non-focal, moving all extremities.  SKIN: No rashes or lesions  : Wang with clear, nonbloody urine
No fevers or chills  NO flank pain    Vital Signs Last 24 Hrs  T(C): 36.4 (03 Dec 2021 09:46), Max: 36.8 (02 Dec 2021 18:52)  T(F): 97.5 (03 Dec 2021 09:46), Max: 98.2 (02 Dec 2021 18:52)  HR: 86 (03 Dec 2021 09:46) (70 - 92)  BP: 145/80 (03 Dec 2021 09:46) (120/68 - 148/94)  BP(mean): --  RR: 18 (03 Dec 2021 09:46) (16 - 18)  SpO2: 98% (03 Dec 2021 09:46) (98% - 100%)    I&O's Summary    12-03-21 @ 07:01  -  12-03-21 @ 15:14  --------------------------------------------------------  IN: 0 mL / OUT: 400 mL / NET: -400 mL        GENERAL: alert and awake, nontoxic appearing, in no acute distress   HEENT:  Atraumatic, Normocephalic, Conjunctiva and sclera clear, oral mucosa moist, clear w/o any exudate   NECK: Supple, No JVD  CHEST/LUNG: Breathing comfortably, clear to auscultation bilaterally; No wheeze  HEART: Difficult to auscultate heart sound; No murmurs, rubs, or gallops  ABDOMEN: Soft, Nontender, Nondistended; Bowel sounds present  EXTREMITIES:  2+ Peripheral Pulses, No clubbing, cyanosis, or edema  PSYCH: AAOx3, normal affect  NEUROLOGY: non-focal, moving all extremities.  SKIN: No rashes or lesions    LABS:                        9.3    13.76 )-----------( 176      ( 03 Dec 2021 06:55 )             28.7     12-03    136  |  104  |  53<H>  ----------------------------<  109<H>  3.8   |  20<L>  |  1.95<H>    Ca    8.9      03 Dec 2021 06:55  Phos  2.9     12-03  Mg     2.20     12-03        CAPILLARY BLOOD GLUCOSE                RADIOLOGY & ADDITIONAL TESTS:    Imaging Personally Reviewed:  [x] YES  [ ] NO    Will obtain old records:  [ ] YES  [x] NO
Eyes closed but readily opens to verbal stimuli  Wang inserted last night    Vital Signs Last 24 Hrs  T(C): 36.8 (05 Dec 2021 05:00), Max: 36.8 (04 Dec 2021 21:27)  T(F): 98.3 (05 Dec 2021 05:00), Max: 98.3 (04 Dec 2021 21:27)  HR: 91 (05 Dec 2021 05:00) (81 - 92)  BP: 116/72 (05 Dec 2021 05:00) (12/72 - 122/97)  BP(mean): --  RR: 18 (05 Dec 2021 05:00) (18 - 18)  SpO2: 97% (05 Dec 2021 05:00) (97% - 100%)    I&O's Summary    12-04-21 @ 07:01  -  12-05-21 @ 07:00  --------------------------------------------------------  IN: 360 mL / OUT: 1200 mL / NET: -840 mL    12-05-21 @ 07:01  -  12-05-21 @ 10:35  --------------------------------------------------------  IN: 400 mL / OUT: 300 mL / NET: 100 mL          GENERAL: alert and awake, nontoxic appearing, in no acute distress   HEENT:  Atraumatic, Normocephalic, Conjunctiva and sclera clear, oral mucosa moist, clear w/o any exudate   NECK: Supple, No JVD  CHEST/LUNG: Breathing comfortably, clear to auscultation bilaterally; No wheeze  HEART: Difficult to auscultate heart sound; No murmurs, rubs, or gallops  ABDOMEN: Soft, Nontender, Nondistended; Bowel sounds present  EXTREMITIES:  2+ Peripheral Pulses, No clubbing, cyanosis, or edema  PSYCH: AAOx3, normal affect  NEUROLOGY: non-focal, moving all extremities.  SKIN: No rashes or lesions  : Wang with clear, nonbloody urine    LABS:                        9.2    8.25  )-----------( 188      ( 05 Dec 2021 06:49 )             29.3     12-05    139  |  105  |  46<H>  ----------------------------<  96  4.2   |  23  |  1.71<H>    Ca    8.7      05 Dec 2021 06:49  Phos  2.8     12-05  Mg     2.00     12-05        CAPILLARY BLOOD GLUCOSE                RADIOLOGY & ADDITIONAL TESTS:    Imaging Personally Reviewed:  [x] YES  [ ] NO    Will obtain old records:  [ ] YES  [x] NO            
No fevers overnight  GNR growing in all 4 culture bottles    Vital Signs Last 24 Hrs  T(C): 36.4 (02 Dec 2021 05:19), Max: 36.6 (01 Dec 2021 16:29)  T(F): 97.6 (02 Dec 2021 05:19), Max: 97.8 (01 Dec 2021 16:29)  HR: 71 (02 Dec 2021 05:19) (71 - 78)  BP: 101/60 (02 Dec 2021 05:19) (99/49 - 117/63)  BP(mean): --  RR: 17 (02 Dec 2021 05:19) (16 - 19)  SpO2: 99% (02 Dec 2021 05:19) (97% - 100%)    I&O's Summary    21 @ 07:01  -  21 @ 07:00  --------------------------------------------------------  IN: 100 mL / OUT: 0 mL / NET: 100 mL        GENERAL: alert and awake, nontoxic appearing, in no acute distress   HEENT:  Atraumatic, Normocephalic, Conjunctiva and sclera clear, oral mucosa moist, clear w/o any exudate   NECK: Supple, No JVD  CHEST/LUNG: Breathing comfortably, clear to auscultation bilaterally; No wheeze  HEART: Difficult to auscultate heart sound; No murmurs, rubs, or gallops  ABDOMEN: Soft, Nontender, Nondistended; Bowel sounds present  EXTREMITIES:  2+ Peripheral Pulses, No clubbing, cyanosis, or edema  PSYCH: AAOx3, normal affect  NEUROLOGY: non-focal, moving all extremities.  SKIN: No rashes or lesions    LABS:                        8.3    19.44 )-----------( 155      ( 02 Dec 2021 07:21 )             26.2     12    138  |  106  |  49<H>  ----------------------------<  121<H>  4.2   |  22  |  2.13<H>    Ca    8.7      02 Dec 2021 07:21  Mg     2.30     12-    TPro  7.5  /  Alb  3.5  /  TBili  0.4  /  DBili  x   /  AST  25  /  ALT  14  /  AlkPhos  70      PT/INR - ( 2021 21:54 )   PT: 12.3 sec;   INR: 1.08 ratio         PTT - ( 2021 21:54 )  PTT:26.9 sec  CAPILLARY BLOOD GLUCOSE            Urinalysis Basic - ( 2021 21:54 )    Color: Yellow / Appearance: Turbid / S.015 / pH: x  Gluc: x / Ketone: Negative  / Bili: Negative / Urobili: <2 mg/dL   Blood: x / Protein: 300 mg/dL / Nitrite: Negative   Leuk Esterase: Large / RBC: tntc /HPF / WBC tntc /HPF   Sq Epi: x / Non Sq Epi: x / Bacteria: Many        RADIOLOGY & ADDITIONAL TESTS:    Imaging Personally Reviewed:  [x] YES  [ ] NO    Will obtain old records:  [ ] YES  [x] NO
NO fevers  Saturating well on RA    Vital Signs Last 24 Hrs  T(C): 36.9 (07 Dec 2021 05:51), Max: 36.9 (07 Dec 2021 05:51)  T(F): 98.5 (07 Dec 2021 05:51), Max: 98.5 (07 Dec 2021 05:51)  HR: 94 (07 Dec 2021 05:51) (83 - 94)  BP: 109/58 (07 Dec 2021 05:51) (88/44 - 120/61)  BP(mean): --  RR: 18 (07 Dec 2021 05:51) (18 - 20)  SpO2: 97% (07 Dec 2021 05:51) (97% - 100%)    I&O's Summary    12-06-21 @ 07:01  -  12-07-21 @ 07:00  --------------------------------------------------------  IN: 0 mL / OUT: 1000 mL / NET: -1000 mL          GENERAL: alert and awake, nontoxic appearing, in no acute distress   HEENT:  Atraumatic, Normocephalic, Conjunctiva and sclera clear, oral mucosa moist, clear w/o any exudate   NECK: Supple, No JVD  CHEST/LUNG: Breathing comfortably, clear to auscultation bilaterally; No wheeze  HEART: Difficult to auscultate heart sound; No murmurs, rubs, or gallops  ABDOMEN: Soft, Nontender, Nondistended; Bowel sounds present  EXTREMITIES:  2+ Peripheral Pulses, No clubbing, cyanosis, or edema  PSYCH: AAOx3, normal affect  NEUROLOGY: non-focal, moving all extremities.  SKIN: No rashes or lesions  : Wang with clear, nonbloody urine      LABS:                        9.8    9.89  )-----------( 195      ( 07 Dec 2021 06:36 )             30.7     12-07    138  |  102  |  37<H>  ----------------------------<  107<H>  3.3<L>   |  24  |  1.60<H>    Ca    9.4      07 Dec 2021 06:36  Phos  2.9     12-06  Mg     2.00     12-06        CAPILLARY BLOOD GLUCOSE      POCT Blood Glucose.: 122 mg/dL (06 Dec 2021 23:27)            RADIOLOGY & ADDITIONAL TESTS:    Imaging Personally Reviewed:  [x] YES  [ ] NO    Will obtain old records:  [ ] YES  [x] NO                        
SUBJECTIVE / OVERNIGHT EVENTS:  Pt seen and examined at bedside.   No overnight event.  Feeling better.  no cp, no sob, no n/v/d.     --------------------------------------------------------------------------------------------  LABS:                        9.1    8.48  )-----------( 196      ( 09 Dec 2021 06:51 )             29.5     12-09    138  |  103  |  38<H>  ----------------------------<  106<H>  3.7   |  27  |  1.54<H>    Ca    8.8      09 Dec 2021 06:51        CAPILLARY BLOOD GLUCOSE                RADIOLOGY & ADDITIONAL TESTS:    Imaging Personally Reviewed:  [x] YES  [ ] NO    Consultant(s) Notes Reviewed:  [x] YES  [ ] NO    MEDICATIONS  (STANDING):  ascorbic acid 500 milliGRAM(s) Oral daily  cefTRIAXone   IVPB 1000 milliGRAM(s) IV Intermittent every 24 hours  fludroCORTISONE 0.1 milliGRAM(s) Oral two times a day  folic acid 1 milliGRAM(s) Oral daily  heparin   Injectable 5000 Unit(s) SubCutaneous every 12 hours  influenza  Vaccine (HIGH DOSE) 0.7 milliLiter(s) IntraMuscular once  multivitamin 1 Tablet(s) Oral daily  sertraline 50 milliGRAM(s) Oral daily  tamsulosin 0.4 milliGRAM(s) Oral at bedtime    MEDICATIONS  (PRN):  acetaminophen     Tablet .. 650 milliGRAM(s) Oral every 6 hours PRN Temp greater or equal to 38C (100.4F), Mild Pain (1 - 3)  aluminum hydroxide/magnesium hydroxide/simethicone Suspension 30 milliLiter(s) Oral every 4 hours PRN Dyspepsia  melatonin 3 milliGRAM(s) Oral at bedtime PRN Insomnia  ondansetron Injectable 4 milliGRAM(s) IV Push every 8 hours PRN Nausea and/or Vomiting  sodium chloride 0.65% Nasal 1 Spray(s) Both Nostrils every 6 hours PRN Nasal Congestion      Care Discussed with Consultants/Other Providers [x] YES  [ ] NO    Vital Signs Last 24 Hrs  T(C): 36.1 (09 Dec 2021 21:18), Max: 36.9 (09 Dec 2021 16:47)  T(F): 97 (09 Dec 2021 21:18), Max: 98.5 (09 Dec 2021 16:47)  HR: 88 (09 Dec 2021 16:47) (76 - 90)  BP: 108/45 (09 Dec 2021 21:18) (96/54 - 118/75)  BP(mean): --  RR: 18 (09 Dec 2021 21:18) (18 - 18)  SpO2: 96% (09 Dec 2021 21:18) (96% - 100%)  I&O's Summary    08 Dec 2021 07:01  -  09 Dec 2021 07:00  --------------------------------------------------------  IN: 1050 mL / OUT: 0 mL / NET: 1050 mL    09 Dec 2021 07:01  -  09 Dec 2021 23:13  --------------------------------------------------------  IN: 0 mL / OUT: 1300 mL / NET: -1300 mL        PHYSICAL EXAM:   GENERAL: alert and awake, nontoxic appearing, in no acute distress. hard of hearing   HEENT:  Atraumatic, Normocephalic, Conjunctiva and sclera clear, oral mucosa moist, clear w/o any exudate   NECK: Supple, No JVD  CHEST/LUNG: Breathing comfortably, clear to auscultation bilaterally; No wheeze  HEART: Difficult to auscultate heart sound; No murmurs, rubs, or gallops  ABDOMEN: Soft, Nontender, Nondistended; Bowel sounds present  EXTREMITIES:  2+ Peripheral Pulses, No clubbing, cyanosis, or edema  PSYCH: AAOx3, normal affect  NEUROLOGY: non-focal, moving all extremities.  SKIN: No rashes or lesions  : Wang with clear, nonbloody urine

## 2021-12-13 NOTE — PROGRESS NOTE ADULT - REASON FOR ADMISSION
generalized weakness and somnolence

## 2021-12-13 NOTE — DISCHARGE NOTE NURSING/CASE MANAGEMENT/SOCIAL WORK - PATIENT PORTAL LINK FT
You can access the FollowMyHealth Patient Portal offered by [facility] by registering at the following website: [email or web address]. By joining [facility]’s FollowMyHealth portal, you will also be able to view your health information using other applications (apps) compatible with our system.

## 2022-01-04 PROBLEM — R31.9 HEMATURIA, UNSPECIFIED: Chronic | Status: ACTIVE | Noted: 2021-12-01

## 2022-01-04 PROBLEM — J90 PLEURAL EFFUSION, NOT ELSEWHERE CLASSIFIED: Chronic | Status: ACTIVE | Noted: 2021-12-01

## 2022-01-04 PROBLEM — Z86.39 PERSONAL HISTORY OF OTHER ENDOCRINE, NUTRITIONAL AND METABOLIC DISEASE: Chronic | Status: ACTIVE | Noted: 2021-12-01

## 2022-01-04 PROBLEM — I31.3 PERICARDIAL EFFUSION (NONINFLAMMATORY): Chronic | Status: ACTIVE | Noted: 2021-12-01

## 2022-01-11 ENCOUNTER — INPATIENT (INPATIENT)
Facility: HOSPITAL | Age: 87
LOS: 8 days | Discharge: NURSING, REHAB OR LONG-TERM CARE | DRG: 698 | End: 2022-01-20
Attending: INTERNAL MEDICINE | Admitting: INTERNAL MEDICINE
Payer: MEDICARE

## 2022-01-11 VITALS
HEIGHT: 71 IN | TEMPERATURE: 99 F | DIASTOLIC BLOOD PRESSURE: 62 MMHG | RESPIRATION RATE: 25 BRPM | WEIGHT: 160.06 LBS | SYSTOLIC BLOOD PRESSURE: 106 MMHG | HEART RATE: 86 BPM

## 2022-01-11 DIAGNOSIS — J18.9 PNEUMONIA, UNSPECIFIED ORGANISM: ICD-10-CM

## 2022-01-11 DIAGNOSIS — I95.9 HYPOTENSION, UNSPECIFIED: ICD-10-CM

## 2022-01-11 DIAGNOSIS — Z98.890 OTHER SPECIFIED POSTPROCEDURAL STATES: Chronic | ICD-10-CM

## 2022-01-11 DIAGNOSIS — U07.1 COVID-19: ICD-10-CM

## 2022-01-11 DIAGNOSIS — N17.9 ACUTE KIDNEY FAILURE, UNSPECIFIED: ICD-10-CM

## 2022-01-11 DIAGNOSIS — N39.0 URINARY TRACT INFECTION, SITE NOT SPECIFIED: ICD-10-CM

## 2022-01-11 DIAGNOSIS — Z29.9 ENCOUNTER FOR PROPHYLACTIC MEASURES, UNSPECIFIED: ICD-10-CM

## 2022-01-11 DIAGNOSIS — Z79.899 OTHER LONG TERM (CURRENT) DRUG THERAPY: ICD-10-CM

## 2022-01-11 DIAGNOSIS — Z71.89 OTHER SPECIFIED COUNSELING: ICD-10-CM

## 2022-01-11 LAB
ALBUMIN SERPL ELPH-MCNC: 3.7 G/DL — SIGNIFICANT CHANGE UP (ref 3.3–5)
ALP SERPL-CCNC: 75 U/L — SIGNIFICANT CHANGE UP (ref 40–120)
ALT FLD-CCNC: 22 U/L — SIGNIFICANT CHANGE UP (ref 10–45)
ANION GAP SERPL CALC-SCNC: 15 MMOL/L — SIGNIFICANT CHANGE UP (ref 5–17)
APPEARANCE UR: ABNORMAL
APTT BLD: 30.3 SEC — SIGNIFICANT CHANGE UP (ref 27.5–35.5)
AST SERPL-CCNC: 40 U/L — SIGNIFICANT CHANGE UP (ref 10–40)
BACTERIA # UR AUTO: ABNORMAL
BASOPHILS # BLD AUTO: 0 K/UL — SIGNIFICANT CHANGE UP (ref 0–0.2)
BASOPHILS NFR BLD AUTO: 0 % — SIGNIFICANT CHANGE UP (ref 0–2)
BILIRUB SERPL-MCNC: 0.2 MG/DL — SIGNIFICANT CHANGE UP (ref 0.2–1.2)
BILIRUB UR-MCNC: NEGATIVE — SIGNIFICANT CHANGE UP
BUN SERPL-MCNC: 45 MG/DL — HIGH (ref 7–23)
CALCIUM SERPL-MCNC: 9.3 MG/DL — SIGNIFICANT CHANGE UP (ref 8.4–10.5)
CHLORIDE SERPL-SCNC: 101 MMOL/L — SIGNIFICANT CHANGE UP (ref 96–108)
CO2 SERPL-SCNC: 25 MMOL/L — SIGNIFICANT CHANGE UP (ref 22–31)
COLOR SPEC: ABNORMAL
CREAT SERPL-MCNC: 2.88 MG/DL — HIGH (ref 0.5–1.3)
DIFF PNL FLD: ABNORMAL
EOSINOPHIL # BLD AUTO: 0.23 K/UL — SIGNIFICANT CHANGE UP (ref 0–0.5)
EOSINOPHIL NFR BLD AUTO: 3.5 % — SIGNIFICANT CHANGE UP (ref 0–6)
EPI CELLS # UR: 26 /HPF — HIGH
GIANT PLATELETS BLD QL SMEAR: PRESENT — SIGNIFICANT CHANGE UP
GLUCOSE SERPL-MCNC: 147 MG/DL — HIGH (ref 70–99)
GLUCOSE UR QL: ABNORMAL
HCT VFR BLD CALC: 31.3 % — LOW (ref 39–50)
HGB BLD-MCNC: 9.8 G/DL — LOW (ref 13–17)
HYALINE CASTS # UR AUTO: 575 /LPF — HIGH (ref 0–2)
INR BLD: 0.98 RATIO — SIGNIFICANT CHANGE UP (ref 0.88–1.16)
KETONES UR-MCNC: NEGATIVE — SIGNIFICANT CHANGE UP
LACTATE BLDV-MCNC: 1.7 MMOL/L — SIGNIFICANT CHANGE UP (ref 0.7–2)
LEUKOCYTE ESTERASE UR-ACNC: ABNORMAL
LYMPHOCYTES # BLD AUTO: 0.99 K/UL — LOW (ref 1–3.3)
LYMPHOCYTES # BLD AUTO: 15.1 % — SIGNIFICANT CHANGE UP (ref 13–44)
MANUAL SMEAR VERIFICATION: SIGNIFICANT CHANGE UP
MCHC RBC-ENTMCNC: 30.7 PG — SIGNIFICANT CHANGE UP (ref 27–34)
MCHC RBC-ENTMCNC: 31.3 GM/DL — LOW (ref 32–36)
MCV RBC AUTO: 98.1 FL — SIGNIFICANT CHANGE UP (ref 80–100)
MONOCYTES # BLD AUTO: 0.69 K/UL — SIGNIFICANT CHANGE UP (ref 0–0.9)
MONOCYTES NFR BLD AUTO: 10.6 % — SIGNIFICANT CHANGE UP (ref 2–14)
NEUTROPHILS # BLD AUTO: 4.63 K/UL — SIGNIFICANT CHANGE UP (ref 1.8–7.4)
NEUTROPHILS NFR BLD AUTO: 69 % — SIGNIFICANT CHANGE UP (ref 43–77)
NEUTS BAND # BLD: 1.8 % — SIGNIFICANT CHANGE UP (ref 0–8)
NITRITE UR-MCNC: NEGATIVE — SIGNIFICANT CHANGE UP
PH UR: 7 — SIGNIFICANT CHANGE UP (ref 5–8)
PLAT MORPH BLD: NORMAL — SIGNIFICANT CHANGE UP
PLATELET # BLD AUTO: 231 K/UL — SIGNIFICANT CHANGE UP (ref 150–400)
POTASSIUM SERPL-MCNC: 3.6 MMOL/L — SIGNIFICANT CHANGE UP (ref 3.5–5.3)
POTASSIUM SERPL-SCNC: 3.6 MMOL/L — SIGNIFICANT CHANGE UP (ref 3.5–5.3)
PROT SERPL-MCNC: 7.8 G/DL — SIGNIFICANT CHANGE UP (ref 6–8.3)
PROT UR-MCNC: ABNORMAL
PROTHROM AB SERPL-ACNC: 11.8 SEC — SIGNIFICANT CHANGE UP (ref 10.6–13.6)
RAPID RVP RESULT: DETECTED
RBC # BLD: 3.19 M/UL — LOW (ref 4.2–5.8)
RBC # FLD: 13.3 % — SIGNIFICANT CHANGE UP (ref 10.3–14.5)
RBC BLD AUTO: SIGNIFICANT CHANGE UP
RBC CASTS # UR COMP ASSIST: 374 /HPF — HIGH (ref 0–4)
SARS-COV-2 RNA SPEC QL NAA+PROBE: DETECTED
SODIUM SERPL-SCNC: 141 MMOL/L — SIGNIFICANT CHANGE UP (ref 135–145)
SP GR SPEC: 1.01 — SIGNIFICANT CHANGE UP (ref 1.01–1.02)
UROBILINOGEN FLD QL: NEGATIVE — SIGNIFICANT CHANGE UP
WBC # BLD: 6.54 K/UL — SIGNIFICANT CHANGE UP (ref 3.8–10.5)
WBC # FLD AUTO: 6.54 K/UL — SIGNIFICANT CHANGE UP (ref 3.8–10.5)
WBC UR QL: 6110 /HPF — HIGH (ref 0–5)

## 2022-01-11 PROCEDURE — 93010 ELECTROCARDIOGRAM REPORT: CPT | Mod: GC

## 2022-01-11 PROCEDURE — 99285 EMERGENCY DEPT VISIT HI MDM: CPT | Mod: CS,GC

## 2022-01-11 PROCEDURE — 71045 X-RAY EXAM CHEST 1 VIEW: CPT | Mod: 26

## 2022-01-11 PROCEDURE — 71250 CT THORAX DX C-: CPT | Mod: 26,MA

## 2022-01-11 PROCEDURE — 99223 1ST HOSP IP/OBS HIGH 75: CPT

## 2022-01-11 RX ORDER — CEFTRIAXONE 500 MG/1
1000 INJECTION, POWDER, FOR SOLUTION INTRAMUSCULAR; INTRAVENOUS EVERY 24 HOURS
Refills: 0 | Status: DISCONTINUED | OUTPATIENT
Start: 2022-01-12 | End: 2022-01-12

## 2022-01-11 RX ORDER — LANOLIN ALCOHOL/MO/W.PET/CERES
1 CREAM (GRAM) TOPICAL
Qty: 0 | Refills: 0 | DISCHARGE

## 2022-01-11 RX ORDER — FOLIC ACID 0.8 MG
1 TABLET ORAL DAILY
Refills: 0 | Status: DISCONTINUED | OUTPATIENT
Start: 2022-01-11 | End: 2022-01-20

## 2022-01-11 RX ORDER — LANOLIN ALCOHOL/MO/W.PET/CERES
5 CREAM (GRAM) TOPICAL AT BEDTIME
Refills: 0 | Status: DISCONTINUED | OUTPATIENT
Start: 2022-01-11 | End: 2022-01-20

## 2022-01-11 RX ORDER — FOLIC ACID 0.8 MG
1 TABLET ORAL
Qty: 0 | Refills: 0 | DISCHARGE

## 2022-01-11 RX ORDER — ASCORBIC ACID 60 MG
500 TABLET,CHEWABLE ORAL DAILY
Refills: 0 | Status: DISCONTINUED | OUTPATIENT
Start: 2022-01-11 | End: 2022-01-20

## 2022-01-11 RX ORDER — HEPARIN SODIUM 5000 [USP'U]/ML
5000 INJECTION INTRAVENOUS; SUBCUTANEOUS EVERY 8 HOURS
Refills: 0 | Status: DISCONTINUED | OUTPATIENT
Start: 2022-01-11 | End: 2022-01-20

## 2022-01-11 RX ORDER — FERROUS SULFATE 325(65) MG
1 TABLET ORAL
Qty: 0 | Refills: 0 | DISCHARGE

## 2022-01-11 RX ORDER — CEFTRIAXONE 500 MG/1
2000 INJECTION, POWDER, FOR SOLUTION INTRAMUSCULAR; INTRAVENOUS ONCE
Refills: 0 | Status: COMPLETED | OUTPATIENT
Start: 2022-01-11 | End: 2022-01-11

## 2022-01-11 RX ORDER — AZITHROMYCIN 500 MG/1
500 TABLET, FILM COATED ORAL ONCE
Refills: 0 | Status: COMPLETED | OUTPATIENT
Start: 2022-01-11 | End: 2022-01-11

## 2022-01-11 RX ORDER — FERROUS SULFATE 325(65) MG
325 TABLET ORAL DAILY
Refills: 0 | Status: DISCONTINUED | OUTPATIENT
Start: 2022-01-11 | End: 2022-01-20

## 2022-01-11 RX ORDER — GUAIFENESIN/DEXTROMETHORPHAN 600MG-30MG
10 TABLET, EXTENDED RELEASE 12 HR ORAL EVERY 4 HOURS
Refills: 0 | Status: DISCONTINUED | OUTPATIENT
Start: 2022-01-11 | End: 2022-01-20

## 2022-01-11 RX ORDER — SERTRALINE 25 MG/1
1 TABLET, FILM COATED ORAL
Qty: 0 | Refills: 0 | DISCHARGE

## 2022-01-11 RX ORDER — SERTRALINE 25 MG/1
50 TABLET, FILM COATED ORAL DAILY
Refills: 0 | Status: DISCONTINUED | OUTPATIENT
Start: 2022-01-11 | End: 2022-01-20

## 2022-01-11 RX ORDER — TAMSULOSIN HYDROCHLORIDE 0.4 MG/1
0.4 CAPSULE ORAL AT BEDTIME
Refills: 0 | Status: DISCONTINUED | OUTPATIENT
Start: 2022-01-11 | End: 2022-01-20

## 2022-01-11 RX ORDER — MIRTAZAPINE 45 MG/1
1 TABLET, ORALLY DISINTEGRATING ORAL
Qty: 0 | Refills: 0 | DISCHARGE

## 2022-01-11 RX ORDER — ALBUTEROL 90 UG/1
2 AEROSOL, METERED ORAL EVERY 4 HOURS
Refills: 0 | Status: DISCONTINUED | OUTPATIENT
Start: 2022-01-11 | End: 2022-01-20

## 2022-01-11 RX ORDER — PIPERACILLIN AND TAZOBACTAM 4; .5 G/20ML; G/20ML
3.38 INJECTION, POWDER, LYOPHILIZED, FOR SOLUTION INTRAVENOUS ONCE
Refills: 0 | Status: COMPLETED | OUTPATIENT
Start: 2022-01-11 | End: 2022-01-11

## 2022-01-11 RX ORDER — SODIUM CHLORIDE 9 MG/ML
1000 INJECTION, SOLUTION INTRAVENOUS ONCE
Refills: 0 | Status: COMPLETED | OUTPATIENT
Start: 2022-01-11 | End: 2022-01-11

## 2022-01-11 RX ORDER — MIRTAZAPINE 45 MG/1
7.5 TABLET, ORALLY DISINTEGRATING ORAL AT BEDTIME
Refills: 0 | Status: DISCONTINUED | OUTPATIENT
Start: 2022-01-11 | End: 2022-01-20

## 2022-01-11 RX ORDER — ACETAMINOPHEN 500 MG
650 TABLET ORAL EVERY 4 HOURS
Refills: 0 | Status: DISCONTINUED | OUTPATIENT
Start: 2022-01-11 | End: 2022-01-20

## 2022-01-11 RX ORDER — DEXAMETHASONE 0.5 MG/5ML
6 ELIXIR ORAL DAILY
Refills: 0 | Status: DISCONTINUED | OUTPATIENT
Start: 2022-01-11 | End: 2022-01-11

## 2022-01-11 RX ORDER — VANCOMYCIN HCL 1 G
1000 VIAL (EA) INTRAVENOUS ONCE
Refills: 0 | Status: COMPLETED | OUTPATIENT
Start: 2022-01-11 | End: 2022-01-11

## 2022-01-11 RX ADMIN — SODIUM CHLORIDE 1000 MILLILITER(S): 9 INJECTION, SOLUTION INTRAVENOUS at 21:00

## 2022-01-11 RX ADMIN — SODIUM CHLORIDE 1000 MILLILITER(S): 9 INJECTION, SOLUTION INTRAVENOUS at 20:37

## 2022-01-11 RX ADMIN — CEFTRIAXONE 100 MILLIGRAM(S): 500 INJECTION, POWDER, FOR SOLUTION INTRAMUSCULAR; INTRAVENOUS at 20:37

## 2022-01-11 RX ADMIN — CEFTRIAXONE 2000 MILLIGRAM(S): 500 INJECTION, POWDER, FOR SOLUTION INTRAMUSCULAR; INTRAVENOUS at 21:00

## 2022-01-11 RX ADMIN — PIPERACILLIN AND TAZOBACTAM 200 GRAM(S): 4; .5 INJECTION, POWDER, LYOPHILIZED, FOR SOLUTION INTRAVENOUS at 21:33

## 2022-01-11 RX ADMIN — PIPERACILLIN AND TAZOBACTAM 3.38 GRAM(S): 4; .5 INJECTION, POWDER, LYOPHILIZED, FOR SOLUTION INTRAVENOUS at 22:04

## 2022-01-11 RX ADMIN — Medication 250 MILLIGRAM(S): at 23:18

## 2022-01-11 RX ADMIN — AZITHROMYCIN 250 MILLIGRAM(S): 500 TABLET, FILM COATED ORAL at 22:05

## 2022-01-11 NOTE — ED ADULT NURSE NOTE - NSIMPLEMENTINTERV_GEN_ALL_ED
Implemented All Fall with Harm Risk Interventions:  [place] to call system. Call bell, personal items and telephone within reach. Instruct patient to call for assistance. Room bathroom lighting operational. Non-slip footwear when patient is off stretcher. Physically safe environment: no spills, clutter or unnecessary equipment. Stretcher in lowest position, wheels locked, appropriate side rails in place. Provide visual cue, wrist band, yellow gown, etc. Monitor gait and stability. Monitor for mental status changes and reorient to person, place, and time. Review medications for side effects contributing to fall risk. Reinforce activity limits and safety measures with patient and family. Provide visual clues: red socks.

## 2022-01-11 NOTE — ED ADULT TRIAGE NOTE - BANDS:
Fall Risk; [Alert] : alert [No Acute Distress] : no acute distress [Normocephalic] : normocephalic [Flat Open Anterior [place]] : flat open anterior fontanelle [Red Reflex Bilateral] : red reflex bilateral [PERRL] : PERRL [Auricles Well Formed] : auricles well formed [Normally Placed Ears] : normally placed ears [Clear Tympanic membranes with present light reflex and bony landmarks] : clear tympanic membranes with present light reflex and bony landmarks [No Discharge] : no discharge [Nares Patent] : nares patent [Palate Intact] : palate intact [Uvula Midline] : uvula midline [Tooth Eruption] : tooth eruption  [Supple, full passive range of motion] : supple, full passive range of motion [No Palpable Masses] : no palpable masses [Symmetric Chest Rise] : symmetric chest rise [Clear to Ausculatation Bilaterally] : clear to auscultation bilaterally [Regular Rate and Rhythm] : regular rate and rhythm [S1, S2 present] : S1, S2 present [No Murmurs] : no murmurs [+2 Femoral Pulses] : +2 femoral pulses [Soft] : soft [NonTender] : non tender [Non Distended] : non distended [Normoactive Bowel Sounds] : normoactive bowel sounds [No Hepatomegaly] : no hepatomegaly [No Splenomegaly] : no splenomegaly [Rene 1] : Rene 1 [No Clitoromegaly] : no clitoromegaly [Normal Vaginal Introitus] : normal vaginal introitus [Patent] : patent [Normally Placed] : normally placed [No Abnormal Lymph Nodes Palpated] : no abnormal lymph nodes palpated [No Clavicular Crepitus] : no clavicular crepitus [Negative Reynolds-Ortalani] : negative Reynolds-Ortalani [Symmetric Buttocks Creases] : symmetric buttocks creases [No Spinal Dimple] : no spinal dimple [NoTuft of Hair] : no tuft of hair [Cranial Nerves Grossly Intact] : cranial nerves grossly intact [No Rash or Lesions] : no rash or lesions

## 2022-01-11 NOTE — ED PROVIDER NOTE - ATTENDING CONTRIBUTION TO CARE
Private Physician Ingrid  89y male pmh DNR/DNI, BPH, UTI, HTN, Anemia, Depression,Cad, PAD, Pericardial effusion, Plerual effusion, CKD III, Prostate Ca sp RT. PT comes to ed from [facility] c/o hypotension and hypoxia, Referred to ed. Pt poor historian, denies complaints. No c/p,abd pain, fever and chills, ha. Enroute accoreding to ems b/p improved. 106 systolic, PE [place] male appearing thin frail . Heent normocephalic atraumatic neck supple chest clear anterior & posterior cv no rubs, gallops or murmurs abd soft +bs poor skin turgor,  sanchez in place. MSK + decubitus sacrum  Adria Jackman MD, Facep

## 2022-01-11 NOTE — H&P ADULT - NSHPLABSRESULTS_GEN_ALL_CORE
Labs personally reviewed:                          9.8    6.54  )-----------( 231      ( 2022 19:54 )             31.3         141  |  101  |  45<H>  ----------------------------<  147<H>  3.6   |  25  |  2.88<H>    Ca    9.3      2022 19:54    TPro  7.8  /  Alb  3.7  /  TBili  0.2  /  DBili  x   /  AST  40  /  ALT  22  /  AlkPhos  75          LIVER FUNCTIONS - ( 2022 19:54 )  Alb: 3.7 g/dL / Pro: 7.8 g/dL / ALK PHOS: 75 U/L / ALT: 22 U/L / AST: 40 U/L / GGT: x           PT/INR - ( 2022 19:54 )   PT: 11.8 sec;   INR: 0.98 ratio         PTT - ( 2022 19:54 )  PTT:30.3 sec  Urinalysis Basic - ( 2022 20:41 )    Color: Orange / Appearance: Turbid / S.011 / pH: x  Gluc: x / Ketone: Negative  / Bili: Negative / Urobili: Negative   Blood: x / Protein: 300 mg/dL / Nitrite: Negative   Leuk Esterase: Large / RBC: 374 /hpf / WBC 6110 /HPF   Sq Epi: x / Non Sq Epi: 26 /hpf / Bacteria: Few      CAPILLARY BLOOD GLUCOSE          Imaging:  CXR personally reviewed:  poor field of view   CT chest - pending     EKG personally reviewed: normal sinus rhythm at 86 bpm ,

## 2022-01-11 NOTE — H&P ADULT - NSHPSOCIALHISTORY_GEN_ALL_CORE
lives at [facility] previously lived at [facility], now at [facility]   no smoking , no etoh use

## 2022-01-11 NOTE — H&P ADULT - PROBLEM SELECTOR PLAN 2
previous pansensitive proteus on urine cultures , sanchez exchanged in ED   - continue ceftriaxone   - f/u urine cultures

## 2022-01-11 NOTE — ED PROVIDER NOTE - OBJECTIVE STATEMENT
89M pmh cad, ckd, anemia, recent uti with indwelling sanchez who presents with hypoxia and hypotension.  Arrives via ems with nrb in place, states he was saturating 78% with .  Afebrile in ED, saturating well although poor waveform on oximetry.    denies headache, sore throat, chest pain, shortness of breath, cough, abdominal pain, nausea, vomiting, constipation, diarrhea, back or neck pain, or lower extremity edema. 89M pmh cad, ckd, anemia, recent uti with indwelling sanchez who presents with hypoxia and hypotension.  Arrives via ems with nrb in place, states he was saturating 78% with .  Afebrile in ED, saturating well although poor waveform on oximetry.  Chart review notes DNR/DNI with signed MOLST in documentation from rehab.  brought from [facility].    denies headache, sore throat, chest pain, shortness of breath, cough, abdominal pain, nausea, vomiting, constipation, diarrhea, back or neck pain, or lower extremity edema.

## 2022-01-11 NOTE — ED ADULT NURSE NOTE - CAS EDN DISCHARGE ASSESSMENT
Alert and oriented to person, place and time A&Ox2; disoriented to time./Patient baseline mental status

## 2022-01-11 NOTE — H&P ADULT - NSHPREVIEWOFSYSTEMS_GEN_ALL_CORE
CONSTITUTIONAL: + weakness, no fevers or chills  EYES/ENT: No visual changes;  No dysphagia  NECK: No pain or stiffness  RESPIRATORY: No cough, wheezing, hemoptysis; No shortness of breath  CARDIOVASCULAR: No chest pain or palpitations; No lower extremity edema  EXTREMITIES: no le edema, cyanosis, clubbing  GASTROINTESTINAL: No abdominal or epigastric pain. No nausea, vomiting, or hematemesis; No diarrhea or constipation. No melena or hematochezia.  BACK: No back pain  GENITOURINARY: No dysuria, frequency or hematuria  NEUROLOGICAL: No numbness or weakness  MSK: no joint swelling or pain  SKIN: No itching, burning, rashes, or lesions   PSYCH: no agitation  All other review of systems is negative unless indicated above.

## 2022-01-11 NOTE — ED ADULT NURSE REASSESSMENT NOTE - NS ED NURSE REASSESS COMMENT FT1
Spoke to MD Sanchez regarding antibiotics. Pt at this time has no septic indicators. Asked MD Sanchez if we wanted to waiting for urine results to start antibiotics, and told to start antibiotics now.

## 2022-01-11 NOTE — ED ADULT NURSE REASSESSMENT NOTE - NS ED NURSE REASSESS COMMENT FT1
BHAVNA Velez on 8 Monti notified of completion of Provider to RN order for room air. Pt tolerated well with no signs of dyspnia or hypoxia, with pulse Ox of 100% and will remain on room air for transport.

## 2022-01-11 NOTE — H&P ADULT - PROBLEM SELECTOR PLAN 1
BP improved ,  possibly  2/2 to UTI vs.  dehydration given JOSELITO , previously on fludrocortisone , may have autonomic dysregulation,  no leukocytosis and currently afebrile , s/p IV fluid bolus s/p broad spectrum abx   - hold lasix   - treat for UTI   - check for orthostatic hypotension after adequate IV fluid resuscitation  , can consider restarting fludrocortisone if orthostatic

## 2022-01-11 NOTE — ED PROVIDER NOTE - CLINICAL SUMMARY MEDICAL DECISION MAKING FREE TEXT BOX
89M pmh frequent utis who 89M pmh frequent utis who presents with hypotension and hypoxia.  will workup for sepsis, although may be partially treated since on abx at NH.

## 2022-01-11 NOTE — ED PROVIDER NOTE - CARE PLAN
1 Principal Discharge DX:	Pneumonia   Principal Discharge DX:	Pneumonia  Secondary Diagnosis:	2019 novel coronavirus disease (COVID-19)

## 2022-01-11 NOTE — ED PROVIDER NOTE - PROGRESS NOTE DETAILS
discussed with dr jiang, only followed at NH by him, PMD is [facility] will call [facility] hospitalist for admit.

## 2022-01-11 NOTE — H&P ADULT - ASSESSMENT
89 M w/ Pmhx of prostate cancer s/p radiation, CAD s/p stent, CKD III, pleural and pericardial effusion, afib not on AC , recently admitted for sepsis secondary to UTI s/p indwelling sanchez ,  who presents from [facility] rehab  for hypotension and hypoxia on day of admission.

## 2022-01-11 NOTE — H&P ADULT - NSHPPHYSICALEXAM_GEN_ALL_CORE
Vital Signs Last 24 Hrs  T(C): 37.4 (11 Jan 2022 19:20), Max: 37.4 (11 Jan 2022 19:07)  T(F): 99.4 (11 Jan 2022 19:20), Max: 99.4 (11 Jan 2022 19:07)  HR: 80 (11 Jan 2022 21:38) (80 - 95)  BP: 123/57 (11 Jan 2022 21:38) (106/62 - 134/83)  BP(mean): 77 (11 Jan 2022 21:38) (77 - 92)  RR: 16 (11 Jan 2022 21:38) (16 - 28)  SpO2: 100% (11 Jan 2022 21:38) (100% - 100%) Vital Signs Last 24 Hrs  T(C): 37.4 (11 Jan 2022 19:20), Max: 37.4 (11 Jan 2022 19:07)  T(F): 99.4 (11 Jan 2022 19:20), Max: 99.4 (11 Jan 2022 19:07)  HR: 80 (11 Jan 2022 21:38) (80 - 95)  BP: 123/57 (11 Jan 2022 21:38) (106/62 - 134/83)  BP(mean): 77 (11 Jan 2022 21:38) (77 - 92)  RR: 16 (11 Jan 2022 21:38) (16 - 28)  SpO2: 100% (11 Jan 2022 21:38) (100% - 100%)    GENERAL: elderly frail appearing ,  No acute distress , breathing non labored   HEAD:  Atraumatic, bitemporal wasting   ENT: EOMI, PERRLA, conjunctiva and sclera clear,  moist mucosa no pharyngeal erythema or exudates   NECK: supple , no JVD   CHEST/LUNG: Clear to auscultation bilaterally; No wheeze, equal breath sounds bilaterally   BACK: No spinal tenderness,  No CVA tenderness   HEART: Regular rate and rhythm; No murmurs, rubs, or gallops  ABDOMEN: Soft, Nontender, Nondistended; Bowel sounds present  EXTREMITIES:  No clubbing, cyanosis, or edema  MSK: No joint swelling or effusions, ROM intact   PSYCH: Normal behavior/affect  NEUROLOGY: AAOx3, non-focal, cranial nerves intact  SKIN: + stage II sacral ulcer , Normal color, No rashes or lesions

## 2022-01-11 NOTE — H&P ADULT - HISTORY OF PRESENT ILLNESS
Patient is a 89 year old male w/ Pmhx of prostate cancer s/p radiation, CAD s/p stent, CKD III, pleural and pericardial effusion, ?afib not on AC , recently admitted for sepsis secondary to UTI s/p indwelling sanchez ,  who presents from [facility]  for hypotension and hypoxia on day of admission.   Per transfer record , patient was hypotensive with BP 76/40  on day of admission. He was also reported to be hypoxic . Per EMS record patient had a O2 sat of 78%.  Patient was exposed to covid on 12/31. Patient reports no cough , no fever , no shortness of breath. He reports no abdominal pain , no diarrhea , no dysuria.

## 2022-01-11 NOTE — H&P ADULT - PROBLEM SELECTOR PLAN 3
reportedly exposed to covid 12/31 , no covid like symptoms , CT chest w/ no parenchymal disease, although reported to be hypoxia , suspect  poor pulse oximetry detection in setting of poor peripheral perfusion , since arrival has been weaned down to 2L and saturating at 100% , would recheck saturation on RA , if truly hypoxic  can treat with remdesivir and dexamethasone  - check pulse ox off oxygen   - Airborne + contact Isolation   - Guaifenesin/ dextromethorphan PRN

## 2022-01-11 NOTE — H&P ADULT - PROBLEM SELECTOR PLAN 4
suspect prerenal , sanchez exchanged , draining dark urine   - s/p 1L IV fluid bolus   -  additional 500cc bolus   - monitor ins and outs   - monitor renal function   - renal dose medications   - avoid nephrotoxins  - continue flomax

## 2022-01-11 NOTE — ED PROVIDER NOTE - NSICDXPASTMEDICALHX_GEN_ALL_CORE_FT
PAST MEDICAL HISTORY:  Anemia of chronic disease     CAD (coronary artery disease) asa    COVID-19 vaccine series completed Moderna w booster    Hematuria     History of iron deficiency     PAD (peripheral artery disease) s/p vascular stent    Pericardial effusion     Pleural effusion     Prostate CA s/p radiation    Stage 3 chronic kidney disease

## 2022-01-12 LAB
A1C WITH ESTIMATED AVERAGE GLUCOSE RESULT: 5.6 % — SIGNIFICANT CHANGE UP (ref 4–5.6)
ALBUMIN SERPL ELPH-MCNC: 3.2 G/DL — LOW (ref 3.3–5)
ALP SERPL-CCNC: 70 U/L — SIGNIFICANT CHANGE UP (ref 40–120)
ALT FLD-CCNC: 23 U/L — SIGNIFICANT CHANGE UP (ref 10–45)
ANION GAP SERPL CALC-SCNC: 14 MMOL/L — SIGNIFICANT CHANGE UP (ref 5–17)
AST SERPL-CCNC: 43 U/L — HIGH (ref 10–40)
BILIRUB SERPL-MCNC: 0.2 MG/DL — SIGNIFICANT CHANGE UP (ref 0.2–1.2)
BUN SERPL-MCNC: 42 MG/DL — HIGH (ref 7–23)
CALCIUM SERPL-MCNC: 9.1 MG/DL — SIGNIFICANT CHANGE UP (ref 8.4–10.5)
CHLORIDE SERPL-SCNC: 101 MMOL/L — SIGNIFICANT CHANGE UP (ref 96–108)
CO2 SERPL-SCNC: 26 MMOL/L — SIGNIFICANT CHANGE UP (ref 22–31)
CREAT SERPL-MCNC: 2.74 MG/DL — HIGH (ref 0.5–1.3)
D DIMER BLD IA.RAPID-MCNC: 2397 NG/ML DDU — HIGH
ESTIMATED AVERAGE GLUCOSE: 114 MG/DL — SIGNIFICANT CHANGE UP (ref 68–114)
GLUCOSE SERPL-MCNC: 101 MG/DL — HIGH (ref 70–99)
GRAM STN FLD: SIGNIFICANT CHANGE UP
HCT VFR BLD CALC: 30.4 % — LOW (ref 39–50)
HGB BLD-MCNC: 9.4 G/DL — LOW (ref 13–17)
MCHC RBC-ENTMCNC: 30.7 PG — SIGNIFICANT CHANGE UP (ref 27–34)
MCHC RBC-ENTMCNC: 30.9 GM/DL — LOW (ref 32–36)
MCV RBC AUTO: 99.3 FL — SIGNIFICANT CHANGE UP (ref 80–100)
METHOD TYPE: SIGNIFICANT CHANGE UP
NRBC # BLD: 0 /100 WBCS — SIGNIFICANT CHANGE UP (ref 0–0)
P AERUGINOSA DNA BLD POS NAA+NON-PROBE: SIGNIFICANT CHANGE UP
PLATELET # BLD AUTO: 225 K/UL — SIGNIFICANT CHANGE UP (ref 150–400)
POTASSIUM SERPL-MCNC: 3.6 MMOL/L — SIGNIFICANT CHANGE UP (ref 3.5–5.3)
POTASSIUM SERPL-SCNC: 3.6 MMOL/L — SIGNIFICANT CHANGE UP (ref 3.5–5.3)
PROT SERPL-MCNC: 7.4 G/DL — SIGNIFICANT CHANGE UP (ref 6–8.3)
RBC # BLD: 3.06 M/UL — LOW (ref 4.2–5.8)
RBC # FLD: 13.4 % — SIGNIFICANT CHANGE UP (ref 10.3–14.5)
SODIUM SERPL-SCNC: 141 MMOL/L — SIGNIFICANT CHANGE UP (ref 135–145)
SPECIMEN SOURCE: SIGNIFICANT CHANGE UP
WBC # BLD: 8.78 K/UL — SIGNIFICANT CHANGE UP (ref 3.8–10.5)
WBC # FLD AUTO: 8.78 K/UL — SIGNIFICANT CHANGE UP (ref 3.8–10.5)

## 2022-01-12 RX ORDER — CEFEPIME 1 G/1
1000 INJECTION, POWDER, FOR SOLUTION INTRAMUSCULAR; INTRAVENOUS ONCE
Refills: 0 | Status: COMPLETED | OUTPATIENT
Start: 2022-01-12 | End: 2022-01-12

## 2022-01-12 RX ORDER — CEFEPIME 1 G/1
1000 INJECTION, POWDER, FOR SOLUTION INTRAMUSCULAR; INTRAVENOUS EVERY 24 HOURS
Refills: 0 | Status: DISCONTINUED | OUTPATIENT
Start: 2022-01-13 | End: 2022-01-20

## 2022-01-12 RX ORDER — CEFEPIME 1 G/1
INJECTION, POWDER, FOR SOLUTION INTRAMUSCULAR; INTRAVENOUS
Refills: 0 | Status: DISCONTINUED | OUTPATIENT
Start: 2022-01-12 | End: 2022-01-20

## 2022-01-12 RX ORDER — SODIUM CHLORIDE 9 MG/ML
500 INJECTION INTRAMUSCULAR; INTRAVENOUS; SUBCUTANEOUS ONCE
Refills: 0 | Status: COMPLETED | OUTPATIENT
Start: 2022-01-12 | End: 2022-01-13

## 2022-01-12 RX ORDER — SODIUM CHLORIDE 9 MG/ML
1000 INJECTION, SOLUTION INTRAVENOUS
Refills: 0 | Status: DISCONTINUED | OUTPATIENT
Start: 2022-01-12 | End: 2022-01-19

## 2022-01-12 RX ADMIN — MIRTAZAPINE 7.5 MILLIGRAM(S): 45 TABLET, ORALLY DISINTEGRATING ORAL at 22:17

## 2022-01-12 RX ADMIN — Medication 500 MILLIGRAM(S): at 11:02

## 2022-01-12 RX ADMIN — HEPARIN SODIUM 5000 UNIT(S): 5000 INJECTION INTRAVENOUS; SUBCUTANEOUS at 13:05

## 2022-01-12 RX ADMIN — Medication 5 MILLIGRAM(S): at 22:16

## 2022-01-12 RX ADMIN — HEPARIN SODIUM 5000 UNIT(S): 5000 INJECTION INTRAVENOUS; SUBCUTANEOUS at 05:59

## 2022-01-12 RX ADMIN — Medication 325 MILLIGRAM(S): at 11:02

## 2022-01-12 RX ADMIN — SERTRALINE 50 MILLIGRAM(S): 25 TABLET, FILM COATED ORAL at 11:02

## 2022-01-12 RX ADMIN — TAMSULOSIN HYDROCHLORIDE 0.4 MILLIGRAM(S): 0.4 CAPSULE ORAL at 22:16

## 2022-01-12 RX ADMIN — SODIUM CHLORIDE 50 MILLILITER(S): 9 INJECTION, SOLUTION INTRAVENOUS at 13:23

## 2022-01-12 RX ADMIN — Medication 1 MILLIGRAM(S): at 11:03

## 2022-01-12 RX ADMIN — Medication 1 TABLET(S): at 11:02

## 2022-01-12 RX ADMIN — CEFEPIME 1000 MILLIGRAM(S): 1 INJECTION, POWDER, FOR SOLUTION INTRAMUSCULAR; INTRAVENOUS at 10:49

## 2022-01-12 RX ADMIN — HEPARIN SODIUM 5000 UNIT(S): 5000 INJECTION INTRAVENOUS; SUBCUTANEOUS at 22:17

## 2022-01-12 NOTE — CONSULT NOTE ADULT - ASSESSMENT
**this is an incomplete note--full note to follow pending evaluation**    Pt is a 89M w/ PMHx of prostate cancer s/p radiation, CAD s/p stent, CKD III, pleural and pericardial effusion, ?afib not on AC , recently admitted for sepsis secondary to UTI s/p indwelling sanchez now p/w hypotension/hypoxemia.   +COVID exposure on 12/31.   Hypoxemic per EMS to 78%    COVID-19 PNA  AHRF on NC  +exposure on 12/31. +test here on 1/11  Notes reviewed--concern that hypoxemia related to ?poor perfusion. Pt placed on NRB--> NC and was quickly weaned off. Pt now satting well on RA.  At this time can monitor--would hold remdesivir, but can start if O2 sats <94% or patient requires O2 again  -Steroids recommended after 1st week of symptom onset for any patients that are hypoxic--will hold at this time unless pt continues to remain hypoxemic.   -trend temps/WBC  -trend inflammatory biomarkers  -Continue with supportive care and supplemental O2 as needed--consider proning and tolerating lower oxygen saturation to avoid intubation  -Maintain aspiration precautions  -Maintain isolation per infection control policy    Sepsis 2/2 CAUTI  Hypotension  -UA consistent w/ acute infection  -pending UCx/BCx  -S/p sanchez exchange in the ED  -previous UCx w/ pansensitive P. mirabilis  P. mirabilis can induce amp-C gene creating resistance to earlier generation cephalosporins, including ceftriaxone  -Will escalate to cefepime pending repeat cx    JOSELITO  supportive care/IVFs  appreciate renal recs  renally dose medications  avoid nephrotoxic agents    Infectious Diseases will continue to follow. Please call with any questions.   Ree Castellanos M.D.  [facility], Division of Infectious Diseases [phone]       Pt is a 89M w/ PMHx of prostate cancer s/p radiation, CAD s/p stent, CKD III, pleural and pericardial effusion, ?afib not on AC , recently admitted for sepsis secondary to UTI s/p indwelling sanchez now p/w hypotension/hypoxemia.   +COVID exposure on 12/31.   Hypoxemic per EMS to 78%    COVID-19 PNA  AHRF on NC  +exposure on 12/31. +test here on 1/11  Notes reviewed--concern that hypoxemia related to ?poor perfusion. Pt placed on NRB--> NC and was quickly weaned off. Pt now satting well on RA.  At this time can monitor--would hold remdesivir, but can start if O2 sats <94% and patient requires O2 again  Steroids recommended after 1st week of symptom onset for any patients that are hypoxic--will hold at this time unless pt continues to remain hypoxemic.   -trend temps/WBC  -trend inflammatory biomarkers  -Continue with supportive care and supplemental O2 as needed--consider proning and tolerating lower oxygen saturation to avoid intubation  -Maintain aspiration precautions  -Maintain isolation per infection control policy    Sepsis 2/2 CAUTI  Hypotension  -UA consistent w/ acute infection  -pending UCx/BCx  -S/p sanchez exchange in the ED  -previous UCx w/ pansensitive P. mirabilis  P. mirabilis can induce amp-C gene creating resistance to earlier generation cephalosporins, including ceftriaxone  -Will escalate to cefepime pending repeat cx    JOSELITO  supportive care/IVFs  appreciate renal recs  renally dose medications  avoid nephrotoxic agents    Infectious Diseases will continue to follow. Please call with any questions.   Ree Castellanos M.D.  [facility], Division of Infectious Diseases [phone]

## 2022-01-12 NOTE — PROGRESS NOTE ADULT - SUBJECTIVE AND OBJECTIVE BOX
Patient is a 89y old  Male who presents with a chief complaint of hypotension x 1 day (2022 10:26)      INTERVAL HPI/OVERNIGHT EVENTS: noted  pt seen and examined this am   events noted  feels well,   sating well on RA      Vital Signs Last 24 Hrs  T(C): 36.2 (2022 16:18), Max: 37.4 (2022 08:52)  T(F): 97.2 (2022 16:18), Max: 99.4 (2022 08:52)  HR: 94 (2022 16:18) (78 - 98)  BP: 102/54 (2022 16:18) (91/50 - 123/57)  BP(mean): 68 (2022 23:20) (68 - 77)  RR: 18 (2022 16:18) (15 - 18)  SpO2: 94% (2022 16:18) (94% - 100%)    acetaminophen     Tablet .. 650 milliGRAM(s) Oral every 4 hours PRN  ALBUTerol    90 MICROgram(s) HFA Inhaler 2 Puff(s) Inhalation every 4 hours PRN  ascorbic acid 500 milliGRAM(s) Oral daily  cefepime   IVPB      ferrous    sulfate 325 milliGRAM(s) Oral daily  folic acid 1 milliGRAM(s) Oral daily  guaifenesin/dextromethorphan Oral Liquid 10 milliLiter(s) Oral every 4 hours PRN  heparin   Injectable 5000 Unit(s) SubCutaneous every 8 hours  melatonin 5 milliGRAM(s) Oral at bedtime  mirtazapine 7.5 milliGRAM(s) Oral at bedtime  multivitamin 1 Tablet(s) Oral daily  sertraline 50 milliGRAM(s) Oral daily  sodium chloride 0.45%. 1000 milliLiter(s) IV Continuous <Continuous>  sodium chloride 0.9% Bolus 500 milliLiter(s) IV Bolus once  tamsulosin 0.4 milliGRAM(s) Oral at bedtime      PHYSICAL EXAM:  GENERAL: NAD,   EYES: conjunctiva and sclera clear  ENMT: Moist mucous membranes  NECK: Supple, No JVD, Normal thyroid  CHEST/LUNG: non labored, cta b/l  HEART: Regular rate and rhythm; No murmurs, rubs, or gallops  ABDOMEN: Soft, Nontender, Nondistended; Bowel sounds present  EXTREMITIES:  2+ Peripheral Pulses, No clubbing, cyanosis, or edema  LYMPH: No lymphadenopathy noted  SKIN: No rashes or lesions    Consultant(s) Notes Reviewed:  [x ] YES  [ ] NO  Care Discussed with Consultants/Other Providers [ x] YES  [ ] NO    LABS:                        9.4    8.78  )-----------( 225      ( 2022 06:38 )             30.4     12    141  |  101  |  42<H>  ----------------------------<  101<H>  3.6   |  26  |  2.74<H>    Ca    9.1      2022 06:38    TPro  7.4  /  Alb  3.2<L>  /  TBili  0.2  /  DBili  x   /  AST  43<H>  /  ALT  23  /  AlkPhos  70  -12    PT/INR - ( 2022 19:54 )   PT: 11.8 sec;   INR: 0.98 ratio         PTT - ( 2022 19:54 )  PTT:30.3 sec  Urinalysis Basic - ( 2022 20:41 )    Color: Orange / Appearance: Turbid / S.011 / pH: x  Gluc: x / Ketone: Negative  / Bili: Negative / Urobili: Negative   Blood: x / Protein: 300 mg/dL / Nitrite: Negative   Leuk Esterase: Large / RBC: 374 /hpf / WBC 6110 /HPF   Sq Epi: x / Non Sq Epi: 26 /hpf / Bacteria: Few      CAPILLARY BLOOD GLUCOSE            Urinalysis Basic - ( 2022 20:41 )    Color: Orange / Appearance: Turbid / S.011 / pH: x  Gluc: x / Ketone: Negative  / Bili: Negative / Urobili: Negative   Blood: x / Protein: 300 mg/dL / Nitrite: Negative   Leuk Esterase: Large / RBC: 374 /hpf / WBC 6110 /HPF   Sq Epi: x / Non Sq Epi: 26 /hpf / Bacteria: Few        Culture - Blood (collected 2022 23:02)  Source: .Blood Blood-Peripheral  Gram Stain (2022 17:44):    Growth in aerobic bottle: Gram Negative Rods  Preliminary Report (2022 17:45):    Growth in aerobic bottle: Gram Negative Rods    ***Blood Panel PCR results on this specimen are available    approximately 3 hours after the Gram stain result.***    Gram stain, PCR, and/or culture results may not always    correspond due to difference in methodologies.    ************************************************************    This PCR assay was performed by multiplex PCR. This    Assay tests for 66 bacterial and resistance gene targets.    Please refer to the [facility] test directory    at [email or web address] for details.        RADIOLOGY & ADDITIONAL TESTS:    Imaging Personally Reviewed:  [x ] YES  [ ] NO

## 2022-01-12 NOTE — PATIENT PROFILE ADULT - FALL HARM RISK - HARM RISK INTERVENTIONS

## 2022-01-12 NOTE — PROGRESS NOTE ADULT - ASSESSMENT
89 M w/ Pmhx of prostate cancer s/p radiation, CAD s/p stent, CKD III, pleural and pericardial effusion, afib not on AC , recently admitted for sepsis secondary to UTI s/p indwelling sanchez ,  who presents from [facility] rehab  for hypotension and hypoxia on day of admission.

## 2022-01-12 NOTE — CONSULT NOTE ADULT - SUBJECTIVE AND OBJECTIVE BOX
[facility], Division of Infectious Diseases  DORIAN Patino S. Shah, Y. Patel, [facility]  [phone]    KIANNA GUILLEN  89y, Male  [id number]    HPI--  HPI:  Patient is a 89 year old male w/ Pmhx of prostate cancer s/p radiation, CAD s/p stent, CKD III, pleural and pericardial effusion, ?afib not on AC , recently admitted for sepsis secondary to UTI s/p indwelling sanchez ,  who presents from [facility]  for hypotension and hypoxia on day of admission.   Per transfer record , patient was hypotensive with BP 76/40  on day of admission. He was also reported to be hypoxic . Per EMS record patient had a O2 sat of 78%.  Patient was exposed to covid on . Patient reports no cough , no fever , no shortness of breath. He reports no abdominal pain , no diarrhea , no dysuria.     (2022 22:58)    ID c/s for further evaluation.         Active Medications--  acetaminophen     Tablet .. 650 milliGRAM(s) Oral every 4 hours PRN  ALBUTerol    90 MICROgram(s) HFA Inhaler 2 Puff(s) Inhalation every 4 hours PRN  ascorbic acid 500 milliGRAM(s) Oral daily  cefTRIAXone   IVPB 1000 milliGRAM(s) IV Intermittent every 24 hours  ferrous    sulfate 325 milliGRAM(s) Oral daily  folic acid 1 milliGRAM(s) Oral daily  guaifenesin/dextromethorphan Oral Liquid 10 milliLiter(s) Oral every 4 hours PRN  heparin   Injectable 5000 Unit(s) SubCutaneous every 8 hours  melatonin 5 milliGRAM(s) Oral at bedtime  mirtazapine 7.5 milliGRAM(s) Oral at bedtime  multivitamin 1 Tablet(s) Oral daily  sertraline 50 milliGRAM(s) Oral daily  sodium chloride 0.9% Bolus 500 milliLiter(s) IV Bolus once  tamsulosin 0.4 milliGRAM(s) Oral at bedtime    Antimicrobials:   cefTRIAXone   IVPB 1000 milliGRAM(s) IV Intermittent every 24 hours    Immunologic:     ROS:  CONSTITUTIONAL: No fevers or chills. No weakness or headache. No weight changes.  EYES/ENT: No visual or hearing changes. No sore throat or throat pain .  NECK: No pain or stiffness  RESPIRATORY: No cough, wheezing, or hemoptysis. No shortness of breath  CARDIOVASCULAR: No chest pain or palpitations  GASTROINTESTINAL: No abdominal pain. No nausea or vomiting. No diarrhea or constipation.  GENITOURINARY: No dysuria, frequency or hematuria  NEUROLOGICAL: No numbness or weakness  SKIN: No itching or rashes  PSYCHIATRIC: Pleasant. Appropriate affect    Allergies: No Known Allergies    PMH -- CAD (coronary artery disease)    DM (diabetes mellitus)    Prostate CA    Stage 3 chronic kidney disease    PAD (peripheral artery disease)    Anemia of chronic disease    Pericardial effusion    Pleural effusion    Hematuria    History of iron deficiency    COVID-19 vaccine series completed      PSH -- H/O vascular surgery      FH -- No pertinent family history in first degree relatives    No pertinent family history in first degree relatives      Social History --  EtOH: denies   Tobacco: denies   Drug Use: denies     Travel/Environmental/Occupational History:    Physical Exam--  Vital Signs Last 24 Hrs  T(F): 99.4 (2022 08:52), Max: 99.4 (2022 19:07)  HR: 98 (2022 08:52) (78 - 98)  BP: 91/50 (2022 08:52) (91/50 - 134/83)  RR: 18 (2022 08:52) (15 - 28)  SpO2: 94% (2022 08:52) (94% - 100%)  General: nontoxic-appearing, no acute distress  HEENT: NC/AT, EOMI, anicteric, conjunctiva pink and moist, oropharynx clear, dentition fair  Neck: Not rigid. No sense of mass. No LAD  Lungs: Clear bilaterally without rales, wheezing or rhonchi  Heart: Regular rate and rhythm. No murmur, rub or gallop.  Abdomen: Soft. Nondistended. Nontender. Bowel sounds present. No organomegaly.  Back: No spinal tenderness. No costovertebral angle tenderness.  Extremities: No cyanosis or clubbing. No edema.   Skin: Warm. Dry. Good turgor. No rash. No vasculitic stigmata.    Laboratory & Imaging Data--  CBC:                       9.4    8.78  )-----------( 225      ( 2022 06:38 )             30.4     CMP:     141  |  101  |  42<H>  ----------------------------<  101<H>  3.6   |  26  |  2.74<H>    Ca    9.1      2022 06:38    TPro  7.4  /  Alb  3.2<L>  /  TBili  0.2  /  DBili  x   /  AST  43<H>  /  ALT  23  /  AlkPhos  70  -12    LIVER FUNCTIONS - ( 2022 06:38 )  Alb: 3.2 g/dL / Pro: 7.4 g/dL / ALK PHOS: 70 U/L / ALT: 23 U/L / AST: 43 U/L / GGT: x           Urinalysis Basic - ( 2022 20:41 )    Color: Orange / Appearance: Turbid / S.011 / pH: x  Gluc: x / Ketone: Negative  / Bili: Negative / Urobili: Negative   Blood: x / Protein: 300 mg/dL / Nitrite: Negative   Leuk Esterase: Large / RBC: 374 /hpf / WBC 6110 /HPF   Sq Epi: x / Non Sq Epi: 26 /hpf / Bacteria: Few        Microbiology: reviewed      Radiology: reviewed  < from: CT Chest No Cont (22 @ 21:28) >    ACC: [id number] EXAM:  CT CHEST                          PROCEDURE DATE:  2022          INTERPRETATION:  CLINICAL INFORMATION: Hypoxia. Left opacity.    COMPARISON: Chest x-ray 2022. CT 2021 and 2021.    CONTRAST/COMPLICATIONS:  IV Contrast: NONE  Oral Contrast: NONE  Complications: None reported at time of study completion    PROCEDURE:  CT of the Chest was performed.  Sagittal and coronal reformats were performed.    FINDINGS: Evaluation of the thoracic organs/vasculature is limited   without intravenous contrast.  Artifact from the patient's arms degrading   images.    LUNGS AND AIRWAYS: Patent central airways.. Compressive atelectasis of   the left > right lung. Stable small nodules in the right lung, for   example, a0.4 cm nodule in the right middle lobe (4:91). Calcified   granula in the left upper lobe. Stable biapical pleural thickening.  PLEURA: Small right and moderate left pleural effusion, overall decreased   since prior study.  MEDIASTINUM AND ELIANA: Subcentimeter lymph nodes without lymphadenopathy.  VESSELS: Atherosclerosis.  HEART: Stable heart size and trace pericardial effusion. Aortic valve and   mitral annular calcification.  CHEST WALL AND LOWER NECK: Mild bilateral gynecomastia.  VISUALIZED UPPER ABDOMEN: Stable small right hepatic cyst. Decreased left   hydronephrosis since prior study.  BONES: Degenerative changes of the spine. Stable compression deformity of   T7, T8 and T12 with underlying lucencies and sclerosis. Stable anterior   wedging of the visualized upper lumbar spine. Chronic-appearing bilateral   rib deformities.    IMPRESSION:    Small right and moderate left pleural effusion, overall decreased since   2021.    Additional findings as described.    --- End of Report---          HAWA MAYFIELD MD; Resident Radiology  This document has been electronically signed.  PILLO STRAUSS MD; Attending Radiologist  This document has been electronically signed. 2022 11:12PM    < end of copied text >  < from: Xray Chest 1 View- PORTABLE-Urgent (22 @ 20:34) >    ACC: [id number] EXAM:  XR CHEST PORTABLE URGENT 1V                          PROCEDURE DATE:  2022          INTERPRETATION:  CLINICAL INDICATION: Sepsis.    TECHNIQUE: Frontal view of the chest.    COMPARISON: 2021    FINDINGS:    Cardiac size cannot be assessed secondary to parenchymal opacities.  Small-to-moderate left pleural effusion with compressive left basilar   atelectasis, pneumonia is not excluded.  Right upper and mid lung fields are clear. The right costophrenic angle   is excluded from the field-of-view.    IMPRESSION:  Small to moderate left pleural effusion with compressive left basilar   atelectasis, pneumonia is not excluded.    --- End of Report ---          CHIDI ESCOBAR MD; Resident Radiology  This document has been electronically signed.  SEBAS HERRERA MD; Attending Radiologist  This document has been electronically signed. 2022  9:34AM    < end of copied text >     [facility], Division of Infectious Diseases  DORIAN Patino S. Shah, Y. Patel, [facility]  [phone]    KIANNA GUILLEN  89y, Male  [id number]    HPI--  HPI:  Patient is a 89 year old male w/ Pmhx of prostate cancer s/p radiation, CAD s/p stent, CKD III, pleural and pericardial effusion, ?afib not on AC , recently admitted for sepsis secondary to UTI s/p indwelling sanchez ,  [facility] and hypoxia on day of admission.   Per transfer record , patient was hypotensive with BP 76/40  on day of admission. He was also reported to be hypoxic . Per EMS record patient had a O2 sat of 78%.  Patient was exposed to covid on . Patient reports no cough , no fever , no shortness of breath. He reports no abdominal pain , no diarrhea , no dysuria.     (2022 22:58)    ID c/s for further evaluation.   Pt seen and examined at bedside  Unable to provide hx  Sleeping comfortably   On RA      Active Medications--  acetaminophen     Tablet .. 650 milliGRAM(s) Oral every 4 hours PRN  ALBUTerol    90 MICROgram(s) HFA Inhaler 2 Puff(s) Inhalation every 4 hours PRN  ascorbic acid 500 milliGRAM(s) Oral daily  cefTRIAXone   IVPB 1000 milliGRAM(s) IV Intermittent every 24 hours  ferrous    sulfate 325 milliGRAM(s) Oral daily  folic acid 1 milliGRAM(s) Oral daily  guaifenesin/dextromethorphan Oral Liquid 10 milliLiter(s) Oral every 4 hours PRN  heparin   Injectable 5000 Unit(s) SubCutaneous every 8 hours  melatonin 5 milliGRAM(s) Oral at bedtime  mirtazapine 7.5 milliGRAM(s) Oral at bedtime  multivitamin 1 Tablet(s) Oral daily  sertraline 50 milliGRAM(s) Oral daily  sodium chloride 0.9% Bolus 500 milliLiter(s) IV Bolus once  tamsulosin 0.4 milliGRAM(s) Oral at bedtime    Antimicrobials:   cefTRIAXone   IVPB 1000 milliGRAM(s) IV Intermittent every 24 hours    Immunologic:     ROS: unable to obtain    Allergies: No Known Allergies    PMH -- CAD (coronary artery disease)    DM (diabetes mellitus)    Prostate CA    Stage 3 chronic kidney disease    PAD (peripheral artery disease)    Anemia of chronic disease    Pericardial effusion    Pleural effusion    Hematuria    History of iron deficiency    COVID-19 vaccine series completed      PS -- H/O vascular surgery      FH -- No pertinent family history in first degree relatives    No pertinent family history in first degree relatives      Social History --  EtOH: denies   Tobacco: denies   Drug Use: denies     Travel/Environmental/Occupational History:    Physical Exam--  Vital Signs Last 24 Hrs  T(F): 99.4 (2022 08:52), Max: 99.4 (2022 19:07)  HR: 98 (2022 08:52) (78 - 98)  BP: 91/50 (2022 08:52) (91/50 - 134/83)  RR: 18 (2022 08:52) (15 - 28)  SpO2: 94% (2022 08:52) (94% - 100%)  General: elderly M  HEENT: NC/AT, EOMI  Lungs: decreased b/l breath sounds  Heart: Regular rate and rhythm. No murmur, rub or gallop.  Abdomen: Soft. Nondistended. Nontender. Bowel sounds present. No organomegaly.  Back: No spinal tenderness. No costovertebral angle tenderness.  Extremities: No cyanosis or clubbing. No edema.   Skin: Warm. Dry. Good turgor. No rash. No vasculitic stigmata.    Laboratory & Imaging Data--  CBC:                       9.4    8.78  )-----------( 225      ( 2022 06:38 )             30.4     CMP:     141  |  101  |  42<H>  ----------------------------<  101<H>  3.6   |  26  |  2.74<H>    Ca    9.1      2022 06:38    TPro  7.4  /  Alb  3.2<L>  /  TBili  0.2  /  DBili  x   /  AST  43<H>  /  ALT  23  /  AlkPhos  70  12    LIVER FUNCTIONS - ( 2022 06:38 )  Alb: 3.2 g/dL / Pro: 7.4 g/dL / ALK PHOS: 70 U/L / ALT: 23 U/L / AST: 43 U/L / GGT: x           Urinalysis Basic - ( 2022 20:41 )    Color: Orange / Appearance: Turbid / S.011 / pH: x  Gluc: x / Ketone: Negative  / Bili: Negative / Urobili: Negative   Blood: x / Protein: 300 mg/dL / Nitrite: Negative   Leuk Esterase: Large / RBC: 374 /hpf / WBC 6110 /HPF   Sq Epi: x / Non Sq Epi: 26 /hpf / Bacteria: Few        Microbiology: reviewed      Radiology: reviewed  < from: CT Chest No Cont (22 @ 21:28) >    ACC: [id number] EXAM:  CT CHEST                          PROCEDURE DATE:  2022          INTERPRETATION:  CLINICAL INFORMATION: Hypoxia. Left opacity.    COMPARISON: Chest x-ray 2022. CT 2021 and 2021.    CONTRAST/COMPLICATIONS:  IV Contrast: NONE  Oral Contrast: NONE  Complications: None reported at time of study completion    PROCEDURE:  CT of the Chest was performed.  Sagittal and coronal reformats were performed.    FINDINGS: Evaluation of the thoracic organs/vasculature is limited   without intravenous contrast.  Artifact from the patient's arms degrading   images.    LUNGS AND AIRWAYS: Patent central airways.. Compressive atelectasis of   the left > right lung. Stable small nodules in the right lung, for   example, a0.4 cm nodule in the right middle lobe (4:91). Calcified   granula in the left upper lobe. Stable biapical pleural thickening.  PLEURA: Small right and moderate left pleural effusion, overall decreased   since prior study.  MEDIASTINUM AND ELIANA: Subcentimeter lymph nodes without lymphadenopathy.  VESSELS: Atherosclerosis.  HEART: Stable heart size and trace pericardial effusion. Aortic valve and   mitral annular calcification.  CHEST WALL AND LOWER NECK: Mild bilateral gynecomastia.  VISUALIZED UPPER ABDOMEN: Stable small right hepatic cyst. Decreased left   hydronephrosis since prior study.  BONES: Degenerative changes of the spine. Stable compression deformity of   T7, T8 and T12 with underlying lucencies and sclerosis. Stable anterior   wedging of the visualized upper lumbar spine. Chronic-appearing bilateral   rib deformities.    IMPRESSION:    Small right and moderate left pleural effusion, overall decreased since   2021.    Additional findings as described.    --- End of Report---          HAWA MAYFIELD MD; Resident Radiology  This document has been electronically signed.  PILLO STRAUSS MD; Attending Radiologist  This document has been electronically signed. 2022 11:12PM    < end of copied text >  < from: Xray Chest 1 View- PORTABLE-Urgent (22 @ 20:34) >    ACC: [id number] EXAM:  XR CHEST PORTABLE URGENT 1V                          PROCEDURE DATE:  2022          INTERPRETATION:  CLINICAL INDICATION: Sepsis.    TECHNIQUE: Frontal view of the chest.    COMPARISON: 2021    FINDINGS:    Cardiac size cannot be assessed secondary to parenchymal opacities.  Small-to-moderate left pleural effusion with compressive left basilar   atelectasis, pneumonia is not excluded.  Right upper and mid lung fields are clear. The right costophrenic angle   is excluded from the field-of-view.    IMPRESSION:  Small to moderate left pleural effusion with compressive left basilar   atelectasis, pneumonia is not excluded.    --- End of Report ---          CHIDI ESCOBAR MD; Resident Radiology  This document has been electronically signed.  SEBAS HERRERA MD; Attending Radiologist  This document has been electronically signed. 2022  9:34AM    < end of copied text >

## 2022-01-13 LAB
-  AMIKACIN: SIGNIFICANT CHANGE UP
-  AZTREONAM: SIGNIFICANT CHANGE UP
-  CEFEPIME: SIGNIFICANT CHANGE UP
-  CEFTAZIDIME: SIGNIFICANT CHANGE UP
-  CIPROFLOXACIN: SIGNIFICANT CHANGE UP
-  GENTAMICIN: SIGNIFICANT CHANGE UP
-  LEVOFLOXACIN: SIGNIFICANT CHANGE UP
-  MEROPENEM: SIGNIFICANT CHANGE UP
-  PIPERACILLIN/TAZOBACTAM: SIGNIFICANT CHANGE UP
-  TOBRAMYCIN: SIGNIFICANT CHANGE UP
ANION GAP SERPL CALC-SCNC: 15 MMOL/L — SIGNIFICANT CHANGE UP (ref 5–17)
BUN SERPL-MCNC: 40 MG/DL — HIGH (ref 7–23)
CALCIUM SERPL-MCNC: 8.8 MG/DL — SIGNIFICANT CHANGE UP (ref 8.4–10.5)
CHLORIDE SERPL-SCNC: 100 MMOL/L — SIGNIFICANT CHANGE UP (ref 96–108)
CO2 SERPL-SCNC: 25 MMOL/L — SIGNIFICANT CHANGE UP (ref 22–31)
CREAT SERPL-MCNC: 2.77 MG/DL — HIGH (ref 0.5–1.3)
GLUCOSE SERPL-MCNC: 90 MG/DL — SIGNIFICANT CHANGE UP (ref 70–99)
GRAM STN FLD: SIGNIFICANT CHANGE UP
HCT VFR BLD CALC: 28 % — LOW (ref 39–50)
HGB BLD-MCNC: 8.8 G/DL — LOW (ref 13–17)
MCHC RBC-ENTMCNC: 31.2 PG — SIGNIFICANT CHANGE UP (ref 27–34)
MCHC RBC-ENTMCNC: 31.4 GM/DL — LOW (ref 32–36)
MCV RBC AUTO: 99.3 FL — SIGNIFICANT CHANGE UP (ref 80–100)
METHOD TYPE: SIGNIFICANT CHANGE UP
NRBC # BLD: 0 /100 WBCS — SIGNIFICANT CHANGE UP (ref 0–0)
PLATELET # BLD AUTO: 224 K/UL — SIGNIFICANT CHANGE UP (ref 150–400)
POTASSIUM SERPL-MCNC: 3.3 MMOL/L — LOW (ref 3.5–5.3)
POTASSIUM SERPL-SCNC: 3.3 MMOL/L — LOW (ref 3.5–5.3)
RBC # BLD: 2.82 M/UL — LOW (ref 4.2–5.8)
RBC # FLD: 13.2 % — SIGNIFICANT CHANGE UP (ref 10.3–14.5)
SODIUM SERPL-SCNC: 140 MMOL/L — SIGNIFICANT CHANGE UP (ref 135–145)
WBC # BLD: 7.94 K/UL — SIGNIFICANT CHANGE UP (ref 3.8–10.5)
WBC # FLD AUTO: 7.94 K/UL — SIGNIFICANT CHANGE UP (ref 3.8–10.5)

## 2022-01-13 RX ORDER — POTASSIUM CHLORIDE 20 MEQ
40 PACKET (EA) ORAL EVERY 4 HOURS
Refills: 0 | Status: COMPLETED | OUTPATIENT
Start: 2022-01-13 | End: 2022-01-13

## 2022-01-13 RX ADMIN — Medication 40 MILLIEQUIVALENT(S): at 13:16

## 2022-01-13 RX ADMIN — Medication 40 MILLIEQUIVALENT(S): at 17:42

## 2022-01-13 RX ADMIN — HEPARIN SODIUM 5000 UNIT(S): 5000 INJECTION INTRAVENOUS; SUBCUTANEOUS at 05:40

## 2022-01-13 RX ADMIN — TAMSULOSIN HYDROCHLORIDE 0.4 MILLIGRAM(S): 0.4 CAPSULE ORAL at 22:37

## 2022-01-13 RX ADMIN — Medication 5 MILLIGRAM(S): at 22:37

## 2022-01-13 RX ADMIN — Medication 1 MILLIGRAM(S): at 12:06

## 2022-01-13 RX ADMIN — Medication 325 MILLIGRAM(S): at 12:06

## 2022-01-13 RX ADMIN — CEFEPIME 100 MILLIGRAM(S): 1 INJECTION, POWDER, FOR SOLUTION INTRAMUSCULAR; INTRAVENOUS at 08:57

## 2022-01-13 RX ADMIN — HEPARIN SODIUM 5000 UNIT(S): 5000 INJECTION INTRAVENOUS; SUBCUTANEOUS at 22:37

## 2022-01-13 RX ADMIN — Medication 500 MILLIGRAM(S): at 12:05

## 2022-01-13 RX ADMIN — MIRTAZAPINE 7.5 MILLIGRAM(S): 45 TABLET, ORALLY DISINTEGRATING ORAL at 22:37

## 2022-01-13 RX ADMIN — Medication 1 TABLET(S): at 12:05

## 2022-01-13 RX ADMIN — HEPARIN SODIUM 5000 UNIT(S): 5000 INJECTION INTRAVENOUS; SUBCUTANEOUS at 13:18

## 2022-01-13 RX ADMIN — SERTRALINE 50 MILLIGRAM(S): 25 TABLET, FILM COATED ORAL at 12:05

## 2022-01-13 RX ADMIN — SODIUM CHLORIDE 250 MILLILITER(S): 9 INJECTION INTRAMUSCULAR; INTRAVENOUS; SUBCUTANEOUS at 13:17

## 2022-01-13 NOTE — PROVIDER CONTACT NOTE (CRITICAL VALUE NOTIFICATION) - BACKGROUND
Should have reported results earlier, but day shift RN was unable to and after the message was passed on, results were not reported at appropriate time  Pt already on course of cefepime abx per ID

## 2022-01-13 NOTE — DIETITIAN NUTRITION RISK NOTIFICATION - TREATMENT: THE FOLLOWING DIET HAS BEEN RECOMMENDED
Diet, Regular:   Minced and Moist (MINCEDMOIST)  Nixon(7 Gm Arginine/7 Gm Glut/1.2 Gm HMB     Qty per Day:  2  Supplement Feeding Modality:  Oral  Ensure Enlive Cans or Servings Per Day:  3       Frequency:  Daily (01-13-22 @ 17:11) [Pending Verification By Attending]  Diet, Regular:   Minced and Moist (MINCEDMOIST) (01-11-22 @ 23:38) [Active]

## 2022-01-13 NOTE — DIETITIAN INITIAL EVALUATION ADULT. - DIET TYPE
Nixon 2x/day + Ensure Enlive 3x/day = 1050 bakari, 60 Gm protein/minced and moist/supplement (specify)

## 2022-01-13 NOTE — DIETITIAN INITIAL EVALUATION ADULT. - PERTINENT LABORATORY DATA
01-13 Na 140 mmol/L Glu 90 mg/dL K+ 3.3 mmol/L<L> Cr 2.77 mg/dL<H> BUN 40 mg/dL<H> Phos n/a   Alb n/a   PAB n/a   Hgb 8.8 g/dL<L> Hct 28.0 %<L>   Glucose, Serum: 90 mg/dL      Hgba1c 5.6%

## 2022-01-13 NOTE — DIETITIAN INITIAL EVALUATION ADULT. - REASON INDICATOR FOR ASSESSMENT
Consult ordered for: Consult ordered for: pressure injury > stage 2  Source: EMR, Pt with AMS (inappropraite for interview), interview with wife Veronica at bedside, Communication with team

## 2022-01-13 NOTE — PROGRESS NOTE ADULT - SUBJECTIVE AND OBJECTIVE BOX
[facility], Division of Infectious Diseases  DORIAN Patino Y. Patel, S. Shah, [facility]  [phone]    Name: KIANNA GUILLEN  Age: 89y  Gender: Male  MRN: [id number]    Interval History:  No acute overnight events. Afebrile. Low grade temp to 99F noted   On RA, satting well  Notes reviewed. +bacteremia    Antibiotics:  cefepime   IVPB 1000 milliGRAM(s) IV Intermittent every 24 hours  cefepime   IVPB          Medications:  acetaminophen     Tablet .. 650 milliGRAM(s) Oral every 4 hours PRN  ALBUTerol    90 MICROgram(s) HFA Inhaler 2 Puff(s) Inhalation every 4 hours PRN  ascorbic acid 500 milliGRAM(s) Oral daily  cefepime   IVPB 1000 milliGRAM(s) IV Intermittent every 24 hours  cefepime   IVPB      ferrous    sulfate 325 milliGRAM(s) Oral daily  folic acid 1 milliGRAM(s) Oral daily  guaifenesin/dextromethorphan Oral Liquid 10 milliLiter(s) Oral every 4 hours PRN  heparin   Injectable 5000 Unit(s) SubCutaneous every 8 hours  melatonin 5 milliGRAM(s) Oral at bedtime  mirtazapine 7.5 milliGRAM(s) Oral at bedtime  multivitamin 1 Tablet(s) Oral daily  sertraline 50 milliGRAM(s) Oral daily  sodium chloride 0.45%. 1000 milliLiter(s) IV Continuous <Continuous>  sodium chloride 0.9% Bolus 500 milliLiter(s) IV Bolus once  tamsulosin 0.4 milliGRAM(s) Oral at bedtime      Review of Systems:  A 10-point review of systems was obtained.   Review of systems otherwise negative except as previously noted.    Allergies: No Known Allergies    For details regarding the patient's past medical history, social history, family history, and other miscellaneous elements, please refer the initial infectious diseases consultation and/or the admitting history and physical examination for this admission.    Objective:  Vitals:   T(C): 36.8 (22 @ 08:23), Max: 36.9 (22 @ 00:45)  HR: 77 (22 @ 08:23) (77 - 96)  BP: 109/64 (22 @ 08:23) (99/59 - 109/64)  RR: 18 (22 @ 08:23) (18 - 18)  SpO2: 95% (22 @ 08:23) (94% - 95%)    Physical Examination:  General: elderly M, NAD  HEENT: NC/AT, EOMI  Neck: supple, no palpable LAD  Cardio: S1, S2 heard, RRR, no murmurs  Resp: decreased b/l breath sounds  Abd: soft, NT, ND, + bowel sounds  Ext: no edema or cyanosis  Skin: warm, dry, no visible rash      Laboratory Studies:  CBC:                       8.8    7.94  )-----------( 224      ( 2022 08:04 )             28.0     CMP:     140  |  100  |  40<H>  ----------------------------<  90  3.3<L>   |  25  |  2.77<H>    Ca    8.8      2022 08:04    TPro  7.4  /  Alb  3.2<L>  /  TBili  0.2  /  DBili  x   /  AST  43<H>  /  ALT  23  /  AlkPhos  70  01-12    LIVER FUNCTIONS - ( 2022 06:38 )  Alb: 3.2 g/dL / Pro: 7.4 g/dL / ALK PHOS: 70 U/L / ALT: 23 U/L / AST: 43 U/L / GGT: x           Urinalysis Basic - ( 2022 20:41 )    Color: Orange / Appearance: Turbid / S.011 / pH: x  Gluc: x / Ketone: Negative  / Bili: Negative / Urobili: Negative   Blood: x / Protein: 300 mg/dL / Nitrite: Negative   Leuk Esterase: Large / RBC: 374 /hpf / WBC 6110 /HPF   Sq Epi: x / Non Sq Epi: 26 /hpf / Bacteria: Few        Microbiology: reviewed    Culture - Blood (collected 22 @ 23:02)  Source: .Blood Blood-Peripheral  Gram Stain (22 @ 01:05):    Growth in aerobic bottle: Gram Negative Rods    Growth in anaerobic bottle: Gram Negative Rods  Preliminary Report (22 @ 01:05):    Growth in aerobic bottle: Gram Negative Rods    Growth in anaerobic bottle: Gram Negative Rods    ***Blood Panel PCR results on this specimen are available    approximately 3 hours after the Gram stain result.***    Gram stain, PCR, and/or culture results may not always    correspond due to difference in methodologies.    ************************************************************    This PCR assay was performed by multiplex PCR. This    Assay tests for 66 bacterial and resistance gene targets.    Please refer to the [facility] Labs test directory    at [email or web address] for details.  Organism: Blood Culture PCR (22 @ 21:38)  Organism: Blood Culture PCR (22 @ 21:38)      -  Pseudomonas aeruginosa: Detec      Method Type: PCR    Culture - Blood (collected 22 @ 23:02)  Source: .Blood Blood-Peripheral  Preliminary Report (22 @ 01:01):    No growth to date.    Culture - Urine (collected 22 @ 22:57)  Source: Clean Catch Clean Catch (Midstream)  Preliminary Report (22 @ 21:58):    >100,000 CFU/ml Pseudomonas aeruginosa        Radiology: reviewed  < from: CT Chest No Cont (22 @ 21:28) >    ACC: [id number] EXAM:  CT CHEST                          PROCEDURE DATE:  2022          INTERPRETATION:  CLINICAL INFORMATION: Hypoxia. Left opacity.    COMPARISON: Chest x-ray 2022. CT 2021 and 2021.    CONTRAST/COMPLICATIONS:  IV Contrast: NONE  Oral Contrast: NONE  Complications: None reported at time of study completion    PROCEDURE:  CT of the Chest was performed.  Sagittal and coronal reformats were performed.    FINDINGS: Evaluation of the thoracic organs/vasculature is limited   without intravenous contrast.  Artifact from the patient's arms degrading   images.    LUNGS AND AIRWAYS: Patent central airways.. Compressive atelectasis of   the left > right lung. Stable small nodules in the right lung, for   example, a0.4 cm nodule in the right middle lobe (4:91). Calcified   granula in the left upper lobe. Stable biapical pleural thickening.  PLEURA: Small right and moderate left pleural effusion, overall decreased   since prior study.  MEDIASTINUM AND ELIANA: Subcentimeter lymph nodes without lymphadenopathy.  VESSELS: Atherosclerosis.  HEART: Stable heart size and trace pericardial effusion. Aortic valve and   mitral annular calcification.  CHEST WALL AND LOWER NECK: Mild bilateral gynecomastia.  VISUALIZED UPPER ABDOMEN: Stable small right hepatic cyst. Decreased left   hydronephrosis since prior study.  BONES: Degenerative changes of the spine. Stable compression deformity of   T7, T8 and T12 with underlying lucencies and sclerosis. Stable anterior   wedging of the visualized upper lumbar spine. Chronic-appearing bilateral   rib deformities.    IMPRESSION:    Small right and moderate left pleural effusion, overall decreased since   2021.    Additional findings as described.    --- End of Report---          HAWA MAYFIELD MD; Resident Radiology  This document has been electronically signed.  PILLO STRAUSS MD; Attending Radiologist  This document has been electronically signed. 2022 11:12PM    < end of copied text >

## 2022-01-13 NOTE — CHART NOTE - NSCHARTNOTEFT_GEN_A_CORE
[id number]  KIANNA GUILLEN    Notified by RN at 07:02AM today that patient with critical lab result of positive blood culture results that were reported to day RN yesterday (1/12); these results were not conveyed to a provider until this AM. Blood culture results notable for "Growth in aerobic bottle: Gram Negative Rods" and "Growth in anaerobic bottle: Gram Negative Aldo." Also noted, UCx with growth of pseudomonas. Patient resting comfortably in bed this AM.       Vital Signs Last 24 Hrs  T(C): 36.9 (13 Jan 2022 00:45), Max: 37.4 (12 Jan 2022 08:52)  T(F): 98.5 (13 Jan 2022 00:45), Max: 99.4 (12 Jan 2022 08:52)  HR: 96 (13 Jan 2022 00:45) (94 - 98)  BP: 99/59 (13 Jan 2022 00:45) (91/50 - 102/54)  RR: 18 (13 Jan 2022 00:45) (18 - 18)  SpO2: 95% (13 Jan 2022 00:45) (94% - 95%)                          9.4    8.78  )-----------( 225      ( 12 Jan 2022 06:38 )             30.4       01-12    141  |  101  |  42<H>  ----------------------------<  101<H>  3.6   |  26  |  2.74<H>    Ca    9.1      12 Jan 2022 06:38    TPro  7.4  /  Alb  3.2<L>  /  TBili  0.2  /  DBili  x   /  AST  43<H>  /  ALT  23  /  AlkPhos  70  01-12        Culture - Blood (collected 11 Jan 2022 23:02)  Source: .Blood Blood-Peripheral  Gram Stain (13 Jan 2022 01:05):    Growth in aerobic bottle: Gram Negative Rods    Growth in anaerobic bottle: Gram Negative Rods  Preliminary Report (13 Jan 2022 01:05):    Growth in aerobic bottle: Gram Negative Rods    Growth in anaerobic bottle: Gram Negative Rods    ***Blood Panel PCR results on this specimen are available    approximately 3 hours after the Gram stain result.***    Gram stain, PCR, and/or culture results may not always    correspond due to difference in methodologies.    ************************************************************    This PCR assay was performed by multiplex PCR. This    Assay tests for 66 bacterial and resistance gene targets.    Please refer to the [facility] Labs test directory    at [email or web address] for details.  Organism: Blood Culture PCR (12 Jan 2022 21:38)  Organism: Blood Culture PCR (12 Jan 2022 21:38)    Culture - Blood (collected 11 Jan 2022 23:02)  Source: .Blood Blood-Peripheral  Preliminary Report (13 Jan 2022 01:01):    No growth to date.    Culture - Urine (collected 11 Jan 2022 22:57)  Source: Clean Catch Clean Catch (Midstream)  Preliminary Report (12 Jan 2022 21:58):    >100,000 CFU/ml Pseudomonas aeruginosa        89yoM PMHx of prostate cancer s/p radiation, CAD s/p stent, CKD III, pleural and pericardial effusion, afib not on AC , recently admitted for sepsis secondary to UTI s/p indwelling sanchez [facility]  [facility] and [facility].  Patient now presenting acutely with positive blood cultures with growth of gram negative rods --> pseudomonas.       Plan of Care/ Interventions:  -Vital signs hemodynamically stable  -Labs without leukocytosis, patient has been afebrile  -Follow up sensitivities of UCx and BCx  -Continue with Cefepime pending sensitivities  -Follow up ID recommendations  -Monitor WBC and fever curve  -Will continue to closely monitor patient/vitals and discuss with day team        Caleb Membreno PA-C  Dept of Medicine  [id number]

## 2022-01-13 NOTE — DIETITIAN INITIAL EVALUATION ADULT. - PERSON TAUGHT/METHOD
Discussed importance of adequate consumption of meals/supplements to optimize protein-energy intake. Encouraged small/frequent meals, nutrient dense snacks, prioritizing protein foods at meal time. Reviewed use of Nixon for wound healing./verbal instruction/individual instruction/spouse instructed/teach back - (Patient repeats in own words)

## 2022-01-13 NOTE — DIETITIAN INITIAL EVALUATION ADULT. - ADD RECOMMEND
1) New malnutrition notification sent. 2) Consider repeat swallow evaluation for texture/consistency recommendations. 3) Continue regular diet, defer texture/consistency needs to team. 4) Add Ensure Enlive 3x/day. 5) Add Nixon 2x/day. 6) Add vitamin C for wound healing. Continue multivitamin for micronutrient coverage. 7) Provide feeding assistance PRN. Encourage adequate consumption of meals/supplements to optimize protein-energy intake. 8) Monitor PO intake/tolerance, weights, labs, hydration status, skin integrity

## 2022-01-13 NOTE — DIETITIAN INITIAL EVALUATION ADULT. - SIGNS/SYMPTOMS
<75% estimated protein-energy needs x > 1 month, severe/moderate muscle/fat depletion multiple pressure injuries

## 2022-01-13 NOTE — DIETITIAN INITIAL EVALUATION ADULT. - ORAL INTAKE PTA/DIET HISTORY
Per wife, Pt with poor appetite and fair PO intake at baseline. Therapeutic restrictions/modifications: soft/chopped diet at nursing home PTA. NKFA/intolerances reported. Micronutrient supplementation: iron, vitamin C, folic acid, multivitamin. Protein-energy supplementation: Ensure Plus 3x/day, Magic Cup 1x/day. Wife denies Pt has difficutly chewing/swallowing, no Hx of swallow evaluations per chart.

## 2022-01-13 NOTE — PROGRESS NOTE ADULT - ASSESSMENT
89 M w/ Pmhx of prostate cancer s/p radiation, CAD s/p stent, CKD III, pleural and pericardial effusion, afib not on AC , recently admitted for sepsis secondary to UTI s/p indwelling sanchez ,  who presents from [facility] rehab  for hypotension and hypoxia on day of admission.

## 2022-01-13 NOTE — DIETITIAN INITIAL EVALUATION ADULT. - ETIOLOGY
decreased ability to consume adequate protein-energy in setting of increased physiological demand for nutrients Increased physiological demand for nutrients

## 2022-01-13 NOTE — PROGRESS NOTE ADULT - ASSESSMENT
Pt is a 89M w/ PMHx of prostate cancer s/p radiation, CAD s/p stent, CKD III, pleural and pericardial effusion, ?afib not on AC , recently admitted for sepsis secondary to UTI s/p indwelling sanchez now p/w hypotension/hypoxemia.   +COVID exposure on 12/31.   Hypoxemic per EMS to 78%    Sepsis 2/2 CAUTI  Pseudomonal Bacteremia  Hypotension resolved  -UA consistent w/ acute infection  -UCx w/ Pseudomonas  -BCx +GNR bacteremia, repeat surveillance cx ordered  -S/p sanchez exchange in the ED  -c/w cefepime for now, will likely have to do total 14 day duration given bacteremia  will review susceptibilities for final therapy recs    COVID-19 PNA  AHRF resolved  +exposure on 12/31. +test here on 1/11  Notes reviewed--concern that hypoxemia related to ?poor perfusion. Pt placed on NRB--> NC and was quickly weaned off.   Pt continues to maintain good sats on RA  At this time can monitor--would hold remdesivir, but can start if O2 sats <94% and patient requires O2 again  Steroids recommended after 1st week of symptom onset for any patients that are hypoxic--will hold given no hypoxemia and active infectin  -trend temps/WBC  -trend inflammatory biomarkers  -Continue with supportive care and supplemental O2 as needed--consider proning and tolerating lower oxygen saturation to avoid intubation  -Maintain aspiration precautions  -Maintain isolation per infection control policy    JOSELITO  supportive care/IVFs  appreciate renal recs  renally dose medications  avoid nephrotoxic agents    Infectious Diseases will continue to follow. Please call with any questions.   Ree Castellanos M.D.  [facility], Division of Infectious Diseases [phone]

## 2022-01-13 NOTE — DIETITIAN INITIAL EVALUATION ADULT. - PERTINENT MEDS FT
MEDICATIONS  (STANDING):  ascorbic acid 500 milliGRAM(s) Oral daily  cefepime   IVPB      cefepime   IVPB 1000 milliGRAM(s) IV Intermittent every 24 hours  ferrous    sulfate 325 milliGRAM(s) Oral daily  folic acid 1 milliGRAM(s) Oral daily  heparin   Injectable 5000 Unit(s) SubCutaneous every 8 hours  melatonin 5 milliGRAM(s) Oral at bedtime  mirtazapine 7.5 milliGRAM(s) Oral at bedtime  multivitamin 1 Tablet(s) Oral daily  potassium chloride    Tablet ER 40 milliEquivalent(s) Oral every 4 hours  sertraline 50 milliGRAM(s) Oral daily  sodium chloride 0.45%. 1000 milliLiter(s) (50 mL/Hr) IV Continuous <Continuous>  tamsulosin 0.4 milliGRAM(s) Oral at bedtime    MEDICATIONS  (PRN):  acetaminophen     Tablet .. 650 milliGRAM(s) Oral every 4 hours PRN Temp greater or equal to 38.5C (101.3F)  ALBUTerol    90 MICROgram(s) HFA Inhaler 2 Puff(s) Inhalation every 4 hours PRN Shortness of Breath and/or Wheezing  guaifenesin/dextromethorphan Oral Liquid 10 milliLiter(s) Oral every 4 hours PRN Cough

## 2022-01-13 NOTE — DIETITIAN INITIAL EVALUATION ADULT. - PHYSCIAL ASSESSMENT
93% IBW  Skin per nursing flowsheets: pressure injury #1 (left spine, stage 2), pressure injury #2 (sacrum, stage 2)

## 2022-01-13 NOTE — DIETITIAN INITIAL EVALUATION ADULT. - OTHER INFO
Per team Pt with poor appetite/PO intake, <50% of meals consumed since admission. Pt is tolerating texture modified foods and thin liquids without difficulty per team. Per wife, Pt would have improved PO intake with regular texture foods. Will discuss repeat swallow evaluation with team. Wife amenable to Pt receiving oral nutritional supplement to optimize PO intake: Ensure Enlive 3x/day (strawberry/vanilla) + Magic cup 1x/day. Will trial Nixon 2x/day to promote wound healing. Food preferences obtained; RD to honor as able.     GI: No overt signs of GI distress reported. Last BM was yesterday 01/12 (fecal incontinence) , no diarrhea/constipation noted. Bowel regimen: none    UBW is unknown, wife uncertain of weight changes. Current admission weight of 72.4 kg (daily 01/12). Per previous RD note () "downtrending weight history 5/6/21-68kg, 6/7/21-65.9kg, 11/30/21-64.2kg. Indicates weight gain 16% x 2 months." Of note, Pt with Hx of chronic diuretic use, likely weight changes secondary to fluid shifts. RD to continue to monitor weight trends as available/able.

## 2022-01-13 NOTE — PROGRESS NOTE ADULT - SUBJECTIVE AND OBJECTIVE BOX
Patient is a 89y old  Male who presents with a chief complaint of hypotension x 1 day (2022 16:47)      INTERVAL HPI/OVERNIGHT EVENTS: noted  pt seen and examined this am   events noted  more alert this am  sating well on RA  noted-bld cx +pseudomonas        Vital Signs Last 24 Hrs  T(C): 36.9 (2022 16:22), Max: 36.9 (2022 00:45)  T(F): 98.5 (2022 16:22), Max: 98.5 (2022 00:45)  HR: 87 (2022 16:22) (77 - 96)  BP: 105/63 (2022 16:22) (99/59 - 109/64)  BP(mean): --  RR: 18 (2022 16:22) (18 - 18)  SpO2: 96% (2022 16:22) (95% - 96%)    acetaminophen     Tablet .. 650 milliGRAM(s) Oral every 4 hours PRN  ALBUTerol    90 MICROgram(s) HFA Inhaler 2 Puff(s) Inhalation every 4 hours PRN  ascorbic acid 500 milliGRAM(s) Oral daily  cefepime   IVPB      cefepime   IVPB 1000 milliGRAM(s) IV Intermittent every 24 hours  ferrous    sulfate 325 milliGRAM(s) Oral daily  folic acid 1 milliGRAM(s) Oral daily  guaifenesin/dextromethorphan Oral Liquid 10 milliLiter(s) Oral every 4 hours PRN  heparin   Injectable 5000 Unit(s) SubCutaneous every 8 hours  melatonin 5 milliGRAM(s) Oral at bedtime  mirtazapine 7.5 milliGRAM(s) Oral at bedtime  multivitamin 1 Tablet(s) Oral daily  sertraline 50 milliGRAM(s) Oral daily  sodium chloride 0.45%. 1000 milliLiter(s) IV Continuous <Continuous>  tamsulosin 0.4 milliGRAM(s) Oral at bedtime      PHYSICAL EXAM:  GENERAL: NAD,   EYES: conjunctiva and sclera clear  ENMT: Moist mucous membranes  NECK: Supple, No JVD, Normal thyroid  CHEST/LUNG: non labored, cta b/l  HEART: Regular rate and rhythm; No murmurs, rubs, or gallops  ABDOMEN: Soft, Nontender, Nondistended; Bowel sounds present  EXTREMITIES:  2+ Peripheral Pulses, No clubbing, cyanosis, or edema  LYMPH: No lymphadenopathy noted  SKIN: No rashes or lesions    Consultant(s) Notes Reviewed:  [x ] YES  [ ] NO  Care Discussed with Consultants/Other Providers [ x] YES  [ ] NO    LABS:                        8.8    7.94  )-----------( 224      ( 2022 08:04 )             28.0     -13    140  |  100  |  40<H>  ----------------------------<  90  3.3<L>   |  25  |  2.77<H>    Ca    8.8      2022 08:04    TPro  7.4  /  Alb  3.2<L>  /  TBili  0.2  /  DBili  x   /  AST  43<H>  /  ALT  23  /  AlkPhos  70  01-12      Urinalysis Basic - ( 2022 20:41 )    Color: Orange / Appearance: Turbid / S.011 / pH: x  Gluc: x / Ketone: Negative  / Bili: Negative / Urobili: Negative   Blood: x / Protein: 300 mg/dL / Nitrite: Negative   Leuk Esterase: Large / RBC: 374 /hpf / WBC 6110 /HPF   Sq Epi: x / Non Sq Epi: 26 /hpf / Bacteria: Few      CAPILLARY BLOOD GLUCOSE            Urinalysis Basic - ( 2022 20:41 )    Color: Orange / Appearance: Turbid / S.011 / pH: x  Gluc: x / Ketone: Negative  / Bili: Negative / Urobili: Negative   Blood: x / Protein: 300 mg/dL / Nitrite: Negative   Leuk Esterase: Large / RBC: 374 /hpf / WBC 6110 /HPF   Sq Epi: x / Non Sq Epi: 26 /hpf / Bacteria: Few        Culture - Blood (collected 2022 23:02)  Source: .Blood Blood-Peripheral  Gram Stain (2022 01:05):    Growth in aerobic bottle: Gram Negative Rods    Growth in anaerobic bottle: Gram Negative Rods  Preliminary Report (2022 14:13):    Growth in aerobic bottle: Pseudomonas aeruginosa    Growth in anaerobic bottle: Gram Negative Rods    ***Blood Panel PCR results on this specimen are available    approximately 3 hours after the Gram stain result.***    Gram stain, PCR, and/or culture results may not always    correspond due to difference in methodologies.    ************************************************************    This PCR assay was performed by multiplex PCR. This    Assay tests for 66 bacterial and resistance gene targets.    Please refer to the [facility] Labs test directory    at [email or web address] for details.  Organism: Blood Culture PCR (2022 21:38)  Organism: Blood Culture PCR (2022 21:38)    Culture - Blood (collected 2022 23:02)  Source: .Blood Blood-Peripheral  Preliminary Report (2022 01:01):    No growth to date.    Culture - Urine (collected 2022 22:57)  Source: Clean Catch Clean Catch (Midstream)  Preliminary Report (2022 18:45):    >100,000 CFU/ml Pseudomonas aeruginosa    >100,000 CFU/ml Staphylococcus aureus  Organism: Pseudomonas aeruginosa (2022 18:28)  Organism: Pseudomonas aeruginosa (2022 18:28)        RADIOLOGY & ADDITIONAL TESTS:    Imaging Personally Reviewed:  [x ] YES  [ ] NO

## 2022-01-14 LAB
-  AMIKACIN: SIGNIFICANT CHANGE UP
-  AMPICILLIN/SULBACTAM: SIGNIFICANT CHANGE UP
-  AZTREONAM: SIGNIFICANT CHANGE UP
-  CEFAZOLIN: SIGNIFICANT CHANGE UP
-  CEFEPIME: SIGNIFICANT CHANGE UP
-  CEFTAZIDIME: SIGNIFICANT CHANGE UP
-  CIPROFLOXACIN: SIGNIFICANT CHANGE UP
-  DAPTOMYCIN: SIGNIFICANT CHANGE UP
-  GENTAMICIN: SIGNIFICANT CHANGE UP
-  GENTAMICIN: SIGNIFICANT CHANGE UP
-  IMIPENEM: SIGNIFICANT CHANGE UP
-  LEVOFLOXACIN: SIGNIFICANT CHANGE UP
-  LINEZOLID: SIGNIFICANT CHANGE UP
-  MEROPENEM: SIGNIFICANT CHANGE UP
-  OXACILLIN: SIGNIFICANT CHANGE UP
-  PENICILLIN: SIGNIFICANT CHANGE UP
-  PIPERACILLIN/TAZOBACTAM: SIGNIFICANT CHANGE UP
-  RIFAMPIN: SIGNIFICANT CHANGE UP
-  TETRACYCLINE: SIGNIFICANT CHANGE UP
-  TOBRAMYCIN: SIGNIFICANT CHANGE UP
-  TRIMETHOPRIM/SULFAMETHOXAZOLE: SIGNIFICANT CHANGE UP
-  VANCOMYCIN: SIGNIFICANT CHANGE UP
ANION GAP SERPL CALC-SCNC: 12 MMOL/L — SIGNIFICANT CHANGE UP (ref 5–17)
BUN SERPL-MCNC: 37 MG/DL — HIGH (ref 7–23)
CALCIUM SERPL-MCNC: 8.9 MG/DL — SIGNIFICANT CHANGE UP (ref 8.4–10.5)
CHLORIDE SERPL-SCNC: 108 MMOL/L — SIGNIFICANT CHANGE UP (ref 96–108)
CO2 SERPL-SCNC: 24 MMOL/L — SIGNIFICANT CHANGE UP (ref 22–31)
CREAT SERPL-MCNC: 2.73 MG/DL — HIGH (ref 0.5–1.3)
CULTURE RESULTS: SIGNIFICANT CHANGE UP
CULTURE RESULTS: SIGNIFICANT CHANGE UP
GLUCOSE SERPL-MCNC: 100 MG/DL — HIGH (ref 70–99)
METHOD TYPE: SIGNIFICANT CHANGE UP
METHOD TYPE: SIGNIFICANT CHANGE UP
ORGANISM # SPEC MICROSCOPIC CNT: SIGNIFICANT CHANGE UP
POTASSIUM SERPL-MCNC: 4.1 MMOL/L — SIGNIFICANT CHANGE UP (ref 3.5–5.3)
POTASSIUM SERPL-SCNC: 4.1 MMOL/L — SIGNIFICANT CHANGE UP (ref 3.5–5.3)
SODIUM SERPL-SCNC: 144 MMOL/L — SIGNIFICANT CHANGE UP (ref 135–145)
SPECIMEN SOURCE: SIGNIFICANT CHANGE UP
SPECIMEN SOURCE: SIGNIFICANT CHANGE UP

## 2022-01-14 RX ADMIN — Medication 1 TABLET(S): at 11:18

## 2022-01-14 RX ADMIN — Medication 1 MILLIGRAM(S): at 11:18

## 2022-01-14 RX ADMIN — MIRTAZAPINE 7.5 MILLIGRAM(S): 45 TABLET, ORALLY DISINTEGRATING ORAL at 21:45

## 2022-01-14 RX ADMIN — TAMSULOSIN HYDROCHLORIDE 0.4 MILLIGRAM(S): 0.4 CAPSULE ORAL at 21:45

## 2022-01-14 RX ADMIN — HEPARIN SODIUM 5000 UNIT(S): 5000 INJECTION INTRAVENOUS; SUBCUTANEOUS at 05:22

## 2022-01-14 RX ADMIN — HEPARIN SODIUM 5000 UNIT(S): 5000 INJECTION INTRAVENOUS; SUBCUTANEOUS at 21:45

## 2022-01-14 RX ADMIN — HEPARIN SODIUM 5000 UNIT(S): 5000 INJECTION INTRAVENOUS; SUBCUTANEOUS at 14:07

## 2022-01-14 RX ADMIN — Medication 325 MILLIGRAM(S): at 11:18

## 2022-01-14 RX ADMIN — CEFEPIME 100 MILLIGRAM(S): 1 INJECTION, POWDER, FOR SOLUTION INTRAMUSCULAR; INTRAVENOUS at 09:43

## 2022-01-14 RX ADMIN — SERTRALINE 50 MILLIGRAM(S): 25 TABLET, FILM COATED ORAL at 11:19

## 2022-01-14 RX ADMIN — Medication 500 MILLIGRAM(S): at 11:18

## 2022-01-14 RX ADMIN — Medication 5 MILLIGRAM(S): at 21:45

## 2022-01-14 NOTE — PROGRESS NOTE ADULT - SUBJECTIVE AND OBJECTIVE BOX
[facility], Division of Infectious Diseases  DORIAN Patino Y. Patel, S. Shah, [facility]  [phone]    Name: KIANNA GUILLEN  Age: 89y  Gender: Male  MRN: [id number]    Interval History:  No acute overnight events.   Afebrile  Continues to remain on RA  Notes reviewed    Antibiotics:  cefepime   IVPB 1000 milliGRAM(s) IV Intermittent every 24 hours  cefepime   IVPB          Medications:  acetaminophen     Tablet .. 650 milliGRAM(s) Oral every 4 hours PRN  ALBUTerol    90 MICROgram(s) HFA Inhaler 2 Puff(s) Inhalation every 4 hours PRN  ascorbic acid 500 milliGRAM(s) Oral daily  cefepime   IVPB 1000 milliGRAM(s) IV Intermittent every 24 hours  cefepime   IVPB      ferrous    sulfate 325 milliGRAM(s) Oral daily  folic acid 1 milliGRAM(s) Oral daily  guaifenesin/dextromethorphan Oral Liquid 10 milliLiter(s) Oral every 4 hours PRN  heparin   Injectable 5000 Unit(s) SubCutaneous every 8 hours  melatonin 5 milliGRAM(s) Oral at bedtime  mirtazapine 7.5 milliGRAM(s) Oral at bedtime  multivitamin 1 Tablet(s) Oral daily  sertraline 50 milliGRAM(s) Oral daily  sodium chloride 0.45%. 1000 milliLiter(s) IV Continuous <Continuous>  tamsulosin 0.4 milliGRAM(s) Oral at bedtime      Review of Systems: as above    Allergies: No Known Allergies    For details regarding the patient's past medical history, social history, family history, and other miscellaneous elements, please refer the initial infectious diseases consultation and/or the admitting history and physical examination for this admission.    Objective:  Vitals:   T(C): 36.9 (01-14-22 @ 08:46), Max: 36.9 (01-13-22 @ 16:22)  HR: 99 (01-14-22 @ 08:46) (87 - 99)  BP: 132/61 (01-14-22 @ 08:46) (105/63 - 132/61)  RR: 18 (01-14-22 @ 08:46) (18 - 18)  SpO2: 95% (01-14-22 @ 08:46) (95% - 97%)    Physical Examination:  General: elderly M  HEENT: NC/AT, EOMI  Neck: supple, no palpable LAD  Cardio: S1, S2 heard, RRR, no murmurs  Resp: decreased b/l breath sounds  Abd: soft, NT, ND, + bowel sounds  Ext: no edema or cyanosis  Skin: warm, dry, no visible rash      Laboratory Studies:  CBC:                       8.8    7.94  )-----------( 224      ( 13 Jan 2022 08:04 )             28.0     CMP: 01-14    144  |  108  |  37<H>  ----------------------------<  100<H>  4.1   |  24  |  2.73<H>    Ca    8.9      14 Jan 2022 06:12            Microbiology: reviewed    Culture - Blood (collected 01-11-22 @ 23:02)  Source: .Blood Blood-Peripheral  Gram Stain (01-13-22 @ 01:05):    Growth in aerobic bottle: Gram Negative Rods    Growth in anaerobic bottle: Gram Negative Rods  Preliminary Report (01-13-22 @ 20:41):    Growth in aerobic and anaerobic bottles: Pseudomonas aeruginosa    ***Blood Panel PCR results on this specimen are available    approximately 3 hours after the Gram stain result.***    Gram stain, PCR, and/or culture results may not always    correspond due to difference in methodologies.    ************************************************************    This PCR assay was performed by multiplex PCR. This    Assay tests for 66 bacterial and resistance gene targets.    Please refer to the [facility] test directory    at [email or web address] for details.  Organism: Blood Culture PCR (01-12-22 @ 21:38)  Organism: Blood Culture PCR (01-12-22 @ 21:38)      -  Pseudomonas aeruginosa: Detec      Method Type: PCR    Culture - Blood (collected 01-11-22 @ 23:02)  Source: .Blood Blood-Peripheral  Preliminary Report (01-13-22 @ 01:01):    No growth to date.    Culture - Urine (collected 01-11-22 @ 22:57)  Source: Clean Catch Clean Catch (Midstream)  Preliminary Report (01-13-22 @ 18:45):    >100,000 CFU/ml Pseudomonas aeruginosa    >100,000 CFU/ml Staphylococcus aureus  Organism: Pseudomonas aeruginosa (01-13-22 @ 18:28)  Organism: Pseudomonas aeruginosa (01-13-22 @ 18:28)      -  Amikacin: S <=16      -  Aztreonam: S 8      -  Cefepime: S 4      -  Ceftazidime: S 4      -  Ciprofloxacin: S <=0.25      -  Gentamicin: S <=2      -  Levofloxacin: S <=0.5      -  Meropenem: S <=1      -  Piperacillin/Tazobactam: S <=8      -  Tobramycin: S <=2      Method Type: CRUZ        Radiology: reviewed

## 2022-01-14 NOTE — PROGRESS NOTE ADULT - ASSESSMENT
89 M w/ Pmhx of prostate cancer s/p radiation, CAD s/p stent, CKD III, pleural and pericardial effusion, afib not on AC , recently admitted for sepsis secondary to UTI s/p indwelling sanchez ,  who presents from [facility] rehab  for hypotension and hypoxia on day of admission.

## 2022-01-14 NOTE — PROGRESS NOTE ADULT - ASSESSMENT
Pt is a 89M w/ PMHx of prostate cancer s/p radiation, CAD s/p stent, CKD III, pleural and pericardial effusion, ?afib not on AC , recently admitted for sepsis secondary to UTI s/p indwelling sanchez now p/w hypotension/hypoxemia.   +COVID exposure on 12/31. Hypoxemic per EMS to 78%    Sepsis 2/2 CAUTI  Pseudomonal Bacteremia  -UA consistent w/ acute infection  -UCx w/ Pseudomonas, pan sensitive  -BCx +Pseudomonas, repeat surveillance cx ordered  -S/p sanchez exchange in the ED  -c/w cefepime for now  Agree w/ cefepime 1gm q24h x14 day course through 1/26 after confirming repeat cx from 1/13 are negative  Can place midline. Fax weekly CMP, CBC to Dr. Castellanos [phone]    Plan d/w ROSEANNE Bajwa    COVID-19 PNA  AHRF resolved  +exposure on 12/31. +test here on 1/11  Notes reviewed--concern that hypoxemia related to ?poor perfusion. Pt placed on NRB--> NC and was quickly weaned off.   Pt continues to maintain good sats on RA  At this time can monitor--would hold remdesivir, but can start if O2 sats <94% and patient requires O2 again  Steroids recommended after 1st week of symptom onset for any patients that are hypoxic--will hold given no hypoxemia and active infection  -trend temps/WBC  -trend inflammatory biomarkers  -Continue with supportive care and supplemental O2 as needed--consider proning and tolerating lower oxygen saturation to avoid intubation  -Maintain aspiration precautions  -Maintain isolation per infection control policy    JOSELITO  supportive care/IVFs  appreciate renal recs  renally dose medications  avoid nephrotoxic agents    Infectious Diseases will continue to follow. Please call with any questions.   Ree Castellanos M.D.  [facility], Division of Infectious Diseases [phone]

## 2022-01-14 NOTE — PROGRESS NOTE ADULT - SUBJECTIVE AND OBJECTIVE BOX
Patient is a 89y old  Male who presents with a chief complaint of hypotension x 1 day (14 Jan 2022 10:46)      INTERVAL HPI/OVERNIGHT EVENTS: noted  pt seen and examined this am   events noted  feels well      Vital Signs Last 24 Hrs  T(C): 36.9 (14 Jan 2022 08:46), Max: 36.9 (13 Jan 2022 16:22)  T(F): 98.4 (14 Jan 2022 08:46), Max: 98.5 (13 Jan 2022 16:22)  HR: 99 (14 Jan 2022 08:46) (87 - 99)  BP: 132/61 (14 Jan 2022 08:46) (105/63 - 132/61)  BP(mean): --  RR: 18 (14 Jan 2022 08:46) (18 - 18)  SpO2: 95% (14 Jan 2022 08:46) (95% - 97%)    acetaminophen     Tablet .. 650 milliGRAM(s) Oral every 4 hours PRN  ALBUTerol    90 MICROgram(s) HFA Inhaler 2 Puff(s) Inhalation every 4 hours PRN  ascorbic acid 500 milliGRAM(s) Oral daily  cefepime   IVPB 1000 milliGRAM(s) IV Intermittent every 24 hours  cefepime   IVPB      ferrous    sulfate 325 milliGRAM(s) Oral daily  folic acid 1 milliGRAM(s) Oral daily  guaifenesin/dextromethorphan Oral Liquid 10 milliLiter(s) Oral every 4 hours PRN  heparin   Injectable 5000 Unit(s) SubCutaneous every 8 hours  melatonin 5 milliGRAM(s) Oral at bedtime  mirtazapine 7.5 milliGRAM(s) Oral at bedtime  multivitamin 1 Tablet(s) Oral daily  sertraline 50 milliGRAM(s) Oral daily  sodium chloride 0.45%. 1000 milliLiter(s) IV Continuous <Continuous>  tamsulosin 0.4 milliGRAM(s) Oral at bedtime      PHYSICAL EXAM:  GENERAL: NAD,   EYES: conjunctiva and sclera clear  ENMT: Moist mucous membranes  NECK: Supple, No JVD, Normal thyroid  CHEST/LUNG: non labored, cta b/l  HEART: Regular rate and rhythm; No murmurs, rubs, or gallops  ABDOMEN: Soft, Nontender, Nondistended; Bowel sounds present  EXTREMITIES:  2+ Peripheral Pulses, No clubbing, cyanosis, or edema  LYMPH: No lymphadenopathy noted  SKIN: No rashes or lesions    Consultant(s) Notes Reviewed:  [x ] YES  [ ] NO  Care Discussed with Consultants/Other Providers [ x] YES  [ ] NO    LABS:                        8.8    7.94  )-----------( 224      ( 13 Jan 2022 08:04 )             28.0     01-14    144  |  108  |  37<H>  ----------------------------<  100<H>  4.1   |  24  |  2.73<H>    Ca    8.9      14 Jan 2022 06:12          CAPILLARY BLOOD GLUCOSE                Culture - Blood (collected 11 Jan 2022 23:02)  Source: .Blood Blood-Peripheral  Gram Stain (13 Jan 2022 01:05):    Growth in aerobic bottle: Gram Negative Rods    Growth in anaerobic bottle: Gram Negative Rods  Final Report (14 Jan 2022 12:20):    Growth in aerobic and anaerobic bottles: Pseudomonas aeruginosa    ***Blood Panel PCR results on this specimen are available    approximately 3 hours after the Gram stain result.***    Gram stain, PCR, and/or culture results may not always    correspond due to difference in methodologies.    ************************************************************    This PCR assay was performed by multiplex PCR. This    Assay tests for 66 bacterial and resistance gene targets.    Please refer to the [facility] test directory    at [email or web address] for details.  Organism: Blood Culture PCR  Pseudomonas aeruginosa (14 Jan 2022 12:20)  Organism: Pseudomonas aeruginosa (14 Jan 2022 12:20)  Organism: Blood Culture PCR (14 Jan 2022 12:20)    Culture - Blood (collected 11 Jan 2022 23:02)  Source: .Blood Blood-Peripheral  Preliminary Report (13 Jan 2022 01:01):    No growth to date.    Culture - Urine (collected 11 Jan 2022 22:57)  Source: Clean Catch Clean Catch (Midstream)  Preliminary Report (13 Jan 2022 18:45):    >100,000 CFU/ml Pseudomonas aeruginosa    >100,000 CFU/ml Staphylococcus aureus  Organism: Pseudomonas aeruginosa (13 Jan 2022 18:28)  Organism: Pseudomonas aeruginosa (13 Jan 2022 18:28)        RADIOLOGY & ADDITIONAL TESTS:    Imaging Personally Reviewed:  [x ] YES  [ ] NO

## 2022-01-14 NOTE — CONSULT NOTE ADULT - SUBJECTIVE AND OBJECTIVE BOX
Dr. Orellana  Office [phone]  Cell [phone]  Yeimy CRONIN  Cell [phone]    RENAL INITIAL CONSULT NOTE: DATE OF SERVICE 22 @ 18:25    HPI:  Patient is a 89 year old male w/ Pmhx of prostate cancer s/p radiation, CAD s/p stent, CKD III, pleural and pericardial effusion, ?afib not on AC , recently admitted for sepsis secondary to UTI s/p indwelling sanchez ,  [facility] on day of [facility].   Per transfer record , patient was hypotensive with BP 76/40  on day of admission. He was also reported to be hypoxic . Per EMS record patient had a O2 sat of 78%.  Patient was exposed to covid on . Patient reports no cough , no fever , no shortness of breath. He reports no abdominal pain , no diarrhea. Found to have renal failure. He is very confused, history is from chart review. We were called for JOSELITO and renal clearance for midline         Allergies:  No Known Allergies      PAST MEDICAL & SURGICAL HISTORY:  CAD (coronary artery disease)  asa    Prostate CA  s/p radiation    Stage 3 chronic kidney disease    PAD (peripheral artery disease)  s/p vascular stent    Anemia of chronic disease    Pericardial effusion    Pleural effusion    Hematuria    History of iron deficiency    COVID-19 vaccine series completed  Moderna w booster    H/O vascular surgery        Home Medications Reviewed    Hospital Medications:   MEDICATIONS  (STANDING):  ascorbic acid 500 milliGRAM(s) Oral daily  cefepime   IVPB 1000 milliGRAM(s) IV Intermittent every 24 hours  cefepime   IVPB      ferrous    sulfate 325 milliGRAM(s) Oral daily  folic acid 1 milliGRAM(s) Oral daily  heparin   Injectable 5000 Unit(s) SubCutaneous every 8 hours  melatonin 5 milliGRAM(s) Oral at bedtime  mirtazapine 7.5 milliGRAM(s) Oral at bedtime  multivitamin 1 Tablet(s) Oral daily  sertraline 50 milliGRAM(s) Oral daily  sodium chloride 0.45%. 1000 milliLiter(s) (50 mL/Hr) IV Continuous <Continuous>  tamsulosin 0.4 milliGRAM(s) Oral at bedtime      SOCIAL HISTORY:  unknown    FAMILY HISTORY:  No pertinent family history in first degree relatives        REVIEW OF SYSTEMS:  could not be obtained due to mental state    VITALS:  T(F): 98.2 (22 @ 15:48), Max: 98.4 (22 @ 08:46)  HR: 89 (22 @ 15:48)  BP: 103/62 (22 @ 15:48)  RR: 18 (22 @ 15:48)  SpO2: 95% (22 @ 15:48)  Wt(kg): --     @ 07:  -   @ 07:00  --------------------------------------------------------  IN: 480 mL / OUT: 2800 mL / NET: -2320 mL     @ 07:  -   @ 18:25  --------------------------------------------------------  IN: 440 mL / OUT: 500 mL / NET: -60 mL          PHYSICAL EXAM:  Constitutional: NAD  HEENT: anicteric sclera, oropharynx clear, MMM  Neck: No JVD  Respiratory: CTAB, no wheezes, rales or rhonchi  Cardiovascular: S1, S2, RRR  Gastrointestinal: BS+, soft, NT/ND  Extremities: No cyanosis or clubbing. No peripheral edema  Neurological: Awake  : No CVA tenderness. + sanchez.   Skin: No rashes     LABS:      144  |  108  |  37<H>  ----------------------------<  100<H>  4.1   |  24  |  2.73<H>    Ca    8.9      2022 06:12      Creatinine Trend: 2.73 <--, 2.77 <--, 2.74 <--, 2.88 <--                        8.8    7.94  )-----------( 224      ( 2022 08:04 )             28.0     Urine Studies:  Urinalysis Basic - ( 2022 20:41 )    Color: Orange / Appearance: Turbid / S.011 / pH:   Gluc:  / Ketone: Negative  / Bili: Negative / Urobili: Negative   Blood:  / Protein: 300 mg/dL / Nitrite: Negative   Leuk Esterase: Large / RBC: 374 /hpf / WBC 6110 /HPF   Sq Epi:  / Non Sq Epi: 26 /hpf / Bacteria: Few          RADIOLOGY & ADDITIONAL STUDIES:

## 2022-01-14 NOTE — CONSULT NOTE ADULT - ASSESSMENT
Patient is a 89 year old male w/ Pmhx of prostate cancer s/p radiation, CAD s/p stent, CKD III, pleural and pericardial effusion, ?afib not on AC , recently admitted for sepsis secondary to UTI s/p indwelling sanchez ,  [facility] on day of admission. Has JOSELITO, COVID, has bacteremia planned for midline, called for renal clearance    A/P:  JOSELITO:  Etiology?  Baseline Scr 1.5  Likely ATN sec to hemodynamic change  Renal function has started to improve  Has good amount urine in the bag  Monitor renal function closely  Planned for midline for antibiotics in view of Pseudomonal bacteremia  From renal patient is cleared for midline-unlikely will need vascular access for chronic HD  Will do further work up if renal function worsens

## 2022-01-15 LAB
ANION GAP SERPL CALC-SCNC: 15 MMOL/L — SIGNIFICANT CHANGE UP (ref 5–17)
BUN SERPL-MCNC: 35 MG/DL — HIGH (ref 7–23)
CALCIUM SERPL-MCNC: 9.4 MG/DL — SIGNIFICANT CHANGE UP (ref 8.4–10.5)
CHLORIDE SERPL-SCNC: 108 MMOL/L — SIGNIFICANT CHANGE UP (ref 96–108)
CO2 SERPL-SCNC: 22 MMOL/L — SIGNIFICANT CHANGE UP (ref 22–31)
CREAT SERPL-MCNC: 2.82 MG/DL — HIGH (ref 0.5–1.3)
GLUCOSE SERPL-MCNC: 92 MG/DL — SIGNIFICANT CHANGE UP (ref 70–99)
HCT VFR BLD CALC: 30.9 % — LOW (ref 39–50)
HGB BLD-MCNC: 9.5 G/DL — LOW (ref 13–17)
MCHC RBC-ENTMCNC: 30.7 GM/DL — LOW (ref 32–36)
MCHC RBC-ENTMCNC: 30.8 PG — SIGNIFICANT CHANGE UP (ref 27–34)
MCV RBC AUTO: 100.3 FL — HIGH (ref 80–100)
NRBC # BLD: 0 /100 WBCS — SIGNIFICANT CHANGE UP (ref 0–0)
PLATELET # BLD AUTO: 249 K/UL — SIGNIFICANT CHANGE UP (ref 150–400)
POTASSIUM SERPL-MCNC: 4.1 MMOL/L — SIGNIFICANT CHANGE UP (ref 3.5–5.3)
POTASSIUM SERPL-SCNC: 4.1 MMOL/L — SIGNIFICANT CHANGE UP (ref 3.5–5.3)
RBC # BLD: 3.08 M/UL — LOW (ref 4.2–5.8)
RBC # FLD: 13.6 % — SIGNIFICANT CHANGE UP (ref 10.3–14.5)
SODIUM SERPL-SCNC: 145 MMOL/L — SIGNIFICANT CHANGE UP (ref 135–145)
WBC # BLD: 7.35 K/UL — SIGNIFICANT CHANGE UP (ref 3.8–10.5)
WBC # FLD AUTO: 7.35 K/UL — SIGNIFICANT CHANGE UP (ref 3.8–10.5)

## 2022-01-15 RX ADMIN — Medication 5 MILLIGRAM(S): at 22:28

## 2022-01-15 RX ADMIN — HEPARIN SODIUM 5000 UNIT(S): 5000 INJECTION INTRAVENOUS; SUBCUTANEOUS at 13:19

## 2022-01-15 RX ADMIN — HEPARIN SODIUM 5000 UNIT(S): 5000 INJECTION INTRAVENOUS; SUBCUTANEOUS at 05:25

## 2022-01-15 RX ADMIN — CEFEPIME 100 MILLIGRAM(S): 1 INJECTION, POWDER, FOR SOLUTION INTRAMUSCULAR; INTRAVENOUS at 10:41

## 2022-01-15 RX ADMIN — HEPARIN SODIUM 5000 UNIT(S): 5000 INJECTION INTRAVENOUS; SUBCUTANEOUS at 22:28

## 2022-01-15 RX ADMIN — MIRTAZAPINE 7.5 MILLIGRAM(S): 45 TABLET, ORALLY DISINTEGRATING ORAL at 22:28

## 2022-01-15 RX ADMIN — TAMSULOSIN HYDROCHLORIDE 0.4 MILLIGRAM(S): 0.4 CAPSULE ORAL at 22:28

## 2022-01-15 NOTE — PROGRESS NOTE ADULT - ASSESSMENT
Patient is a 89 year old male w/ Pmhx of prostate cancer s/p radiation, CAD s/p stent, CKD III, pleural and pericardial effusion, ?afib not on AC , recently admitted for sepsis secondary to UTI s/p indwelling sanchez ,  [facility] and hypoxia on day of admission. Has JOSELITO, COVID, has bacteremia planned for midline, called for renal clearance    A/P:  JOSELITO:  Etiology?  Baseline Scr 1.5  Likely ATN sec to hemodynamic change  Renal function remains elevated   Would check repeat renal sonogram this admission, as his last sonogram had hydronephrosis in the past.   Pt non oliguric   Monitor renal function closely  Planned for midline for antibiotics in view of Pseudomonal bacteremia and UTI  From renal patient is cleared for midline-unlikely will need vascular access for chronic HD  Will do further work up if renal function worsens  No episodes of hypotension  renally dose all medications to current GFR  avoid excessive vitamin c tablets

## 2022-01-15 NOTE — PROGRESS NOTE ADULT - SUBJECTIVE AND OBJECTIVE BOX
Patient is a 89y old  Male who presents with a chief complaint of hypotension x 1 day (14 Jan 2022 18:25)      INTERVAL HPI/OVERNIGHT EVENTS: noted  pt seen and examined this am   events noted  no new events/complaints    Vital Signs Last 24 Hrs  T(C): 36.5 (15 Juan 2022 10:14), Max: 36.8 (14 Jan 2022 15:48)  T(F): 97.7 (15 Juan 2022 10:14), Max: 98.2 (14 Jan 2022 15:48)  HR: 96 (15 Juan 2022 10:14) (89 - 96)  BP: 118/76 (15 Juan 2022 10:14) (103/62 - 118/76)  BP(mean): --  RR: 18 (15 Juan 2022 10:14) (18 - 18)  SpO2: 91% (15 Juan 2022 10:14) (91% - 95%)    acetaminophen     Tablet .. 650 milliGRAM(s) Oral every 4 hours PRN  ALBUTerol    90 MICROgram(s) HFA Inhaler 2 Puff(s) Inhalation every 4 hours PRN  ascorbic acid 500 milliGRAM(s) Oral daily  cefepime   IVPB 1000 milliGRAM(s) IV Intermittent every 24 hours  cefepime   IVPB      ferrous    sulfate 325 milliGRAM(s) Oral daily  folic acid 1 milliGRAM(s) Oral daily  guaifenesin/dextromethorphan Oral Liquid 10 milliLiter(s) Oral every 4 hours PRN  heparin   Injectable 5000 Unit(s) SubCutaneous every 8 hours  melatonin 5 milliGRAM(s) Oral at bedtime  mirtazapine 7.5 milliGRAM(s) Oral at bedtime  multivitamin 1 Tablet(s) Oral daily  sertraline 50 milliGRAM(s) Oral daily  sodium chloride 0.45%. 1000 milliLiter(s) IV Continuous <Continuous>  tamsulosin 0.4 milliGRAM(s) Oral at bedtime      PHYSICAL EXAM:  GENERAL: NAD,   EYES: conjunctiva and sclera clear  ENMT: Moist mucous membranes  NECK: Supple, No JVD, Normal thyroid  CHEST/LUNG: non labored, cta b/l  HEART: Regular rate and rhythm; No murmurs, rubs, or gallops  ABDOMEN: Soft, Nontender, Nondistended; Bowel sounds present  EXTREMITIES:  2+ Peripheral Pulses, No clubbing, cyanosis, or edema  LYMPH: No lymphadenopathy noted  SKIN: No rashes or lesions    Consultant(s) Notes Reviewed:  [x ] YES  [ ] NO  Care Discussed with Consultants/Other Providers [ x] YES  [ ] NO    LABS:                        9.5    7.35  )-----------( 249      ( 15 Juan 2022 11:03 )             30.9     01-15    145  |  108  |  35<H>  ----------------------------<  92  4.1   |  22  |  2.82<H>    Ca    9.4      15 Juan 2022 11:05          CAPILLARY BLOOD GLUCOSE                Culture - Blood (collected 13 Jan 2022 17:03)  Source: .Blood Blood  Preliminary Report (14 Jan 2022 18:01):    No growth to date.    Culture - Blood (collected 13 Jan 2022 17:03)  Source: .Blood Blood  Preliminary Report (14 Jan 2022 18:01):    No growth to date.        RADIOLOGY & ADDITIONAL TESTS:    Imaging Personally Reviewed:  [x ] YES  [ ] NO

## 2022-01-15 NOTE — PROGRESS NOTE ADULT - SUBJECTIVE AND OBJECTIVE BOX
[facility] NEPHROLOGY [facility]ACTICE   MD Abhijit Brown PA    From 7 AM - 5 PM:  OFFICE: [phone]  Dr. Orellana cell: [phone]  Dr. Raines cell: [phone]  ROSEANNE Banda cell: [phone]    From 5 PM - 7 AM: Answering Service: [phone]  Date of service: 01-15-22 @ 14:25    RENAL FOLLOW UP NOTE  --------------------------------------------------------------------------------  HPI:  Pt seen and examined at bedside.       PAST HISTORY  --------------------------------------------------------------------------------  No significant changes to PMH, PSH, FHx, SHx, unless otherwise noted    ALLERGIES & MEDICATIONS  --------------------------------------------------------------------------------  Allergies    No Known Allergies    Intolerances      Standing Inpatient Medications  ascorbic acid 500 milliGRAM(s) Oral daily  cefepime   IVPB 1000 milliGRAM(s) IV Intermittent every 24 hours  cefepime   IVPB      ferrous    sulfate 325 milliGRAM(s) Oral daily  folic acid 1 milliGRAM(s) Oral daily  heparin   Injectable 5000 Unit(s) SubCutaneous every 8 hours  melatonin 5 milliGRAM(s) Oral at bedtime  mirtazapine 7.5 milliGRAM(s) Oral at bedtime  multivitamin 1 Tablet(s) Oral daily  sertraline 50 milliGRAM(s) Oral daily  sodium chloride 0.45%. 1000 milliLiter(s) IV Continuous <Continuous>  tamsulosin 0.4 milliGRAM(s) Oral at bedtime    PRN Inpatient Medications  acetaminophen     Tablet .. 650 milliGRAM(s) Oral every 4 hours PRN  ALBUTerol    90 MICROgram(s) HFA Inhaler 2 Puff(s) Inhalation every 4 hours PRN  guaifenesin/dextromethorphan Oral Liquid 10 milliLiter(s) Oral every 4 hours PRN      REVIEW OF SYSTEMS  --------------------------------------------------------------------------------  General: no fever  CVS: no chest pain  RESP: no sob, no cough  ABD: no abdominal pain  : no dysuria  MSK: no edema     VITALS/PHYSICAL EXAM  --------------------------------------------------------------------------------  T(C): 36.5 (01-15-22 @ 10:14), Max: 36.8 (01-14-22 @ 15:48)  HR: 96 (01-15-22 @ 10:14) (89 - 96)  BP: 118/76 (01-15-22 @ 10:14) (103/62 - 118/76)  RR: 18 (01-15-22 @ 10:14) (18 - 18)  SpO2: 91% (01-15-22 @ 10:14) (91% - 95%)  Wt(kg): --        01-14-22 @ 07:01  -  01-15-22 @ 07:00  --------------------------------------------------------  IN: 440 mL / OUT: 1200 mL / NET: -760 mL    01-15-22 @ 07:01  -  01-15-22 @ 14:25  --------------------------------------------------------  IN: 50 mL / OUT: 0 mL / NET: 50 mL      Physical Exam:  	Gen: NAD  	HEENT: MMM  	Pulm: CTA B/L  	CV: S1S2  	Abd: Soft, +BS  	Ext: No LE edema B/L                      Neuro: Awake   	Skin: Warm and Dry   	    LABS/STUDIES  --------------------------------------------------------------------------------              9.5    7.35  >-----------<  249      [01-15-22 @ 11:03]              30.9     145  |  108  |  35  ----------------------------<  92      [01-15-22 @ 11:05]  4.1   |  22  |  2.82        Ca     9.4     [01-15-22 @ 11:05]    Creatinine Trend:  SCr 2.82 [01-15 @ 11:05]  SCr 2.73 [01-14 @ 06:12]  SCr 2.77 [01-13 @ 08:04]  SCr 2.74 [01-12 @ 06:38]  SCr 2.88 [01-11 @ 19:54]    Urinalysis - [01-11-22 @ 20:41]      Color Orange / Appearance Turbid / SG 1.011 / pH 7.0      Gluc Trace / Ketone Negative  / Bili Negative / Urobili Negative       Blood Large / Protein 300 mg/dL / Leuk Est Large / Nitrite Negative       / WBC 6110 / Hyaline 575 / Gran  / Sq Epi  / Non Sq Epi 26 / Bacteria Few      Iron 43, TIBC 180, %sat 24      [04-19-21 @ 10:44]  Ferritin 370      [04-19-21 @ 10:12]  Vitamin D (25OH) 22.5      [04-18-21 @ 09:07]  TSH 5.67      [04-18-21 @ 09:07]

## 2022-01-16 LAB
ANION GAP SERPL CALC-SCNC: 15 MMOL/L — SIGNIFICANT CHANGE UP (ref 5–17)
BUN SERPL-MCNC: 39 MG/DL — HIGH (ref 7–23)
CALCIUM SERPL-MCNC: 9.6 MG/DL — SIGNIFICANT CHANGE UP (ref 8.4–10.5)
CHLORIDE SERPL-SCNC: 109 MMOL/L — HIGH (ref 96–108)
CO2 SERPL-SCNC: 23 MMOL/L — SIGNIFICANT CHANGE UP (ref 22–31)
CREAT SERPL-MCNC: 2.87 MG/DL — HIGH (ref 0.5–1.3)
GLUCOSE SERPL-MCNC: 92 MG/DL — SIGNIFICANT CHANGE UP (ref 70–99)
POTASSIUM SERPL-MCNC: 4.1 MMOL/L — SIGNIFICANT CHANGE UP (ref 3.5–5.3)
POTASSIUM SERPL-SCNC: 4.1 MMOL/L — SIGNIFICANT CHANGE UP (ref 3.5–5.3)
SODIUM SERPL-SCNC: 147 MMOL/L — HIGH (ref 135–145)

## 2022-01-16 RX ORDER — SODIUM CHLORIDE 9 MG/ML
1000 INJECTION, SOLUTION INTRAVENOUS
Refills: 0 | Status: DISCONTINUED | OUTPATIENT
Start: 2022-01-16 | End: 2022-01-17

## 2022-01-16 RX ADMIN — SODIUM CHLORIDE 50 MILLILITER(S): 9 INJECTION, SOLUTION INTRAVENOUS at 15:28

## 2022-01-16 RX ADMIN — HEPARIN SODIUM 5000 UNIT(S): 5000 INJECTION INTRAVENOUS; SUBCUTANEOUS at 21:59

## 2022-01-16 RX ADMIN — HEPARIN SODIUM 5000 UNIT(S): 5000 INJECTION INTRAVENOUS; SUBCUTANEOUS at 13:09

## 2022-01-16 RX ADMIN — HEPARIN SODIUM 5000 UNIT(S): 5000 INJECTION INTRAVENOUS; SUBCUTANEOUS at 05:14

## 2022-01-16 RX ADMIN — CEFEPIME 100 MILLIGRAM(S): 1 INJECTION, POWDER, FOR SOLUTION INTRAMUSCULAR; INTRAVENOUS at 09:15

## 2022-01-16 RX ADMIN — TAMSULOSIN HYDROCHLORIDE 0.4 MILLIGRAM(S): 0.4 CAPSULE ORAL at 22:00

## 2022-01-16 RX ADMIN — Medication 5 MILLIGRAM(S): at 22:00

## 2022-01-16 RX ADMIN — MIRTAZAPINE 7.5 MILLIGRAM(S): 45 TABLET, ORALLY DISINTEGRATING ORAL at 21:59

## 2022-01-16 NOTE — PROGRESS NOTE ADULT - ASSESSMENT
89 M w/ Pmhx of prostate cancer s/p radiation, CAD s/p stent, CKD III, pleural and pericardial effusion, afib not on AC , recently admitted for sepsis secondary to UTI s/p indwelling sanchez ,  who presents from [facility] rehab  for hypotension and hypoxia on day of admission.

## 2022-01-16 NOTE — PROGRESS NOTE ADULT - SUBJECTIVE AND OBJECTIVE BOX
Patient is a 89y old  Male who presents with a chief complaint of hypotension x 1 day (15 Juan 2022 14:25)      INTERVAL HPI/OVERNIGHT EVENTS: noted  pt seen and examined this am   events noted  feels well      Vital Signs Last 24 Hrs  T(C): 36.9 (16 Jan 2022 11:42), Max: 36.9 (16 Jan 2022 11:42)  T(F): 98.4 (16 Jan 2022 11:42), Max: 98.4 (16 Jan 2022 11:42)  HR: 97 (16 Jan 2022 11:42) (97 - 99)  BP: 114/66 (16 Jan 2022 11:42) (114/66 - 132/54)  BP(mean): --  RR: 18 (16 Jan 2022 11:42) (18 - 18)  SpO2: 97% (16 Jan 2022 11:42) (97% - 97%)    acetaminophen     Tablet .. 650 milliGRAM(s) Oral every 4 hours PRN  ALBUTerol    90 MICROgram(s) HFA Inhaler 2 Puff(s) Inhalation every 4 hours PRN  ascorbic acid 500 milliGRAM(s) Oral daily  cefepime   IVPB 1000 milliGRAM(s) IV Intermittent every 24 hours  cefepime   IVPB      ferrous    sulfate 325 milliGRAM(s) Oral daily  folic acid 1 milliGRAM(s) Oral daily  guaifenesin/dextromethorphan Oral Liquid 10 milliLiter(s) Oral every 4 hours PRN  heparin   Injectable 5000 Unit(s) SubCutaneous every 8 hours  melatonin 5 milliGRAM(s) Oral at bedtime  mirtazapine 7.5 milliGRAM(s) Oral at bedtime  multivitamin 1 Tablet(s) Oral daily  sertraline 50 milliGRAM(s) Oral daily  sodium chloride 0.45%. 1000 milliLiter(s) IV Continuous <Continuous>  tamsulosin 0.4 milliGRAM(s) Oral at bedtime      PHYSICAL EXAM:  GENERAL: NAD,   EYES: conjunctiva and sclera clear  ENMT: dry mucous membranes  NECK: Supple, No JVD, Normal thyroid  CHEST/LUNG: non labored, cta b/l  HEART: Regular rate and rhythm; No murmurs, rubs, or gallops  ABDOMEN: Soft, Nontender, Nondistended; Bowel sounds present  EXTREMITIES:  2+ Peripheral Pulses, No clubbing, cyanosis, or edema  LYMPH: No lymphadenopathy noted  SKIN: No rashes or lesions    Consultant(s) Notes Reviewed:  [x ] YES  [ ] NO  Care Discussed with Consultants/Other Providers [ x] YES  [ ] NO    LABS:                        9.5    7.35  )-----------( 249      ( 15 Juan 2022 11:03 )             30.9     01-16    147<H>  |  109<H>  |  39<H>  ----------------------------<  92  4.1   |  23  |  2.87<H>    Ca    9.6      16 Jan 2022 07:40          CAPILLARY BLOOD GLUCOSE                Culture - Blood (collected 13 Jan 2022 17:03)  Source: .Blood Blood  Preliminary Report (14 Jan 2022 18:01):    No growth to date.    Culture - Blood (collected 13 Jan 2022 17:03)  Source: .Blood Blood  Preliminary Report (14 Jan 2022 18:01):    No growth to date.        RADIOLOGY & ADDITIONAL TESTS:    Imaging Personally Reviewed:  [x ] YES  [ ] NO

## 2022-01-16 NOTE — PROGRESS NOTE ADULT - SUBJECTIVE AND OBJECTIVE BOX
[facility]   MD Abhijit Brown PA    From 7 AM - 5 PM:  OFFICE: [phone]  Dr. Orellana cell: [phone]  Dr. Raines cell: [phone]  ROSEANNE Banda cell: [phone]    From 5 PM - 7 AM: Answering Service: [phone]  Date of service: 01-16-22 @ 14:08    RENAL FOLLOW UP NOTE  --------------------------------------------------------------------------------  HPI:  Pt seen and examined at bedside.       PAST HISTORY  --------------------------------------------------------------------------------  No significant changes to PMH, PSH, FHx, SHx, unless otherwise noted    ALLERGIES & MEDICATIONS  --------------------------------------------------------------------------------  Allergies    No Known Allergies    Intolerances      Standing Inpatient Medications  ascorbic acid 500 milliGRAM(s) Oral daily  cefepime   IVPB 1000 milliGRAM(s) IV Intermittent every 24 hours  cefepime   IVPB      dextrose 5% + sodium chloride 0.45%. 1000 milliLiter(s) IV Continuous <Continuous>  ferrous    sulfate 325 milliGRAM(s) Oral daily  folic acid 1 milliGRAM(s) Oral daily  heparin   Injectable 5000 Unit(s) SubCutaneous every 8 hours  melatonin 5 milliGRAM(s) Oral at bedtime  mirtazapine 7.5 milliGRAM(s) Oral at bedtime  multivitamin 1 Tablet(s) Oral daily  sertraline 50 milliGRAM(s) Oral daily  sodium chloride 0.45%. 1000 milliLiter(s) IV Continuous <Continuous>  tamsulosin 0.4 milliGRAM(s) Oral at bedtime    PRN Inpatient Medications  acetaminophen     Tablet .. 650 milliGRAM(s) Oral every 4 hours PRN  ALBUTerol    90 MICROgram(s) HFA Inhaler 2 Puff(s) Inhalation every 4 hours PRN  guaifenesin/dextromethorphan Oral Liquid 10 milliLiter(s) Oral every 4 hours PRN      REVIEW OF SYSTEMS  --------------------------------------------------------------------------------  unable to obtain     VITALS/PHYSICAL EXAM  --------------------------------------------------------------------------------  T(C): 36.9 (01-16-22 @ 11:42), Max: 36.9 (01-16-22 @ 11:42)  HR: 97 (01-16-22 @ 11:42) (97 - 99)  BP: 114/66 (01-16-22 @ 11:42) (114/66 - 132/54)  RR: 18 (01-16-22 @ 11:42) (18 - 18)  SpO2: 97% (01-16-22 @ 11:42) (97% - 97%)  Wt(kg): --        01-15-22 @ 07:01  -  01-16-22 @ 07:00  --------------------------------------------------------  IN: 200 mL / OUT: 400 mL / NET: -200 mL    01-16-22 @ 07:01  -  01-16-22 @ 14:08  --------------------------------------------------------  IN: 50 mL / OUT: 0 mL / NET: 50 mL      Physical Exam:  	Gen: NAD  	HEENT: MMM  	Pulm: CTA B/L  	CV: S1S2  	Abd: Soft, +BS  	Ext: No LE edema B/L                      Neuro: Awake   	Skin: Warm and Dry   	    LABS/STUDIES  --------------------------------------------------------------------------------              9.5    7.35  >-----------<  249      [01-15-22 @ 11:03]              30.9     147  |  109  |  39  ----------------------------<  92      [01-16-22 @ 07:40]  4.1   |  23  |  2.87        Ca     9.6     [01-16-22 @ 07:40]    Creatinine Trend:  SCr 2.87 [01-16 @ 07:40]  SCr 2.82 [01-15 @ 11:05]  SCr 2.73 [01-14 @ 06:12]  SCr 2.77 [01-13 @ 08:04]  SCr 2.74 [01-12 @ 06:38]    Urinalysis - [01-11-22 @ 20:41]      Color Orange / Appearance Turbid / SG 1.011 / pH 7.0      Gluc Trace / Ketone Negative  / Bili Negative / Urobili Negative       Blood Large / Protein 300 mg/dL / Leuk Est Large / Nitrite Negative       / WBC 6110 / Hyaline 575 / Gran  / Sq Epi  / Non Sq Epi 26 / Bacteria Few      Iron 43, TIBC 180, %sat 24      [04-19-21 @ 10:44]  Ferritin 370      [04-19-21 @ 10:12]  Vitamin D (25OH) 22.5      [04-18-21 @ 09:07]  TSH 5.67      [04-18-21 @ 09:07]

## 2022-01-16 NOTE — PROGRESS NOTE ADULT - ASSESSMENT
Patient is a 89 year old male w/ Pmhx of prostate cancer s/p radiation, CAD s/p stent, CKD III, pleural and pericardial effusion, ?afib not on AC , recently admitted for sepsis secondary to UTI s/p indwelling sanchez ,  [facility]  for hypotension and hypoxia on day of admission. Has JOSELITO, COVID, has bacteremia planned for midline, called for renal clearance    A/P:  JOSELITO:  Etiology?  Baseline Scr 1.5  Likely ATN sec to hemodynamic change  Renal function remains elevated but stable  Would check repeat renal sonogram this admission, as his last sonogram had hydronephrosis in the past.   Pt non oliguric   Monitor renal function closely  Planned for midline for antibiotics in view of Pseudomonal bacteremia and UTI  From renal patient is cleared for midline-unlikely will need vascular access for chronic HD  Will do further work up if renal function worsens  No episodes of hypotension  renally dose all medications to current GFR  avoid excessive vitamin c tablets

## 2022-01-17 ENCOUNTER — TRANSCRIPTION ENCOUNTER (OUTPATIENT)
Age: 87
End: 2022-01-17

## 2022-01-17 LAB
ANION GAP SERPL CALC-SCNC: 14 MMOL/L — SIGNIFICANT CHANGE UP (ref 5–17)
BUN SERPL-MCNC: 38 MG/DL — HIGH (ref 7–23)
CALCIUM SERPL-MCNC: 9.2 MG/DL — SIGNIFICANT CHANGE UP (ref 8.4–10.5)
CHLORIDE SERPL-SCNC: 107 MMOL/L — SIGNIFICANT CHANGE UP (ref 96–108)
CO2 SERPL-SCNC: 22 MMOL/L — SIGNIFICANT CHANGE UP (ref 22–31)
CREAT SERPL-MCNC: 2.81 MG/DL — HIGH (ref 0.5–1.3)
CULTURE RESULTS: SIGNIFICANT CHANGE UP
GLUCOSE SERPL-MCNC: 110 MG/DL — HIGH (ref 70–99)
HCT VFR BLD CALC: 28.7 % — LOW (ref 39–50)
HGB BLD-MCNC: 8.9 G/DL — LOW (ref 13–17)
MCHC RBC-ENTMCNC: 30.6 PG — SIGNIFICANT CHANGE UP (ref 27–34)
MCHC RBC-ENTMCNC: 31 GM/DL — LOW (ref 32–36)
MCV RBC AUTO: 98.6 FL — SIGNIFICANT CHANGE UP (ref 80–100)
NRBC # BLD: 0 /100 WBCS — SIGNIFICANT CHANGE UP (ref 0–0)
PLATELET # BLD AUTO: 270 K/UL — SIGNIFICANT CHANGE UP (ref 150–400)
POTASSIUM SERPL-MCNC: 3.7 MMOL/L — SIGNIFICANT CHANGE UP (ref 3.5–5.3)
POTASSIUM SERPL-SCNC: 3.7 MMOL/L — SIGNIFICANT CHANGE UP (ref 3.5–5.3)
RBC # BLD: 2.91 M/UL — LOW (ref 4.2–5.8)
RBC # FLD: 13.6 % — SIGNIFICANT CHANGE UP (ref 10.3–14.5)
SODIUM SERPL-SCNC: 143 MMOL/L — SIGNIFICANT CHANGE UP (ref 135–145)
SPECIMEN SOURCE: SIGNIFICANT CHANGE UP
WBC # BLD: 6.84 K/UL — SIGNIFICANT CHANGE UP (ref 3.8–10.5)
WBC # FLD AUTO: 6.84 K/UL — SIGNIFICANT CHANGE UP (ref 3.8–10.5)

## 2022-01-17 RX ORDER — SODIUM CHLORIDE 9 MG/ML
1000 INJECTION, SOLUTION INTRAVENOUS
Refills: 0 | Status: DISCONTINUED | OUTPATIENT
Start: 2022-01-17 | End: 2022-01-19

## 2022-01-17 RX ADMIN — TAMSULOSIN HYDROCHLORIDE 0.4 MILLIGRAM(S): 0.4 CAPSULE ORAL at 21:33

## 2022-01-17 RX ADMIN — SERTRALINE 50 MILLIGRAM(S): 25 TABLET, FILM COATED ORAL at 11:13

## 2022-01-17 RX ADMIN — HEPARIN SODIUM 5000 UNIT(S): 5000 INJECTION INTRAVENOUS; SUBCUTANEOUS at 06:16

## 2022-01-17 RX ADMIN — CEFEPIME 100 MILLIGRAM(S): 1 INJECTION, POWDER, FOR SOLUTION INTRAMUSCULAR; INTRAVENOUS at 10:48

## 2022-01-17 RX ADMIN — Medication 1 TABLET(S): at 11:13

## 2022-01-17 RX ADMIN — Medication 1 MILLIGRAM(S): at 11:13

## 2022-01-17 RX ADMIN — HEPARIN SODIUM 5000 UNIT(S): 5000 INJECTION INTRAVENOUS; SUBCUTANEOUS at 21:32

## 2022-01-17 RX ADMIN — HEPARIN SODIUM 5000 UNIT(S): 5000 INJECTION INTRAVENOUS; SUBCUTANEOUS at 12:51

## 2022-01-17 RX ADMIN — MIRTAZAPINE 7.5 MILLIGRAM(S): 45 TABLET, ORALLY DISINTEGRATING ORAL at 21:33

## 2022-01-17 RX ADMIN — Medication 500 MILLIGRAM(S): at 11:13

## 2022-01-17 RX ADMIN — Medication 5 MILLIGRAM(S): at 21:33

## 2022-01-17 RX ADMIN — Medication 1 TABLET(S): at 11:52

## 2022-01-17 RX ADMIN — SODIUM CHLORIDE 50 MILLILITER(S): 9 INJECTION, SOLUTION INTRAVENOUS at 12:51

## 2022-01-17 RX ADMIN — Medication 325 MILLIGRAM(S): at 11:13

## 2022-01-17 NOTE — PROGRESS NOTE ADULT - ASSESSMENT
89 M w/ Pmhx of prostate cancer s/p radiation, CAD s/p stent, CKD III, pleural and pericardial effusion, afib not on AC , recently admitted for sepsis secondary to UTI s/p indwelling sanchez ,  who presents from [facility] rehab  for hypotension and hypoxia on day of admission.

## 2022-01-17 NOTE — PROGRESS NOTE ADULT - ASSESSMENT
Patient is a 89 year old male w/ Pmhx of prostate cancer s/p radiation, CAD s/p stent, CKD III, pleural and pericardial effusion, ?afib not on AC , recently admitted for sepsis secondary to UTI s/p indwelling sanchez ,  [facility]  for hypotension and hypoxia on day of admission. Has JOSELITO, COVID, has bacteremia planned for midline, called for renal clearance    A/P:  JOSELITO:  Etiology?  Baseline Scr 1.5  Likely ATN sec to hemodynamic change  Renal function remains elevated but stable  Would check repeat renal sonogram this admission, as his last sonogram had hydronephrosis in the past.   Pt non oliguric   Monitor renal function closely  Planned for midline for antibiotics in view of Pseudomonal bacteremia and UTI  From renal patient is cleared for midline-unlikely will need vascular access for chronic HD  Will do further work up if renal function worsens  No new episodes of hypotension  renally dose all medications to current GFR  avoid excessive vitamin c tablets

## 2022-01-17 NOTE — DISCHARGE NOTE PROVIDER - HOSPITAL COURSE
89 M w/ Pmhx of prostate cancer s/p radiation, CAD s/p stent, CKD III, pleural and pericardial effusion, afib not on AC , recently admitted for sepsis secondary to UTI s/p   indwelling sanchez ,  who presents from [facility] rehab  for hypotension and hypoxia on day of admission.      89 M w/ Pmhx of prostate cancer s/p radiation, CAD s/p stent, CKD III, pleural and pericardial effusion, afib not on AC , recently admitted for sepsis secondary to UTI s/p   indwelling sanchez ,  who presents from [facility]  for hypotension and hypoxia on day of admission.     ·  Problem: Hypotension.   ·  Plan: 2/2 to sepsis -pseudomonas bacteremia-UTI  previously on fludrocortisone , may have autonomic dysregulation,  ID fu noted-cefepime 1gm q24h x14 day course through 1/26 after confirming repeat cx from 1/13 are negative  sp midline placemnet-IR on 1/16  - hold lasix   cont gentle IV fluid resuscitation.     Problem/Plan - 2:  ·  Problem: Acute UTI.   ·  Plan: sanchez exchanged in ED    urine cultures-pseudomonas  cefepime started  ID cs fu.     Problem/Plan - 3:  ·  Problem: 2019 novel coronavirus disease (COVID-19).   ·  Plan: reportedly exposed to covid 12/31 , no covid like symptoms , CT chest w/ no parenchymal disease, although reported to be hypoxia , suspect  poor pulse oximetry   detection in setting of poor peripheral perfusion , since arrival has been weaned down to 2L and saturating at 96 % on RA , did not need Remdesivir and dexa  -   Problem/Plan - 4:  ·  Problem: JOSELITO (acute kidney injury)on CKD  ·  Plan: sp IVF  likely ATN-Cr trending down  renal cs appreciated-Repeat Renal sono  pt dry MM, hypernatremia- start D51/2 ns 50cc/hr and monitor.     89 M w/ Pmhx of prostate cancer s/p radiation, CAD s/p stent, CKD III, pleural and pericardial effusion, afib not on AC , recently admitted for sepsis secondary to UTI s/p   indwelling sanchez ,  who presents from [facility]  for hypotension and hypoxia on day of admission.     ·  Problem: Hypotension.   ·  Plan: 2/2 to sepsis -pseudomonas bacteremia-UTI  previously on fludrocortisone , may have autonomic dysregulation,  ID fu noted-cefepime 1gm q24h x14 day course through 1/26 after confirming repeat cx from 1/13 are negative  sp midline placemnet-IR on 1/16  - hold lasix   Received gentle IV fluid resuscitation. BP stable now     Problem/Plan - 2:  ·  Problem: Acute UTI.   ·  Plan: sanchez exchanged in ED    urine cultures-pseudomonas  cefepime started  ID cs done     Problem/Plan - 3:  ·  Problem: 2019 novel coronavirus disease (COVID-19).   ·  Plan: reportedly exposed to covid 12/31 , no covid like symptoms , CT chest w/ no parenchymal disease, although reported to be hypoxia , suspect  poor pulse oximetry   detection in setting of poor peripheral perfusion , since arrival has been weaned down to 2L and saturating at 96 % on RA , did not need Remdesivir and dexa  -   Problem/Plan - 4:  ·  Problem: JOSELITO (acute kidney injury)on CKD  ·  Plan: sp IVF  likely ATN-Cr trending down  renal cs appreciated-Repeat Renal sono with no hydro  --hypernatremia- started  D51/2 ns 50cc/hr, now resolved     89 M w/ Pmhx of prostate cancer s/p radiation, CAD s/p stent, CKD III, pleural and pericardial effusion, afib not on AC , recently admitted for sepsis secondary to UTI s/p   indwelling sanchez ,  who presents from [facility]  for hypotension and hypoxia on day of admission.     ·  Problem: Hypotension.   ·  Plan: 2/2 to sepsis -pseudomonas bacteremia-UTI  previously on fludrocortisone , may have autonomic dysregulation,  ID fu noted-cefepime 1gm q24h x14 day course through 1/26 after confirming repeat cx from 1/13 are negative  sp midline placemnet-IR on 1/16  - hold lasix   Received gentle IV fluid resuscitation. BP stable now     Problem/Plan - 2:  ·  Problem: Acute UTI.   ·  Plan: sanchez exchanged in ED    urine cultures-pseudomonas  cefepime started  ID cs done     Problem/Plan - 3:  ·  Problem: 2019 novel coronavirus disease (COVID-19).   ·  Plan: reportedly exposed to covid 12/31 , no covid like symptoms , CT chest w/ no parenchymal disease, although reported to be hypoxia , suspect  poor pulse oximetry   detection in setting of poor peripheral perfusion , since arrival has been weaned down to 2L and saturating at 96 % on RA , did not need Remdesivir and dexa  -   Problem/Plan - 4:  ·  Problem: JOSELITO (acute kidney injury)on CKD  ·  Plan: sp IVF  likely ATN-Cr trending down  renal cs appreciated-Repeat Renal sono with no hydro  --hypernatremia- started  D51/2 ns 50cc/hr, now improved.     89 M w/ Pmhx of prostate cancer s/p radiation, CAD s/p stent, CKD III, pleural and pericardial effusion, afib not on AC , recently admitted for sepsis secondary to UTI s/p   indwelling sanchez ,  who presents from [facility]  for hypotension and hypoxia on day of admission.     ·  Problem: Hypotension.   ·  Plan: 2/2 to sepsis -pseudomonas bacteremia-UTI  previously on fludrocortisone , may have autonomic dysregulation,  ID fu noted-cefepime 1gm q24h x14 day course through 1/26 after confirming repeat cx from 1/13 are negative  sp midline placemnet-IR on 1/16  - hold lasix   Received gentle IV fluid resuscitation. BP stable now     Problem/Plan - 2:  ·  Problem: Acute UTI.   ·  Plan: sanchez exchanged in ED    urine cultures-pseudomonas, MRSA bacteruria  cefepime started, continue bactrim through 1/26  ID cs done     Problem/Plan - 3:  ·  Problem: 2019 novel coronavirus disease (COVID-19).   ·  Plan: reportedly exposed to covid 12/31 , no covid like symptoms , CT chest w/ no parenchymal disease, although reported to be hypoxia , suspect  poor pulse oximetry   detection in setting of poor peripheral perfusion , since arrival has been weaned down to 2L and saturating at 96 % on RA , did not need Remdesivir and dexa  -   Problem/Plan - 4:  ·  Problem: JOSELITO (acute kidney injury)on CKD  ·  Plan: sp IVF  likely ATN-Cr trending down  renal cs appreciated-Repeat Renal sono with no hydro  --hypernatremia- started  D51/2 ns 50cc/hr, now improved.  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  RPT doppler in 7-10 days

## 2022-01-17 NOTE — DISCHARGE NOTE PROVIDER - DETAILS OF MALNUTRITION DIAGNOSIS/DIAGNOSES
This patient has been assessed with a concern for Malnutrition and was treated during this hospitalization for the following Nutrition diagnosis/diagnoses:     -  01/13/2022: Severe protein-calorie malnutrition

## 2022-01-17 NOTE — PROGRESS NOTE ADULT - SUBJECTIVE AND OBJECTIVE BOX
Dr. Orellana  Office [phone]  Cell [phone]  Yeimy CRONIN  Cell [phone]    RENAL PROGRESS NOTE: DATE OF SERVICE 01-17-22 @ 13:25    Patient is a 89y old  Male who presents with a chief complaint of hypotension x 1 day (17 Jan 2022 09:07)      Patient seen and examined at bedside. No chest pain/sob    VITALS:  T(F): 98.1 (01-17-22 @ 08:13), Max: 98.2 (01-16-22 @ 17:09)  HR: 93 (01-17-22 @ 08:13)  BP: 119/68 (01-17-22 @ 08:13)  RR: 18 (01-17-22 @ 08:13)  SpO2: 96% (01-17-22 @ 08:13)  Wt(kg): --    01-16 @ 07:01  -  01-17 @ 07:00  --------------------------------------------------------  IN: 50 mL / OUT: 800 mL / NET: -750 mL    01-17 @ 07:01 - 01-17 @ 13:25  --------------------------------------------------------  IN: 250 mL / OUT: 0 mL / NET: 250 mL          PHYSICAL EXAM:  Constitutional: NAD  Neck: No JVD  Respiratory: CTAB, no wheezes, rales or rhonchi  Cardiovascular: S1, S2, RRR  Gastrointestinal: BS+, soft, NT/ND  Extremities: No peripheral edema    Hospital Medications:   MEDICATIONS  (STANDING):  ascorbic acid 500 milliGRAM(s) Oral daily  cefepime   IVPB 1000 milliGRAM(s) IV Intermittent every 24 hours  cefepime   IVPB      dextrose 5% + sodium chloride 0.45%. 1000 milliLiter(s) (50 mL/Hr) IV Continuous <Continuous>  ferrous    sulfate 325 milliGRAM(s) Oral daily  folic acid 1 milliGRAM(s) Oral daily  heparin   Injectable 5000 Unit(s) SubCutaneous every 8 hours  melatonin 5 milliGRAM(s) Oral at bedtime  mirtazapine 7.5 milliGRAM(s) Oral at bedtime  multivitamin 1 Tablet(s) Oral daily  sertraline 50 milliGRAM(s) Oral daily  sodium chloride 0.45%. 1000 milliLiter(s) (50 mL/Hr) IV Continuous <Continuous>  tamsulosin 0.4 milliGRAM(s) Oral at bedtime  trimethoprim  160 mG/sulfamethoxazole 800 mG 1 Tablet(s) Oral daily      LABS:  01-17    143  |  107  |  38<H>  ----------------------------<  110<H>  3.7   |  22  |  2.81<H>    Ca    9.2      17 Jan 2022 07:17      Creatinine Trend: 2.81 <--, 2.87 <--, 2.82 <--, 2.73 <--, 2.77 <--, 2.74 <--, 2.88 <--                                8.9    6.84  )-----------( 270      ( 17 Jan 2022 07:17 )             28.7     Urine Studies:  Urinalysis - [01-11-22 @ 20:41]      Color Orange / Appearance Turbid / SG 1.011 / pH 7.0      Gluc Trace / Ketone Negative  / Bili Negative / Urobili Negative       Blood Large / Protein 300 mg/dL / Leuk Est Large / Nitrite Negative       / WBC 6110 / Hyaline 575 / Gran  / Sq Epi  / Non Sq Epi 26 / Bacteria Few      Iron 43, TIBC 180, %sat 24      [04-19-21 @ 10:44]  Ferritin 370      [04-19-21 @ 10:12]  Vitamin D (25OH) 22.5      [04-18-21 @ 09:07]  TSH 5.67      [04-18-21 @ 09:07]        RADIOLOGY & ADDITIONAL STUDIES:

## 2022-01-17 NOTE — PROGRESS NOTE ADULT - ASSESSMENT
Pt is a 89M w/ PMHx of prostate cancer s/p radiation, CAD s/p stent, CKD III, pleural and pericardial effusion, ?afib not on AC , recently admitted for sepsis secondary to UTI s/p indwelling sanchez now p/w hypotension/hypoxemia.   +COVID exposure on 12/31. Hypoxemic per EMS to 78%    Sepsis 2/2 CAUTI  Pseudomonal Bacteremia  MRSA Bacteruria  -UA consistent w/ acute infection  -UCx w/ Pseudomonas, pan sensitive  -UCx w/ MRSA  -BCx +Pseudomonas, repeat surveillance cx ordered  -S/p sanchez exchange in the ED  -c/w cefepime for now  Agree w/ cefepime 1gm q24h x14 day course through 1/26 after confirming repeat cx from 1/13 are negative  Can place midline. Fax weekly CMP, CBC to Dr. Castellanos [phone]    Plan d/w ROSEANNE Bajwa    COVID-19 PNA  AHRF resolved  +exposure on 12/31. +test here on 1/11  Notes reviewed--concern that hypoxemia related to ?poor perfusion. Pt placed on NRB--> NC and was quickly weaned off.   Pt continues to maintain good sats on RA  At this time can monitor--would hold remdesivir, but can start if O2 sats <94% and patient requires O2 again  Steroids recommended after 1st week of symptom onset for any patients that are hypoxic--will hold given no hypoxemia and active infection  -trend temps/WBC  -trend inflammatory biomarkers  -Continue with supportive care and supplemental O2 as needed--consider proning and tolerating lower oxygen saturation to avoid intubation  -Maintain aspiration precautions  -Maintain isolation per infection control policy    JOSELITO  supportive care/IVFs  appreciate renal recs  renally dose medications  avoid nephrotoxic agents    Infectious Diseases will continue to follow. Please call with any questions.   Ree Castellanos M.D.  [facility], Division of Infectious Diseases [phone]       Pt is a 89M w/ PMHx of prostate cancer s/p radiation, CAD s/p stent, CKD III, pleural and pericardial effusion, ?afib not on AC , recently admitted for sepsis secondary to UTI s/p indwelling sanchez now p/w hypotension/hypoxemia.   +COVID exposure on 12/31. Hypoxemic per EMS to 78%    Sepsis 2/2 CAUTI  Pseudomonal Bacteremia  MRSA Bacteruria  -UA consistent w/ acute infection  -UCx w/ Pseudomonas, pan sensitive  -UCx w/ MRSA  -BCx +Pseudomonas, repeat surveillance cx ordered  -S/p sanchez exchange in the ED  -c/w cefepime for now  Agree w/ cefepime 1gm q24h x14 day course through 1/26 after confirming repeat cx from 1/13 are negative  Can place midline. Fax weekly CMP, CBC to Dr. Castellanos [phone]  Adding bactrim as well given MRSA recovered for x10 day course  COVID-19 PNA  AHRF resolved  +exposure on 12/31. +test here on 1/11  Notes reviewed--concern that hypoxemia related to ?poor perfusion. Pt placed on NRB--> NC and was quickly weaned off.   Pt continues to maintain good sats on RA  At this time can monitor--would hold remdesivir, but can start if O2 sats <94% and patient requires O2 again  Steroids recommended after 1st week of symptom onset for any patients that are hypoxic--will hold given no hypoxemia and active infection  -trend temps/WBC  -trend inflammatory biomarkers  -Continue with supportive care and supplemental O2 as needed--consider proning and tolerating lower oxygen saturation to avoid intubation  -Maintain aspiration precautions  -Maintain isolation per infection control policy    JOSELITO  supportive care/IVFs  appreciate renal recs  renally dose medications  avoid nephrotoxic agents    Infectious Diseases will continue to follow. Please call with any questions.   Ree Castellanos M.D.  [facility], Division of Infectious Diseases [phone]       Pt is a 89M w/ PMHx of prostate cancer s/p radiation, CAD s/p stent, CKD III, pleural and pericardial effusion, ?afib not on AC , recently admitted for sepsis secondary to UTI s/p indwelling sanchez now p/w hypotension/hypoxemia.   +COVID exposure on 12/31. Hypoxemic per EMS to 78%    Sepsis 2/2 CAUTI  Pseudomonal Bacteremia  MRSA Bacteruria  -UA consistent w/ acute infection  -UCx w/ Pseudomonas, pan sensitive  -UCx w/ MRSA  -BCx +Pseudomonas, repeat surveillance cx ordered  -S/p sanchez exchange in the ED  C/w cefepime 1gm q24h x14 day course through 1/26 after confirming repeat cx from 1/13 are negative  Can place midline. Fax weekly CMP, CBC to Dr. Castellanos [phone]  Adding bactrim 1 DS tab daily (renally dosed from 1 DS tab BID) x10 day course for recovered MRSA    COVID-19 PNA  AHRF resolved  +exposure on 12/31. +test here on 1/11  Notes reviewed--concern that hypoxemia related to ?poor perfusion. Pt placed on NRB--> NC and was quickly weaned off.   Pt continues to maintain good sats on RA  At this time can monitor--would hold remdesivir, but can start if O2 sats <94% and patient requires O2 again  Steroids recommended after 1st week of symptom onset for any patients that are hypoxic--will hold given no hypoxemia and active infection  -trend temps/WBC  -trend inflammatory biomarkers  -Continue with supportive care and supplemental O2 as needed--consider proning and tolerating lower oxygen saturation to avoid intubation  -Maintain aspiration precautions  -Maintain isolation per infection control policy    JOSELITO  supportive care/IVFs  appreciate renal recs  renally dose medications  avoid nephrotoxic agents    Infectious Diseases will continue to follow. Please call with any questions.   Ree Castellanos M.D.  [facility], Division of Infectious Diseases [phone]       Pt is a 89M w/ PMHx of prostate cancer s/p radiation, CAD s/p stent, CKD III, pleural and pericardial effusion, ?afib not on AC , recently admitted for sepsis secondary to UTI s/p indwelling sanchez now p/w hypotension/hypoxemia.   +COVID exposure on 12/31. Hypoxemic per EMS to 78%    Sepsis 2/2 CAUTI  Pseudomonal Bacteremia  MRSA Bacteruria  -UA consistent w/ acute infection  -UCx w/ Pseudomonas, pan sensitive  -UCx w/ MRSA  -BCx +Pseudomonas, repeat surveillance cx NGTD  -S/p sanchez exchange in the ED  C/w cefepime 1gm q24h x14 day course through 1/26  Fax weekly CMP, CBC to Dr. Castellanos [phone]  Adding bactrim 1 DS tab daily (renally dosed from 1 DS tab BID) x10 day course for recovered MRSA    COVID-19 PNA  AHRF resolved  +exposure on 12/31. +test here on 1/11  Notes reviewed--concern that hypoxemia related to ?poor perfusion. Pt placed on NRB--> NC and was quickly weaned off.   Pt continues to maintain good sats on RA  At this time can monitor--would hold remdesivir, but can start if O2 sats <94% and patient requires O2 again  Steroids recommended after 1st week of symptom onset for any patients that are hypoxic--will hold given no hypoxemia and active infection  -trend temps/WBC  -trend inflammatory biomarkers  -Continue with supportive care and supplemental O2 as needed--consider proning and tolerating lower oxygen saturation to avoid intubation  -Maintain aspiration precautions  -Maintain isolation per infection control policy    JOSELITO  supportive care/IVFs  appreciate renal recs  renally dose medications  avoid nephrotoxic agents    Infectious Diseases will continue to follow. Please call with any questions.   Ree Castellanos M.D.  [facility], Division of Infectious Diseases [phone]

## 2022-01-17 NOTE — DISCHARGE NOTE PROVIDER - NSDCCPCAREPLAN_GEN_ALL_CORE_FT
PRINCIPAL DISCHARGE DIAGNOSIS  Diagnosis: Hypotension  Assessment and Plan of Treatment: Problem: Hypotension.   ·  Plan: 2/2 to sepsis -pseudomonas bacteremia-UTI  previously on fludrocortisone , may have autonomic dysregulation,  ID fu noted-cefepime 1gm q24h x14 day course through 1/26 after confirming repeat cx from 1/13 are negative  sp midline placemnet-IR on 1/16  - hold lasix   cont gentle IV fluid resuscitation.        SECONDARY DISCHARGE DIAGNOSES  Diagnosis: Acute UTI  Assessment and Plan of Treatment: sanchez exchanged in ED    urine cultures-pseudomonas  cefepime started continue as recommended via midline      Diagnosis: JOSELITO (acute kidney injury)  Assessment and Plan of Treatment: on CKD Improved with IV hydration  Avoid taking (NSAIDs) - (ex: Ibuprofen, Advil, Celebrex, Naprosyn)  Avoid taking any nephrotoxic agents (can harm kidneys) - Intravenous contrast for diagnostic testing, combination cold medications.  Have all medications adjusted for your renal function by your Health Care Provider.  Blood pressure control is important.  Take all medication as prescribed.  repeat Renal US ----      Diagnosis: 2019 novel coronavirus disease (COVID-19)  Assessment and Plan of Treatment: Initially was hypoxic on admission. Likely wrong reading from poor peripheral perfusion. On RA. Did not need remdesivir and dexamethasone     PRINCIPAL DISCHARGE DIAGNOSIS  Diagnosis: Hypotension  Assessment and Plan of Treatment: Problem: Hypotension.    2/2 to sepsis -pseudomonas bacteremia-UTI  previously on fludrocortisone , may have autonomic dysregulation,  ID fu noted-cefepime 1gm q24h x14 day course through 1/26 after confirming repeat cx from 1/13 are negative  sp midline placemnet-IR on 1/16  - hold lasix   Encourage oral hydration      SECONDARY DISCHARGE DIAGNOSES  Diagnosis: Acute UTI  Assessment and Plan of Treatment: sanchez exchanged in ED    urine cultures-pseudomonas  cefepime started continue as recommended via midline      Diagnosis: JOSELITO (acute kidney injury)  Assessment and Plan of Treatment: on CKD Improved with IV hydration  Avoid taking (NSAIDs) - (ex: Ibuprofen, Advil, Celebrex, Naprosyn)  Avoid taking any nephrotoxic agents (can harm kidneys) - Intravenous contrast for diagnostic testing, combination cold medications.  Have all medications adjusted for your renal function by your Health Care Provider.  Blood pressure control is important.  Take all medication as prescribed.  repeat Renal US ----      Diagnosis: 2019 novel coronavirus disease (COVID-19)  Assessment and Plan of Treatment: Initially was hypoxic on admission. Likely wrong reading from poor peripheral perfusion. On RA. Did not need remdesivir and dexamethasone  stable on RA  d dimer on admission 2000s, downtrending  doppler LE neg acute DVT, possible chronic DVT left femoral vein, pt was not on AC due to hematuria history  can repeat doppler 1 week outpt  can dc on xarelto 10mg qd for 30 days for dvt ppx with covid      Diagnosis: Hypernatremia  Assessment and Plan of Treatment: Likely from free water loss. Today 148. Please encourage oral fluid intake. Repeat BMP in 2 days     PRINCIPAL DISCHARGE DIAGNOSIS  Diagnosis: Hypotension  Assessment and Plan of Treatment: Problem: Hypotension.    2/2 to sepsis -pseudomonas bacteremia-UTI  previously on fludrocortisone , may have autonomic dysregulation,  ID fu noted-cefepime 1gm q24h x14 day course through 1/26 after confirming repeat cx from 1/13 are negative  sp midline placemnet-IR on 1/16  - hold lasix   Encourage oral hydration      SECONDARY DISCHARGE DIAGNOSES  Diagnosis: Acute UTI  Assessment and Plan of Treatment: sanchez exchanged in ED    urine cultures-pseudomonas, MRSA bacteruria  cefepime started continue as recommended via midline through 1/26  Continue bactrim for recovered MRSA Bacteriria through 1/26  CBC, CMP, weekly and fax result to aquilino Yung to       Diagnosis: JOSELITO (acute kidney injury)  Assessment and Plan of Treatment: on CKD Improved with IV hydration  Avoid taking (NSAIDs) - (ex: Ibuprofen, Advil, Celebrex, Naprosyn)  Avoid taking any nephrotoxic agents (can harm kidneys) - Intravenous contrast for diagnostic testing, combination cold medications.  Have all medications adjusted for your renal function by your Health Care Provider.  Blood pressure control is important.  Take all medication as prescribed.  repeat Renal US ----      Diagnosis: 2019 novel coronavirus disease (COVID-19)  Assessment and Plan of Treatment: Initially was hypoxic on admission. Likely wrong reading from poor peripheral perfusion. On RA. Did not need remdesivir and dexamethasone  stable on RA  d dimer on admission 2000s, downtrending  doppler LE neg acute DVT, possible chronic DVT left femoral vein, pt was not on AC due to hematuria history  can repeat doppler 1 week outpt  can dc on xarelto 10mg qd for 30 days for dvt ppx with covid      Diagnosis: Hypernatremia  Assessment and Plan of Treatment: Likely from free water loss. Today 148. Please encourage oral fluid intake. Repeat BMP in 2 days

## 2022-01-17 NOTE — PROGRESS NOTE ADULT - SUBJECTIVE AND OBJECTIVE BOX
[facility], Division of Infectious Diseases  DORIAN Patino Y. Patel, S. Shah, G. [facility]  [phone]    Name: KIANNA GUILLEN  Age: 89y  Gender: Male  MRN: [id number]    Interval History:  Patient seen and examined at bedside this morning  No acute overnight events.   Notes reviewed    Antibiotics:  cefepime   IVPB 1000 milliGRAM(s) IV Intermittent every 24 hours  cefepime   IVPB          Medications:  acetaminophen     Tablet .. 650 milliGRAM(s) Oral every 4 hours PRN  ALBUTerol    90 MICROgram(s) HFA Inhaler 2 Puff(s) Inhalation every 4 hours PRN  ascorbic acid 500 milliGRAM(s) Oral daily  cefepime   IVPB 1000 milliGRAM(s) IV Intermittent every 24 hours  cefepime   IVPB      dextrose 5% + sodium chloride 0.45%. 1000 milliLiter(s) IV Continuous <Continuous>  ferrous    sulfate 325 milliGRAM(s) Oral daily  folic acid 1 milliGRAM(s) Oral daily  guaifenesin/dextromethorphan Oral Liquid 10 milliLiter(s) Oral every 4 hours PRN  heparin   Injectable 5000 Unit(s) SubCutaneous every 8 hours  melatonin 5 milliGRAM(s) Oral at bedtime  mirtazapine 7.5 milliGRAM(s) Oral at bedtime  multivitamin 1 Tablet(s) Oral daily  sertraline 50 milliGRAM(s) Oral daily  sodium chloride 0.45%. 1000 milliLiter(s) IV Continuous <Continuous>  tamsulosin 0.4 milliGRAM(s) Oral at bedtime      Review of Systems:  A 10-point review of systems was obtained.     Pertinent positives and negatives--  Constitutional: No fevers. No Chills. No Rigors.   Cardiovascular: No chest pain. No palpitations.  Respiratory: No shortness of breath. No cough.  Gastrointestinal: No nausea or vomiting. No diarrhea or constipation.   Psychiatric: Pleasant. Appropriate affect.    Review of systems otherwise negative except as previously noted.    Allergies: No Known Allergies    For details regarding the patient's past medical history, social history, family history, and other miscellaneous elements, please refer the initial infectious diseases consultation and/or the admitting history and physical examination for this admission.    Objective:  Vitals:   T(C): 36.7 (01-17-22 @ 08:13), Max: 36.9 (01-16-22 @ 11:42)  HR: 93 (01-17-22 @ 08:13) (93 - 98)  BP: 119/68 (01-17-22 @ 08:13) (114/66 - 128/72)  RR: 18 (01-17-22 @ 08:13) (18 - 18)  SpO2: 96% (01-17-22 @ 08:13) (95% - 97%)    Physical Examination:  General: no acute distress  HEENT: NC/AT, EOMI, anicteric, no oral lesions  Neck: supple, no palpable LAD  Cardio: S1, S2 heard, RRR, no murmurs  Resp: breath sounds heard bilaterally, no rales, wheezes or rhonchi  Abd: soft, NT, ND, + bowel sounds  Neuro: no obvious focal deficits  Ext: no edema or cyanosis  Skin: warm, dry, no visible rash      Laboratory Studies:  CBC:                       8.9    6.84  )-----------( 270      ( 17 Jan 2022 07:17 )             28.7     CMP: 01-17    143  |  107  |  38<H>  ----------------------------<  110<H>  3.7   |  22  |  2.81<H>    Ca    9.2      17 Jan 2022 07:17            Microbiology: reviewed    Culture - Blood (collected 01-13-22 @ 17:03)  Source: .Blood Blood  Preliminary Report (01-14-22 @ 18:01):    No growth to date.    Culture - Blood (collected 01-13-22 @ 17:03)  Source: .Blood Blood  Preliminary Report (01-14-22 @ 18:01):    No growth to date.    Culture - Blood (collected 01-11-22 @ 23:02)  Source: .Blood Blood-Peripheral  Gram Stain (01-13-22 @ 01:05):    Growth in aerobic bottle: Gram Negative Rods    Growth in anaerobic bottle: Gram Negative Rods  Final Report (01-14-22 @ 12:20):    Growth in aerobic and anaerobic bottles: Pseudomonas aeruginosa    ***Blood Panel PCR results on this specimen are available    approximately 3 hours after the Gram stain result.***    Gram stain, PCR, and/or culture results may not always    correspond due to difference in methodologies.    ************************************************************    This PCR assay was performed by multiplex PCR. This    Assay tests for 66 bacterial and resistance gene targets.    Please refer to the [facility] test directory    at [email or web address] for details.  Organism: Blood Culture PCR  Pseudomonas aeruginosa (01-14-22 @ 12:20)  Organism: Pseudomonas aeruginosa (01-14-22 @ 12:20)      -  Amikacin: S <=16      -  Aztreonam: S 8      -  Cefepime: S 4      -  Ceftazidime: S 4      -  Ciprofloxacin: S <=0.25      -  Gentamicin: S <=2      -  Imipenem: S 2      -  Levofloxacin: S <=0.5      -  Meropenem: S <=1      -  Piperacillin/Tazobactam: S <=8      -  Tobramycin: S <=2      Method Type: CRUZ  Organism: Blood Culture PCR (01-14-22 @ 12:20)      -  Pseudomonas aeruginosa: Detec      Method Type: PCR    Culture - Blood (collected 01-11-22 @ 23:02)  Source: .Blood Blood-Peripheral  Final Report (01-17-22 @ 01:00):    No Growth Final    Culture - Urine (collected 01-11-22 @ 22:57)  Source: Clean Catch Clean Catch (Midstream)  Final Report (01-14-22 @ 17:50):    >100,000 CFU/ml Pseudomonas aeruginosa    >100,000 CFU/ml Methicillin Resistant Staphylococcus aureus  Organism: Pseudomonas aeruginosa  Methicillin resistant Staphylococcus aureus (01-14-22 @ 17:50)  Organism: Methicillin resistant Staphylococcus aureus (01-14-22 @ 17:50)      -  Ampicillin/Sulbactam: R 16/8      -  Cefazolin: R 16      -  Daptomycin: S 1      -  Gentamicin: S <=1 Should not be used as monotherapy      -  Linezolid: S 2      -  Oxacillin: R >2      -  Penicillin: R >8      -  Rifampin: S <=1 Should not be used as monotherapy      -  Tetra/Doxy: S <=1      -  Trimethoprim/Sulfamethoxazole: S <=0.5/9.5      -  Vancomycin: S 2      Method Type: CRUZ  Organism: Pseudomonas aeruginosa (01-14-22 @ 17:50)      -  Amikacin: S <=16      -  Aztreonam: S 8      -  Cefepime: S 4      -  Ceftazidime: S 4      -  Ciprofloxacin: S <=0.25      -  Gentamicin: S <=2      -  Levofloxacin: S <=0.5      -  Meropenem: S <=1      -  Piperacillin/Tazobactam: S <=8      -  Tobramycin: S <=2      Method Type: CRUZ        Radiology: reviewed       [facility], Division of Infectious Diseases  DORIAN Patino Y. Patel, S. Shah, G. [facility]  [phone]    Name: KIANNA GUILLEN  Age: 89y  Gender: Male  MRN: [id number]    Interval History:  NAOE. Afebrile  Notes reviewed    Antibiotics:  cefepime   IVPB 1000 milliGRAM(s) IV Intermittent every 24 hours  cefepime   IVPB          Medications:  acetaminophen     Tablet .. 650 milliGRAM(s) Oral every 4 hours PRN  ALBUTerol    90 MICROgram(s) HFA Inhaler 2 Puff(s) Inhalation every 4 hours PRN  ascorbic acid 500 milliGRAM(s) Oral daily  cefepime   IVPB 1000 milliGRAM(s) IV Intermittent every 24 hours  cefepime   IVPB      dextrose 5% + sodium chloride 0.45%. 1000 milliLiter(s) IV Continuous <Continuous>  ferrous    sulfate 325 milliGRAM(s) Oral daily  folic acid 1 milliGRAM(s) Oral daily  guaifenesin/dextromethorphan Oral Liquid 10 milliLiter(s) Oral every 4 hours PRN  heparin   Injectable 5000 Unit(s) SubCutaneous every 8 hours  melatonin 5 milliGRAM(s) Oral at bedtime  mirtazapine 7.5 milliGRAM(s) Oral at bedtime  multivitamin 1 Tablet(s) Oral daily  sertraline 50 milliGRAM(s) Oral daily  sodium chloride 0.45%. 1000 milliLiter(s) IV Continuous <Continuous>  tamsulosin 0.4 milliGRAM(s) Oral at bedtime      Review of Systems:  unable to obtain    Allergies: No Known Allergies    For details regarding the patient's past medical history, social history, family history, and other miscellaneous elements, please refer the initial infectious diseases consultation and/or the admitting history and physical examination for this admission.    Objective:  Vitals:   T(C): 36.7 (01-17-22 @ 08:13), Max: 36.9 (01-16-22 @ 11:42)  HR: 93 (01-17-22 @ 08:13) (93 - 98)  BP: 119/68 (01-17-22 @ 08:13) (114/66 - 128/72)  RR: 18 (01-17-22 @ 08:13) (18 - 18)  SpO2: 96% (01-17-22 @ 08:13) (95% - 97%)    Physical Examination:  General: no acute distress  HEENT: NC/AT, EOMI, anicteric, no oral lesions  Neck: supple, no palpable LAD  Cardio: S1, S2 heard, RRR, no murmurs  Resp: breath sounds heard bilaterally, no rales, wheezes or rhonchi  Abd: soft, NT, ND, + bowel sounds  Neuro: no obvious focal deficits  Ext: no edema or cyanosis  Skin: warm, dry, no visible rash    Laboratory Studies:  CBC:                       8.9    6.84  )-----------( 270      ( 17 Jan 2022 07:17 )             28.7     CMP: 01-17    143  |  107  |  38<H>  ----------------------------<  110<H>  3.7   |  22  |  2.81<H>    Ca    9.2      17 Jan 2022 07:17            Microbiology: reviewed    Culture - Blood (collected 01-13-22 @ 17:03)  Source: .Blood Blood  Preliminary Report (01-14-22 @ 18:01):    No growth to date.    Culture - Blood (collected 01-13-22 @ 17:03)  Source: .Blood Blood  Preliminary Report (01-14-22 @ 18:01):    No growth to date.    Culture - Blood (collected 01-11-22 @ 23:02)  Source: .Blood Blood-Peripheral  Gram Stain (01-13-22 @ 01:05):    Growth in aerobic bottle: Gram Negative Rods    Growth in anaerobic bottle: Gram Negative Rods  Final Report (01-14-22 @ 12:20):    Growth in aerobic and anaerobic bottles: Pseudomonas aeruginosa    ***Blood Panel PCR results on this specimen are available    approximately 3 hours after the Gram stain result.***    Gram stain, PCR, and/or culture results may not always    correspond due to difference in methodologies.    ************************************************************    This PCR assay was performed by multiplex PCR. This    Assay tests for 66 bacterial and resistance gene targets.    Please refer to the [facility] test directory    at [email or web address] for details.  Organism: Blood Culture PCR  Pseudomonas aeruginosa (01-14-22 @ 12:20)  Organism: Pseudomonas aeruginosa (01-14-22 @ 12:20)      -  Amikacin: S <=16      -  Aztreonam: S 8      -  Cefepime: S 4      -  Ceftazidime: S 4      -  Ciprofloxacin: S <=0.25      -  Gentamicin: S <=2      -  Imipenem: S 2      -  Levofloxacin: S <=0.5      -  Meropenem: S <=1      -  Piperacillin/Tazobactam: S <=8      -  Tobramycin: S <=2      Method Type: CRUZ  Organism: Blood Culture PCR (01-14-22 @ 12:20)      -  Pseudomonas aeruginosa: Detec      Method Type: PCR    Culture - Blood (collected 01-11-22 @ 23:02)  Source: .Blood Blood-Peripheral  Final Report (01-17-22 @ 01:00):    No Growth Final    Culture - Urine (collected 01-11-22 @ 22:57)  Source: Clean Catch Clean Catch (Midstream)  Final Report (01-14-22 @ 17:50):    >100,000 CFU/ml Pseudomonas aeruginosa    >100,000 CFU/ml Methicillin Resistant Staphylococcus aureus  Organism: Pseudomonas aeruginosa  Methicillin resistant Staphylococcus aureus (01-14-22 @ 17:50)  Organism: Methicillin resistant Staphylococcus aureus (01-14-22 @ 17:50)      -  Ampicillin/Sulbactam: R 16/8      -  Cefazolin: R 16      -  Daptomycin: S 1      -  Gentamicin: S <=1 Should not be used as monotherapy      -  Linezolid: S 2      -  Oxacillin: R >2      -  Penicillin: R >8      -  Rifampin: S <=1 Should not be used as monotherapy      -  Tetra/Doxy: S <=1      -  Trimethoprim/Sulfamethoxazole: S <=0.5/9.5      -  Vancomycin: S 2      Method Type: CRUZ  Organism: Pseudomonas aeruginosa (01-14-22 @ 17:50)      -  Amikacin: S <=16      -  Aztreonam: S 8      -  Cefepime: S 4      -  Ceftazidime: S 4      -  Ciprofloxacin: S <=0.25      -  Gentamicin: S <=2      -  Levofloxacin: S <=0.5      -  Meropenem: S <=1      -  Piperacillin/Tazobactam: S <=8      -  Tobramycin: S <=2      Method Type: CRUZ        Radiology: reviewed

## 2022-01-17 NOTE — PROGRESS NOTE ADULT - SUBJECTIVE AND OBJECTIVE BOX
Patient is a 89y old  Male who presents with a chief complaint of hypotension x 1 day (17 Jan 2022 13:25)      INTERVAL HPI/OVERNIGHT EVENTS: noted  pt seen and examined this am   events noted  no new events/compliants      Vital Signs Last 24 Hrs  T(C): 36.7 (17 Jan 2022 08:13), Max: 36.8 (16 Jan 2022 17:09)  T(F): 98.1 (17 Jan 2022 08:13), Max: 98.2 (16 Jan 2022 17:09)  HR: 93 (17 Jan 2022 08:13) (93 - 98)  BP: 119/68 (17 Jan 2022 08:13) (117/72 - 128/72)  BP(mean): --  RR: 18 (17 Jan 2022 08:13) (18 - 18)  SpO2: 96% (17 Jan 2022 08:13) (95% - 97%)    acetaminophen     Tablet .. 650 milliGRAM(s) Oral every 4 hours PRN  ALBUTerol    90 MICROgram(s) HFA Inhaler 2 Puff(s) Inhalation every 4 hours PRN  ascorbic acid 500 milliGRAM(s) Oral daily  cefepime   IVPB 1000 milliGRAM(s) IV Intermittent every 24 hours  cefepime   IVPB      dextrose 5% + sodium chloride 0.45%. 1000 milliLiter(s) IV Continuous <Continuous>  ferrous    sulfate 325 milliGRAM(s) Oral daily  folic acid 1 milliGRAM(s) Oral daily  guaifenesin/dextromethorphan Oral Liquid 10 milliLiter(s) Oral every 4 hours PRN  heparin   Injectable 5000 Unit(s) SubCutaneous every 8 hours  melatonin 5 milliGRAM(s) Oral at bedtime  mirtazapine 7.5 milliGRAM(s) Oral at bedtime  multivitamin 1 Tablet(s) Oral daily  sertraline 50 milliGRAM(s) Oral daily  sodium chloride 0.45%. 1000 milliLiter(s) IV Continuous <Continuous>  tamsulosin 0.4 milliGRAM(s) Oral at bedtime  trimethoprim  160 mG/sulfamethoxazole 800 mG 1 Tablet(s) Oral daily      PHYSICAL EXAM:  GENERAL: NAD,   EYES: conjunctiva and sclera clear  ENMT: Moist mucous membranes  NECK: Supple, No JVD, Normal thyroid  CHEST/LUNG: non labored, cta b/l  HEART: Regular rate and rhythm; No murmurs, rubs, or gallops  ABDOMEN: Soft, Nontender, Nondistended; Bowel sounds present  EXTREMITIES:  2+ Peripheral Pulses, No clubbing, cyanosis, or edema  LYMPH: No lymphadenopathy noted  SKIN: No rashes or lesions    Consultant(s) Notes Reviewed:  [x ] YES  [ ] NO  Care Discussed with Consultants/Other Providers [ x] YES  [ ] NO    LABS:                        8.9    6.84  )-----------( 270      ( 17 Jan 2022 07:17 )             28.7     01-17    143  |  107  |  38<H>  ----------------------------<  110<H>  3.7   |  22  |  2.81<H>    Ca    9.2      17 Jan 2022 07:17          CAPILLARY BLOOD GLUCOSE                  RADIOLOGY & ADDITIONAL TESTS:    Imaging Personally Reviewed:  [x ] YES  [ ] NO

## 2022-01-17 NOTE — DISCHARGE NOTE PROVIDER - NSDCMRMEDTOKEN_GEN_ALL_CORE_FT
ascorbic acid 500 mg oral tablet: 1 tab(s) orally once a day  ferrous sulfate 325 mg (65 mg elemental iron) oral tablet: 1 tab(s) orally once a day  fludrocortisone 0.1 mg oral tablet: 1 tab(s) orally 2 times a day  folic acid 1 mg oral tablet: 1 tab(s) orally once a day  furosemide 20 mg oral tablet: 1 tab(s) orally once a day  Melatonin 5 mg oral tablet: 1 tab(s) orally once a day (at bedtime)  mirtazapine 7.5 mg oral tablet: 1 tab(s) orally once a day (at bedtime)  Multiple Vitamins oral tablet: 1 tab(s) orally once a day  sertraline 50 mg oral tablet: 1 tab(s) orally once a day  tamsulosin 0.4 mg oral capsule: 1 cap(s) orally once a day (at bedtime)   acetaminophen 325 mg oral tablet: 2 tab(s) orally every 4 hours, As needed, Temp greater or equal to 38.5C (101.3F)  albuterol 90 mcg/inh inhalation aerosol: 2 puff(s) inhaled every 4 hours, As needed, Shortness of Breath and/or Wheezing  ascorbic acid 500 mg oral tablet: 1 tab(s) orally once a day  cefepime: 1000 milligram(s) intravenous once a day  till 1/26  ferrous sulfate 325 mg (65 mg elemental iron) oral tablet: 1 tab(s) orally once a day  folic acid 1 mg oral tablet: 1 tab(s) orally once a day  furosemide 20 mg oral tablet: 1 tab(s) orally once a day  guaifenesin-dextromethorphan 100 mg-10 mg/5 mL oral liquid: 10 milliliter(s) orally every 4 hours, As needed, Cough  Melatonin 5 mg oral tablet: 1 tab(s) orally once a day (at bedtime)  mirtazapine 7.5 mg oral tablet: 1 tab(s) orally once a day (at bedtime)  Multiple Vitamins oral tablet: 1 tab(s) orally once a day  sertraline 50 mg oral tablet: 1 tab(s) orally once a day  sulfamethoxazole-trimethoprim 800 mg-160 mg oral tablet: 1 tab(s) orally once a day  tamsulosin 0.4 mg oral capsule: 1 cap(s) orally once a day (at bedtime)  Xarelto 10 mg oral tablet: 1 tab(s) orally once a day x 10days  for VTE prophylaxis, post covid   acetaminophen 325 mg oral tablet: 2 tab(s) orally every 4 hours, As needed, Temp greater or equal to 38.5C (101.3F)  albuterol 90 mcg/inh inhalation aerosol: 2 puff(s) inhaled every 4 hours, As needed, Shortness of Breath and/or Wheezing  ascorbic acid 500 mg oral tablet: 1 tab(s) orally once a day  cefepime: 1000 milligram(s) intravenous once a day  till 1/26  ferrous sulfate 325 mg (65 mg elemental iron) oral tablet: 1 tab(s) orally once a day  folic acid 1 mg oral tablet: 1 tab(s) orally once a day  guaifenesin-dextromethorphan 100 mg-10 mg/5 mL oral liquid: 10 milliliter(s) orally every 4 hours, As needed, Cough  Melatonin 5 mg oral tablet: 1 tab(s) orally once a day (at bedtime)  mirtazapine 7.5 mg oral tablet: 1 tab(s) orally once a day (at bedtime)  Multiple Vitamins oral tablet: 1 tab(s) orally once a day  sertraline 50 mg oral tablet: 1 tab(s) orally once a day  sulfamethoxazole-trimethoprim 800 mg-160 mg oral tablet: 1 tab(s) orally once a day  tamsulosin 0.4 mg oral capsule: 1 cap(s) orally once a day (at bedtime)  Xarelto 10 mg oral tablet: 1 tab(s) orally once a day x 10days  for VTE prophylaxis, post covid

## 2022-01-17 NOTE — DISCHARGE NOTE PROVIDER - CARE PROVIDER_API CALL
[Patient Intake Form Reviewed] : Patient intake form was reviewed [As Noted in HPI] : as noted in HPI [Negative] : Heme/Lymph martin,   Phone: (   )    -  Fax: (   )    -  Follow Up Time:

## 2022-01-18 LAB
ANION GAP SERPL CALC-SCNC: 13 MMOL/L — SIGNIFICANT CHANGE UP (ref 5–17)
BUN SERPL-MCNC: 36 MG/DL — HIGH (ref 7–23)
CALCIUM SERPL-MCNC: 9.1 MG/DL — SIGNIFICANT CHANGE UP (ref 8.4–10.5)
CHLORIDE SERPL-SCNC: 108 MMOL/L — SIGNIFICANT CHANGE UP (ref 96–108)
CO2 SERPL-SCNC: 23 MMOL/L — SIGNIFICANT CHANGE UP (ref 22–31)
CREAT SERPL-MCNC: 2.82 MG/DL — HIGH (ref 0.5–1.3)
CULTURE RESULTS: SIGNIFICANT CHANGE UP
CULTURE RESULTS: SIGNIFICANT CHANGE UP
GLUCOSE SERPL-MCNC: 91 MG/DL — SIGNIFICANT CHANGE UP (ref 70–99)
POTASSIUM SERPL-MCNC: 3.7 MMOL/L — SIGNIFICANT CHANGE UP (ref 3.5–5.3)
POTASSIUM SERPL-SCNC: 3.7 MMOL/L — SIGNIFICANT CHANGE UP (ref 3.5–5.3)
SARS-COV-2 RNA SPEC QL NAA+PROBE: DETECTED
SODIUM SERPL-SCNC: 144 MMOL/L — SIGNIFICANT CHANGE UP (ref 135–145)
SPECIMEN SOURCE: SIGNIFICANT CHANGE UP
SPECIMEN SOURCE: SIGNIFICANT CHANGE UP

## 2022-01-18 PROCEDURE — 76770 US EXAM ABDO BACK WALL COMP: CPT | Mod: 26

## 2022-01-18 RX ADMIN — Medication 1 MILLIGRAM(S): at 12:32

## 2022-01-18 RX ADMIN — Medication 1 TABLET(S): at 12:32

## 2022-01-18 RX ADMIN — HEPARIN SODIUM 5000 UNIT(S): 5000 INJECTION INTRAVENOUS; SUBCUTANEOUS at 05:22

## 2022-01-18 RX ADMIN — HEPARIN SODIUM 5000 UNIT(S): 5000 INJECTION INTRAVENOUS; SUBCUTANEOUS at 13:04

## 2022-01-18 RX ADMIN — MIRTAZAPINE 7.5 MILLIGRAM(S): 45 TABLET, ORALLY DISINTEGRATING ORAL at 21:27

## 2022-01-18 RX ADMIN — Medication 500 MILLIGRAM(S): at 12:32

## 2022-01-18 RX ADMIN — SODIUM CHLORIDE 50 MILLILITER(S): 9 INJECTION, SOLUTION INTRAVENOUS at 08:51

## 2022-01-18 RX ADMIN — Medication 5 MILLIGRAM(S): at 21:26

## 2022-01-18 RX ADMIN — HEPARIN SODIUM 5000 UNIT(S): 5000 INJECTION INTRAVENOUS; SUBCUTANEOUS at 21:26

## 2022-01-18 RX ADMIN — Medication 325 MILLIGRAM(S): at 12:32

## 2022-01-18 RX ADMIN — TAMSULOSIN HYDROCHLORIDE 0.4 MILLIGRAM(S): 0.4 CAPSULE ORAL at 21:27

## 2022-01-18 RX ADMIN — CEFEPIME 100 MILLIGRAM(S): 1 INJECTION, POWDER, FOR SOLUTION INTRAMUSCULAR; INTRAVENOUS at 10:37

## 2022-01-18 RX ADMIN — SERTRALINE 50 MILLIGRAM(S): 25 TABLET, FILM COATED ORAL at 12:32

## 2022-01-18 NOTE — PROGRESS NOTE ADULT - SUBJECTIVE AND OBJECTIVE BOX
[facility] [place] [facility]   MD RADHA VILLEGAS MD RUORU WONG, PA    TEL:  OFFICE: [phone]  DR CASTILLO CELL: [phone]  TRIP KHAN CELL: [phone]  DR. SMITH CELL: [phone]      FROM 5 PM - 7 AM PLEASE CALL ANSWERING SERVICE: [phone]    RENAL FOLLOW UP NOTE--Date of Service 01-18-22 @ 12:33  --------------------------------------------------------------------------------  HPI:      Pt seen and examined at bedside.        PAST HISTORY  --------------------------------------------------------------------------------  No significant changes to PMH, PSH, FHx, SHx, unless otherwise noted    ALLERGIES & MEDICATIONS  --------------------------------------------------------------------------------  Allergies    No Known Allergies    Intolerances      Standing Inpatient Medications  ascorbic acid 500 milliGRAM(s) Oral daily  cefepime   IVPB 1000 milliGRAM(s) IV Intermittent every 24 hours  cefepime   IVPB      dextrose 5% + sodium chloride 0.45%. 1000 milliLiter(s) IV Continuous <Continuous>  ferrous    sulfate 325 milliGRAM(s) Oral daily  folic acid 1 milliGRAM(s) Oral daily  heparin   Injectable 5000 Unit(s) SubCutaneous every 8 hours  melatonin 5 milliGRAM(s) Oral at bedtime  mirtazapine 7.5 milliGRAM(s) Oral at bedtime  multivitamin 1 Tablet(s) Oral daily  sertraline 50 milliGRAM(s) Oral daily  sodium chloride 0.45%. 1000 milliLiter(s) IV Continuous <Continuous>  tamsulosin 0.4 milliGRAM(s) Oral at bedtime  trimethoprim  160 mG/sulfamethoxazole 800 mG 1 Tablet(s) Oral daily    PRN Inpatient Medications  acetaminophen     Tablet .. 650 milliGRAM(s) Oral every 4 hours PRN  ALBUTerol    90 MICROgram(s) HFA Inhaler 2 Puff(s) Inhalation every 4 hours PRN  guaifenesin/dextromethorphan Oral Liquid 10 milliLiter(s) Oral every 4 hours PRN      REVIEW OF SYSTEMS  --------------------------------------------------------------------------------  General: no fever  MSK: no edema     VITALS/PHYSICAL EXAM  --------------------------------------------------------------------------------  T(C): 36.4 (01-18-22 @ 08:12), Max: 36.7 (01-17-22 @ 16:15)  HR: 99 (01-18-22 @ 08:12) (95 - 99)  BP: 114/63 (01-18-22 @ 08:12) (108/63 - 117/62)  RR: 18 (01-18-22 @ 08:12) (18 - 18)  SpO2: 95% (01-18-22 @ 08:12) (93% - 95%)  Wt(kg): --        01-17-22 @ 07:01  -  01-18-22 @ 07:00  --------------------------------------------------------  IN: 850 mL / OUT: 1450 mL / NET: -600 mL      Physical Exam:  	Gen: NAD  	HEENT: MMM  	Pulm: CTA B/L  	CV: S1S2  	Abd: Soft, +BS  	Ext: No LE edema B/L                      Neuro: Awake   	Skin: Warm and Dry   	Vascular access: NO HD catheter           LAUREN sanchez  LABS/STUDIES  --------------------------------------------------------------------------------              8.9    6.84  >-----------<  270      [01-17-22 @ 07:17]              28.7     144  |  108  |  36  ----------------------------<  91      [01-18-22 @ 06:35]  3.7   |  23  |  2.82        Ca     9.1     [01-18-22 @ 06:35]            Creatinine Trend:  SCr 2.82 [01-18 @ 06:35]  SCr 2.81 [01-17 @ 07:17]  SCr 2.87 [01-16 @ 07:40]  SCr 2.82 [01-15 @ 11:05]  SCr 2.73 [01-14 @ 06:12]    Urinalysis - [01-11-22 @ 20:41]      Color Orange / Appearance Turbid / SG 1.011 / pH 7.0      Gluc Trace / Ketone Negative  / Bili Negative / Urobili Negative       Blood Large / Protein 300 mg/dL / Leuk Est Large / Nitrite Negative       / WBC 6110 / Hyaline 575 / Gran  / Sq Epi  / Non Sq Epi 26 / Bacteria Few      Iron 43, TIBC 180, %sat 24      [04-19-21 @ 10:44]  Ferritin 370      [04-19-21 @ 10:12]  Vitamin D (25OH) 22.5      [04-18-21 @ 09:07]  TSH 5.67      [04-18-21 @ 09:07]

## 2022-01-18 NOTE — PROGRESS NOTE ADULT - PROBLEM SELECTOR PLAN 2
previous pansensitive proteus on urine cultures , sanchez exchanged in ED   - continue ceftriaxone   - f/u urine cultures
sanchez exchanged in ED    urine cultures-pseudomonas  cefepime started  ID cs fu
sanchez exchanged in ED    urine cultures-pseudomonas  cefepime started  ID cs fu noted

## 2022-01-18 NOTE — PROGRESS NOTE ADULT - SUBJECTIVE AND OBJECTIVE BOX
[facility], Division of Infectious Diseases  DORIAN Patino Y. Patel, S. Shah, G. [facility]  [phone]    Name: KIANNA GUILLEN  Age: 89y  Gender: Male  MRN: [id number]    Interval History:  Patient seen this AM  No acute overnight events. Afebrile  on RA  Notes reviewed    Antibiotics:  cefepime   IVPB 1000 milliGRAM(s) IV Intermittent every 24 hours  cefepime   IVPB      trimethoprim  160 mG/sulfamethoxazole 800 mG 1 Tablet(s) Oral daily      Medications:  acetaminophen     Tablet .. 650 milliGRAM(s) Oral every 4 hours PRN  ALBUTerol    90 MICROgram(s) HFA Inhaler 2 Puff(s) Inhalation every 4 hours PRN  ascorbic acid 500 milliGRAM(s) Oral daily  cefepime   IVPB 1000 milliGRAM(s) IV Intermittent every 24 hours  cefepime   IVPB      dextrose 5% + sodium chloride 0.45%. 1000 milliLiter(s) IV Continuous <Continuous>  ferrous    sulfate 325 milliGRAM(s) Oral daily  folic acid 1 milliGRAM(s) Oral daily  guaifenesin/dextromethorphan Oral Liquid 10 milliLiter(s) Oral every 4 hours PRN  heparin   Injectable 5000 Unit(s) SubCutaneous every 8 hours  melatonin 5 milliGRAM(s) Oral at bedtime  mirtazapine 7.5 milliGRAM(s) Oral at bedtime  multivitamin 1 Tablet(s) Oral daily  sertraline 50 milliGRAM(s) Oral daily  sodium chloride 0.45%. 1000 milliLiter(s) IV Continuous <Continuous>  tamsulosin 0.4 milliGRAM(s) Oral at bedtime  trimethoprim  160 mG/sulfamethoxazole 800 mG 1 Tablet(s) Oral daily      Review of Systems:  unable to obtain    Allergies: No Known Allergies    For details regarding the patient's past medical history, social history, family history, and other miscellaneous elements, please refer the initial infectious diseases consultation and/or the admitting history and physical examination for this admission.    Objective:  Vitals:   T(C): 36.4 (01-18-22 @ 08:12), Max: 36.7 (01-17-22 @ 16:15)  HR: 99 (01-18-22 @ 08:12) (95 - 99)  BP: 114/63 (01-18-22 @ 08:12) (108/63 - 117/62)  RR: 18 (01-18-22 @ 08:12) (18 - 18)  SpO2: 95% (01-18-22 @ 08:12) (93% - 95%)    Physical Examination:  General: no acute distress  HEENT: NC/AT, EOMI  Neck: supple, no palpable LAD  Cardio: S1, S2 heard, RRR, no murmurs  Resp: decreased b/l breath sounds  Abd: soft, NT, ND  Ext: no edema or cyanosis  Skin: warm, dry, no visible rash      Laboratory Studies:  CBC:                       8.9    6.84  )-----------( 270      ( 17 Jan 2022 07:17 )             28.7     CMP: 01-18    144  |  108  |  36<H>  ----------------------------<  91  3.7   |  23  |  2.82<H>    Ca    9.1      18 Jan 2022 06:35            Microbiology: reviewed    Culture - Blood (collected 01-13-22 @ 17:03)  Source: .Blood Blood  Preliminary Report (01-14-22 @ 18:01):    No growth to date.    Culture - Blood (collected 01-13-22 @ 17:03)  Source: .Blood Blood  Preliminary Report (01-14-22 @ 18:01):    No growth to date.    Culture - Blood (collected 01-11-22 @ 23:02)  Source: .Blood Blood-Peripheral  Gram Stain (01-13-22 @ 01:05):    Growth in aerobic bottle: Gram Negative Rods    Growth in anaerobic bottle: Gram Negative Rods  Final Report (01-14-22 @ 12:20):    Growth in aerobic and anaerobic bottles: Pseudomonas aeruginosa    ***Blood Panel PCR results on this specimen are available    approximately 3 hours after the Gram stain result.***    Gram stain, PCR, and/or culture results may not always    correspond due to difference in methodologies.    ************************************************************    This PCR assay was performed by multiplex PCR. This    Assay tests for 66 bacterial and resistance gene targets.    Please refer to the [facility] test directory    at [email or web address] for details.  Organism: Blood Culture PCR  Pseudomonas aeruginosa (01-14-22 @ 12:20)  Organism: Pseudomonas aeruginosa (01-14-22 @ 12:20)      -  Amikacin: S <=16      -  Aztreonam: S 8      -  Cefepime: S 4      -  Ceftazidime: S 4      -  Ciprofloxacin: S <=0.25      -  Gentamicin: S <=2      -  Imipenem: S 2      -  Levofloxacin: S <=0.5      -  Meropenem: S <=1      -  Piperacillin/Tazobactam: S <=8      -  Tobramycin: S <=2      Method Type: CRUZ  Organism: Blood Culture PCR (01-14-22 @ 12:20)      -  Pseudomonas aeruginosa: Detec      Method Type: PCR    Culture - Blood (collected 01-11-22 @ 23:02)  Source: .Blood Blood-Peripheral  Final Report (01-17-22 @ 01:00):    No Growth Final    Culture - Urine (collected 01-11-22 @ 22:57)  Source: Clean Catch Clean Catch (Midstream)  Final Report (01-14-22 @ 17:50):    >100,000 CFU/ml Pseudomonas aeruginosa    >100,000 CFU/ml Methicillin Resistant Staphylococcus aureus  Organism: Pseudomonas aeruginosa  Methicillin resistant Staphylococcus aureus (01-14-22 @ 17:50)  Organism: Methicillin resistant Staphylococcus aureus (01-14-22 @ 17:50)      -  Ampicillin/Sulbactam: R 16/8      -  Cefazolin: R 16      -  Daptomycin: S 1      -  Gentamicin: S <=1 Should not be used as monotherapy      -  Linezolid: S 2      -  Oxacillin: R >2      -  Penicillin: R >8      -  Rifampin: S <=1 Should not be used as monotherapy      -  Tetra/Doxy: S <=1      -  Trimethoprim/Sulfamethoxazole: S <=0.5/9.5      -  Vancomycin: S 2      Method Type: CRUZ  Organism: Pseudomonas aeruginosa (01-14-22 @ 17:50)      -  Amikacin: S <=16      -  Aztreonam: S 8      -  Cefepime: S 4      -  Ceftazidime: S 4      -  Ciprofloxacin: S <=0.25      -  Gentamicin: S <=2      -  Levofloxacin: S <=0.5      -  Meropenem: S <=1      -  Piperacillin/Tazobactam: S <=8      -  Tobramycin: S <=2      Method Type: CRUZ        Radiology: reviewed

## 2022-01-18 NOTE — ADVANCED PRACTICE NURSE CONSULT - RECOMMEDATIONS
Will recommend the followin. sacrum, thoracic spine: continue with foam dressings change every 3 days and prn for soiling, drainage. continue to monitor  2. T&P  3. complete CAir boots  4. nutrition support as pt condition allows   Tx plan discussed with RN

## 2022-01-18 NOTE — PROGRESS NOTE ADULT - PROBLEM SELECTOR PLAN 4
suspect prerenal , sanchez exchanged , draining dark urine   cont IVF  - monitor ins and outs   - monitor renal function   - renal dose medications   - avoid nephrotoxins  - continue flomax
suspect prerenal , sanchez exchanged , draining dark urine   cont IVF  - monitor ins and outs   - monitor renal function   - renal dose medications   - avoid nephrotoxins  - continue flomax
sp IVF  likely ATN  - monitor ins and outs   - monitor renal function   - renal dose medications   - avoid nephrotoxins  - continue flomax  renal cs appreciated  pt dry MM, hypernatremia- start D51/2 ns 50cc/hr and monitor
sp IVF  likely ATN  - monitor ins and outs   - monitor renal function   - renal dose medications   - avoid nephrotoxins  - continue flomax  renal cs appreciated  , hypernatremia- resolved  Renal US- fu
sp IVF  likely ATN  - monitor ins and outs   - monitor renal function   - renal dose medications   - avoid nephrotoxins  - continue flomax  renal cs appreciated  pt dry MM, hypernatremia- start D51/2 ns 50cc/hr and monitor
suspect prerenal , sanchez exchanged , draining dark urine   cont gentle IVF  - monitor ins and outs   - monitor renal function   - renal dose medications   - avoid nephrotoxins  - continue flomax  renal cs
sp IVF  likely ATN  - monitor ins and outs   - monitor renal function   - renal dose medications   - avoid nephrotoxins  - continue flomax  renal cs appreciated

## 2022-01-18 NOTE — PROGRESS NOTE ADULT - PROBLEM SELECTOR PLAN 1
2/2 to sepsis -pseudomonas bacteremia-UTI  previously on fludrocortisone , may have autonomic dysregulation,  ID fu noted-cefepime 1gm q24h x14 day course through 1/26 after confirming repeat cx from 1/13 are negative  sp midline placemnet-IR and renal clearence noted  - hold lasix   cont gentle IV fluid resuscitation
BP improved ,  possibly  2/2 to UTI vs.  dehydration given JOSELITO , previously on fludrocortisone , may have autonomic dysregulation,  no leukocytosis and currently afebrile , s/p IV fluid bolus s/p broad spectrum abx   - hold lasix   - treat for UTI   cont IV fluid resuscitation  ,   can consider restarting fludrocortisone if orthostatic
2/2 to sepsis -pseudomonas bacteremia-UTI  previously on fludrocortisone , may have autonomic dysregulation,  ID fu noted-cefepime 1gm q24h x14 day course through 1/26 after confirming repeat cx from 1/13 are negative  for midline placemnet-IR and renal clearence noted  - hold lasix   cont gentle IV fluid resuscitation
2/2 to sepsis -pseudomonas bacteremia-UTI  previously on fludrocortisone , may have autonomic dysregulation,  ID fu noted-cefepime 1gm q24h x14 day course through 1/26 after confirming repeat cx from 1/13 are negative  for midline placemnet-IR and renal clearence noted  - hold lasix   cont gentle IV fluid resuscitation
2/2 to sepsis -pseudomonas bacteremia-UTI  previously on fludrocortisone , may have autonomic dysregulation,  ID fu noted-cefepime 1gm q24h x14 day course through 1/26 after confirming repeat cx from 1/13 are negative  sp midline placemnet-IR and renal clearence noted  - hold lasix   cont gentle IV fluid resuscitation
2/2 to sepsis -pseudomonas bacteremia-UTI  previously on fludrocortisone , may have autonomic dysregulation,  ID fu noted-cefepime 1gm q24h x14 day course through 1/26 after confirming repeat cx from 1/13 are negative  midline placemnet-pending renal clearence, IR cleared  - hold lasix   cont gentle IV fluid resuscitation
2/2 to sepsis -pseudomonas bacteremia-UTI  previously on fludrocortisone , may have autonomic dysregulation,   cont cefepime  - hold lasix   cont IV fluid resuscitation  ,   can consider restarting fludrocortisone if orthostatic

## 2022-01-18 NOTE — PROVIDER CONTACT NOTE (CRITICAL VALUE NOTIFICATION) - TEST AND RESULT REPORTED:
+ urine c/s finalwas greater than 100,000 Pseudomonas aeroguniso , greater than 100,000 MRSA
Pt has +BCX, gram- rods in aerobic bottle from 1/11

## 2022-01-18 NOTE — PROGRESS NOTE ADULT - ASSESSMENT
Patient is a 89 year old male w/ Pmhx of prostate cancer s/p radiation, CAD s/p stent, CKD III, pleural and pericardial effusion, ?afib not on AC , recently admitted for sepsis secondary to UTI s/p indwelling sanchez ,  [facility]  for hypotension and hypoxia on day of admission. Has JOSELITO, COVID, has bacteremia planned for midline, called for renal clearance    A/P:  JOSELITO:  Etiology?  Baseline Scr 1.5  Likely ATN sec to hemodynamic change  Renal function remains elevated but stable  Would check repeat renal sonogram this admission, as his last sonogram had hydronephrosis in the past.   Pt non oliguric   Monitor renal function closely  Will do further work up if renal function worsens  No new episodes of hypotension  renally dose all medications to current GFR  avoid excessive vitamin c tablets

## 2022-01-18 NOTE — PROGRESS NOTE ADULT - PROBLEM SELECTOR PROBLEM 5
Medication management

## 2022-01-18 NOTE — PROGRESS NOTE ADULT - SUBJECTIVE AND OBJECTIVE BOX
Patient is a 89y old  Male who presents with a chief complaint of hypotension x 1 day (18 Jan 2022 12:32)      INTERVAL HPI/OVERNIGHT EVENTS: noted  pt seen and examined this am   events noted  minimal po intake      Vital Signs Last 24 Hrs  T(C): 36.9 (18 Jan 2022 16:17), Max: 36.9 (18 Jan 2022 16:17)  T(F): 98.4 (18 Jan 2022 16:17), Max: 98.4 (18 Jan 2022 16:17)  HR: 65 (18 Jan 2022 16:17) (65 - 99)  BP: 96/69 (18 Jan 2022 16:17) (96/69 - 117/62)  BP(mean): --  RR: 18 (18 Jan 2022 16:17) (18 - 18)  SpO2: 93% (18 Jan 2022 16:17) (93% - 95%)    acetaminophen     Tablet .. 650 milliGRAM(s) Oral every 4 hours PRN  ALBUTerol    90 MICROgram(s) HFA Inhaler 2 Puff(s) Inhalation every 4 hours PRN  ascorbic acid 500 milliGRAM(s) Oral daily  cefepime   IVPB 1000 milliGRAM(s) IV Intermittent every 24 hours  cefepime   IVPB      dextrose 5% + sodium chloride 0.45%. 1000 milliLiter(s) IV Continuous <Continuous>  ferrous    sulfate 325 milliGRAM(s) Oral daily  folic acid 1 milliGRAM(s) Oral daily  guaifenesin/dextromethorphan Oral Liquid 10 milliLiter(s) Oral every 4 hours PRN  heparin   Injectable 5000 Unit(s) SubCutaneous every 8 hours  melatonin 5 milliGRAM(s) Oral at bedtime  mirtazapine 7.5 milliGRAM(s) Oral at bedtime  multivitamin 1 Tablet(s) Oral daily  sertraline 50 milliGRAM(s) Oral daily  sodium chloride 0.45%. 1000 milliLiter(s) IV Continuous <Continuous>  tamsulosin 0.4 milliGRAM(s) Oral at bedtime  trimethoprim  160 mG/sulfamethoxazole 800 mG 1 Tablet(s) Oral daily      PHYSICAL EXAM:  GENERAL: NAD,   EYES: conjunctiva and sclera clear  ENMT: Moist mucous membranes  NECK: Supple, No JVD, Normal thyroid  CHEST/LUNG: non labored, cta b/l  HEART: Regular rate and rhythm; No murmurs, rubs, or gallops  ABDOMEN: Soft, Nontender, Nondistended; Bowel sounds present  EXTREMITIES:  2+ Peripheral Pulses, No clubbing, cyanosis, or edema  LYMPH: No lymphadenopathy noted  SKIN: No rashes or lesions    Consultant(s) Notes Reviewed:  [x ] YES  [ ] NO  Care Discussed with Consultants/Other Providers [ x] YES  [ ] NO    LABS:                        8.9    6.84  )-----------( 270      ( 17 Jan 2022 07:17 )             28.7     01-18    144  |  108  |  36<H>  ----------------------------<  91  3.7   |  23  |  2.82<H>    Ca    9.1      18 Jan 2022 06:35          CAPILLARY BLOOD GLUCOSE                  RADIOLOGY & ADDITIONAL TESTS:    Imaging Personally Reviewed:  [x ] YES  [ ] NO

## 2022-01-18 NOTE — PROGRESS NOTE ADULT - PROBLEM SELECTOR PROBLEM 7
Advance care planning

## 2022-01-18 NOTE — PROGRESS NOTE ADULT - PROBLEM SELECTOR PROBLEM 4
JOSELITO (acute kidney injury)

## 2022-01-18 NOTE — ADVANCED PRACTICE NURSE CONSULT - ASSESSMENT
The pt was encountered on [facility], on Airborne Isolation as he is Covid +  Mr Callahan is thin and frail with protuberant bony prominences- he was seen by nutrition and noted to have severe protein calorie malnutrition.  Upon assessment, the pt presents with an area of dusky purple discoloration of intact skin on the b/l buttocks and sacrum- documented as a stage 2 on admission suggest that given the skin tone, that this a deep tissue injury. the area measures 5cmx 4cmx 0cm  similarly, on the thoracic spine is a wound that measures 6cmx 4cmx 9cm- 20% presents as  a superficial pink wound and 80% as dusky purple discoloration of the skin- documented as a stage 2 on admission again suggest that this is really an evolving deep tissue injury. as the wounds' are located over protuberant bony prominences , will recommend foam dressings to cushion and promote moist healing.

## 2022-01-18 NOTE — PROGRESS NOTE ADULT - PROBLEM SELECTOR PLAN 7
- DNR/DNI   - MOLST in chart        [facility]  
- DNR/DNI   - MOLST in chart    d/w wife at bedside plan of care    [facility]  
- DNR/DNI   - MOLST in chart    tried calling wife-no response  will discuss goc am    [facility]  
- DNR/DNI   - MOLST in chart        [facility]  
- DNR/DNI   - MOLST in chart        [facility]  
- DNR/DNI   - MOLST in chart      dc planning am  [facility]  
- DNR/DNI   - MOLST in chart      dc planning pending Cr improvement  d/w wife on phone 1/18 plan of care  [facility]

## 2022-01-18 NOTE — PROGRESS NOTE ADULT - PROBLEM SELECTOR PLAN 6
- sub q heparin

## 2022-01-18 NOTE — PROGRESS NOTE ADULT - ASSESSMENT
Pt is a 89M w/ PMHx of prostate cancer s/p radiation, CAD s/p stent, CKD III, pleural and pericardial effusion, ?afib not on AC , recently admitted for sepsis secondary to UTI s/p indwelling sanchez now p/w hypotension/hypoxemia.   +COVID exposure on 12/31. Hypoxemic per EMS to 78%    Sepsis 2/2 CAUTI  Pseudomonal Bacteremia  MRSA Bacteruria  -UA consistent w/ acute infection  -UCx w/ Pseudomonas, pan sensitive  -UCx w/ MRSA  -BCx +Pseudomonas, repeat surveillance cx ordered  -S/p sanchez exchange in the ED  C/w cefepime 1gm q24h x14 day course through 1/26 after confirming repeat cx from 1/13 are negative  Can place midline. Fax weekly CMP, CBC to Dr. Castellanos [phone]  C/w bactrim 1 DS tab daily (renally dosed from 1 DS tab BID) x10 day course for recovered MRSA    COVID-19 PNA  AHRF resolved  +exposure on 12/31. +test here on 1/11  Notes reviewed--concern that hypoxemia related to ?poor perfusion. Pt placed on NRB--> NC and was quickly weaned off.   Pt continues to maintain good sats on RA  At this time can monitor--would hold remdesivir, but can start if O2 sats <94% and patient requires O2 again  Steroids recommended after 1st week of symptom onset for any patients that are hypoxic--will hold given no hypoxemia and active infection  -trend temps/WBC  -trend inflammatory biomarkers  -Continue with supportive care and supplemental O2 as needed--consider proning and tolerating lower oxygen saturation to avoid intubation  -Maintain aspiration precautions  -Maintain isolation per infection control policy    JOSELITO  supportive care/IVFs  appreciate renal recs  renally dose medications  avoid nephrotoxic agents    Infectious Diseases will continue to follow. Please call with any questions.   Ree Castellanos M.D.  [facility], Division of Infectious Diseases [phone]       Pt is a 89M w/ PMHx of prostate cancer s/p radiation, CAD s/p stent, CKD III, pleural and pericardial effusion, ?afib not on AC , recently admitted for sepsis secondary to UTI s/p indwelling sanchez now p/w hypotension/hypoxemia.   +COVID exposure on 12/31. Hypoxemic per EMS to 78%    Sepsis 2/2 CAUTI  Pseudomonal Bacteremia  MRSA Bacteruria  -UA consistent w/ acute infection  -UCx w/ Pseudomonas, pan sensitive  -UCx w/ MRSA  -BCx +Pseudomonas, repeat surveillance cx negative  -S/p sanchez exchange in the ED  C/w cefepime 1gm q24h x14 day course through 1/26  Fax weekly CMP, CBC to Dr. Castellanos [phone]  C/w bactrim 1 DS tab daily (renally dosed from 1 DS tab BID) x10 day course for recovered MRSA    COVID-19 PNA  AHRF resolved  +exposure on 12/31. +test here on 1/11  Notes reviewed--concern that hypoxemia related to ?poor perfusion. Pt placed on NRB--> NC and was quickly weaned off.   Pt continues to maintain good sats on RA  At this time can monitor--would hold remdesivir, but can start if O2 sats <94% and patient requires O2 again  Steroids recommended after 1st week of symptom onset for any patients that are hypoxic--will hold given no hypoxemia and active infection  -trend temps/WBC  -trend inflammatory biomarkers  -Continue with supportive care and supplemental O2 as needed--consider proning and tolerating lower oxygen saturation to avoid intubation  -Maintain aspiration precautions  -Maintain isolation per infection control policy    JOSELITO  supportive care/IVFs  appreciate renal recs  renally dose medications  avoid nephrotoxic agents    Infectious Diseases will continue to follow. Please call with any questions.   Ree Castellanos M.D.  [facility], Division of Infectious Diseases [phone]

## 2022-01-18 NOTE — ADVANCED PRACTICE NURSE CONSULT - REASON FOR CONSULT
Requested by staff to assess skin status of pt a/w pressure injuries. PMH is noted:  Patient is a 89 year old male w/ Pmhx of prostate cancer s/p radiation, CAD s/p stent, CKD III, pleural and pericardial effusion, ?afib not on AC , recently admitted for sepsis secondary to UTI s/p indwelling sanchez ,  who [facility]  for hypotension and hypoxia on day of admission.   Per transfer record , patient was hypotensive with BP 76/40  on day of admission. He was also reported to be hypoxic . Per EMS record patient had a O2 sat of 78%.  Patient was exposed to covid on 12/31. Patient reports no cough , no fever , no shortness of breath. He reports no abdominal pain , no diarrhea , no dysuria.

## 2022-01-18 NOTE — PROGRESS NOTE ADULT - PROBLEM SELECTOR PLAN 5
- continue mirtazapine   - continue sertraline   - MV/FA  - vitamin c   - senna

## 2022-01-18 NOTE — PROGRESS NOTE ADULT - PROBLEM SELECTOR PROBLEM 3
2019 novel coronavirus disease (COVID-19)

## 2022-01-19 LAB
ANION GAP SERPL CALC-SCNC: 14 MMOL/L — SIGNIFICANT CHANGE UP (ref 5–17)
BUN SERPL-MCNC: 37 MG/DL — HIGH (ref 7–23)
CALCIUM SERPL-MCNC: 9.1 MG/DL — SIGNIFICANT CHANGE UP (ref 8.4–10.5)
CHLORIDE SERPL-SCNC: 107 MMOL/L — SIGNIFICANT CHANGE UP (ref 96–108)
CO2 SERPL-SCNC: 23 MMOL/L — SIGNIFICANT CHANGE UP (ref 22–31)
CREAT SERPL-MCNC: 2.75 MG/DL — HIGH (ref 0.5–1.3)
D DIMER BLD IA.RAPID-MCNC: 1102 NG/ML DDU — HIGH
GLUCOSE SERPL-MCNC: 94 MG/DL — SIGNIFICANT CHANGE UP (ref 70–99)
POTASSIUM SERPL-MCNC: 3.5 MMOL/L — SIGNIFICANT CHANGE UP (ref 3.5–5.3)
POTASSIUM SERPL-SCNC: 3.5 MMOL/L — SIGNIFICANT CHANGE UP (ref 3.5–5.3)
SODIUM SERPL-SCNC: 144 MMOL/L — SIGNIFICANT CHANGE UP (ref 135–145)

## 2022-01-19 PROCEDURE — 93970 EXTREMITY STUDY: CPT | Mod: 26

## 2022-01-19 RX ORDER — CHLORHEXIDINE GLUCONATE 213 G/1000ML
1 SOLUTION TOPICAL
Refills: 0 | Status: DISCONTINUED | OUTPATIENT
Start: 2022-01-19 | End: 2022-01-20

## 2022-01-19 RX ADMIN — Medication 325 MILLIGRAM(S): at 12:09

## 2022-01-19 RX ADMIN — Medication 5 MILLIGRAM(S): at 21:30

## 2022-01-19 RX ADMIN — CEFEPIME 100 MILLIGRAM(S): 1 INJECTION, POWDER, FOR SOLUTION INTRAMUSCULAR; INTRAVENOUS at 12:09

## 2022-01-19 RX ADMIN — Medication 1 MILLIGRAM(S): at 12:09

## 2022-01-19 RX ADMIN — MIRTAZAPINE 7.5 MILLIGRAM(S): 45 TABLET, ORALLY DISINTEGRATING ORAL at 21:30

## 2022-01-19 RX ADMIN — SERTRALINE 50 MILLIGRAM(S): 25 TABLET, FILM COATED ORAL at 12:09

## 2022-01-19 RX ADMIN — Medication 1 TABLET(S): at 12:09

## 2022-01-19 RX ADMIN — HEPARIN SODIUM 5000 UNIT(S): 5000 INJECTION INTRAVENOUS; SUBCUTANEOUS at 05:15

## 2022-01-19 RX ADMIN — HEPARIN SODIUM 5000 UNIT(S): 5000 INJECTION INTRAVENOUS; SUBCUTANEOUS at 13:19

## 2022-01-19 RX ADMIN — Medication 500 MILLIGRAM(S): at 12:10

## 2022-01-19 RX ADMIN — HEPARIN SODIUM 5000 UNIT(S): 5000 INJECTION INTRAVENOUS; SUBCUTANEOUS at 21:29

## 2022-01-19 RX ADMIN — TAMSULOSIN HYDROCHLORIDE 0.4 MILLIGRAM(S): 0.4 CAPSULE ORAL at 21:30

## 2022-01-19 RX ADMIN — Medication 1 TABLET(S): at 12:10

## 2022-01-19 NOTE — PROGRESS NOTE ADULT - ASSESSMENT
Patient is a 89 year old male w/ Pmhx of prostate cancer s/p radiation, CAD s/p stent, CKD III, pleural and pericardial effusion, ?afib not on AC , recently admitted for sepsis secondary to UTI s/p indwelling sanchez ,  [facility]  [facility] and hypoxia on day of admission. Has JOSELITO, COVID, has bacteremia planned for midline, called for renal clearance    A/P:  JOSELITO:  Etiology?  Baseline Scr 1.5  Likely ATN sec to hemodynamic change  Renal function remains elevated but stable  Repeat renal sonogram with no right hydro, left kidney not visualized.   Last sonogram had  right hydronephrosis in the past.   Pt non oliguric   Monitor renal function closely  renally dose all medications to current GFR

## 2022-01-19 NOTE — PROGRESS NOTE ADULT - SUBJECTIVE AND OBJECTIVE BOX
Patient is a 89y old  Male who presents with a chief complaint of hypotension x 1 day (19 Jan 2022 07:33)      SUBJECTIVE / OVERNIGHT EVENTS:    Patient seen and examined.       Vital Signs Last 24 Hrs  T(C): 36.7 (19 Jan 2022 08:31), Max: 36.9 (18 Jan 2022 16:17)  T(F): 98.1 (19 Jan 2022 08:31), Max: 98.4 (18 Jan 2022 16:17)  HR: 78 (19 Jan 2022 08:31) (65 - 107)  BP: 118/73 (19 Jan 2022 08:31) (96/69 - 143/77)  BP(mean): --  RR: 18 (19 Jan 2022 08:31) (18 - 18)  SpO2: 93% (19 Jan 2022 08:31) (90% - 93%)  I&O's Summary    18 Jan 2022 07:01  -  19 Jan 2022 07:00  --------------------------------------------------------  IN: 0 mL / OUT: 825 mL / NET: -825 mL        PE:  GENERAL: NAD, frail  HEAD:  Atraumatic, Normocephalic  EYES: conjunctiva and sclera clear  NECK:  No JVD  CHEST/LUNG: CTABL, No wheeze  HEART: Regular rate and rhythm; no murmur  ABDOMEN: Soft, Nontender, Nondistended; Bowel sounds present  EXTREMITIES:  2+ Peripheral Pulses, No clubbing, cyanosis, or edema  SKIN: No rashes or lesions  NEURO: No focal deficits    LABS:    01-19    144  |  107  |  37<H>  ----------------------------<  94  3.5   |  23  |  2.75<H>    Ca    9.1      19 Jan 2022 06:48        CAPILLARY BLOOD GLUCOSE                RADIOLOGY & ADDITIONAL TESTS:    Imaging Personally Reviewed:  [x] YES  [ ] NO    Consultant(s) Notes Reviewed:  [x] YES  [ ] NO    MEDICATIONS  (STANDING):  ascorbic acid 500 milliGRAM(s) Oral daily  cefepime   IVPB 1000 milliGRAM(s) IV Intermittent every 24 hours  cefepime   IVPB      dextrose 5% + sodium chloride 0.45%. 1000 milliLiter(s) (50 mL/Hr) IV Continuous <Continuous>  ferrous    sulfate 325 milliGRAM(s) Oral daily  folic acid 1 milliGRAM(s) Oral daily  heparin   Injectable 5000 Unit(s) SubCutaneous every 8 hours  melatonin 5 milliGRAM(s) Oral at bedtime  mirtazapine 7.5 milliGRAM(s) Oral at bedtime  multivitamin 1 Tablet(s) Oral daily  sertraline 50 milliGRAM(s) Oral daily  sodium chloride 0.45%. 1000 milliLiter(s) (50 mL/Hr) IV Continuous <Continuous>  tamsulosin 0.4 milliGRAM(s) Oral at bedtime  trimethoprim  160 mG/sulfamethoxazole 800 mG 1 Tablet(s) Oral daily    MEDICATIONS  (PRN):  acetaminophen     Tablet .. 650 milliGRAM(s) Oral every 4 hours PRN Temp greater or equal to 38.5C (101.3F)  ALBUTerol    90 MICROgram(s) HFA Inhaler 2 Puff(s) Inhalation every 4 hours PRN Shortness of Breath and/or Wheezing  guaifenesin/dextromethorphan Oral Liquid 10 milliLiter(s) Oral every 4 hours PRN Cough      Care Discussed with Consultants/Other Providers [x] YES  [ ] NO    HEALTH ISSUES - PROBLEM Dx:  Hypotension    2019 novel coronavirus disease (COVID-19)    JOSELITO (acute kidney injury)    Medication management    DVT prophylaxis    Advance care planning    Acute UTI         Patient is a 89y old  Male who presents with a chief complaint of hypotension x 1 day (19 Jan 2022 07:33)      SUBJECTIVE / OVERNIGHT EVENTS:    Patient seen and examined. denies complaints poor historian. denies pain.      Vital Signs Last 24 Hrs  T(C): 36.7 (19 Jan 2022 08:31), Max: 36.9 (18 Jan 2022 16:17)  T(F): 98.1 (19 Jan 2022 08:31), Max: 98.4 (18 Jan 2022 16:17)  HR: 78 (19 Jan 2022 08:31) (65 - 107)  BP: 118/73 (19 Jan 2022 08:31) (96/69 - 143/77)  BP(mean): --  RR: 18 (19 Jan 2022 08:31) (18 - 18)  SpO2: 93% (19 Jan 2022 08:31) (90% - 93%)  I&O's Summary    18 Jan 2022 07:01  -  19 Jan 2022 07:00  --------------------------------------------------------  IN: 0 mL / OUT: 825 mL / NET: -825 mL        PE:  GENERAL: NAD, frail, aao x 1-2, contracted  HEAD:  Atraumatic, Normocephalic  EYES: conjunctiva and sclera clear  NECK:  No JVD  CHEST/LUNG: CTABL, No wheeze  HEART: Regular rate and rhythm; no murmur  ABDOMEN: Soft, Nontender, Nondistended; Bowel sounds present  fu sanchez  EXTREMITIES:  2+ Peripheral Pulses, No clubbing, cyanosis, or edema  SKIN: No rashes or lesions  NEURO: No focal deficits    LABS:    01-19    144  |  107  |  37<H>  ----------------------------<  94  3.5   |  23  |  2.75<H>    Ca    9.1      19 Jan 2022 06:48        CAPILLARY BLOOD GLUCOSE                RADIOLOGY & ADDITIONAL TESTS:    Imaging Personally Reviewed:  [x] YES  [ ] NO    Consultant(s) Notes Reviewed:  [x] YES  [ ] NO    MEDICATIONS  (STANDING):  ascorbic acid 500 milliGRAM(s) Oral daily  cefepime   IVPB 1000 milliGRAM(s) IV Intermittent every 24 hours  cefepime   IVPB      dextrose 5% + sodium chloride 0.45%. 1000 milliLiter(s) (50 mL/Hr) IV Continuous <Continuous>  ferrous    sulfate 325 milliGRAM(s) Oral daily  folic acid 1 milliGRAM(s) Oral daily  heparin   Injectable 5000 Unit(s) SubCutaneous every 8 hours  melatonin 5 milliGRAM(s) Oral at bedtime  mirtazapine 7.5 milliGRAM(s) Oral at bedtime  multivitamin 1 Tablet(s) Oral daily  sertraline 50 milliGRAM(s) Oral daily  sodium chloride 0.45%. 1000 milliLiter(s) (50 mL/Hr) IV Continuous <Continuous>  tamsulosin 0.4 milliGRAM(s) Oral at bedtime  trimethoprim  160 mG/sulfamethoxazole 800 mG 1 Tablet(s) Oral daily    MEDICATIONS  (PRN):  acetaminophen     Tablet .. 650 milliGRAM(s) Oral every 4 hours PRN Temp greater or equal to 38.5C (101.3F)  ALBUTerol    90 MICROgram(s) HFA Inhaler 2 Puff(s) Inhalation every 4 hours PRN Shortness of Breath and/or Wheezing  guaifenesin/dextromethorphan Oral Liquid 10 milliLiter(s) Oral every 4 hours PRN Cough      Care Discussed with Consultants/Other Providers [x] YES  [ ] NO    HEALTH ISSUES - PROBLEM Dx:  Hypotension    2019 novel coronavirus disease (COVID-19)    JOSELITO (acute kidney injury)    Medication management    DVT prophylaxis    Advance care planning    Acute UTI

## 2022-01-19 NOTE — PROGRESS NOTE ADULT - ASSESSMENT
Pt is a 89M w/ PMHx of prostate cancer s/p radiation, CAD s/p stent, CKD III, pleural and pericardial effusion, ?afib not on AC , recently admitted for sepsis secondary to UTI s/p indwelling sanchez now p/w hypotension/hypoxemia.   +COVID exposure on 12/31. Hypoxemic per EMS to 78%    Sepsis 2/2 CAUTI  Pseudomonal Bacteremia  MRSA Bacteruria  -UA consistent w/ acute infection  -UCx w/ Pseudomonas, pan sensitive  -UCx w/ MRSA  -BCx +Pseudomonas, repeat surveillance cx NGTD  -S/p sanchez exchange in the ED  C/w cefepime 1gm q24h x14 day course through 1/26   Fax weekly CMP, CBC to Dr. Castellanos [phone]  C/w bactrim 1 DS tab daily (renally dosed from 1 DS tab BID) x10 day course for recovered MRSA    COVID-19 PNA  AHRF resolved  +exposure on 12/31. +test here on 1/11  Notes reviewed--concern that hypoxemia related to ?poor perfusion. Pt placed on NRB--> NC and was quickly weaned off.   Pt continues to maintain good sats on RA  At this time can monitor--would hold remdesivir, but can start if O2 sats <94% and patient requires O2 again  Steroids recommended after 1st week of symptom onset for any patients that are hypoxic--will hold given no hypoxemia and active infection  -trend temps/WBC  -trend inflammatory biomarkers  -Continue with supportive care and supplemental O2 as needed--consider proning and tolerating lower oxygen saturation to avoid intubation  -Maintain aspiration precautions  -Maintain isolation per infection control policy    JOSELITO  supportive care/IVFs  appreciate renal recs  renally dose medications  avoid nephrotoxic agents    Infectious Diseases will continue to follow. Please call with any questions.   Ree Castellanos M.D.  [facility], Division of Infectious Diseases [phone]       Pt is a 89M w/ PMHx of prostate cancer s/p radiation, CAD s/p stent, CKD III, pleural and pericardial effusion, ?afib not on AC , recently admitted for sepsis secondary to UTI s/p indwelling sanchez now p/w hypotension/hypoxemia.   +COVID exposure on 12/31. Hypoxemic per EMS to 78%    Sepsis 2/2 CAUTI  Pseudomonal Bacteremia  MRSA Bacteruria  -UA consistent w/ acute infection  -UCx w/ Pseudomonas, pan sensitive  -UCx w/ MRSA  -BCx +Pseudomonas, repeat surveillance cx NGTD  -S/p sanchez exchange in the ED  C/w cefepime 1gm q24h x14 day course through 1/26   Fax weekly CMP, CBC to Dr. Castellanos [phone]  C/w bactrim 1 DS tab daily (renally dosed from 1 DS tab BID) x10 day course for recovered MRSA through 1/26    COVID-19 PNA  AHRF resolved  +exposure on 12/31. +test here on 1/11  Notes reviewed--concern that hypoxemia related to ?poor perfusion. Pt placed on NRB--> NC and was quickly weaned off.   Pt continues to maintain good sats on RA  At this time can monitor--would hold remdesivir, but can start if O2 sats <94% and patient requires O2 again  Steroids recommended after 1st week of symptom onset for any patients that are hypoxic--will hold given no hypoxemia and active infection  -trend temps/WBC  -trend inflammatory biomarkers  -Continue with supportive care and supplemental O2 as needed--consider proning and tolerating lower oxygen saturation to avoid intubation  -Maintain aspiration precautions  -Maintain isolation per infection control policy    JOSELITO  supportive care/IVFs  appreciate renal recs  renally dose medications  avoid nephrotoxic agents    Infectious Diseases will continue to follow. Please call with any questions.   Ree Castellanos M.D.  [facility], Division of Infectious Diseases [phone]

## 2022-01-19 NOTE — CHART NOTE - NSCHARTNOTEFT_GEN_A_CORE
Called by Vascular steve for results of LE dopplers :    Limited evaluation due to contracted position of the patient.  No acute DVT of the right lower extremity of the visualized venous   segments.  No acute DVT of the left lower extremity of the visualized venous   segments. Left common femoral vein demonstrates wall thickening and   partial compressibility, otherwise with good flow on color Doppler. 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 MANJINDER Rivera was informed of this   finding on 1/19/2022 at 5:15 PM.  If there are persistent symptoms and concern for central propagation of   calf vein thrombus which would be unseen on this study due to modified   protocol, consider correlation with repeat venous duplex ultrasound in   7-10 days.  Pt is currently on heparin sq for dvt ppx. Will follow up with attending, Dr. Mares, for further recommendations.  Yo DUNBAR

## 2022-01-19 NOTE — PROGRESS NOTE ADULT - SUBJECTIVE AND OBJECTIVE BOX
[facility], Division of Infectious Diseases  DORIAN Patino Y. Patel, S. Shah, G. [facility]  [phone]    Name: KIANNA GUILLEN  Age: 89y  Gender: Male  MRN: [id number]    Interval History:  No acute overnight events.   Afebrile  No complaints  Notes reviewed    Antibiotics:  cefepime   IVPB 1000 milliGRAM(s) IV Intermittent every 24 hours  cefepime   IVPB      trimethoprim  160 mG/sulfamethoxazole 800 mG 1 Tablet(s) Oral daily      Medications:  acetaminophen     Tablet .. 650 milliGRAM(s) Oral every 4 hours PRN  ALBUTerol    90 MICROgram(s) HFA Inhaler 2 Puff(s) Inhalation every 4 hours PRN  ascorbic acid 500 milliGRAM(s) Oral daily  cefepime   IVPB 1000 milliGRAM(s) IV Intermittent every 24 hours  cefepime   IVPB      dextrose 5% + sodium chloride 0.45%. 1000 milliLiter(s) IV Continuous <Continuous>  ferrous    sulfate 325 milliGRAM(s) Oral daily  folic acid 1 milliGRAM(s) Oral daily  guaifenesin/dextromethorphan Oral Liquid 10 milliLiter(s) Oral every 4 hours PRN  heparin   Injectable 5000 Unit(s) SubCutaneous every 8 hours  melatonin 5 milliGRAM(s) Oral at bedtime  mirtazapine 7.5 milliGRAM(s) Oral at bedtime  multivitamin 1 Tablet(s) Oral daily  sertraline 50 milliGRAM(s) Oral daily  sodium chloride 0.45%. 1000 milliLiter(s) IV Continuous <Continuous>  tamsulosin 0.4 milliGRAM(s) Oral at bedtime  trimethoprim  160 mG/sulfamethoxazole 800 mG 1 Tablet(s) Oral daily      Review of Systems:  Review of systems otherwise negative except as previously noted.    Allergies: No Known Allergies    For details regarding the patient's past medical history, social history, family history, and other miscellaneous elements, please refer the initial infectious diseases consultation and/or the admitting history and physical examination for this admission.    Objective:  Vitals:   T(C): 36.9 (01-19-22 @ 00:48), Max: 36.9 (01-18-22 @ 16:17)  HR: 107 (01-19-22 @ 00:48) (65 - 107)  BP: 143/77 (01-19-22 @ 00:48) (96/69 - 143/77)  RR: 18 (01-19-22 @ 00:48) (18 - 18)  SpO2: 90% (01-19-22 @ 00:48) (90% - 95%)    Physical Examination:  General: no acute distress  HEENT: NC/AT, EOMI  Neck: supple, no palpable LAD  Cardio: S1, S2 heard, RRR, no murmurs  Resp: decreased b/l breath sounds  Abd: soft, NT, ND  Ext: no edema or cyanosis  Skin: warm, dry, no visible rash    Laboratory Studies:  CBC:   CMP: 01-18    144  |  108  |  36<H>  ----------------------------<  91  3.7   |  23  |  2.82<H>    Ca    9.1      18 Jan 2022 06:35            Microbiology: reviewed    Culture - Blood (collected 01-13-22 @ 17:03)  Source: .Blood Blood  Final Report (01-18-22 @ 18:00):    No Growth Final    Culture - Blood (collected 01-13-22 @ 17:03)  Source: .Blood Blood  Final Report (01-18-22 @ 18:00):    No Growth Final    Culture - Blood (collected 01-11-22 @ 23:02)  Source: .Blood Blood-Peripheral  Gram Stain (01-13-22 @ 01:05):    Growth in aerobic bottle: Gram Negative Rods    Growth in anaerobic bottle: Gram Negative Rods  Final Report (01-14-22 @ 12:20):    Growth in aerobic and anaerobic bottles: Pseudomonas aeruginosa    ***Blood Panel PCR results on this specimen are available    approximately 3 hours after the Gram stain result.***    Gram stain, PCR, and/or culture results may not always    correspond due to difference in methodologies.    ************************************************************    This PCR assay was performed by multiplex PCR. This    Assay tests for 66 bacterial and resistance gene targets.    Please refer to the [facility] [email or web address] test directory    at [email or web address] for details.  Organism: Blood Culture PCR  Pseudomonas aeruginosa (01-14-22 @ 12:20)  Organism: Pseudomonas aeruginosa (01-14-22 @ 12:20)      -  Amikacin: S <=16      -  Aztreonam: S 8      -  Cefepime: S 4      -  Ceftazidime: S 4      -  Ciprofloxacin: S <=0.25      -  Gentamicin: S <=2      -  Imipenem: S 2      -  Levofloxacin: S <=0.5      -  Meropenem: S <=1      -  Piperacillin/Tazobactam: S <=8      -  Tobramycin: S <=2      Method Type: CRUZ  Organism: Blood Culture PCR (01-14-22 @ 12:20)      -  Pseudomonas aeruginosa: Detec      Method Type: PCR    Culture - Blood (collected 01-11-22 @ 23:02)  Source: .Blood Blood-Peripheral  Final Report (01-17-22 @ 01:00):    No Growth Final    Culture - Urine (collected 01-11-22 @ 22:57)  Source: Clean Catch Clean Catch (Midstream)  Final Report (01-14-22 @ 17:50):    >100,000 CFU/ml Pseudomonas aeruginosa    >100,000 CFU/ml Methicillin Resistant Staphylococcus aureus  Organism: Pseudomonas aeruginosa  Methicillin resistant Staphylococcus aureus (01-14-22 @ 17:50)  Organism: Methicillin resistant Staphylococcus aureus (01-14-22 @ 17:50)      -  Ampicillin/Sulbactam: R 16/8      -  Cefazolin: R 16      -  Daptomycin: S 1      -  Gentamicin: S <=1 Should not be used as monotherapy      -  Linezolid: S 2      -  Oxacillin: R >2      -  Penicillin: R >8      -  Rifampin: S <=1 Should not be used as monotherapy      -  Tetra/Doxy: S <=1      -  Trimethoprim/Sulfamethoxazole: S <=0.5/9.5      -  Vancomycin: S 2      Method Type: CRUZ  Organism: Pseudomonas aeruginosa (01-14-22 @ 17:50)      -  Amikacin: S <=16      -  Aztreonam: S 8      -  Cefepime: S 4      -  Ceftazidime: S 4      -  Ciprofloxacin: S <=0.25      -  Gentamicin: S <=2      -  Levofloxacin: S <=0.5      -  Meropenem: S <=1      -  Piperacillin/Tazobactam: S <=8      -  Tobramycin: S <=2      Method Type: CRUZ        Radiology: reviewed       [facility], Division of Infectious Diseases  DORIAN Patino Y. Patel, S. Shah, G. [facility]  [phone]    Name: KIANNA GUILLEN  Age: 89y  Gender: Male  MRN: [id number]    Interval History:  No acute overnight events.   Afebrile  sleeping comfortably  Notes reviewed    Antibiotics:  cefepime   IVPB 1000 milliGRAM(s) IV Intermittent every 24 hours  cefepime   IVPB      trimethoprim  160 mG/sulfamethoxazole 800 mG 1 Tablet(s) Oral daily      Medications:  acetaminophen     Tablet .. 650 milliGRAM(s) Oral every 4 hours PRN  ALBUTerol    90 MICROgram(s) HFA Inhaler 2 Puff(s) Inhalation every 4 hours PRN  ascorbic acid 500 milliGRAM(s) Oral daily  cefepime   IVPB 1000 milliGRAM(s) IV Intermittent every 24 hours  cefepime   IVPB      dextrose 5% + sodium chloride 0.45%. 1000 milliLiter(s) IV Continuous <Continuous>  ferrous    sulfate 325 milliGRAM(s) Oral daily  folic acid 1 milliGRAM(s) Oral daily  guaifenesin/dextromethorphan Oral Liquid 10 milliLiter(s) Oral every 4 hours PRN  heparin   Injectable 5000 Unit(s) SubCutaneous every 8 hours  melatonin 5 milliGRAM(s) Oral at bedtime  mirtazapine 7.5 milliGRAM(s) Oral at bedtime  multivitamin 1 Tablet(s) Oral daily  sertraline 50 milliGRAM(s) Oral daily  sodium chloride 0.45%. 1000 milliLiter(s) IV Continuous <Continuous>  tamsulosin 0.4 milliGRAM(s) Oral at bedtime  trimethoprim  160 mG/sulfamethoxazole 800 mG 1 Tablet(s) Oral daily      Review of Systems:  unable to obtain    Allergies: No Known Allergies    For details regarding the patient's past medical history, social history, family history, and other miscellaneous elements, please refer the initial infectious diseases consultation and/or the admitting history and physical examination for this admission.    Objective:  Vitals:   T(C): 36.9 (01-19-22 @ 00:48), Max: 36.9 (01-18-22 @ 16:17)  HR: 107 (01-19-22 @ 00:48) (65 - 107)  BP: 143/77 (01-19-22 @ 00:48) (96/69 - 143/77)  RR: 18 (01-19-22 @ 00:48) (18 - 18)  SpO2: 90% (01-19-22 @ 00:48) (90% - 95%)    Physical Examination:  General: no acute distress  HEENT: NC/AT, EOMI  Neck: supple, no palpable LAD  Cardio: S1, S2 heard, RRR, no murmurs  Resp: decreased b/l breath sounds  Abd: soft, NT, ND  Ext: no edema or cyanosis  Skin: warm, dry, no visible rash    Laboratory Studies:  CBC:   CMP: 01-18    144  |  108  |  36<H>  ----------------------------<  91  3.7   |  23  |  2.82<H>    Ca    9.1      18 Jan 2022 06:35            Microbiology: reviewed    Culture - Blood (collected 01-13-22 @ 17:03)  Source: .Blood Blood  Final Report (01-18-22 @ 18:00):    No Growth Final    Culture - Blood (collected 01-13-22 @ 17:03)  Source: .Blood Blood  Final Report (01-18-22 @ 18:00):    No Growth Final    Culture - Blood (collected 01-11-22 @ 23:02)  Source: .Blood Blood-Peripheral  Gram Stain (01-13-22 @ 01:05):    Growth in aerobic bottle: Gram Negative Rods    Growth in anaerobic bottle: Gram Negative Rods  Final Report (01-14-22 @ 12:20):    Growth in aerobic and anaerobic bottles: Pseudomonas aeruginosa    ***Blood Panel PCR results on this specimen are available    approximately 3 hours after the Gram stain result.***    Gram stain, PCR, and/or culture results may not always    correspond due to difference in methodologies.    ************************************************************    This PCR assay was performed by multiplex PCR. This    Assay tests for 66 bacterial and resistance gene targets.    Please refer to the [facility] [employer] test directory    at [email or web address] for details.  Organism: Blood Culture PCR  Pseudomonas aeruginosa (01-14-22 @ 12:20)  Organism: Pseudomonas aeruginosa (01-14-22 @ 12:20)      -  Amikacin: S <=16      -  Aztreonam: S 8      -  Cefepime: S 4      -  Ceftazidime: S 4      -  Ciprofloxacin: S <=0.25      -  Gentamicin: S <=2      -  Imipenem: S 2      -  Levofloxacin: S <=0.5      -  Meropenem: S <=1      -  Piperacillin/Tazobactam: S <=8      -  Tobramycin: S <=2      Method Type: CRUZ  Organism: Blood Culture PCR (01-14-22 @ 12:20)      -  Pseudomonas aeruginosa: Detec      Method Type: PCR    Culture - Blood (collected 01-11-22 @ 23:02)  Source: .Blood Blood-Peripheral  Final Report (01-17-22 @ 01:00):    No Growth Final    Culture - Urine (collected 01-11-22 @ 22:57)  Source: Clean Catch Clean Catch (Midstream)  Final Report (01-14-22 @ 17:50):    >100,000 CFU/ml Pseudomonas aeruginosa    >100,000 CFU/ml Methicillin Resistant Staphylococcus aureus  Organism: Pseudomonas aeruginosa  Methicillin resistant Staphylococcus aureus (01-14-22 @ 17:50)  Organism: Methicillin resistant Staphylococcus aureus (01-14-22 @ 17:50)      -  Ampicillin/Sulbactam: R 16/8      -  Cefazolin: R 16      -  Daptomycin: S 1      -  Gentamicin: S <=1 Should not be used as monotherapy      -  Linezolid: S 2      -  Oxacillin: R >2      -  Penicillin: R >8      -  Rifampin: S <=1 Should not be used as monotherapy      -  Tetra/Doxy: S <=1      -  Trimethoprim/Sulfamethoxazole: S <=0.5/9.5      -  Vancomycin: S 2      Method Type: CRUZ  Organism: Pseudomonas aeruginosa (01-14-22 @ 17:50)      -  Amikacin: S <=16      -  Aztreonam: S 8      -  Cefepime: S 4      -  Ceftazidime: S 4      -  Ciprofloxacin: S <=0.25      -  Gentamicin: S <=2      -  Levofloxacin: S <=0.5      -  Meropenem: S <=1      -  Piperacillin/Tazobactam: S <=8      -  Tobramycin: S <=2      Method Type: CRUZ        Radiology: reviewed

## 2022-01-19 NOTE — PROGRESS NOTE ADULT - ASSESSMENT
89 M PMHx  prostate cancer s/p radiation, CAD s/p stent, CKD III, pleural and pericardial effusion, afib not on AC , recently admitted for sepsis secondary to UTI s/p indwelling sanchez,  who presents from [facility] rehab for hypotension and hypoxia on day of admission.     # sepsis 2/2 pseudomonas bacteremia/UTI  previously on fludrocortisone, may have autonomic dysregulation,  ID following  bactrim  cefepime 1gm q24h x14 day course through 1/26  repeat cx from 1/13 are negative  sp midline placemnet-IR and renal clearance noted  hold lasix   sanchez exchanged in ED     # 2019 novel coronavirus disease (COVID-19)  stable on RA  Airborne + contact Isolation   Guaifenesin/ dextromethorphan PRN    #  JOSELITO (acute kidney injury)  likely ATN  sp IVF  continue flomax  renal following  Renal US no hydro  creat stable and mild improvement    DVT prophylaxis  sub q heparin.    Advance care planning.   DNR/DNI   MOLST in chart    dc planning pending Cr improvement/renal clx  wife updated on 1/18    [facility]  [phone] 89 M PMHx  prostate cancer s/p radiation, CAD s/p stent, CKD III, pleural and pericardial effusion, afib not on AC , recently admitted for sepsis secondary to UTI s/p indwelling sanchez,  who presents from [facility] rehab for hypotension and hypoxia on day of admission.     # sepsis 2/2 pseudomonas bacteremia/UTI  previously on fludrocortisone, may have autonomic dysregulation,  ID following  bactrim  cefepime 1gm q24h x14 day course through 1/26  repeat cx from 1/13 are negative  sp midline placemnet-IR and renal clearance noted  hold lasix   sanchez exchanged in ED     # 2019 novel coronavirus disease (COVID-19)  stable on RA  Airborne + contact Isolation   Guaifenesin/ dextromethorphan PRN    #  JOSELITO (acute kidney injury)  likely ATN  sp IVF  continue flomax  renal following  Renal US no hydro  creat stable and mild improvement    DVT prophylaxis  sub q heparin.    Advance care planning.   DNR/DNI   MOLST in chart    dc planning pending Cr improvement/renal clx  wife updated on 1/18    [facility]  [phone]    PCP Dr. Castillo 89 M PMHx  prostate cancer s/p radiation, CAD s/p stent, CKD III, pleural and pericardial effusion, afib not on AC , recently admitted for sepsis secondary to UTI s/p indwelling sanchez,  who presents from [facility] rehab for hypotension and hypoxia on day of admission.     # sepsis 2/2 pseudomonas bacteremia/UTI  previously on fludrocortisone, may have autonomic dysregulation, can resume on DC  ID following  bactrim  cefepime 1gm q24h x14 day course through 1/26  repeat cx from 1/13 are negative  sp midline placemnet-IR and renal clearance noted  sanchez exchanged in ED, dc with sanchez    # 2019 novel coronavirus disease (COVID-19)  stable on RA  Guaifenesin/ dextromethorphan PRN    #  JOSELITO (acute kidney injury)  likely ATN  sp IVF  continue flomax  renal following  Renal US no hydro  creat stable and mild improvement, dw renal, cleared for DC    DVT prophylaxis  sub q heparin    Advance care planning  DNR/DNI   MOLST in chart    wife updated on 1/18  dc planning    PCP Dr. Castillo    [facility]  [phone] 89 M PMHx  prostate cancer s/p radiation, CAD s/p stent, CKD III, pleural and pericardial effusion, afib not on AC , recently admitted for sepsis secondary to UTI s/p indwelling sanchez,  who presents from [facility] rehab for hypotension and hypoxia on day of admission.     # sepsis 2/2 pseudomonas bacteremia/UTI  previously on fludrocortisone, may have autonomic dysregulation, can resume on DC  ID following  bactrim  cefepime 1gm q24h x14 day course through 1/26  repeat cx from 1/13 are negative  sp midline placemnet-IR and renal clearance noted  sanchez exchanged in ED, dc with sanchez    # 2019 novel coronavirus disease (COVID-19)  stable on RA  Guaifenesin/ dextromethorphan PRN    # JOSELITO (acute kidney injury)  likely ATN  sp IVF  continue flomax  renal following  Renal US no hydro  creat stable and mild improvement, dw renal, cleared for DC    DVT prophylaxis  sub q heparin    Advance care planning  DNR/DNI   MOLST in chart    wife updated on 1/18  dw wife again today plan of care in agreement for dc planning    PCP Dr. Castillo    [facility]  [phone] 89 M PMHx  prostate cancer s/p radiation, CAD s/p stent, CKD III, pleural and pericardial effusion, afib not on AC , recently admitted for sepsis secondary to UTI s/p indwelling sanchez,  who presents from [facility] rehab for hypotension and hypoxia on day of admission.     # sepsis 2/2 pseudomonas bacteremia/UTI  previously on fludrocortisone, may have autonomic dysregulation, can resume on DC  ID following  bactrim  cefepime 1gm q24h x14 day course through 1/26  repeat cx from 1/13 are negative  sp midline placemnet-IR and renal clearance noted  sanchez exchanged in ED, dc with sanchez    # 2019 novel coronavirus disease (COVID-19)  stable on RA  Guaifenesin/ dextromethorphan PRN  d dimer on admission 2000s, repeat D Dimer stat  check doppler LE ro DVT  can dc on xarelto 10mg qd for 30 days if above negative for dvt ppx with covid    # JOSELITO (acute kidney injury)  likely ATN  sp IVF  continue flomax  renal following  Renal US no hydro  creat stable and mild improvement, dw renal, cleared for DC    DVT prophylaxis  sub q heparin    Advance care planning  DNR/DNI   MOLST in chart    wife updated on 1/18  dw wife again today plan of care in agreement for dc planning    PCP Dr. Castillo    [facility]  [phone]

## 2022-01-20 ENCOUNTER — TRANSCRIPTION ENCOUNTER (OUTPATIENT)
Age: 87
End: 2022-01-20

## 2022-01-20 VITALS — OXYGEN SATURATION: 96 %

## 2022-01-20 PROBLEM — Z92.29 PERSONAL HISTORY OF OTHER DRUG THERAPY: Chronic | Status: ACTIVE | Noted: 2022-01-11

## 2022-01-20 LAB
ANION GAP SERPL CALC-SCNC: 15 MMOL/L — SIGNIFICANT CHANGE UP (ref 5–17)
BUN SERPL-MCNC: 40 MG/DL — HIGH (ref 7–23)
CALCIUM SERPL-MCNC: 9.3 MG/DL — SIGNIFICANT CHANGE UP (ref 8.4–10.5)
CHLORIDE SERPL-SCNC: 111 MMOL/L — HIGH (ref 96–108)
CO2 SERPL-SCNC: 22 MMOL/L — SIGNIFICANT CHANGE UP (ref 22–31)
CREAT SERPL-MCNC: 2.96 MG/DL — HIGH (ref 0.5–1.3)
GLUCOSE SERPL-MCNC: 77 MG/DL — SIGNIFICANT CHANGE UP (ref 70–99)
POTASSIUM SERPL-MCNC: 4 MMOL/L — SIGNIFICANT CHANGE UP (ref 3.5–5.3)
POTASSIUM SERPL-SCNC: 4 MMOL/L — SIGNIFICANT CHANGE UP (ref 3.5–5.3)
SODIUM SERPL-SCNC: 148 MMOL/L — HIGH (ref 135–145)

## 2022-01-20 PROCEDURE — U0003: CPT

## 2022-01-20 PROCEDURE — 36569 INSJ PICC 5 YR+ W/O IMAGING: CPT

## 2022-01-20 PROCEDURE — 93970 EXTREMITY STUDY: CPT

## 2022-01-20 PROCEDURE — 83605 ASSAY OF LACTIC ACID: CPT

## 2022-01-20 PROCEDURE — 87150 DNA/RNA AMPLIFIED PROBE: CPT

## 2022-01-20 PROCEDURE — 71250 CT THORAX DX C-: CPT | Mod: MA

## 2022-01-20 PROCEDURE — 87186 SC STD MICRODIL/AGAR DIL: CPT

## 2022-01-20 PROCEDURE — U0005: CPT

## 2022-01-20 PROCEDURE — 85027 COMPLETE CBC AUTOMATED: CPT

## 2022-01-20 PROCEDURE — 87040 BLOOD CULTURE FOR BACTERIA: CPT

## 2022-01-20 PROCEDURE — 80048 BASIC METABOLIC PNL TOTAL CA: CPT

## 2022-01-20 PROCEDURE — 0225U NFCT DS DNA&RNA 21 SARSCOV2: CPT

## 2022-01-20 PROCEDURE — 99285 EMERGENCY DEPT VISIT HI MDM: CPT | Mod: 25

## 2022-01-20 PROCEDURE — 96365 THER/PROPH/DIAG IV INF INIT: CPT

## 2022-01-20 PROCEDURE — 83036 HEMOGLOBIN GLYCOSYLATED A1C: CPT

## 2022-01-20 PROCEDURE — 96367 TX/PROPH/DG ADDL SEQ IV INF: CPT

## 2022-01-20 PROCEDURE — 87086 URINE CULTURE/COLONY COUNT: CPT

## 2022-01-20 PROCEDURE — C1751: CPT

## 2022-01-20 PROCEDURE — 85730 THROMBOPLASTIN TIME PARTIAL: CPT

## 2022-01-20 PROCEDURE — 80053 COMPREHEN METABOLIC PANEL: CPT

## 2022-01-20 PROCEDURE — 85379 FIBRIN DEGRADATION QUANT: CPT

## 2022-01-20 PROCEDURE — 76770 US EXAM ABDO BACK WALL COMP: CPT

## 2022-01-20 PROCEDURE — 85610 PROTHROMBIN TIME: CPT

## 2022-01-20 PROCEDURE — 36415 COLL VENOUS BLD VENIPUNCTURE: CPT

## 2022-01-20 PROCEDURE — 85025 COMPLETE CBC W/AUTO DIFF WBC: CPT

## 2022-01-20 PROCEDURE — 81001 URINALYSIS AUTO W/SCOPE: CPT

## 2022-01-20 PROCEDURE — 71045 X-RAY EXAM CHEST 1 VIEW: CPT

## 2022-01-20 RX ORDER — CEFEPIME 1 G/1
1000 INJECTION, POWDER, FOR SOLUTION INTRAMUSCULAR; INTRAVENOUS
Qty: 0 | Refills: 0 | DISCHARGE
Start: 2022-01-20

## 2022-01-20 RX ORDER — GUAIFENESIN/DEXTROMETHORPHAN 600MG-30MG
10 TABLET, EXTENDED RELEASE 12 HR ORAL
Qty: 0 | Refills: 0 | DISCHARGE
Start: 2022-01-20

## 2022-01-20 RX ORDER — ALBUTEROL 90 UG/1
2 AEROSOL, METERED ORAL
Qty: 0 | Refills: 0 | DISCHARGE
Start: 2022-01-20

## 2022-01-20 RX ORDER — RIVAROXABAN 15 MG-20MG
1 KIT ORAL
Qty: 0 | Refills: 0 | DISCHARGE

## 2022-01-20 RX ORDER — ACETAMINOPHEN 500 MG
2 TABLET ORAL
Qty: 0 | Refills: 0 | DISCHARGE
Start: 2022-01-20

## 2022-01-20 RX ORDER — SODIUM CHLORIDE 9 MG/ML
1000 INJECTION, SOLUTION INTRAVENOUS
Refills: 0 | Status: DISCONTINUED | OUTPATIENT
Start: 2022-01-20 | End: 2022-01-20

## 2022-01-20 RX ADMIN — Medication 1 TABLET(S): at 11:25

## 2022-01-20 RX ADMIN — HEPARIN SODIUM 5000 UNIT(S): 5000 INJECTION INTRAVENOUS; SUBCUTANEOUS at 06:12

## 2022-01-20 RX ADMIN — CEFEPIME 100 MILLIGRAM(S): 1 INJECTION, POWDER, FOR SOLUTION INTRAMUSCULAR; INTRAVENOUS at 11:24

## 2022-01-20 RX ADMIN — CHLORHEXIDINE GLUCONATE 1 APPLICATION(S): 213 SOLUTION TOPICAL at 06:12

## 2022-01-20 NOTE — PROGRESS NOTE ADULT - ASSESSMENT
Pt is a 89M w/ PMHx of prostate cancer s/p radiation, CAD s/p stent, CKD III, pleural and pericardial effusion, ?afib not on AC , recently admitted for sepsis secondary to UTI s/p indwelling sanchez now p/w hypotension/hypoxemia.   +COVID exposure on 12/31. Hypoxemic per EMS to 78%    Sepsis 2/2 CAUTI  Pseudomonal Bacteremia  MRSA Bacteruria  -UA consistent w/ acute infection  -UCx w/ Pseudomonas, pan sensitive  -UCx w/ MRSA  -BCx +Pseudomonas, repeat surveillance cx NGTD  -S/p sanchez exchange in the ED  C/w cefepime 1gm q24h x14 day course through 1/26   Fax weekly CMP, CBC to Dr. Castellnaos [phone]  C/w bactrim 1 DS tab daily (renally dosed from 1 DS tab BID) x10 day course for recovered MRSA through 1/26    COVID-19 PNA  AHRF resolved  +exposure on 12/31. +test here on 1/11  Notes reviewed--concern that hypoxemia related to ?poor perfusion. Pt placed on NRB--> NC and was quickly weaned off.   Pt continues to maintain good sats on RA  At this time can monitor--would hold remdesivir, but can start if O2 sats <94% and patient requires O2 again  Steroids recommended after 1st week of symptom onset for any patients that are hypoxic--will hold given no hypoxemia and active infection  -trend temps/WBC  -trend inflammatory biomarkers  -Continue with supportive care and supplemental O2 as needed--consider proning and tolerating lower oxygen saturation to avoid intubation  -Maintain aspiration precautions  -Maintain isolation per infection control policy    JOSELITO  supportive care/IVFs  appreciate renal recs  renally dose medications  avoid nephrotoxic agents    Infectious Diseases will continue to follow. Please call with any questions.   Ree Casetllanos M.D.  [facility], Division of Infectious Diseases [phone]       Pt is a 89M w/ PMHx of prostate cancer s/p radiation, CAD s/p stent, CKD III, pleural and pericardial effusion, ?afib not on AC , recently admitted for sepsis secondary to UTI s/p indwelling sanchez now p/w hypotension/hypoxemia.   +COVID exposure on 12/31. Hypoxemic per EMS to 78%    Sepsis 2/2 CAUTI  Pseudomonal Bacteremia  MRSA Bacteruria  -UA consistent w/ acute infection  -UCx w/ Pseudomonas, pan sensitive  -UCx w/ MRSA  -BCx +Pseudomonas, repeat surveillance cx NGTD  -S/p sanchez exchange in the ED  C/w cefepime 1gm q24h x14 day course through 1/26   Fax weekly CMP, CBC to Dr. Castellanos [phone]  C/w bactrim 1 DS tab daily (renally dosed from 1 DS tab BID) x10 day course for recovered MRSA through 1/26    COVID-19 PNA  AHRF resolved  +exposure on 12/31. +test here on 1/11  Pt continues to maintain good sats on RA  -trend temps/WBC  -trend inflammatory biomarkers  -Continue with supportive care and supplemental O2 as needed--consider proning and tolerating lower oxygen saturation to avoid intubation  -Maintain aspiration precautions  -Maintain isolation per infection control policy    JOSELITO  supportive care/IVFs  appreciate renal recs  renally dose medications  avoid nephrotoxic agents    Infectious Diseases will continue to follow. Please call with any questions.   Ree Castellanos M.D.  [facility], Division of Infectious Diseases [phone]

## 2022-01-20 NOTE — PROGRESS NOTE ADULT - ASSESSMENT
Patient is a 89 year old male w/ Pmhx of prostate cancer s/p radiation, CAD s/p stent, CKD III, pleural and pericardial effusion, ?afib not on AC , recently admitted for sepsis secondary to UTI s/p indwelling sanchez ,  [facility]  [facility] and hypoxia on day of admission. Has JOSELITO, COVID, has bacteremia planned for midline, called for renal clearance    A/P:  JOSELITO:  Etiology?  Baseline Scr 1.5  Likely ATN sec to hemodynamic change  Renal function remains elevated but relatively stable  Repeat renal sonogram with no right hydro, left kidney not visualized.   Last sonogram had  right hydronephrosis in the past.   Pt non oliguric   Monitor renal function closely  renally dose all medications to current GFR    HYpernatremia  sec to free water loss  REcommend D5 at 75cc for 14 hours   Monitor serum NA  Avoid overcorrection >8-10 mEq in 24 hrs

## 2022-01-20 NOTE — PROGRESS NOTE ADULT - SUBJECTIVE AND OBJECTIVE BOX
Patient is a 89y old  Male who presents with a chief complaint of hypotension x 1 day (19 Jan 2022 10:56)      SUBJECTIVE / OVERNIGHT EVENTS:    Patient seen and examined.   poor historian. denies complaints.    Vital Signs Last 24 Hrs  T(C): 36.7 (20 Jan 2022 01:14), Max: 37 (19 Jan 2022 15:39)  T(F): 98 (20 Jan 2022 01:14), Max: 98.6 (19 Jan 2022 15:39)  HR: 88 (20 Jan 2022 01:14) (77 - 88)  BP: 134/76 (20 Jan 2022 01:14) (118/73 - 134/76)  BP(mean): --  RR: 18 (20 Jan 2022 01:14) (18 - 18)  SpO2: 91% (20 Jan 2022 01:14) (91% - 94%)  I&O's Summary    19 Jan 2022 07:01  -  20 Jan 2022 07:00  --------------------------------------------------------  IN: 140 mL / OUT: 1500 mL / NET: -1360 mL        PE:  GENERAL: NAD, frail, aao x 1-2, contracted  HEAD:  Atraumatic, Normocephalic  EYES: conjunctiva and sclera clear  NECK:  No JVD  CHEST/LUNG: CTABL, No wheeze  HEART: Regular rate and rhythm; no murmur  ABDOMEN: Soft, Nontender, Nondistended; Bowel sounds present  gu: sanchez  EXTREMITIES:  2+ Peripheral Pulses, No clubbing, cyanosis, or edema  SKIN: No rashes or lesions  NEURO: No focal deficits    LABS:    01-19    144  |  107  |  37<H>  ----------------------------<  94  3.5   |  23  |  2.75<H>    Ca    9.1      19 Jan 2022 06:48        CAPILLARY BLOOD GLUCOSE                RADIOLOGY & ADDITIONAL TESTS:    Imaging Personally Reviewed:  [x] YES  [ ] NO    Consultant(s) Notes Reviewed:  [x] YES  [ ] NO    MEDICATIONS  (STANDING):  ascorbic acid 500 milliGRAM(s) Oral daily  cefepime   IVPB 1000 milliGRAM(s) IV Intermittent every 24 hours  cefepime   IVPB      chlorhexidine 2% Cloths 1 Application(s) Topical <User Schedule>  ferrous    sulfate 325 milliGRAM(s) Oral daily  folic acid 1 milliGRAM(s) Oral daily  heparin   Injectable 5000 Unit(s) SubCutaneous every 8 hours  melatonin 5 milliGRAM(s) Oral at bedtime  mirtazapine 7.5 milliGRAM(s) Oral at bedtime  multivitamin 1 Tablet(s) Oral daily  sertraline 50 milliGRAM(s) Oral daily  tamsulosin 0.4 milliGRAM(s) Oral at bedtime  trimethoprim  160 mG/sulfamethoxazole 800 mG 1 Tablet(s) Oral daily    MEDICATIONS  (PRN):  acetaminophen     Tablet .. 650 milliGRAM(s) Oral every 4 hours PRN Temp greater or equal to 38.5C (101.3F)  ALBUTerol    90 MICROgram(s) HFA Inhaler 2 Puff(s) Inhalation every 4 hours PRN Shortness of Breath and/or Wheezing  guaifenesin/dextromethorphan Oral Liquid 10 milliLiter(s) Oral every 4 hours PRN Cough      Care Discussed with Consultants/Other Providers [x] YES  [ ] NO    HEALTH ISSUES - PROBLEM Dx:  Hypotension    2019 novel coronavirus disease (COVID-19)    JOSELITO (acute kidney injury)    Medication management    DVT prophylaxis    Advance care planning    Acute UTI

## 2022-01-20 NOTE — PROVIDER CONTACT NOTE (MEDICATION) - SITUATION
Patient refusing to take PO pills, pt started to become agitated when the meds were being attempted to give. Will wait and try again later.

## 2022-01-20 NOTE — PROGRESS NOTE ADULT - PROVIDER SPECIALTY LIST ADULT
Nephrology
Nephrology
Infectious Disease
Internal Medicine
Internal Medicine
Infectious Disease
Nephrology
Nephrology
Infectious Disease
Internal Medicine
Internal Medicine
Nephrology
Internal Medicine
Nephrology
Internal Medicine

## 2022-01-20 NOTE — PROGRESS NOTE ADULT - REASON FOR ADMISSION
hypotension x 1 day

## 2022-01-20 NOTE — DISCHARGE NOTE NURSING/CASE MANAGEMENT/SOCIAL WORK - PATIENT PORTAL LINK FT
You can access the FollowMyHealth Patient Portal offered by [facility] by registering at the following website: [email or web address]. By joining [facility]’s FollowMyHealth portal, you will also be able to view your health information using other applications (apps) compatible with our system.

## 2022-01-20 NOTE — PROGRESS NOTE ADULT - NUTRITIONAL ASSESSMENT
This patient has been assessed with a concern for Malnutrition and has been determined to have a diagnosis/diagnoses of Severe protein-calorie malnutrition.    This patient is being managed with:   Diet Regular-  Soft and Bite Sized (SOFTBTSZ)  Nixon(7 Gm Arginine/7 Gm Glut/1.2 Gm HMB     Qty per Day:  2  Supplement Feeding Modality:  Oral  Ensure Enlive Cans or Servings Per Day:  3       Frequency:  Daily  Entered: Jan 16 2022  1:55PM    Diet Regular-  Minced and Moist (MINCEDMOIST)  Nixon(7 Gm Arginine/7 Gm Glut/1.2 Gm HMB     Qty per Day:  2  Supplement Feeding Modality:  Oral  Ensure Enlive Cans or Servings Per Day:  3       Frequency:  Daily  Entered: Jan 13 2022  5:10PM    The following pending diet order is being considered for treatment of Severe protein-calorie malnutrition:null
This patient has been assessed with a concern for Malnutrition and has been determined to have a diagnosis/diagnoses of Severe protein-calorie malnutrition.    This patient is being managed with:   Diet Regular-  Minced and Moist (MINCEDMOIST)  Nixon(7 Gm Arginine/7 Gm Glut/1.2 Gm HMB     Qty per Day:  2  Supplement Feeding Modality:  Oral  Ensure Enlive Cans or Servings Per Day:  3       Frequency:  Daily  Entered: Jan 14 2022 11:06AM    Diet Regular-  Minced and Moist (MINCEDMOIST)  Nixon(7 Gm Arginine/7 Gm Glut/1.2 Gm HMB     Qty per Day:  2  Supplement Feeding Modality:  Oral  Ensure Enlive Cans or Servings Per Day:  3       Frequency:  Daily  Entered: Jan 13 2022  5:10PM    The following pending diet order is being considered for treatment of Severe protein-calorie malnutrition:null
This patient has been assessed with a concern for Malnutrition and has been determined to have a diagnosis/diagnoses of Severe protein-calorie malnutrition.    This patient is being managed with:   Diet Regular-  Minced and Moist (MINCEDMOIST)  Nixon(7 Gm Arginine/7 Gm Glut/1.2 Gm HMB     Qty per Day:  2  Supplement Feeding Modality:  Oral  Ensure Enlive Cans or Servings Per Day:  3       Frequency:  Daily  Entered: Jan 14 2022 11:06AM    Diet Regular-  Minced and Moist (MINCEDMOIST)  Nixon(7 Gm Arginine/7 Gm Glut/1.2 Gm HMB     Qty per Day:  2  Supplement Feeding Modality:  Oral  Ensure Enlive Cans or Servings Per Day:  3       Frequency:  Daily  Entered: Jan 13 2022  5:10PM    The following pending diet order is being considered for treatment of Severe protein-calorie malnutrition:null
This patient has been assessed with a concern for Malnutrition and has been determined to have a diagnosis/diagnoses of Severe protein-calorie malnutrition.    This patient is being managed with:   Diet Regular-  Soft and Bite Sized (SOFTBTSZ)  Nixon(7 Gm Arginine/7 Gm Glut/1.2 Gm HMB     Qty per Day:  2  Supplement Feeding Modality:  Oral  Ensure Enlive Cans or Servings Per Day:  3       Frequency:  Daily  Entered: Jan 16 2022  1:55PM    Diet Regular-  Minced and Moist (MINCEDMOIST)  Nixon(7 Gm Arginine/7 Gm Glut/1.2 Gm HMB     Qty per Day:  2  Supplement Feeding Modality:  Oral  Ensure Enlive Cans or Servings Per Day:  3       Frequency:  Daily  Entered: Jan 13 2022  5:10PM    The following pending diet order is being considered for treatment of Severe protein-calorie malnutrition:null
This patient has been assessed with a concern for Malnutrition and has been determined to have a diagnosis/diagnoses of Severe protein-calorie malnutrition.    This patient is being managed with:   Diet Regular-  Minced and Moist (MINCEDMOIST)  Nixon(7 Gm Arginine/7 Gm Glut/1.2 Gm HMB     Qty per Day:  2  Supplement Feeding Modality:  Oral  Ensure Enlive Cans or Servings Per Day:  3       Frequency:  Daily  Entered: Jan 13 2022  5:10PM    Diet Regular-  Minced and Moist (MINCEDMOIST)  Entered: Jan 11 2022 11:37PM    The following pending diet order is being considered for treatment of Severe protein-calorie malnutrition:null
This patient has been assessed with a concern for Malnutrition and has been determined to have a diagnosis/diagnoses of Severe protein-calorie malnutrition.    This patient is being managed with:   Diet Regular-  Soft and Bite Sized (SOFTBTSZ)  Nixon(7 Gm Arginine/7 Gm Glut/1.2 Gm HMB     Qty per Day:  2  Supplement Feeding Modality:  Oral  Ensure Enlive Cans or Servings Per Day:  3       Frequency:  Daily  Entered: Jan 16 2022  1:55PM    Diet Regular-  Minced and Moist (MINCEDMOIST)  Nixon(7 Gm Arginine/7 Gm Glut/1.2 Gm HMB     Qty per Day:  2  Supplement Feeding Modality:  Oral  Ensure Enlive Cans or Servings Per Day:  3       Frequency:  Daily  Entered: Jan 13 2022  5:10PM    The following pending diet order is being considered for treatment of Severe protein-calorie malnutrition:null

## 2022-01-20 NOTE — PROGRESS NOTE ADULT - SUBJECTIVE AND OBJECTIVE BOX
[facility]   MD RADHA VILLEGAS MD RUORU WONG, PA    TEL:  OFFICE: [phone]  DR CASTILLO CELL: [phone]  TRIP KHAN CELL: [phone]  DR. SMITH CELL: [phone]      FROM 5 PM - 7 AM PLEASE CALL ANSWERING SERVICE: [phone]    RENAL FOLLOW UP NOTE--Date of Service 01-20-22 @ 11:02  --------------------------------------------------------------------------------  HPI:      Pt seen and examined at bedside.       PAST HISTORY  --------------------------------------------------------------------------------  No significant changes to PMH, PSH, FHx, SHx, unless otherwise noted    ALLERGIES & MEDICATIONS  --------------------------------------------------------------------------------  Allergies    No Known Allergies    Intolerances      Standing Inpatient Medications  ascorbic acid 500 milliGRAM(s) Oral daily  cefepime   IVPB 1000 milliGRAM(s) IV Intermittent every 24 hours  cefepime   IVPB      chlorhexidine 2% Cloths 1 Application(s) Topical <User Schedule>  dextrose 5%. 1000 milliLiter(s) IV Continuous <Continuous>  ferrous    sulfate 325 milliGRAM(s) Oral daily  folic acid 1 milliGRAM(s) Oral daily  heparin   Injectable 5000 Unit(s) SubCutaneous every 8 hours  melatonin 5 milliGRAM(s) Oral at bedtime  mirtazapine 7.5 milliGRAM(s) Oral at bedtime  multivitamin 1 Tablet(s) Oral daily  sertraline 50 milliGRAM(s) Oral daily  tamsulosin 0.4 milliGRAM(s) Oral at bedtime  trimethoprim  160 mG/sulfamethoxazole 800 mG 1 Tablet(s) Oral daily    PRN Inpatient Medications  acetaminophen     Tablet .. 650 milliGRAM(s) Oral every 4 hours PRN  ALBUTerol    90 MICROgram(s) HFA Inhaler 2 Puff(s) Inhalation every 4 hours PRN  guaifenesin/dextromethorphan Oral Liquid 10 milliLiter(s) Oral every 4 hours PRN      REVIEW OF SYSTEMS  --------------------------------------------------------------------------------  General: no fever  MSK: no edema     VITALS/PHYSICAL EXAM  --------------------------------------------------------------------------------  T(C): 36.6 (01-20-22 @ 08:44), Max: 37 (01-19-22 @ 15:39)  HR: 76 (01-20-22 @ 08:44) (76 - 88)  BP: 114/68 (01-20-22 @ 08:44) (114/68 - 134/76)  RR: 18 (01-20-22 @ 08:44) (18 - 18)  SpO2: 93% (01-20-22 @ 08:44) (91% - 94%)  Wt(kg): --        01-19-22 @ 07:01  -  01-20-22 @ 07:00  --------------------------------------------------------  IN: 140 mL / OUT: 1500 mL / NET: -1360 mL      Physical Exam:  	Gen: NAD  	HEENT: MMM  	Pulm: CTA B/L  	CV: S1S2  	Abd: Soft, +BS  	Ext: No LE edema B/L                      Neuro: Awake   	Skin: Warm and Dry   	Vascular access: NO HD catheter           LAUREN sanchez  LABS/STUDIES  --------------------------------------------------------------------------------    148  |  111  |  40  ----------------------------<  77      [01-20-22 @ 07:43]  4.0   |  22  |  2.96        Ca     9.3     [01-20-22 @ 07:43]            Creatinine Trend:  SCr 2.96 [01-20 @ 07:43]  SCr 2.75 [01-19 @ 06:48]  SCr 2.82 [01-18 @ 06:35]  SCr 2.81 [01-17 @ 07:17]  SCr 2.87 [01-16 @ 07:40]    Urinalysis - [01-11-22 @ 20:41]      Color Orange / Appearance Turbid / SG 1.011 / pH 7.0      Gluc Trace / Ketone Negative  / Bili Negative / Urobili Negative       Blood Large / Protein 300 mg/dL / Leuk Est Large / Nitrite Negative       / WBC 6110 / Hyaline 575 / Gran  / Sq Epi  / Non Sq Epi 26 / Bacteria Few      Iron 43, TIBC 180, %sat 24      [04-19-21 @ 10:44]  Ferritin 370      [04-19-21 @ 10:12]  Vitamin D (25OH) 22.5      [04-18-21 @ 09:07]  TSH 5.67      [04-18-21 @ 09:07]

## 2022-01-20 NOTE — DISCHARGE NOTE NURSING/CASE MANAGEMENT/SOCIAL WORK - NSDCPEFALRISK_GEN_ALL_CORE
For information on Fall & Injury Prevention, visit: [email or web address] OR  [email or web address] OR  https://www.cdc.gov/steadi/patient.html

## 2022-01-20 NOTE — PROGRESS NOTE ADULT - SUBJECTIVE AND OBJECTIVE BOX
[facility], Division of Infectious Diseases  DORIAN Patino Y. Patel, S. Shah, G. [facility]  [phone]    Name: KIANNA GUILLEN  Age: 89y  Gender: Male  MRN: [id number]    Interval History:  No acute overnight events.   Afebrile  Notes reviewed    Antibiotics:  cefepime   IVPB 1000 milliGRAM(s) IV Intermittent every 24 hours  cefepime   IVPB      trimethoprim  160 mG/sulfamethoxazole 800 mG 1 Tablet(s) Oral daily      Medications:  acetaminophen     Tablet .. 650 milliGRAM(s) Oral every 4 hours PRN  ALBUTerol    90 MICROgram(s) HFA Inhaler 2 Puff(s) Inhalation every 4 hours PRN  ascorbic acid 500 milliGRAM(s) Oral daily  cefepime   IVPB 1000 milliGRAM(s) IV Intermittent every 24 hours  cefepime   IVPB      chlorhexidine 2% Cloths 1 Application(s) Topical <User Schedule>  ferrous    sulfate 325 milliGRAM(s) Oral daily  folic acid 1 milliGRAM(s) Oral daily  guaifenesin/dextromethorphan Oral Liquid 10 milliLiter(s) Oral every 4 hours PRN  heparin   Injectable 5000 Unit(s) SubCutaneous every 8 hours  melatonin 5 milliGRAM(s) Oral at bedtime  mirtazapine 7.5 milliGRAM(s) Oral at bedtime  multivitamin 1 Tablet(s) Oral daily  sertraline 50 milliGRAM(s) Oral daily  tamsulosin 0.4 milliGRAM(s) Oral at bedtime  trimethoprim  160 mG/sulfamethoxazole 800 mG 1 Tablet(s) Oral daily      Review of Systems:  Review of systems otherwise negative except as previously noted.    Allergies: No Known Allergies    For details regarding the patient's past medical history, social history, family history, and other miscellaneous elements, please refer the initial infectious diseases consultation and/or the admitting history and physical examination for this admission.    Objective:  Vitals:   T(C): 36.7 (01-20-22 @ 01:14), Max: 37 (01-19-22 @ 15:39)  HR: 88 (01-20-22 @ 01:14) (77 - 88)  BP: 134/76 (01-20-22 @ 01:14) (118/73 - 134/76)  RR: 18 (01-20-22 @ 01:14) (18 - 18)  SpO2: 91% (01-20-22 @ 01:14) (91% - 94%)    Physical Examination:  General: no acute distress  HEENT: NC/AT, EOMI  Neck: supple, no palpable LAD  Cardio: S1, S2 heard, RRR, no murmurs  Resp: decreased b/l breath sounds  Abd: soft, NT, ND  Ext: no edema or cyanosis  Skin: warm, dry, no visible rash      Laboratory Studies:  CBC:   CMP: 01-19    144  |  107  |  37<H>  ----------------------------<  94  3.5   |  23  |  2.75<H>    Ca    9.1      19 Jan 2022 06:48            Microbiology: reviewed    Culture - Blood (collected 01-13-22 @ 17:03)  Source: .Blood Blood  Final Report (01-18-22 @ 18:00):    No Growth Final    Culture - Blood (collected 01-13-22 @ 17:03)  Source: .Blood Blood  Final Report (01-18-22 @ 18:00):    No Growth Final    Culture - Blood (collected 01-11-22 @ 23:02)  Source: .Blood Blood-Peripheral  Gram Stain (01-13-22 @ 01:05):    Growth in aerobic bottle: Gram Negative Rods    Growth in anaerobic bottle: Gram Negative Rods  Final Report (01-14-22 @ 12:20):    Growth in aerobic and anaerobic bottles: Pseudomonas aeruginosa    ***Blood Panel PCR results on this specimen are available    approximately 3 hours after the Gram stain result.***    Gram stain, PCR, and/or culture results may not always    correspond due to difference in methodologies.    ************************************************************    This PCR assay was performed by multiplex PCR. This    Assay tests for 66 bacterial and resistance gene targets.    Please refer to the [facility] test directory    at [email or web address] for details.  Organism: Blood Culture PCR  Pseudomonas aeruginosa (01-14-22 @ 12:20)  Organism: Pseudomonas aeruginosa (01-14-22 @ 12:20)      -  Amikacin: S <=16      -  Aztreonam: S 8      -  Cefepime: S 4      -  Ceftazidime: S 4      -  Ciprofloxacin: S <=0.25      -  Gentamicin: S <=2      -  Imipenem: S 2      -  Levofloxacin: S <=0.5      -  Meropenem: S <=1      -  Piperacillin/Tazobactam: S <=8      -  Tobramycin: S <=2      Method Type: CRUZ  Organism: Blood Culture PCR (01-14-22 @ 12:20)      -  Pseudomonas aeruginosa: Detec      Method Type: PCR    Culture - Blood (collected 01-11-22 @ 23:02)  Source: .Blood Blood-Peripheral  Final Report (01-17-22 @ 01:00):    No Growth Final    Culture - Urine (collected 01-11-22 @ 22:57)  Source: Clean Catch Clean Catch (Midstream)  Final Report (01-14-22 @ 17:50):    >100,000 CFU/ml Pseudomonas aeruginosa    >100,000 CFU/ml Methicillin Resistant Staphylococcus aureus  Organism: Pseudomonas aeruginosa  Methicillin resistant Staphylococcus aureus (01-14-22 @ 17:50)  Organism: Methicillin resistant Staphylococcus aureus (01-14-22 @ 17:50)      -  Ampicillin/Sulbactam: R 16/8      -  Cefazolin: R 16      -  Daptomycin: S 1      -  Gentamicin: S <=1 Should not be used as monotherapy      -  Linezolid: S 2      -  Oxacillin: R >2      -  Penicillin: R >8      -  Rifampin: S <=1 Should not be used as monotherapy      -  Tetra/Doxy: S <=1      -  Trimethoprim/Sulfamethoxazole: S <=0.5/9.5      -  Vancomycin: S 2      Method Type: CRUZ  Organism: Pseudomonas aeruginosa (01-14-22 @ 17:50)      -  Amikacin: S <=16      -  Aztreonam: S 8      -  Cefepime: S 4      -  Ceftazidime: S 4      -  Ciprofloxacin: S <=0.25      -  Gentamicin: S <=2      -  Levofloxacin: S <=0.5      -  Meropenem: S <=1      -  Piperacillin/Tazobactam: S <=8      -  Tobramycin: S <=2      Method Type: CRUZ        Radiology: reviewed       [facility], Division of Infectious Diseases  DORIAN Patino Y. Patel, S. Shah, G. [facility]  [phone]    Name: KIANNA GUILLEN  Age: 89y  Gender: Male  MRN: [id number]    Interval History:  Pt seen this AM  No acute overnight events.   Afebrile. Sleeping comfortably  Notes reviewed    Antibiotics:  cefepime   IVPB 1000 milliGRAM(s) IV Intermittent every 24 hours  cefepime   IVPB      trimethoprim  160 mG/sulfamethoxazole 800 mG 1 Tablet(s) Oral daily      Medications:  acetaminophen     Tablet .. 650 milliGRAM(s) Oral every 4 hours PRN  ALBUTerol    90 MICROgram(s) HFA Inhaler 2 Puff(s) Inhalation every 4 hours PRN  ascorbic acid 500 milliGRAM(s) Oral daily  cefepime   IVPB 1000 milliGRAM(s) IV Intermittent every 24 hours  cefepime   IVPB      chlorhexidine 2% Cloths 1 Application(s) Topical <User Schedule>  ferrous    sulfate 325 milliGRAM(s) Oral daily  folic acid 1 milliGRAM(s) Oral daily  guaifenesin/dextromethorphan Oral Liquid 10 milliLiter(s) Oral every 4 hours PRN  heparin   Injectable 5000 Unit(s) SubCutaneous every 8 hours  melatonin 5 milliGRAM(s) Oral at bedtime  mirtazapine 7.5 milliGRAM(s) Oral at bedtime  multivitamin 1 Tablet(s) Oral daily  sertraline 50 milliGRAM(s) Oral daily  tamsulosin 0.4 milliGRAM(s) Oral at bedtime  trimethoprim  160 mG/sulfamethoxazole 800 mG 1 Tablet(s) Oral daily      Review of Systems:  unable to obtain    Allergies: No Known Allergies    For details regarding the patient's past medical history, social history, family history, and other miscellaneous elements, please refer the initial infectious diseases consultation and/or the admitting history and physical examination for this admission.    Objective:  Vitals:   T(C): 36.7 (01-20-22 @ 01:14), Max: 37 (01-19-22 @ 15:39)  HR: 88 (01-20-22 @ 01:14) (77 - 88)  BP: 134/76 (01-20-22 @ 01:14) (118/73 - 134/76)  RR: 18 (01-20-22 @ 01:14) (18 - 18)  SpO2: 91% (01-20-22 @ 01:14) (91% - 94%)    Physical Examination:  General: no acute distress  HEENT: NC/AT, EOMI  Neck: supple, no palpable LAD  Cardio: S1, S2 heard, RRR, no murmurs  Resp: decreased b/l breath sounds  Abd: soft, NT, ND  Ext: no edema or cyanosis  Skin: warm, dry, no visible rash      Laboratory Studies:  CBC:   CMP: 01-19    144  |  107  |  37<H>  ----------------------------<  94  3.5   |  23  |  2.75<H>    Ca    9.1      19 Jan 2022 06:48            Microbiology: reviewed    Culture - Blood (collected 01-13-22 @ 17:03)  Source: .Blood Blood  Final Report (01-18-22 @ 18:00):    No Growth Final    Culture - Blood (collected 01-13-22 @ 17:03)  Source: .Blood Blood  Final Report (01-18-22 @ 18:00):    No Growth Final    Culture - Blood (collected 01-11-22 @ 23:02)  Source: .Blood Blood-Peripheral  Gram Stain (01-13-22 @ 01:05):    Growth in aerobic bottle: Gram Negative Rods    Growth in anaerobic bottle: Gram Negative Rods  Final Report (01-14-22 @ 12:20):    Growth in aerobic and anaerobic bottles: Pseudomonas aeruginosa    ***Blood Panel PCR results on this specimen are available    approximately 3 hours after the Gram stain result.***    Gram stain, PCR, and/or culture results may not always    correspond due to difference in methodologies.    ************************************************************    This PCR assay was performed by multiplex PCR. This    Assay tests for 66 bacterial and resistance gene targets.    Please refer to the [facility] test directory    at [email or web address] for details.  Organism: Blood Culture PCR  Pseudomonas aeruginosa (01-14-22 @ 12:20)  Organism: Pseudomonas aeruginosa (01-14-22 @ 12:20)      -  Amikacin: S <=16      -  Aztreonam: S 8      -  Cefepime: S 4      -  Ceftazidime: S 4      -  Ciprofloxacin: S <=0.25      -  Gentamicin: S <=2      -  Imipenem: S 2      -  Levofloxacin: S <=0.5      -  Meropenem: S <=1      -  Piperacillin/Tazobactam: S <=8      -  Tobramycin: S <=2      Method Type: CRUZ  Organism: Blood Culture PCR (01-14-22 @ 12:20)      -  Pseudomonas aeruginosa: Detec      Method Type: PCR    Culture - Blood (collected 01-11-22 @ 23:02)  Source: .Blood Blood-Peripheral  Final Report (01-17-22 @ 01:00):    No Growth Final    Culture - Urine (collected 01-11-22 @ 22:57)  Source: Clean Catch Clean Catch (Midstream)  Final Report (01-14-22 @ 17:50):    >100,000 CFU/ml Pseudomonas aeruginosa    >100,000 CFU/ml Methicillin Resistant Staphylococcus aureus  Organism: Pseudomonas aeruginosa  Methicillin resistant Staphylococcus aureus (01-14-22 @ 17:50)  Organism: Methicillin resistant Staphylococcus aureus (01-14-22 @ 17:50)      -  Ampicillin/Sulbactam: R 16/8      -  Cefazolin: R 16      -  Daptomycin: S 1      -  Gentamicin: S <=1 Should not be used as monotherapy      -  Linezolid: S 2      -  Oxacillin: R >2      -  Penicillin: R >8      -  Rifampin: S <=1 Should not be used as monotherapy      -  Tetra/Doxy: S <=1      -  Trimethoprim/Sulfamethoxazole: S <=0.5/9.5      -  Vancomycin: S 2      Method Type: CRUZ  Organism: Pseudomonas aeruginosa (01-14-22 @ 17:50)      -  Amikacin: S <=16      -  Aztreonam: S 8      -  Cefepime: S 4      -  Ceftazidime: S 4      -  Ciprofloxacin: S <=0.25      -  Gentamicin: S <=2      -  Levofloxacin: S <=0.5      -  Meropenem: S <=1      -  Piperacillin/Tazobactam: S <=8      -  Tobramycin: S <=2      Method Type: CRUZ        Radiology: reviewed

## 2022-02-23 NOTE — PATIENT PROFILE ADULT - HOME ACCESSIBILITY CONCERNS
Brenna is a 25 year old year old female, , here for an OB visit. Gestational Age 24w1d; RAYNA 2022. Pt has no complaints. She denies headaches, vision changes, nausea, vomiting, diarrhea, constipation, RUQ/epigastric pain, regular contractions, leakage of fluid, vaginal bleeding, dysuria, abnormal vaginal discharge or edema. Reports good fetal movement x 2.      PNC:    * FU US with MFM today reveals anatomy completed and normal; Fetus A is vertex; SANJUANITA WNL; EFW 81%; Fetus B is breech; SANJUANITA WNL; EFW > 97%  * Elevated BP x 2 today - mild, no s/s of preeclampsia- will plan labs today, check BP at home, return to office in 2 days for BP check; continue to monitor closely for preeclampsia symptoms.   * Discussed having 1 HR GTT done early, ASAP due to LGA   * Discussed Tdap in 4 weeks; RH POS  * Continue serial growth US with MFM and BPP weekly 36+ weeks and delivery at 38 weeks or sooner if clinically indicated.      Danger S/S and FMC reviewed; contact the office with any concerns.  RTC ASAP for 1 hour glucose; in 2 days for BP check and 4 week(s) for MFM US and OB visit or sooner if needed.    All of the patient’s questions were answered; she verbalizes understanding and is in agreement with the above mentioned plan.  She is certainly encouraged to call my office should any questions or concerns develop prior to her follow up appointment.    YAMILET Scherer  2022   none

## 2022-03-07 ENCOUNTER — APPOINTMENT (OUTPATIENT)
Dept: UROLOGY | Facility: CLINIC | Age: 87
End: 2022-03-07

## 2022-09-23 NOTE — PROVIDER CONTACT NOTE (CRITICAL VALUE NOTIFICATION) - BACKGROUND
Patient will not need a MWV this year.  He is going to be doing a Cancer Study at [facility] in Nov.  Can call after the first of the year for next year.   Sepsis

## 2022-12-09 NOTE — ED ADULT NURSE NOTE - CAS TRG GENERAL NORM CIRC DET
Take Omeprazole on morning of surgery with sip of water, and hold all other medications until after surgery. Stop NSAIDS (Motrin, Aleve, Ibuprofen, Diclofenac), vitamin E, aspirin, fish oil 7-10 days prior to surgery unless instructed otherwise by surgeon.
Strong peripheral pulses/Capillary refill less/equal to 2 seconds

## 2022-12-10 NOTE — ED ADULT TRIAGE NOTE - HEIGHT IN CM
Subjective   History of Present Illness  36-year-old female presents the emergency room complaints of cough and shortness of breath for the past 2 weeks.  Patient reports she was noted to have COVID 2 weeks ago.  She states that she felt like her symptoms improve and then started developing shortness of breath and a cough.  She states that she went to an urgent treatment center and was started on prednisone Tessalon Perles and azithromycin.  She states that symptoms have persisted.  She went to urgent treatment center today and was told to come to the emergency room due to concern that symptoms of been persistent.  She states she had a subjective fever.  Cough is nonproductive.  She states that she had a right earache as well.    Cough  Cough characteristics:  Non-productive  Sputum characteristics:  Nondescript  Severity:  Moderate  Duration:  2 weeks  Timing:  Constant  Progression:  Worsening  Chronicity:  New  Relieved by:  Nothing  Worsened by:  Activity and deep breathing  Ineffective treatments:  Rest (oral steroids)  Associated symptoms: fever and shortness of breath    Associated symptoms: no chills, no diaphoresis, no ear fullness, no ear pain, no myalgias, no rash, no sinus congestion and no sore throat        Review of Systems   Constitutional: Positive for fever. Negative for chills and diaphoresis.   HENT: Negative for ear pain and sore throat.    Respiratory: Positive for cough and shortness of breath.    Musculoskeletal: Negative for myalgias.   Skin: Negative for rash.   All other systems reviewed and are negative.      No past medical history on file.    Allergies   Allergen Reactions   • Sulfa Antibiotics Anaphylaxis   • Amoxicillin Rash   • Penicillins Rash   • Viibryd [Vilazodone Hcl] Rash       No past surgical history on file.    No family history on file.    Social History     Socioeconomic History   • Marital status: Single           Objective   Physical Exam  Vitals and nursing note  reviewed.   Constitutional:       Appearance: She is not ill-appearing or diaphoretic.   HENT:      Head: Normocephalic and atraumatic.      Comments: Moist mucous membranes.  Clear oropharynx.  Uvula midline.  Clear tympanic membrane's bilaterally.  No mastoid tenderness.  Eyes:      Extraocular Movements: Extraocular movements intact.      Pupils: Pupils are equal, round, and reactive to light.   Neck:      Vascular: No JVD.   Cardiovascular:      Rate and Rhythm: Normal rate and regular rhythm.      Comments: No extrasystoles 2+ radial pulses bilaterally.  Pulmonary:      Effort: Pulmonary effort is normal.      Breath sounds: Normal breath sounds.      Comments: No rhonchi rales or wheezes.  Speaks in full sentences.  No accessory muscle use.  Abdominal:      General: Bowel sounds are normal.      Palpations: Abdomen is soft.      Tenderness: There is no abdominal tenderness. There is no guarding or rebound.   Musculoskeletal:         General: Normal range of motion.      Cervical back: Neck supple.      Right lower leg: No edema.      Left lower leg: No edema.   Skin:     General: Skin is warm and dry.   Neurological:      General: No focal deficit present.      Mental Status: She is alert and oriented to person, place, and time.      Cranial Nerves: No cranial nerve deficit.   Psychiatric:         Mood and Affect: Mood normal.         Procedures           ED Course  ED Course as of 12/10/22 0134   Sat Dec 10, 2022   0021 EKG: Vent. rate 105 BPM.  CO interval 152 QRS 72 no ST elevation     [BB]   0035 Patient white blood cell count unremarkable CRP slight elevation 0.81 hCG negative BNP procalcitonin sed rate is unremarkable.  CMP is unremarkable as well.  Patient appears no distress at this time. [BB]   0112 XR Chest 2 View    Result Date: 12/9/2022  No acute cardiopulmonary findings. Signer Name: GENARO MATA MD  Signed: 12/9/2022 6:41 PM  Workstation Name: [id number]  [facility]  [address]    CT Angiogram Chest Pulmonary Embolism    Result Date: 12/10/2022  1. No PE or aortic dissection. 2. No evidence of acute pneumonia. 3. Mildly enlarged right high paratracheal node is probably reactive. Attention on 3 month follow-up chest CT is recommended. Signer Name: Satnam Godfrey MD  Signed: 12/10/2022 12:58 AM  Workstation Name: [facility]  [facility]     [BB]   0114 Patient is noted to have ongoing symptoms for 2 weeks.  We will place patient on oral Omnicef in light of her ongoing cough. [BB]      ED Course User Index  [BB] Dandre Valdes MD                                   [facility] reviewed by Dandre Valdes MD       MDM  Number of Diagnoses or Management Options  Acute bronchitis, unspecified organism: minor  Hypertension, unspecified type: established and improving     Amount and/or Complexity of Data Reviewed  Clinical lab tests: ordered and reviewed  Tests in the radiology section of CPT®: ordered and reviewed  Independent visualization of images, tracings, or specimens: yes    Risk of Complications, Morbidity, and/or Mortality  Presenting problems: low  Diagnostic procedures: low  Management options: low    Patient Progress  Patient progress: stable      Final diagnoses:   Acute bronchitis, unspecified organism   Hypertension, unspecified type       ED Disposition  ED Disposition     ED Disposition   Discharge    Condition   Stable    Comment   --             Jake Alexander, APRN  [address] [place] [id number] [phone]    In 2 days           Medication List      New Prescriptions    cefdinir 300 MG capsule  Commonly known as: OMNICEF  Take 1 capsule by mouth 2 (Two) Times a Day.     promethazine-codeine 6.25-10 MG/5ML syrup  Commonly known as: PHENERGAN with CODEINE  Take 5 mL by mouth Every 6 (Six) Hours As Needed for Cough.           Where to Get Your Medications      These medications were sent to [facility] - [place], [place] -  [address] [address] - [phone]  - [phone]  [address] [address], [facility] [address]    Phone: [phone]   · promethazine-codeine 6.25-10 MG/5ML syrup     You can get these medications from any pharmacy    Bring a paper prescription for each of these medications  · cefdinir 300 MG capsule          Dandre Valdes MD  12/10/22 0133       Dandre Valdes MD  12/10/22 0134

## 2023-02-20 NOTE — H&P ADULT - PROBLEM/PLAN-8
bilateral upper extremity Active ROM was WFL (within functional limits)/bilateral  lower extremity Active ROM was WFL (within functional limits)
DISPLAY PLAN FREE TEXT

## 2023-03-14 NOTE — PROGRESS NOTE ADULT - PROBLEM SELECTOR PROBLEM 7
Stage 3 chronic kidney disease Silver Nitrate Text: The wound bed was treated with silver nitrate after the biopsy was performed.

## 2023-03-21 NOTE — DISCHARGE NOTE PROVIDER - NSDCQMCOGNITION_NEU_ALL_CORE
Continue with wound MD topical recommendations as ordered.    Please contact Wound/Ostomy Care Service Line if we can be of further assistance (ext [phone]). 
Difficulty making decisions

## 2023-07-13 NOTE — ED PROVIDER NOTE - DOMESTIC TRAVEL HIGH RISK QUESTION
No
MEDICATIONS  (STANDING):  aMIOdarone    Tablet 200 milliGRAM(s) Oral daily  dapagliflozin 10 milliGRAM(s) Oral daily  digoxin     Tablet 125 MICROGram(s) Oral daily  levETIRAcetam 500 milliGRAM(s) Oral two times a day  metoprolol tartrate 50 milliGRAM(s) Oral two times a day  neomycin/bacitracin/polymyxin Topical Ointment 1 Application(s) Topical two times a day  senna 2 Tablet(s) Oral at bedtime  tamsulosin 0.4 milliGRAM(s) Oral at bedtime  zinc oxide 40% Paste 1 Application(s) Topical two times a day    MEDICATIONS  (PRN):  acetaminophen     Tablet .. 650 milliGRAM(s) Oral every 6 hours PRN Mild Pain (1 - 3)  melatonin 3 milliGRAM(s) Oral at bedtime PRN Insomnia  polyethylene glycol 3350 17 Gram(s) Oral daily PRN Constipation

## 2023-08-29 NOTE — DISCHARGE NOTE PROVIDER - REASON FOR ADMISSION
[facility] Hospitalist Progress Note    Subjective   Patient referred feeling much better, she is breathing well.  He has a catheter in the right side of the neck which will be removed today    Objective  No fever or chills    I/O's    Intake/Output Summary (Last 24 hours) at 8/29/2023 0734  Last data filed at 8/29/2023 0000  Gross per 24 hour   Intake 900 ml   Output 1500 ml   Net -600 ml       Last Recorded Vitals  Vitals with min/max:      Vital Last Value 24 Hour Range   Temperature 97.5 °F (36.4 °C) (08/29/23 0610) Temp  Min: 97.5 °F (36.4 °C)  Max: 98.8 °F (37.1 °C)   Pulse 61 (08/29/23 0610) Pulse  Min: 61  Max: 70   Respiratory 14 (08/29/23 0610) Resp  Min: 14  Max: 18   Non-Invasive  Blood Pressure 136/75 (08/29/23 0610) BP  Min: 110/67  Max: 154/81   Pulse Oximetry 97 % (08/29/23 0610) SpO2  Min: 92 %  Max: 100 %   Arterial   Blood Pressure   No data recorded      Body mass index is 38.32 kg/m².    ROS  10-point review of systems is negative except otherwise as stated above    Physical Exam:  GENERAL:  In no apparent distress  HEENT: normocephalic, atraumatic, pupils reactive to light, conjunctiva is normal. Ear lobes normal, nose normal, throat moist no masses  Neck: Supple no JVD, no thyroid palpable, no bruit.  Neck with known toenail catheter  CHEST:  Symmetric on expansion  LUNGS:  with clear breath sounds bilaterally.   CARDIAC:  Regular rate and rhythm.  S1 and S2  ABDOMEN:  Soft, without detectable tenderness.  No sign of distention.  No   rebound or guarding, and no masses palpated.  EXTREMITIES::  Good range of motion of all major joints. no calf tenderness    VASCULAR:  No Edema.  Peripheral pulses normal and equal in all extremities  NEUROLOGIC: AAOX3 no acute distress, normal tone, normal sensitivity, motor force, reflex normal.  PSYCH:  no depression or anxiety     Labs   Recent Labs     08/27/23  0505 08/28/23  0415 08/29/23  0433   WBC 7.3 6.8 8.4   RBC 3.25* 3.21* 3.51*   HGB 8.8* 8.8*  9.6*   HCT 27.8* 27.3* 30.4*    249 292   MCV 85.5 85.0 86.6   MCH 27.1 27.4 27.4   MCHC 31.7* 32.2 31.6*   NRBCRE 0 0 0         Recent Labs     08/26/23  1347 08/27/23  0505 08/28/23  0415 08/29/23  0433   SODIUM 135 137 135 135   POTASSIUM 3.8 3.8 4.0 4.1   MG  --  2.2 2.1  --    PHOS  --  4.6 5.4*  --    CO2 30 28 29 30   ANIONGAP 8 11 8 8   GLUCOSE 165* 106* 161* 109*   BUN 28* 34* 43* 48*   CREATININE 2.65* 3.74* 3.73* 3.13*   CALCIUM 8.1* 8.6 8.6 9.1   BILIRUBIN 0.2 0.3 0.2  --    AST 21 18 15  --    GPT 31 27 21  --    ALKPT 125* 113 116  --    GLOB 4.2* 4.0 3.9  --    AGR 0.7* 0.7* 0.7*  --         Recent Labs   Lab 08/25/23  0050   NTPROB 12,608*        No results for input(s): \"INR\", \"PT\", \"PTT\" in the last 72 hours.     Imaging  US KIDNEY BILATERAL   Final Result by Angel Thacker MD (08/25 2034)   Normal sonographic appearance of the kidneys.         Electronically Signed by: ANGEL THACKER M.D.    Signed on: 8/25/2023 8:34 PM    Workstation ID: [id number]      XR CHEST AP OR PA   Final Result by Li Hernandez MD (08/25 9866)   1.   Right central venous catheter tip terminates at the superior   cavoatrial junction.   2.   Mildly improved patchy perihilar airspace opacities. Findings likely   reflect improved pulmonary edema.               Electronically Signed by: LI HERNANDEZ MD    Signed on: 8/25/2023 2:18 PM    Workstation ID: [id number]      XR CHEST PA OR AP 1 VIEW   Final Result by Marry Ramires DO (08/25 0208)      The heart is enlarged.  Findings concerning for mild pulmonary vascular   congestion. There are bilateral interstitial and alveolar opacities   concerning for edema. Pneumonia/pneumonitis is not excluded there are small   bilateral pleural effusions. No pneumothorax appreciated.      Electronically Signed by: MARRY RAMIRES M.D.    Signed on: 8/25/2023 2:08 AM    Workstation ID: [id number]      IR PROCEDURE REQUEST    (Results Pending)           Echo:  No valid procedures specified.     Assessment/Plan    Active Hospital Problems    Diagnosis    • Acute on chronic diastolic heart failure (CMD)    • Flash pulmonary edema (CMD)    • Hypertensive urgency    • Acute respiratory failure with hypoxia (CMD)    • Type 2 diabetes mellitus with stage 4 chronic kidney disease, without long-term current use of insulin (CMD)    • Acute renal failure superimposed on stage 4 chronic kidney disease (CMD)    • JUN (obstructive sleep apnea)    • Anemia in stage 4 chronic kidney disease (CMD)    • PVD (peripheral vascular disease) (CMD)    • NSTEMI (non-ST elevated myocardial infarction)  Recent    • Musculoskeletal chest pain    • HTN (hypertension)    • Obesity    • CAD (coronary artery disease)      PLAN:  - Diuretics per nephrology as well as possible long term dialysis; will f/u; renal function stable  Musculoskeletal chest pain- Resolved after lidocaine patch  Acute hypoxemic respiratory failure- Resolved  - Off oxygen  -On nightly BiPAP here but discussed with critical care attending and patient can resume nightly CPAP at home on DC; reviewed ABG on admission  Type 2DM uncontrolled  -A1c 14.8, continue Lantus 30 units nightly as well as sliding scale  - Continue carb controlled diet and will wait for diabetic educator  -Sugars controlled in hospital  -Controlled with Norvasc, Coreg, Imdur  - No signs of bleeding  -Recent drug-eluting stent and per cardiology, patient will be on dual antiplatelet therapy for 1 year from 8/22  - Per cardiology/nephrology, patient will no longer be on Xarelto due to renal disease for PAD  · 08/29/23 , 1:34 PM I have review patient records,  Performed physical exam,  notes, analysed labs, vital signs, I/O radiology, nurses notes.  According to nephrology she does not need dialysis any longer, patient creatinine down to 3.7, patient with good urine output will remove the nodule hemodialysis catheter today, continue  with Bumex 2 mg twice daily, carvedilol 25 mg twice daily Imdur 60 mg daily.  We will plan to check BMP tomorrow and if stable will plan to discharge her home      Current Facility-Administered Medications   Medication Dose Route Frequency Provider Last Rate Last Admin   • lidocaine (LIDOCARE) 4 % patch 1 patch  1 patch Transdermal Daily González Barfield MD   1 patch at 08/28/23 1412   • iron sucrose (VENOFER) injection 200 mg  200 mg Intravenous Q48H Susan Trinidad MD   200 mg at 08/27/23 2141   • bumetanide (BUMEX) tablet 2 mg  2 mg Oral BID Brain Vazquez DO   2 mg at 08/28/23 2054   • heparin (porcine) injection 5,000 Units  5,000 Units Subcutaneous 3 times per day Jaime Lind DO   5,000 Units at 08/29/23 0440   • sodium chloride (PF) 0.9 % injection 2 mL  2 mL Intracatheter 2 times per day Higinio Woods MD   2 mL at 08/28/23 2100   • clopidogrel (PLAVIX) tablet 75 mg  75 mg Oral Daily Higinio Woods MD   75 mg at 08/28/23 0843   • aspirin chewable 81 mg  81 mg Oral Daily Higinio Woods MD   81 mg at 08/28/23 0843   • rosuvastatin (CRESTOR) tablet 10 mg  10 mg Oral Daily Higinio Woods MD   10 mg at 08/28/23 0843   • insulin lispro (ADMELOG,HumaLOG) - Scheduled Mealtime Dose   Subcutaneous TID Brain Beck DO   9 Units at 08/28/23 1834   • insulin lispro (ADMELOG,HumaLOG) - Correction Dose   Subcutaneous Nightly Brain Vazquez DO       • insulin lispro (ADMELOG,HumaLOG) - Correction Dose   Subcutaneous TID Brain Beck DO   15 Units at 08/28/23 1834   • carvedilol (COREG) tablet 25 mg  25 mg Oral 2 times per day Phi Daniel MD   25 mg at 08/28/23 2054   • insulin glargine (LANTUS) injection 30 Units  30 Units Subcutaneous Nightly Pearce, Yancy, DO   30 Units at 08/28/23 2054   • isosorbide mononitrate (IMDUR) ER tablet 60 mg  60 mg Oral Daily Ac Resendiz MD   60 mg at 08/28/23 0842     Current Facility-Administered Medications   Medication Dose  Route Frequency Provider Last Rate Last Admin   • melatonin tablet 6 mg  6 mg Oral QHS PRN Danielle Downs CNP   6 mg at 08/29/23 0440   • acetaminophen (TYLENOL) tablet 650 mg  650 mg Oral Q4H PRN Brain Vzaquez DO   650 mg at 08/28/23 0839   • HYDROcodone-acetaminophen (NORCO) 5-325 MG per tablet 1 tablet  1 tablet Oral Q4H PRN Brain Vazquez DO   1 tablet at 08/28/23 2054   • sodium citrate anticoagulant 4 % flush 3 mL  3 mL Intracatheter PRN Susan Trinidad MD   3 mL at 08/26/23 1426   • albumin human (SPA) 25 % injection 12.5 g  12.5 g Intravenous PRN Susan Trinidad MD 50 mL/hr at 08/27/23 1212 Rate Verify at 08/27/23 1212   • ondansetron (ZOFRAN) injection 4 mg  4 mg Intravenous Q12H PRN Brain Vazquez DO   4 mg at 08/26/23 1339   • sodium chloride 0.9 % flush bag 25 mL  25 mL Intravenous PRN Higinio Woods MD       • sodium chloride 0.9% infusion   Intravenous Continuous PRN Higinio Woods MD       • sodium chloride 0.9% infusion   Intravenous Continuous PRN Higinio Woods MD       • sodium chloride (NORMAL SALINE) 0.9 % bolus 500 mL  500 mL Intravenous PRN Higinio Woods MD       • sodium chloride (NORMAL SALINE) 0.9 % bolus 100-200 mL  100-200 mL Intravenous PRN Susan Trinidad MD       • labetalol (NORMODYNE) injection 10 mg  10 mg Intravenous Q6H PRN Phi Daniel MD           DVT Prophylaxis  Current Active Medications for DVT Prophylaxis (From admission, onward)         Stop     heparin (porcine) injection 5,000 Units  5,000 Units,   Subcutaneous,   3 times per day         --                   Discussed with patient, nurse, Consultant      Marley Mcghee MD  Hospitalist   8/29/2023    The 21st Century Cures Act makes medical notes like these available to patients in the interest of transparency. However, be advised this is a medical document. It is intended as peer to peer communication. It is written in medical language and may contain abbreviations,  jargon, and other verbiage that could be misleading or confusing to lay persons. It may appear blunt or direct. Medical documents are intended to carry relevant information, facts as evident, and the clinical opinion of the physician.           generalized weakness and somnolence

## 2023-09-12 NOTE — ED PROVIDER NOTE - NS ED ROS FT
Opioid Pregnancy And Lactation Text: These medications can lead to premature delivery and should be avoided during pregnancy. These medications are also present in breast milk in small amounts. REVIEW OF SYSTEMS:  General:  no fever, no chills  HEENT: no headache, no vision changes  Cardiac: no chest pain,   Respiratory: no cough, no shortness of breath  Gastrointestinal: no abdominal pain, no nausea, no vomiting, no diarrhea, no melena, no hematochezia   Genitourinary: no hematuria, no dysuria, no urinary frequency, no urinary hesitancy   Neuro: +dizziness  -Hope Ng PGY-3

## 2023-10-14 NOTE — H&P ADULT - NSHPPHYSICALEXAM_GEN_ALL_CORE
Watch for signs of infection; redness, swelling, fever, chills or heat, report such symptoms to the MD. No driving while taking pain medication, it causes drowsiness & constipation. Drink 6-8 glasses of fluids daily to promote hydration. No heavy lifting, pulling or pushing heavy objects. Follow up with the MD as per discharge orders  Vital Signs Last 24 Hrs  T(C): 36.5 (06 Jun 2021 16:46), Max: 36.9 (06 Jun 2021 12:15)  T(F): 97.7 (06 Jun 2021 16:46), Max: 98.5 (06 Jun 2021 12:15)  HR: 78 (06 Jun 2021 16:46) (78 - 91)  BP: 129/74 (06 Jun 2021 16:46) (118/68 - 129/74)  BP(mean): --  RR: 18 (06 Jun 2021 16:46) (17 - 18)  SpO2: 100% (06 Jun 2021 16:46) (95% - 100%)      CONSTITUTIONAL: NAD, well-developed, elderly  EYES: PERRL, EOMI; conjunctiva and sclera clear  ENMT: Moist oral mucosa, no pharyngeal injection or exudates; poor dentition  NECK: Supple, no palpable masses; no thyromegaly  RESPIRATORY: Normal respiratory effort; lungs are clear to auscultation bilaterally  CARDIOVASCULAR: Regular rate and rhythm, normal S1 and S2, no murmur/rub/gallop;   EXTREMITIES: 2+ BL pitting edema in LE BL upto hips; Peripheral pulses are 2+ bilaterally  ABDOMEN: Nontender to palpation, normoactive bowel sounds, no rebound/guarding;   MUSCULOSKELETAL; no clubbing or cyanosis of digits; no joint swelling or tenderness to palpation  PSYCH: A+O to person, place ; affect appropriate  NEUROLOGY: CN 2-12 are intact and symmetric; no gross sensory/motor deficits   SKIN: three areas of DTI in the back, foam dressing on heels BL - skin intact underneath and dorsal left foot

## 2023-12-26 NOTE — PROGRESS NOTE ADULT - PROBLEM SELECTOR PLAN 1
Patient with fever, tachycardia, leukocytosis, lactic acidosis and pyuria c/w sepsis due to UTI. Urine cx from the previous episode grew pansensitive E.coli   -Blood cxs 11/30/21 --> 100K Proteus mirabilis  -Urine cx 12/1/21 --> 100K Proteus mirabilis  -Repeat blood cxs 12/3/21 --> (-)  -Urine cx 12/4/21 --> (-)  -Zosyn 11/30 --> 12/3  -Ceftriaxone 12/3 --> 12/9  -Appreciate ID
[FreeTextEntry1] : Pt descended to 2.0 MARIMAR @ 1.5 PSI/min without incident in chamber #4 Pt resting @ depth with chest rise and fall observed throughout tx.  Pt ascended from 2.0 MARIMAR @ 1.5 PSI/min without incident.  Pt tolerated tx well.
Patient with fever, tachycardia, leukocytosis, lactic acidosis and pyuria c/w sepsis due to UTI. Urine cx from the previous episode grew pansensitive E.coli   -Blood cxs 11/30/21 --> 100K Proteus mirabilis  -Urine cx 12/1/21 --> 100K Proteus mirabilis  -Repeat blood cxs 12/3/21 --> (-)  -Urine cx 12/4/21 --> (-)  -Zosyn 11/30 --> 12/3  -Ceftriaxone 12/3 --> 12/9  -Appreciate ID
Patient with fever, tachycardia, leukocytosis, lactic acidosis and pyuria c/w sepsis due to UTI. Urine cx from the previous episode grew pansensitive E.coli   -Blood cxs 11/30/21 --> 100K Proteus mirabilis  -Urine cx 12/1/21 --> 100K Proteus mirabilis  -Repeat blood cxs 12/3/21 --> (-)  -Urine cx 12/4/21 --> (-)  -Zosyn 11/30 --> 12/3  -Ceftriaxone 12/3 --> 12/9  -Appreciate ID
Patient with fever, tachycardia, leukocytosis, lactic acidosis and pyuria c/w sepsis due to UTI. Urine cx from the previous episode grew pansensitive E.coli   -Blood cxs 11/30/21 --> 100K Proteus mirabilis  -Urine cx 12/1/21 --> 100K Proteus mirabilis  -Repeat blood cxs 12/3/21 --> (-)  -Urine cx 12/4/21 --> (-)  -Zosyn 11/30 --> 12/3  -Ceftriaxone 12/3 --> 12/9 (completed course)  -Appreciate ID
Patient with fever, tachycardia, leukocytosis, lactic acidosis and pyuria c/w sepsis due to UTI. Urine cx from the previous episode grew pansensitive E.coli   -C/w zosyn for now  -Blood cxs 11/30/21 grew > 100K GNR  -Urine cx 12/1/21 grew > 100K GNR  -De-escalate abx depending on ucx   -Consider CT scan A/P if symptoms do not resolve with abx  -Appreciate ID
Patient with fever, tachycardia, leukocytosis, lactic acidosis and pyuria c/w sepsis due to UTI. Urine cx from the previous episode grew pansensitive E.coli   -Blood cxs 11/30/21 --> 100K Proteus mirabilis  -Urine cx 12/1/21 --> 100K Proteus mirabilis  -Repeat blood cxs 12/3/21 --> (-)  -Urine cx 12/4/21 --> (-)  -Zosyn 11/30 --> 12/3  -Ceftriaxone 12/3 --> 12/9  -Appreciate ID
Patient with fever, tachycardia, leukocytosis, lactic acidosis and pyuria c/w sepsis due to UTI. Urine cx from the previous episode grew pansensitive E.coli   -Blood cxs 11/30/21 --> 100K Proteus mirabilis  -Urine cx 12/1/21 --> 100K Proteus mirabilis  -Repeat blood cxs 12/3/21 -->  -Zosyn 11/30 --> 12/3  -Ceftriaxone 12/3 -->  -Appreciate ID
Patient with fever, tachycardia, leukocytosis, lactic acidosis and pyuria c/w sepsis due to UTI. Urine cx from the previous episode grew pansensitive E.coli   -Blood cxs 11/30/21 --> 100K Proteus mirabilis  -Urine cx 12/1/21 --> 100K Proteus mirabilis  -Repeat blood cxs 12/3/21 -->  -Zosyn 11/30 --> 12/3  -Ceftriaxone 12/3 -->  -Appreciate ID
Patient with fever, tachycardia, leukocytosis, lactic acidosis and pyuria c/w sepsis due to UTI. Urine cx from the previous episode grew pansensitive E.coli   -Blood cxs 11/30/21 --> 100K Proteus mirabilis  -Urine cx 12/1/21 --> 100K Proteus mirabilis  -Repeat blood cxs 12/3/21 --> (-)  -Urine cx 12/4/21 --> (-)  -Zosyn 11/30 --> 12/3  -Ceftriaxone 12/3 --> 12/9 (completed course)  -Appreciate ID
Patient with fever, tachycardia, leukocytosis, lactic acidosis and pyuria c/w sepsis due to UTI. Urine cx from the previous episode grew pansensitive E.coli   -Blood cxs 11/30/21 --> 100K Proteus mirabilis  -Urine cx 12/1/21 --> 100K Proteus mirabilis  -Repeat blood cxs 12/3/21 --> (-)  -Urine cx 12/4/21 --> (-)  -Zosyn 11/30 --> 12/3  -Ceftriaxone 12/3 --> 12/9 (completed course)  -Appreciate ID
Patient with fever, tachycardia, leukocytosis, lactic acidosis and pyuria c/w sepsis due to UTI. Urine cx from the previous episode grew pansensitive E.coli   -Blood cxs 11/30/21 --> 100K Proteus mirabilis  -Urine cx 12/1/21 --> 100K Proteus mirabilis  -Repeat blood cxs 12/3/21 -->  -Zosyn 11/30 --> 12/3  -Ceftriaxone 12/3 -->  -Appreciate ID

## 2024-01-04 NOTE — ED ADULT NURSE NOTE - NSIMPLEMENTINTERV_GEN_ALL_ED
Patient was last seen in follow up by Tangela Borjas NP on 2/8/17. Last lipid profile and CMP was obtained on 2/8/17. Refill request for Simvastatin 10 mg daily completed per protocol.  
Implemented All Fall with Harm Risk Interventions:  [place] to call system. Call bell, personal items and telephone within reach. Instruct patient to call for assistance. Room bathroom lighting operational. Non-slip footwear when patient is off stretcher. Physically safe environment: no spills, clutter or unnecessary equipment. Stretcher in lowest position, wheels locked, appropriate side rails in place. Provide visual cue, wrist band, yellow gown, etc. Monitor gait and stability. Monitor for mental status changes and reorient to person, place, and time. Review medications for side effects contributing to fall risk. Reinforce activity limits and safety measures with patient and family. Provide visual clues: red socks.
none

## 2024-02-08 NOTE — PHYSICAL THERAPY INITIAL EVALUATION ADULT - DIAGNOSIS, PT EVAL
Venous Duplex      Indications:  Limb pain  Leg Edema    Sonographer: Aicha Gibbons RVT    TECHNIQUE:  Venous Duplex ultrasound spectral Doppler wave modality was performed with the patient in the supine and upright positions to determine the status of the deep and superficial systems of the right and left lower extremity. The common femoral, femoral and popliteal veins of the deep venous system were imaged. The superficial system was imaged entirely to include, but was not limited to, the saphenous-femoral junction (SFJ) down the greater saphenous vein from the groin to the ankle, and the saphenous-popliteal junction (SPJ), if present, at the popliteal fossa down the small saphenous vein (SSV) to the ankle. As indicated, the cranial extensions of the SSV (CESSV), the anterior accessory GSV (AAGSV), and the posterior accessory GSV (PAGSV) were also evaluated, if present. All areas meeting the criteria for venous reflux (500 milliseconds or greater in the saphenous veins and principle branches, 350 milliseconds in perforators and 1000 milliseconds in the deep system) were confirmed with spectral Doppler analysis and confirmatory images were documented:     FINDINGS ARE THE FOLLOWING:    RIGHT LEG:  There is no evidence of thrombus in the interrogated segments of the deep or superficial venous system. There is no evidence of deep venous reflux. There is incompetence of the saphenous-femoral junction.    GSV dimensions: 3.6mm junction  6.1mm proximal  5.0mm mid  3.2mm distal  There is >0.5 seconds of reflux of the Greater Saphenous Vein  The reflux extends along the entire length of the GSV.    SSV dimensions: 2.3mm junction  2.2mm mid  There is <0.5 seconds of reflux in the Small Saphenous Vein    There are multiple incompetent tributaries along the thigh and lower leg.    Trib1 dimensions: 3.4mm  Trib2 dimensions: 3.1mm  There is >0.5 seconds of reflux in the tributaries       LEFT LEG:  There is no evidence of  thrombus in the interrogated segments of the deep or superficial venous system. There is no evidence of deep venous reflux.     GSV dimensions: 4.8mm junction  6.2mm proximal  3.4mm mid  2.7mm distal  There is <0.5 seconds of reflux of the Greater Saphenous Vein  The reflux extends along the entire length of the GSV.    SSV dimensions: 1.9mm junction  1.9mm mid  There is <0.5 seconds of reflux in the Small Saphenous Vein    AAGSV dimensions: 2.9mm   There is <0.5 seconds of reflux in the Anterior Accessory Saphenous Vein        CONCLUSIONS:  Patient's left leg deep and superficial veins are without any reflux.  There is no evidence of thrombus in the interrogated segments of the deep or superficial venous system in both legs.  No evidence of deep venous reflux in both legs.  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  Numerous incompetent tributaries are seen in the RT LE as noted in the MAP measured more than 3 mm in diameter.    Discussions  As per the detailed reflux study of the legs patient may benefit from DIANE of  RT GSV, followed by adjunctive Ultrasound guided noncompounded foam sclerotherapy of refluxing remnant of truncal vein X 1 right leg.  Patient also may need adjunctive image guided foam sclerotherapy X 1  in right leg.         Impaired functional mobility

## 2025-01-22 NOTE — H&P ADULT - NSICDXPILOT_GEN_ALL_CORE
PAST SURGICAL HISTORY:  Gastrojejunal (GJ) tube in place     History of biliary stent insertion     History of colon resection     Stented coronary artery      [place] Admission

## 2025-05-22 NOTE — DISCHARGE NOTE PROVIDER - DISCHARGE DATE
Body Location Override (Optional - Billing Will Still Be Based On Selected Body Map Location If Applicable): left clavicular skin Detail Level: Detailed Add 1585x Cpt? (Do Not Bill If You Billed For The Procedure Placing The Sutures. This Is An Add-On Code That Must Be Billed With An E/M Visit Code): No Suture Removal Completed By (Optional): Audra 22-Jun-2021

## 2025-06-16 NOTE — PROGRESS NOTE ADULT - PROBLEM SELECTOR PLAN 1
Note to the patient: The 21st Century Cures Act makes medical notes like these available to patients in the interest of transparency. However, be advised that this is a medical document. It is intended as [id number] communication and fact documentation. It is written in medical language and may contain abbreviations or verbiage that are unfamiliar. It may appear blunt or direct. Medical documents are intended to carry relevant information, facts as evident, and the clinical opinion of the practitioner.      Lab Result Notifications    If labs were ordered at your appointment today, we will contact you with results once all your labs have resulted. Please note certain hormone labs can take up to 10 business days to result.     Prescription Policy     Our goal is to serve you on a more timely basis. Currently, our office receives a large volume of phone requests for medication refills. We are requesting your cooperation with the following:    Look over your medications, diabetic supplies, etc. before coming to your appointment to see if you need any refills.    Request your medication (or supply) refills during your office visit.    Outside of an office visit, requests should be directed to your pharmacy even if you have zero refills. Call your pharmacy after 72 hours to see if your prescription is ready for pick-up.     Pharmacy Refills: All prescriptions take 48-72 hours to refill.          Our Patients are Important    Our goal is for your appointment to start at your appointment time.     Please arrive 15 minutes early to allow our staff adequate time to prepare you for your visit to meet with your provider at the scheduled appointment time.     Additionally, if you need to cancel or change your appointment our clinic requests 24 - 48 hours notice, to allow us to refill that open appointment.     We want to improve and you can help. You may receive a survey asking you about this visit. Please complete  this survey; we will use your feedback to make improvements. Thank you.       -  - vitals stable  - gentle ivf. can stop today and reassess vitals.   - florinef as ordered.
